# Patient Record
Sex: FEMALE | Race: WHITE | NOT HISPANIC OR LATINO | Employment: OTHER | URBAN - METROPOLITAN AREA
[De-identification: names, ages, dates, MRNs, and addresses within clinical notes are randomized per-mention and may not be internally consistent; named-entity substitution may affect disease eponyms.]

---

## 2018-05-22 ENCOUNTER — OFFICE VISIT (OUTPATIENT)
Dept: FAMILY MEDICINE | Facility: CLINIC | Age: 68
End: 2018-05-22
Payer: MEDICARE

## 2018-05-22 VITALS
HEIGHT: 60 IN | RESPIRATION RATE: 18 BRPM | BODY MASS INDEX: 28.86 KG/M2 | SYSTOLIC BLOOD PRESSURE: 118 MMHG | DIASTOLIC BLOOD PRESSURE: 68 MMHG | TEMPERATURE: 98 F | HEART RATE: 73 BPM | WEIGHT: 147 LBS | OXYGEN SATURATION: 98 %

## 2018-05-22 DIAGNOSIS — L73.2 HYDRADENITIS: ICD-10-CM

## 2018-05-22 DIAGNOSIS — M53.3 COCCYDYNIA: Primary | ICD-10-CM

## 2018-05-22 PROCEDURE — 99204 OFFICE O/P NEW MOD 45 MIN: CPT | Mod: S$GLB,,, | Performed by: FAMILY MEDICINE

## 2018-05-22 PROCEDURE — 3008F BODY MASS INDEX DOCD: CPT | Mod: CPTII,S$GLB,, | Performed by: FAMILY MEDICINE

## 2018-05-22 PROCEDURE — 99999 PR PBB SHADOW E&M-NEW PATIENT-LVL IV: CPT | Mod: PBBFAC,,, | Performed by: FAMILY MEDICINE

## 2018-05-22 RX ORDER — ALPRAZOLAM 0.5 MG/1
0.5 TABLET ORAL 2 TIMES DAILY PRN
Refills: 0 | COMMUNITY
Start: 2018-03-20 | End: 2018-10-31 | Stop reason: SDUPTHER

## 2018-05-22 RX ORDER — SULFAMETHOXAZOLE AND TRIMETHOPRIM 800; 160 MG/1; MG/1
1 TABLET ORAL 2 TIMES DAILY
Qty: 20 TABLET | Refills: 0 | Status: SHIPPED | OUTPATIENT
Start: 2018-05-22 | End: 2018-06-01

## 2018-05-22 RX ORDER — AMITRIPTYLINE HYDROCHLORIDE 25 MG/1
25 TABLET, FILM COATED ORAL NIGHTLY PRN
Refills: 2 | COMMUNITY
Start: 2018-02-23 | End: 2019-05-13

## 2018-05-22 RX ORDER — ROSUVASTATIN CALCIUM 40 MG/1
40 TABLET, COATED ORAL DAILY
COMMUNITY
End: 2021-05-31 | Stop reason: SDUPTHER

## 2018-05-22 RX ORDER — LOSARTAN POTASSIUM 25 MG/1
25 TABLET ORAL DAILY
Refills: 3 | COMMUNITY
Start: 2018-03-01 | End: 2018-12-05

## 2018-05-22 RX ORDER — BUTALBITAL, ACETAMINOPHEN AND CAFFEINE 50; 325; 40 MG/1; MG/1; MG/1
TABLET ORAL
Refills: 4 | COMMUNITY
Start: 2018-05-04 | End: 2018-10-31 | Stop reason: SDUPTHER

## 2018-05-22 RX ORDER — FLUTICASONE PROPIONATE 50 MCG
1 SPRAY, SUSPENSION (ML) NASAL DAILY
COMMUNITY
End: 2022-05-25

## 2018-05-23 ENCOUNTER — TELEPHONE (OUTPATIENT)
Dept: FAMILY MEDICINE | Facility: CLINIC | Age: 68
End: 2018-05-23

## 2018-05-23 NOTE — TELEPHONE ENCOUNTER
----- Message from Damaris Adrian sent at 5/23/2018 10:09 AM CDT -----  Contact: [phone]/ self   Patient would like to know if you've gotten her xray results. Please advise.

## 2018-05-25 ENCOUNTER — OFFICE VISIT (OUTPATIENT)
Dept: FAMILY MEDICINE | Facility: CLINIC | Age: 68
End: 2018-05-25
Payer: MEDICARE

## 2018-05-25 VITALS
TEMPERATURE: 98 F | SYSTOLIC BLOOD PRESSURE: 122 MMHG | DIASTOLIC BLOOD PRESSURE: 78 MMHG | WEIGHT: 147 LBS | RESPIRATION RATE: 18 BRPM | BODY MASS INDEX: 28.86 KG/M2 | OXYGEN SATURATION: 97 % | HEIGHT: 60 IN | HEART RATE: 78 BPM

## 2018-05-25 DIAGNOSIS — Z12.39 BREAST CANCER SCREENING: ICD-10-CM

## 2018-05-25 DIAGNOSIS — Z12.11 COLON CANCER SCREENING: ICD-10-CM

## 2018-05-25 DIAGNOSIS — R80.9 MICROALBUMINURIA: Primary | ICD-10-CM

## 2018-05-25 DIAGNOSIS — E11.29 TYPE 2 DIABETES MELLITUS WITH OTHER DIABETIC KIDNEY COMPLICATION, WITHOUT LONG-TERM CURRENT USE OF INSULIN: ICD-10-CM

## 2018-05-25 PROCEDURE — 99214 OFFICE O/P EST MOD 30 MIN: CPT | Mod: S$GLB,,, | Performed by: FAMILY MEDICINE

## 2018-05-25 PROCEDURE — 99999 PR PBB SHADOW E&M-EST. PATIENT-LVL IV: CPT | Mod: PBBFAC,,, | Performed by: FAMILY MEDICINE

## 2018-05-25 PROCEDURE — 3008F BODY MASS INDEX DOCD: CPT | Mod: CPTII,S$GLB,, | Performed by: FAMILY MEDICINE

## 2018-05-28 NOTE — PROGRESS NOTES
HPI:  Jayla Loyd is a 67 y.o. year old female that  Presents fro test results  Chief Complaint   Patient presents with    Follow-up     xray/lab results   .     HPI      Past Medical History:   Diagnosis Date    Diabetes mellitus type I     Migraines     Proteinuria      Social History     Social History    Marital status:      Spouse name: N/A    Number of children: N/A    Years of education: N/A     Occupational History    Not on file.     Social History Main Topics    Smoking status: Never Smoker    Smokeless tobacco: Never Used    Alcohol use Yes      Comment: rarely    Drug use: No    Sexual activity: Not on file     Other Topics Concern    Not on file     Social History Narrative    No narrative on file     History reviewed. No pertinent surgical history.  Family History   Problem Relation Age of Onset    Bladder Cancer Mother     Hypertension Mother         CABG    Heart failure Father         CABG    Hypertension Father     No Known Problems Son            Review of Systems  General ROS: negative for chills, fever or weight loss  ENT ROS: negative for epistaxis, sore throat or rhinorrhea  Respiratory ROS: no cough, shortness of breath, or wheezing  Cardiovascular ROS: no chest pain or dyspnea on exertion  Gastrointestinal ROS: no abdominal pain, change in bowel habits, or black/ bloody stools    Physical Exam:  /78 (BP Location: Right arm, Patient Position: Sitting, BP Method: Medium (Manual))   Pulse 78   Temp 97.6 °F (36.4 °C) (Oral)   Resp 18   Ht 5' (1.524 m)   Wt 66.7 kg (147 lb)   LMP 05/25/1998 (Approximate)   SpO2 97%   BMI 28.71 kg/m²   General appearance: alert, cooperative, no distress  Constitutional:Oriented to person, place, and time.appears well-developed and well-nourished.  HEENT: Normocephalic, atraumatic, neck symmetrical, no nasal discharge, TM- clear bilaterally  Lungs: clear to auscultation bilaterally, no dullness to percussion  bilaterally  Heart: regular rate and rhythm without rub; no displacement of the PMI , S1&S2 present  Abdomen: soft, non-tender; bowel sounds normoactive; no organomegaly  Physical Exam    LABS:    Complete Blood Count  No results found for: RBC, HGB, HCT, MCV, MCH, MCHC, RDW, PLT, MPV, GRAN, LYMPH, MONO, EOS, BASO, GRAN, LYMPH, MONO, EOSINOPHIL, BASOPHIL, DIFFMETHOD    Comprehensive Metabolic Panel  No results found for: GLU, BUN, CREATININE, NA, K, CL, PROT, ALBUMIN, BILITOT, AST, ALKPHOS, CO2, ALT, ANIONGAP, EGFRNONAA, ESTGFRAFRICA    LIPID  No results found for: CHOL, HDL      TSH  No results found for: TSH    Current Outpatient Prescriptions   Medication Sig Dispense Refill    ALPRAZolam (XANAX) 0.5 MG tablet Take 0.5 mg by mouth 2 (two) times daily.  0    amitriptyline (ELAVIL) 25 MG tablet Take 25 mg by mouth every evening.  2    butalbital-acetaminophen-caffeine -40 mg (FIORICET, ESGIC) -40 mg per tablet TAKE 1-2 TABLETS BY MOUTH EVERY 4 HOURS AS NEEDED. NOT EXCEED 6 TABLETS PER 24 HOURS.  4    citalopram (CELEXA) 10 MG tablet Take 40 mg by mouth once daily.       fluticasone (FLONASE) 50 mcg/actuation nasal spray 1 spray by Each Nare route once daily.      losartan (COZAAR) 25 MG tablet Take 25 mg by mouth once daily.  3    metformin (GLUCOPHAGE) 500 MG tablet Take 500 mg by mouth 2 (two) times daily with meals.      rosuvastatin (CRESTOR) 40 MG Tab Take 10 mg by mouth every evening.      sulfamethoxazole-trimethoprim 800-160mg (BACTRIM DS) 800-160 mg Tab Take 1 tablet by mouth 2 (two) times daily. 20 tablet 0     No current facility-administered medications for this visit.        Assessment:    ICD-10-CM ICD-9-CM    1. Microalbuminuria R80.9 791.0 Ambulatory Referral to Nephrology   2. Type 2 diabetes mellitus with other diabetic kidney complication, without long-term current use of insulin E11.29 250.40 Ambulatory Referral to Nephrology         Plan:    Follow-up in about 7 weeks  (around 7/11/2018).          Janie Do MD

## 2018-05-28 NOTE — PROGRESS NOTES
HPI:  Jayla Loyd is a 67 y.o. year old female that  presents for evaluation f a swelling between her buttok cheeks that feels like an uncomfortable mass when she is sitting. She fell on her bottom over a week ago. She also has a small swelling under her right arm pit.  Chief Complaint   Patient presents with    Mass     pt states its under her tailbone, also a cyst under her right arm pit that she would like to have evaluated    .     HPI      Past Medical History:   Diagnosis Date    Diabetes mellitus type I     Migraines     Proteinuria      Social History     Social History    Marital status:      Spouse name: N/A    Number of children: N/A    Years of education: N/A     Occupational History    Not on file.     Social History Main Topics    Smoking status: Never Smoker    Smokeless tobacco: Never Used    Alcohol use Yes      Comment: rarely    Drug use: No    Sexual activity: Not on file     Other Topics Concern    Not on file     Social History Narrative    No narrative on file     History reviewed. No pertinent surgical history.  Family History   Problem Relation Age of Onset    Bladder Cancer Mother     Hypertension Mother         CABG    Heart failure Father         CABG    Hypertension Father     No Known Problems Son            Review of Systems  General ROS: negative for chills, fever or weight loss  ENT ROS: negative for epistaxis, sore throat or rhinorrhea  Respiratory ROS: no cough, shortness of breath, or wheezing  Cardiovascular ROS: no chest pain or dyspnea on exertion  Gastrointestinal ROS: no abdominal pain, change in bowel habits, or black/ bloody stools  Skin: right axilla 2 swellings without drainage    Physical Exam:  /68 (BP Location: Right arm, Patient Position: Sitting, BP Method: Medium (Manual))   Pulse 73   Temp 98.2 °F (36.8 °C) (Oral)   Resp 18   Ht 5' (1.524 m)   Wt 66.7 kg (147 lb)   SpO2 98%   BMI 28.71 kg/m²   General appearance: alert,  cooperative, no distress  Constitutional:Oriented to person, place, and time.appears well-developed and well-nourished.  HEENT: Normocephalic, atraumatic, neck symmetrical, no nasal discharge, TM- clear bilaterally  Lungs: clear to auscultation bilaterally, no dullness to percussion bilaterally  Heart: regular rate and rhythm without rub; no displacement of the PMI , S1&S2 present  Abdomen: soft, non-tender; bowel sounds normoactive; no organomegaly  Axilla: single pustule and small single swelling fluctuant mass with enduration  Physical Exam    LABS:    Complete Blood Count  No results found for: RBC, HGB, HCT, MCV, MCH, MCHC, RDW, PLT, MPV, GRAN, LYMPH, MONO, EOS, BASO, GRAN, LYMPH, MONO, EOSINOPHIL, BASOPHIL, DIFFMETHOD    Comprehensive Metabolic Panel  No results found for: GLU, BUN, CREATININE, NA, K, CL, PROT, ALBUMIN, BILITOT, AST, ALKPHOS, CO2, ALT, ANIONGAP, EGFRNONAA, ESTGFRAFRICA    LIPID  No results found for: CHOL, HDL      TSH  No results found for: TSH    Current Outpatient Prescriptions   Medication Sig Dispense Refill    ALPRAZolam (XANAX) 0.5 MG tablet Take 0.5 mg by mouth 2 (two) times daily.  0    amitriptyline (ELAVIL) 25 MG tablet Take 25 mg by mouth every evening.  2    butalbital-acetaminophen-caffeine -40 mg (FIORICET, ESGIC) -40 mg per tablet TAKE 1-2 TABLETS BY MOUTH EVERY 4 HOURS AS NEEDED. NOT EXCEED 6 TABLETS PER 24 HOURS.  4    citalopram (CELEXA) 10 MG tablet Take 40 mg by mouth once daily.       fluticasone (FLONASE) 50 mcg/actuation nasal spray 1 spray by Each Nare route once daily.      losartan (COZAAR) 25 MG tablet Take 25 mg by mouth once daily.  3    metformin (GLUCOPHAGE) 500 MG tablet Take 500 mg by mouth 2 (two) times daily with meals.      rosuvastatin (CRESTOR) 40 MG Tab Take 10 mg by mouth every evening.      sulfamethoxazole-trimethoprim 800-160mg (BACTRIM DS) 800-160 mg Tab Take 1 tablet by mouth 2 (two) times daily. 20 tablet 0     No current  facility-administered medications for this visit.        Assessment:    ICD-10-CM ICD-9-CM    1. Coccydynia M53.3 724.79 X-Ray Sacrum And Coccyx   2. Hydradenitis L73.2 705.83 sulfamethoxazole-trimethoprim 800-160mg (BACTRIM DS) 800-160 mg Tab         Plan:    Follow-up in 3 days (on 5/25/2018).          Janie Do MD

## 2018-07-11 ENCOUNTER — TELEPHONE (OUTPATIENT)
Dept: FAMILY MEDICINE | Facility: CLINIC | Age: 68
End: 2018-07-11

## 2018-07-11 ENCOUNTER — OFFICE VISIT (OUTPATIENT)
Dept: FAMILY MEDICINE | Facility: CLINIC | Age: 68
End: 2018-07-11
Payer: MEDICARE

## 2018-07-11 VITALS
TEMPERATURE: 98 F | RESPIRATION RATE: 18 BRPM | WEIGHT: 146 LBS | DIASTOLIC BLOOD PRESSURE: 66 MMHG | OXYGEN SATURATION: 98 % | HEIGHT: 60 IN | BODY MASS INDEX: 28.66 KG/M2 | SYSTOLIC BLOOD PRESSURE: 122 MMHG | HEART RATE: 63 BPM

## 2018-07-11 DIAGNOSIS — G43.909 MIGRAINE WITHOUT STATUS MIGRAINOSUS, NOT INTRACTABLE, UNSPECIFIED MIGRAINE TYPE: Primary | ICD-10-CM

## 2018-07-11 DIAGNOSIS — Z11.59 NEED FOR HEPATITIS C SCREENING TEST: ICD-10-CM

## 2018-07-11 PROCEDURE — 99214 OFFICE O/P EST MOD 30 MIN: CPT | Mod: S$GLB,,, | Performed by: FAMILY MEDICINE

## 2018-07-11 PROCEDURE — 99999 PR PBB SHADOW E&M-EST. PATIENT-LVL III: CPT | Mod: PBBFAC,,, | Performed by: FAMILY MEDICINE

## 2018-07-11 PROCEDURE — 3008F BODY MASS INDEX DOCD: CPT | Mod: CPTII,S$GLB,, | Performed by: FAMILY MEDICINE

## 2018-07-11 PROCEDURE — 3078F DIAST BP <80 MM HG: CPT | Mod: CPTII,S$GLB,, | Performed by: FAMILY MEDICINE

## 2018-07-11 PROCEDURE — 3074F SYST BP LT 130 MM HG: CPT | Mod: CPTII,S$GLB,, | Performed by: FAMILY MEDICINE

## 2018-07-11 RX ORDER — BLOOD SUGAR DIAGNOSTIC
STRIP MISCELLANEOUS
Refills: 3 | COMMUNITY
Start: 2018-06-02 | End: 2022-12-30 | Stop reason: SDUPTHER

## 2018-07-11 RX ORDER — LANCETS
EACH MISCELLANEOUS
Refills: 3 | COMMUNITY
Start: 2018-06-02 | End: 2022-12-30 | Stop reason: SDUPTHER

## 2018-07-11 RX ORDER — KETOROLAC TROMETHAMINE 10 MG/1
10 TABLET, FILM COATED ORAL EVERY 6 HOURS PRN
Qty: 90 TABLET | Refills: 2 | Status: SHIPPED | OUTPATIENT
Start: 2018-07-11 | End: 2018-07-24

## 2018-07-11 NOTE — PROGRESS NOTES
HPI:  Jayla Loyd is a 67 y.o. year old female that  presents fro lab f/u. She would like to go over her labs from the nephrologist. She states that they were worried about her Phosphorous level. She was put on a very strict phosphorus restricted diet which she is finding very difficult to stick to.  She would like a medication to replace the Tramadol that she has been taking for her migraine headaches.  Chief Complaint   Patient presents with    Follow-up     lab results from another physican    Medication Problem     Tramadol   .     HPI      Past Medical History:   Diagnosis Date    Diabetes mellitus type I     GERD (gastroesophageal reflux disease)     Migraines     Proteinuria      Social History     Social History    Marital status:      Spouse name: N/A    Number of children: N/A    Years of education: N/A     Occupational History    Not on file.     Social History Main Topics    Smoking status: Never Smoker    Smokeless tobacco: Never Used    Alcohol use Yes      Comment: rarely    Drug use: Yes     Types: Amphetamines      Comment: per script from Dr. Vance    Sexual activity: Not on file     Other Topics Concern    Not on file     Social History Narrative    No narrative on file     Past Surgical History:   Procedure Laterality Date    CATARACT EXTRACTION       Family History   Problem Relation Age of Onset    Bladder Cancer Mother     Hypertension Mother         CABG    Cancer Mother     Heart disease Mother     Heart failure Father         CABG    Hypertension Father     Heart disease Father     No Known Problems Son     Cancer Maternal Aunt     Cancer Paternal Aunt     Diabetes Paternal Aunt     Cancer Maternal Grandmother     Heart disease Maternal Grandfather     Cancer Paternal Grandmother     Dementia Paternal Grandmother     Heart disease Paternal Grandfather            Review of Systems  General ROS: negative for chills, fever or weight loss  ENT ROS:  negative for epistaxis, sore throat or rhinorrhea  Respiratory ROS: no cough, shortness of breath, or wheezing  Cardiovascular ROS: no chest pain or dyspnea on exertion  Gastrointestinal ROS: no abdominal pain, change in bowel habits, or black/ bloody stools    Physical Exam:  /66   Pulse 63   Temp 98.3 °F (36.8 °C) (Oral)   Resp 18   Ht 5' (1.524 m)   Wt 66.2 kg (146 lb)   LMP 05/25/1998 (Approximate)   SpO2 98%   BMI 28.51 kg/m²   General appearance: alert, cooperative, no distress  Constitutional:Oriented to person, place, and time.appears well-developed and well-nourished.  Lungs: clear to auscultation bilaterally, no dullness to percussion bilaterally  Heart: regular rate and rhythm without rub; no displacement of the PMI , S1&S2 present    Physical Exam    LABS:    Complete Blood Count  Lab Results   Component Value Date    RBC 4.21 06/21/2018    HGB 12.0 06/21/2018    HCT 36 06/21/2018     06/21/2018    MPV 9.3 06/21/2018    EOSINOPHIL 1.9 06/21/2018    BASOPHIL 1 06/21/2018       Comprehensive Metabolic Panel  Lab Results   Component Value Date     (A) 06/21/2018    BUN 13 05/14/2018    CREATININE 0.65 06/21/2018     06/21/2018    K 3.9 06/21/2018     06/21/2018    PROT 6.8 06/21/2018    ALBUMIN 4.1 06/21/2018    AST 14 06/21/2018    CO2 30 06/21/2018    ALT 15 06/21/2018    ANIONGAP 17 05/14/2018       LIPID  No results found for: CHOL, HDL      TSH  No results found for: TSH    Current Outpatient Prescriptions   Medication Sig Dispense Refill    ACCU-CHEK SAPPHIRE PLUS TEST STRP Strp Use as directed every day  3    ACCU-CHEK SOFTCLIX LANCETS Misc USE AS DIRECTED EVERY DAY  3    ALPRAZolam (XANAX) 0.5 MG tablet Take 0.5 mg by mouth 2 (two) times daily as needed.   0    amitriptyline (ELAVIL) 25 MG tablet Take 25 mg by mouth nightly as needed.   2    butalbital-acetaminophen-caffeine -40 mg (FIORICET, ESGIC) -40 mg per tablet TAKE 1-2 TABLETS BY MOUTH  EVERY 4 HOURS AS NEEDED. NOT EXCEED 6 TABLETS PER 24 HOURS.  4    citalopram (CELEXA) 10 MG tablet Take 40 mg by mouth once daily.       coenzyme Q10 (CO Q-10) 100 mg capsule Take 100 mg by mouth once daily.      dicyclomine (BENTYL) 20 mg tablet Take 20 mg by mouth as needed.      esomeprazole (NEXIUM) 40 MG capsule Take 40 mg by mouth once daily.      ferrous sulfate (IRON) 325 mg (65 mg iron) CpSR Take 65 mg by mouth once daily.      fluticasone (FLONASE) 50 mcg/actuation nasal spray 1 spray by Each Nare route once daily.      folic acid/multivit-min/lutein (CENTRUM SILVER ORAL) Take by mouth once daily.      Lactobac no.41/Bifidobact no.7 (PROBIOTIC-10 ORAL) Take by mouth once daily.      losartan (COZAAR) 25 MG tablet Take 25 mg by mouth once daily. 1/2 daily  3    metformin (GLUCOPHAGE) 500 MG tablet Take 500 mg by mouth daily with breakfast.       pseudoephedrine (SUDAFED) 30 MG tablet Take 30 mg by mouth every 4 (four) hours as needed for Congestion.      ranitidine (ZANTAC) 150 MG capsule Take 150 mg by mouth 2 (two) times daily.      rosuvastatin (CRESTOR) 40 MG Tab Take 40 mg by mouth once daily.       traMADol (ULTRAM) 50 mg tablet Take 50 mg by mouth every 6 (six) hours as needed for Pain.      TURMERIC, BULK, MISC 1,500 mg by Misc.(Non-Drug; Combo Route) route once daily.      ketorolac (TORADOL) 10 mg tablet Take 1 tablet (10 mg total) by mouth every 6 (six) hours as needed for Pain. 90 tablet 2    magnesium oxide (MAG-OX) 400 mg tablet Take 400 mg by mouth once daily.       No current facility-administered medications for this visit.        Assessment:    ICD-10-CM ICD-9-CM    1. Migraine without status migrainosus, not intractable, unspecified migraine type G43.909 346.90 ketorolac (TORADOL) 10 mg tablet   2. Need for hepatitis C screening test Z11.59 V73.89 Hepatitis C antibody      Hepatitis C antibody         Plan:    Follow-up in 4 weeks (on 8/7/2018).          Janie STOREY  MD Hi

## 2018-07-11 NOTE — TELEPHONE ENCOUNTER
----- Message from Jailene Marshall sent at 7/11/2018 10:03 AM CDT -----  Contact: salo with [facility] [phone]  Pharmacist requested to speak with the nurse to verify if the doctor received the prior auth request for the ketorolac (TORADOL) 10 mg tablet. Please call and advise.

## 2018-07-12 ENCOUNTER — TELEPHONE (OUTPATIENT)
Dept: FAMILY MEDICINE | Facility: CLINIC | Age: 68
End: 2018-07-12

## 2018-07-12 ENCOUNTER — TELEPHONE (OUTPATIENT)
Dept: ADMINISTRATIVE | Facility: HOSPITAL | Age: 68
End: 2018-07-12

## 2018-07-12 NOTE — TELEPHONE ENCOUNTER
----- Message from Precious Flores sent at 7/12/2018 11:08 AM CDT -----  Contact: Self. [phone]  Patient would like to speak with you about [facility] not having the paperwork for her medication. Please advise

## 2018-07-12 NOTE — TELEPHONE ENCOUNTER
----- Message from Jennifer Hurley sent at 7/12/2018  9:12 AM CDT -----  Contact: Myron from [facility]/ [phone]  Pharmacy called in to ask if they can change ketorolac (TORADOL) 10 mg tablet to another medication.    Please call and advise.

## 2018-07-18 ENCOUNTER — TELEPHONE (OUTPATIENT)
Dept: FAMILY MEDICINE | Facility: CLINIC | Age: 68
End: 2018-07-18

## 2018-07-18 NOTE — TELEPHONE ENCOUNTER
----- Message from Deja Do sent at 7/18/2018  3:13 PM CDT -----  Contact: [phone]/ self  Patient requesting to speak with you regarding issues she's experiencing with her coccyx. Please advise. Pt prefers to speak with Nancy.

## 2018-07-23 NOTE — PROGRESS NOTES
CRS Office Visit History and Physical    Referring Md:   Aaareferral Self  No address on file    SUBJECTIVE:     Chief Complaint:  Tailbone pain    History of Present Illness:  Patient is a 67 y.o. female presents with tail bone pain. The patient is a new patient to this practice.   Course is as follows:  In May 2018, she fell directly on her tailbone.  She was seen by her primary care provider.  X-ray demonstrated no fracture.  She has continued to have intermittent pain with sitting.  This is not every time.  She has no pain with ambulation.  She has no pain during defecation.  No similar episodes in the past.  She has not taken any medication for this.  She does not feel the pain is improved or worsened over the past 2 months.  She presents for evaluation given the ongoing nature of the pain. No family history of colon or rectal cancer.  In terms of her functional status, she is constipated at baseline.  She takes fiber daily as well as Dulcolax.  She has 2-3 bowel movements per week.  This has been her baseline for multiple years.    Last Colonoscopy: 2/2/17  One diminutive polyp in the transverse colon.   Resected and retrieved.  Non bleeding internal hemorrhoids.   The examination was otherwise normal.   Repeat colonoscopy in 5 years for surveilance.       Review of patient's allergies indicates:   Allergen Reactions    Codeine Nausea Only       Past Medical History:   Diagnosis Date    Diabetes mellitus type I     GERD (gastroesophageal reflux disease)     Migraines     Proteinuria      Past Surgical History:   Procedure Laterality Date    CATARACT EXTRACTION       Family History   Problem Relation Age of Onset    Bladder Cancer Mother     Hypertension Mother         CABG    Cancer Mother     Heart disease Mother     Heart failure Father         CABG    Hypertension Father     Heart disease Father     No Known Problems Son     Cancer Maternal Aunt     Cancer Paternal Aunt     Diabetes  Paternal Aunt     Cancer Maternal Grandmother     Heart disease Maternal Grandfather     Cancer Paternal Grandmother     Dementia Paternal Grandmother     Heart disease Paternal Grandfather      Social History   Substance Use Topics    Smoking status: Never Smoker    Smokeless tobacco: Never Used    Alcohol use Yes      Comment: rarely        Review of Systems:  Review of Systems   Constitutional: Negative for chills, diaphoresis, fever, malaise/fatigue and weight loss.   HENT: Negative for congestion.    Respiratory: Negative for shortness of breath.    Cardiovascular: Negative for chest pain and leg swelling.   Gastrointestinal: Positive for constipation. Negative for abdominal pain, blood in stool, nausea and vomiting.   Genitourinary: Negative for dysuria.   Musculoskeletal: Positive for back pain and joint pain. Negative for myalgias.   Skin: Negative for rash.   Neurological: Negative for dizziness and weakness.   Endo/Heme/Allergies: Does not bruise/bleed easily.   Psychiatric/Behavioral: Negative for depression.       OBJECTIVE:     Vital Signs (Most Recent)  BP (!) 170/82 (BP Location: Left arm, Patient Position: Sitting, BP Method: Medium (Automatic))   Wt 67.2 kg (148 lb 2.4 oz)   LMP 05/25/1998 (Approximate)   BMI 28.93 kg/m²     Physical Exam:  General: White female in no distress   Neuro: alert and oriented x 4.  Moves all extremities.     HEENT: no icterus.  Trachea midline  Respiratory: respirations are even and unlabored  Cardiac: regular rate  Abdomen:  Soft, nontender, no masses  Extremities: Warm dry and intact  Skin: no rashes  Anorectal:  External exam is normal. No tenderness to direct palpation of the sacrum or coccyx.  No tenderness in the perianal area. No external hemorrhoids.  No fissure.  Digital exam was performed. Normal anal tone. No tenderness on palpation of bilateral levator muscles.  Moderate tenderness to direct palpation of the coccyx posteriorly.  No mass palpated.  No presacral skin dimple    Labs: CBC and electrolytes and renal function normal from 2018    Imagin18:  Pelvic XR : There is no fracture, dislocation, or bony erosion      ASSESSMENT/PLAN:     Jayla was seen today for tailbone pain.    Diagnoses and all orders for this visit:    Coccydynia    Constipation, unspecified constipation type        67-year-old woman with coccydynia following a fall 2 months ago.  We discussed the treatment of this with inset AIDS over the following week.  If she continues to have pain in 2 weeks, we will proceed with an MRI for further evaluation.  I have provided her with reassurance today.  In regards to her constipation, I have asked her to start taking MiraLax once at night as well as fiber daily.    ARCADIO Duenas MD  Staff Surgeon  Colon & Rectal Surgery

## 2018-07-24 ENCOUNTER — INITIAL CONSULT (OUTPATIENT)
Dept: SURGERY | Facility: CLINIC | Age: 68
End: 2018-07-24
Payer: MEDICARE

## 2018-07-24 VITALS
DIASTOLIC BLOOD PRESSURE: 82 MMHG | SYSTOLIC BLOOD PRESSURE: 170 MMHG | BODY MASS INDEX: 28.93 KG/M2 | WEIGHT: 148.13 LBS

## 2018-07-24 DIAGNOSIS — K59.00 CONSTIPATION, UNSPECIFIED CONSTIPATION TYPE: ICD-10-CM

## 2018-07-24 DIAGNOSIS — M53.3 COCCYDYNIA: Primary | ICD-10-CM

## 2018-07-24 PROCEDURE — 99203 OFFICE O/P NEW LOW 30 MIN: CPT | Mod: S$GLB,,, | Performed by: COLON & RECTAL SURGERY

## 2018-07-24 PROCEDURE — 99999 PR PBB SHADOW E&M-EST. PATIENT-LVL II: CPT | Mod: PBBFAC,,, | Performed by: COLON & RECTAL SURGERY

## 2018-07-24 PROCEDURE — 3079F DIAST BP 80-89 MM HG: CPT | Mod: CPTII,S$GLB,, | Performed by: COLON & RECTAL SURGERY

## 2018-07-24 PROCEDURE — 3077F SYST BP >= 140 MM HG: CPT | Mod: CPTII,S$GLB,, | Performed by: COLON & RECTAL SURGERY

## 2018-07-24 PROCEDURE — 3008F BODY MASS INDEX DOCD: CPT | Mod: CPTII,S$GLB,, | Performed by: COLON & RECTAL SURGERY

## 2018-07-27 LAB
HCV AB S/CO SERPL IA: 0.01
HCV AB SERPL QL IA: NORMAL

## 2018-08-07 ENCOUNTER — OFFICE VISIT (OUTPATIENT)
Dept: SURGERY | Facility: CLINIC | Age: 68
End: 2018-08-07
Payer: MEDICARE

## 2018-08-07 ENCOUNTER — OFFICE VISIT (OUTPATIENT)
Dept: FAMILY MEDICINE | Facility: CLINIC | Age: 68
End: 2018-08-07
Payer: MEDICARE

## 2018-08-07 VITALS
TEMPERATURE: 98 F | WEIGHT: 151.81 LBS | BODY MASS INDEX: 29.8 KG/M2 | HEIGHT: 60 IN | HEART RATE: 70 BPM | OXYGEN SATURATION: 97 % | SYSTOLIC BLOOD PRESSURE: 122 MMHG | DIASTOLIC BLOOD PRESSURE: 70 MMHG

## 2018-08-07 VITALS
OXYGEN SATURATION: 98 % | BODY MASS INDEX: 29.82 KG/M2 | TEMPERATURE: 98 F | RESPIRATION RATE: 18 BRPM | DIASTOLIC BLOOD PRESSURE: 70 MMHG | SYSTOLIC BLOOD PRESSURE: 122 MMHG | HEART RATE: 76 BPM | WEIGHT: 151.88 LBS | HEIGHT: 60 IN

## 2018-08-07 DIAGNOSIS — E11.29 TYPE 2 DIABETES MELLITUS WITH OTHER DIABETIC KIDNEY COMPLICATION, WITHOUT LONG-TERM CURRENT USE OF INSULIN: Primary | ICD-10-CM

## 2018-08-07 DIAGNOSIS — Z12.31 ENCOUNTER FOR SCREENING MAMMOGRAM FOR BREAST CANCER: ICD-10-CM

## 2018-08-07 DIAGNOSIS — Z78.0 MENOPAUSE: ICD-10-CM

## 2018-08-07 DIAGNOSIS — K59.00 CONSTIPATION, UNSPECIFIED CONSTIPATION TYPE: ICD-10-CM

## 2018-08-07 DIAGNOSIS — R80.9 MICROALBUMINURIA: ICD-10-CM

## 2018-08-07 DIAGNOSIS — M53.3 COCCYDYNIA: Primary | ICD-10-CM

## 2018-08-07 DIAGNOSIS — K21.9 GASTROESOPHAGEAL REFLUX DISEASE, ESOPHAGITIS PRESENCE NOT SPECIFIED: ICD-10-CM

## 2018-08-07 PROCEDURE — 3078F DIAST BP <80 MM HG: CPT | Mod: CPTII,S$GLB,, | Performed by: FAMILY MEDICINE

## 2018-08-07 PROCEDURE — 3074F SYST BP LT 130 MM HG: CPT | Mod: CPTII,S$GLB,, | Performed by: COLON & RECTAL SURGERY

## 2018-08-07 PROCEDURE — 99999 PR PBB SHADOW E&M-EST. PATIENT-LVL III: CPT | Mod: PBBFAC,,, | Performed by: COLON & RECTAL SURGERY

## 2018-08-07 PROCEDURE — 3078F DIAST BP <80 MM HG: CPT | Mod: CPTII,S$GLB,, | Performed by: COLON & RECTAL SURGERY

## 2018-08-07 PROCEDURE — 3074F SYST BP LT 130 MM HG: CPT | Mod: CPTII,S$GLB,, | Performed by: FAMILY MEDICINE

## 2018-08-07 PROCEDURE — 3008F BODY MASS INDEX DOCD: CPT | Mod: CPTII,S$GLB,, | Performed by: COLON & RECTAL SURGERY

## 2018-08-07 PROCEDURE — 99999 PR PBB SHADOW E&M-EST. PATIENT-LVL III: CPT | Mod: PBBFAC,,, | Performed by: FAMILY MEDICINE

## 2018-08-07 PROCEDURE — 99213 OFFICE O/P EST LOW 20 MIN: CPT | Mod: S$GLB,,, | Performed by: COLON & RECTAL SURGERY

## 2018-08-07 PROCEDURE — 3008F BODY MASS INDEX DOCD: CPT | Mod: CPTII,S$GLB,, | Performed by: FAMILY MEDICINE

## 2018-08-07 PROCEDURE — 99214 OFFICE O/P EST MOD 30 MIN: CPT | Mod: S$GLB,,, | Performed by: FAMILY MEDICINE

## 2018-08-07 RX ORDER — ESOMEPRAZOLE MAGNESIUM 40 MG/1
40 CAPSULE, DELAYED RELEASE ORAL DAILY
Qty: 90 CAPSULE | Refills: 0 | Status: SHIPPED | OUTPATIENT
Start: 2018-08-07 | End: 2019-09-24 | Stop reason: SDUPTHER

## 2018-08-07 NOTE — PROGRESS NOTES
HPI:  Jayla Loyd is a 67 y.o. year old female that presents for f/u of her mammogram and dexa scan. She has an [id number] with Dr. Alvarez.She states that she is actually feeling well at this time.  Chief Complaint   Patient presents with    Follow-up     pt would like to go over Dr. Alvarez labs--mammo/dexa scan    .     HPI      Past Medical History:   Diagnosis Date    Diabetes mellitus type I     GERD (gastroesophageal reflux disease)     Migraines     Proteinuria      Social History     Socioeconomic History    Marital status:      Spouse name: Not on file    Number of children: Not on file    Years of education: Not on file    Highest education level: Not on file   Social Needs    Financial resource strain: Not on file    Food insecurity - worry: Not on file    Food insecurity - inability: Not on file    Transportation needs - medical: Not on file    Transportation needs - non-medical: Not on file   Occupational History    Not on file   Tobacco Use    Smoking status: Never Smoker    Smokeless tobacco: Never Used   Substance and Sexual Activity    Alcohol use: Yes     Comment: rarely    Drug use: Yes     Types: Amphetamines     Comment: per script from Dr. Vance    Sexual activity: Not on file   Other Topics Concern    Not on file   Social History Narrative    Not on file     Past Surgical History:   Procedure Laterality Date    CATARACT EXTRACTION       Family History   Problem Relation Age of Onset    Bladder Cancer Mother     Hypertension Mother         CABG    Cancer Mother     Heart disease Mother     Heart failure Father         CABG    Hypertension Father     Heart disease Father     No Known Problems Son     Cancer Maternal Aunt     Cancer Paternal Aunt     Diabetes Paternal Aunt     Cancer Maternal Grandmother     Heart disease Maternal Grandfather     Cancer Paternal Grandmother     Dementia Paternal Grandmother     Heart disease Paternal Grandfather             Review of Systems  General ROS: negative for chills, fever or weight loss  ENT ROS: negative for epistaxis, sore throat or rhinorrhea  Respiratory ROS: no cough, shortness of breath, or wheezing  Cardiovascular ROS: no chest pain or dyspnea on exertion  Gastrointestinal ROS: no abdominal pain, change in bowel habits, or black/ bloody stools    Physical Exam:  /70 (BP Location: Right arm)   Pulse 76   Temp 98.4 °F (36.9 °C) (Oral)   Resp 18   Ht 5' (1.524 m)   Wt 68.9 kg (151 lb 14.4 oz)   LMP 05/25/1998 (Approximate)   SpO2 98%   BMI 29.67 kg/m²   General appearance: alert, cooperative, no distress  Constitutional:Oriented to person, place, and time.appears well-developed and well-nourished.  Lungs: clear to auscultation bilaterally, no dullness to percussion bilaterally  Heart: regular rate and rhythm without rub; no displacement of the PMI , S1&S2 present  Abdomen: soft, non-tender; bowel sounds normoactive; no organomegaly  Physical Exam    LABS:    Complete Blood Count  Lab Results   Component Value Date    RBC 4.11 07/26/2018    HGB 12.2 07/26/2018    HCT 35.7 07/26/2018     07/26/2018    MPV 9.4 07/26/2018    EOSINOPHIL 2.5 07/26/2018    BASOPHIL 1 07/26/2018       Comprehensive Metabolic Panel  Lab Results   Component Value Date     (A) 06/21/2018    BUN 18 07/26/2018    CREATININE 0.65 06/21/2018     07/26/2018    K 4.2 07/26/2018     07/26/2018    PROT 6.8 06/21/2018    ALBUMIN 4.0 07/26/2018    ALBUMIN 53 07/26/2018    AST 16 07/26/2018    CO2 27 07/26/2018    ALT 27 07/26/2018    ANIONGAP 17 05/14/2018       LIPID  No results found for: CHOL, HDL      TSH  No results found for: TSH    Current Outpatient Medications   Medication Sig Dispense Refill    ACCU-CHEK SAPPHIRE PLUS TEST STRP Strp Use as directed every day  3    ACCU-CHEK SOFTCLIX LANCETS Misc USE AS DIRECTED EVERY DAY  3    ALPRAZolam (XANAX) 0.5 MG tablet Take 0.5 mg by mouth 2 (two) times daily  as needed.   0    amitriptyline (ELAVIL) 25 MG tablet Take 25 mg by mouth nightly as needed.   2    butalbital-acetaminophen-caffeine -40 mg (FIORICET, ESGIC) -40 mg per tablet TAKE 1-2 TABLETS BY MOUTH EVERY 4 HOURS AS NEEDED. NOT EXCEED 6 TABLETS PER 24 HOURS.  4    citalopram (CELEXA) 10 MG tablet Take 40 mg by mouth once daily.       coenzyme Q10 (CO Q-10) 100 mg capsule Take 100 mg by mouth once daily.      dicyclomine (BENTYL) 20 mg tablet Take 20 mg by mouth as needed.      esomeprazole (NEXIUM) 40 MG capsule Take 1 capsule (40 mg total) by mouth once daily. 90 capsule 0    fluticasone (FLONASE) 50 mcg/actuation nasal spray 1 spray by Each Nare route once daily.      folic acid/multivit-min/lutein (CENTRUM SILVER ORAL) Take by mouth once daily.      Lactobac no.41/Bifidobact no.7 (PROBIOTIC-10 ORAL) Take by mouth once daily.      losartan (COZAAR) 25 MG tablet Take 25 mg by mouth once daily. 1/2 daily  3    metformin (GLUCOPHAGE) 500 MG tablet Take 500 mg by mouth daily with breakfast.       pseudoephedrine (SUDAFED) 30 MG tablet Take 30 mg by mouth every 4 (four) hours as needed for Congestion.      ranitidine (ZANTAC) 150 MG capsule Take 150 mg by mouth 2 (two) times daily.      rosuvastatin (CRESTOR) 40 MG Tab Take 40 mg by mouth once daily.        No current facility-administered medications for this visit.        Assessment:    ICD-10-CM ICD-9-CM    1. Type 2 diabetes mellitus with other diabetic kidney complication, without long-term current use of insulin E11.29 250.40    2. Microalbuminuria R80.9 791.0    3. Menopause Z78.0 627.2 DXA Bone Density Spine And Hip   4. Encounter for screening mammogram for breast cancer Z12.31 V76.12 Mammo Digital Screening Bilateral With CAD   5. Gastroesophageal reflux disease, esophagitis presence not specified K21.9 530.81 esomeprazole (NEXIUM) 40 MG capsule         Plan:    Follow-up in 3 months (on 11/7/2018).          Janie Do,  MD

## 2018-08-07 NOTE — PROGRESS NOTES
CRS Office Visit Followup      SUBJECTIVE:     Chief Complaint:  Tailbone pain    History of Present Illness:  Patient is a 67 y.o. female presents with tail bone pain. The patient is an established patient to this practice.   Course is as follows:  In May 2018, she fell directly on her tailbone.  She was seen by her primary care provider.  X-ray demonstrated no fracture.  She has continued to have intermittent pain with sitting.  This is not every time.  She has no pain with ambulation.  She has no pain during defecation.  No similar episodes in the past.  She has not taken any medication for this.  She does not feel the pain is improved or worsened over the past 2 months.  She presents for evaluation given the ongoing nature of the pain. No family history of colon or rectal cancer.  In terms of her functional status, she is constipated at baseline.  She takes fiber daily as well as Dulcolax.  She has 2-3 bowel movements per week.  This has been her baseline for multiple years.  She was seen in clinic 2 weeks ago and found to have tenderness on direct palpation of her coccyx consistent with coccydynia.  She was given NSAIDs.   Presents today for followup.  She has been doing well.  Pain is improved.  No pain today.  Bowel movements have improved with MiraLax and fiber.  Now having almost daily bowel movements.    Last Colonoscopy: 2/2/17  One diminutive polyp in the transverse colon.   Resected and retrieved.  Non bleeding internal hemorrhoids.   The examination was otherwise normal.   Repeat colonoscopy in 5 years for surveilance.           Review of Systems:  Review of Systems   Constitutional: Negative for chills, diaphoresis, fever, malaise/fatigue and weight loss.   HENT: Negative for congestion.    Respiratory: Negative for shortness of breath.    Cardiovascular: Negative for chest pain and leg swelling.   Gastrointestinal: Positive for constipation. Negative for abdominal pain, blood in stool, nausea and  vomiting.   Genitourinary: Negative for dysuria.   Musculoskeletal: Positive for back pain and joint pain. Negative for myalgias.   Skin: Negative for rash.   Neurological: Negative for dizziness and weakness.   Endo/Heme/Allergies: Does not bruise/bleed easily.   Psychiatric/Behavioral: Negative for depression.       OBJECTIVE:     Vital Signs (Most Recent)  /70 (BP Location: Right arm, Patient Position: Sitting, BP Method: Medium (Manual))   Pulse 70   Temp 98.4 °F (36.9 °C) (Oral)   Ht 5' (1.524 m)   Wt 68.9 kg (151 lb 12.6 oz)   LMP 1998 (Approximate)   SpO2 97%   BMI 29.64 kg/m²     Physical Exam:  General: White female in no distress   Neuro: alert and oriented x 4.  Moves all extremities.     HEENT: no icterus.  Trachea midline  Respiratory: respirations are even and unlabored  Cardiac: regular rate  Abdomen:  Soft, nontender, no masses  Extremities: Warm dry and intact  Skin: no rashes  Anorectal:  Deferred    Labs: none    Imagin18:  Pelvic XR : There is no fracture, dislocation, or bony erosion      ASSESSMENT/PLAN:     Jayla was seen today for mg coccyx pain.    Diagnoses and all orders for this visit:    Coccydynia    Constipation, unspecified constipation type        67-year-old woman with coccydynia following a fall 2 months ago.  Overall she is improving.  Constipation is improving as well.  She can follow up with me as needed.        ARCADIO Duenas MD  Staff Surgeon  Colon & Rectal Surgery

## 2018-08-10 DIAGNOSIS — E11.9 TYPE 2 DIABETES MELLITUS WITHOUT COMPLICATION: ICD-10-CM

## 2018-08-19 PROBLEM — I10 HYPERTENSION, ESSENTIAL: Status: ACTIVE | Noted: 2018-08-19

## 2018-08-19 PROBLEM — R80.9 MICROALBUMINURIA: Status: ACTIVE | Noted: 2018-08-19

## 2018-08-19 PROBLEM — E83.39 HYPERPHOSPHATEMIA: Status: ACTIVE | Noted: 2018-08-19

## 2018-09-10 DIAGNOSIS — L73.2 HYDRADENITIS: ICD-10-CM

## 2018-09-10 RX ORDER — SULFAMETHOXAZOLE AND TRIMETHOPRIM 800; 160 MG/1; MG/1
1 TABLET ORAL 2 TIMES DAILY
Qty: 20 TABLET | Refills: 0 | OUTPATIENT
Start: 2018-09-10 | End: 2018-09-20

## 2018-09-25 ENCOUNTER — OFFICE VISIT (OUTPATIENT)
Dept: SURGERY | Facility: CLINIC | Age: 68
End: 2018-09-25
Payer: MEDICARE

## 2018-09-25 DIAGNOSIS — M53.3 COCCYDYNIA: Primary | ICD-10-CM

## 2018-09-25 PROCEDURE — 99999 PR PBB SHADOW E&M-EST. PATIENT-LVL II: CPT | Mod: PBBFAC,,, | Performed by: COLON & RECTAL SURGERY

## 2018-09-25 PROCEDURE — 99213 OFFICE O/P EST LOW 20 MIN: CPT | Mod: S$PBB,,, | Performed by: COLON & RECTAL SURGERY

## 2018-09-25 PROCEDURE — 1101F PT FALLS ASSESS-DOCD LE1/YR: CPT | Mod: CPTII,,, | Performed by: COLON & RECTAL SURGERY

## 2018-09-25 PROCEDURE — 99212 OFFICE O/P EST SF 10 MIN: CPT | Mod: PBBFAC,PN | Performed by: COLON & RECTAL SURGERY

## 2018-09-25 RX ORDER — GABAPENTIN 100 MG/1
100 CAPSULE ORAL 3 TIMES DAILY
Qty: 90 CAPSULE | Refills: 11 | Status: SHIPPED | OUTPATIENT
Start: 2018-09-25 | End: 2019-05-13

## 2018-09-25 NOTE — PROGRESS NOTES
CRS Office Visit Followup      SUBJECTIVE:     Chief Complaint:  Tailbone pain    History of Present Illness:  Patient is a 67 y.o. female presents with tail bone pain. The patient is an established patient to this practice.   Course is as follows:  In May 2018, she fell directly on her tailbone.  She was seen by her primary care provider.  X-ray demonstrated no fracture.  She has continued to have intermittent pain with sitting.  This is not every time.  She has no pain with ambulation.  She has no pain during defecation.  No similar episodes in the past.  She has not taken any medication for this.  She does not feel the pain is improved or worsened over the past 2 months.  She presents for evaluation given the ongoing nature of the pain. No family history of colon or rectal cancer.  In terms of her functional status, she is constipated at baseline.  She takes fiber daily as well as Dulcolax.  She has 2-3 bowel movements per week.  This has been her baseline for multiple years.  She was previously seen in August have prescribed NSAIDs.  She had improvement in her pain.  However, over the past few weeks, she has had worsening of her tailbone pain.  This is intermittent but frequently impacts her ability to sit.  She is concern for an underlying mass and therefore presents for follow-up.    Last Colonoscopy: 7/2018  One diminutive polyp in the transverse colon.   Resected and retrieved.  Non bleeding internal hemorrhoids.   The examination was otherwise normal.   Repeat colonoscopy in 5 years for surveilance.           Review of Systems:  Review of Systems   Constitutional: Negative for chills, diaphoresis, fever, malaise/fatigue and weight loss.   HENT: Negative for congestion.    Respiratory: Negative for shortness of breath.    Cardiovascular: Negative for chest pain and leg swelling.   Gastrointestinal: Positive for constipation. Negative for abdominal pain, blood in stool, nausea and vomiting.   Genitourinary:  Negative for dysuria.   Musculoskeletal: Positive for back pain and joint pain. Negative for myalgias.   Skin: Negative for rash.   Neurological: Negative for dizziness and weakness.   Endo/Heme/Allergies: Does not bruise/bleed easily.   Psychiatric/Behavioral: Negative for depression.       OBJECTIVE:     Vital Signs (Most Recent)  LMP 1998 (Approximate)     Physical Exam:  General: White female in no distress   Neuro: alert and oriented x 4.  Moves all extremities.     HEENT: no icterus.  Trachea midline  Respiratory: respirations are even and unlabored  Cardiac: regular rate  Abdomen:  Soft, nontender, no masses  Extremities: Warm dry and intact  Skin: no rashes  Anorectal:  Deferred    Labs: none    Imagin18:  Pelvic XR : There is no fracture, dislocation, or bony erosion      ASSESSMENT/PLAN:     Diagnoses and all orders for this visit:    Coccydynia  -     MRI Pelvis W WO Contrast; Future  -     Creatinine, serum; Future    Other orders  -     gabapentin (NEURONTIN) 100 MG capsule; Take 1 capsule (100 mg total) by mouth 3 (three) times daily.        67-year-old woman with coccydynia following a fall 2 months ago.  Since the pain is persistent and worsening, we will proceed with cross-sectional imaging.  An MRI has been requested.  Additionally, we will attempt pain management with gabapentin.  Given her underlying renal dysfunction, high dose long-term NSAIDs cannot be given.  I will follow up with her following her MRI      W. Abdi Duenas MD  Staff Surgeon  Colon [facility] Rectal Surgery

## 2018-09-26 ENCOUNTER — TELEPHONE (OUTPATIENT)
Dept: ADMINISTRATIVE | Facility: HOSPITAL | Age: 68
End: 2018-09-26

## 2018-09-26 NOTE — TELEPHONE ENCOUNTER
Contacted pt to schedule labs and follow up appointment, no answer on home phone.   Mobile number, mailbox is full.

## 2018-09-27 ENCOUNTER — HOSPITAL ENCOUNTER (OUTPATIENT)
Dept: RADIOLOGY | Facility: HOSPITAL | Age: 68
Discharge: HOME | End: 2018-09-27
Attending: COLON & RECTAL SURGERY
Payer: MEDICARE

## 2018-09-27 DIAGNOSIS — M53.3 COCCYDYNIA: ICD-10-CM

## 2018-09-27 PROCEDURE — 72195 MRI PELVIS W/O DYE: CPT | Mod: 26,,, | Performed by: RADIOLOGY

## 2018-09-27 PROCEDURE — 72195 MRI PELVIS W/O DYE: CPT | Mod: TC

## 2018-10-02 ENCOUNTER — TELEPHONE (OUTPATIENT)
Dept: SURGERY | Facility: HOSPITAL | Age: 68
End: 2018-10-02

## 2018-10-02 NOTE — TELEPHONE ENCOUNTER
Called patient to discuss her MRI.  MRI was normal and did not show any retrorectal mass or spinal abnormality.  She still having pain despite Neurontin 100 mg 3 times a day.  I have asked for her to increase her dose to 300 mg 3 times a day.  Additionally, she will get back on her Elavil at a lower dose.  She was previously taking Elavil 25 mg which caused her excessive drowsiness during the day after she took the pill.  We will therefore try 12.5mg.  She will follow up with me in a week and let me know how goes.    ARCADIO Duenas MD  Staff Surgeon  Colon & Rectal Surgery

## 2018-10-31 ENCOUNTER — OFFICE VISIT (OUTPATIENT)
Dept: FAMILY MEDICINE | Facility: CLINIC | Age: 68
End: 2018-10-31
Payer: MEDICARE

## 2018-10-31 VITALS
DIASTOLIC BLOOD PRESSURE: 76 MMHG | OXYGEN SATURATION: 97 % | BODY MASS INDEX: 30.23 KG/M2 | WEIGHT: 154 LBS | HEIGHT: 60 IN | HEART RATE: 79 BPM | SYSTOLIC BLOOD PRESSURE: 110 MMHG | RESPIRATION RATE: 18 BRPM

## 2018-10-31 DIAGNOSIS — E11.29 TYPE 2 DIABETES MELLITUS WITH OTHER DIABETIC KIDNEY COMPLICATION, WITHOUT LONG-TERM CURRENT USE OF INSULIN: Primary | ICD-10-CM

## 2018-10-31 DIAGNOSIS — G43.809 OTHER MIGRAINE WITHOUT STATUS MIGRAINOSUS, NOT INTRACTABLE: ICD-10-CM

## 2018-10-31 DIAGNOSIS — F41.9 ANXIETY: ICD-10-CM

## 2018-10-31 PROCEDURE — 99999 PR PBB SHADOW E&M-EST. PATIENT-LVL III: CPT | Mod: PBBFAC,,, | Performed by: FAMILY MEDICINE

## 2018-10-31 PROCEDURE — 99214 OFFICE O/P EST MOD 30 MIN: CPT | Mod: S$PBB,,, | Performed by: FAMILY MEDICINE

## 2018-10-31 PROCEDURE — 99213 OFFICE O/P EST LOW 20 MIN: CPT | Mod: PBBFAC,PN | Performed by: FAMILY MEDICINE

## 2018-10-31 PROCEDURE — 3078F DIAST BP <80 MM HG: CPT | Mod: CPTII,,, | Performed by: FAMILY MEDICINE

## 2018-10-31 PROCEDURE — 3074F SYST BP LT 130 MM HG: CPT | Mod: CPTII,,, | Performed by: FAMILY MEDICINE

## 2018-10-31 PROCEDURE — 1101F PT FALLS ASSESS-DOCD LE1/YR: CPT | Mod: CPTII,,, | Performed by: FAMILY MEDICINE

## 2018-10-31 RX ORDER — BUTALBITAL, ACETAMINOPHEN AND CAFFEINE 50; 325; 40 MG/1; MG/1; MG/1
TABLET ORAL
Qty: 90 TABLET | Refills: 4 | Status: SHIPPED | OUTPATIENT
Start: 2018-10-31 | End: 2018-12-05

## 2018-10-31 RX ORDER — ALPRAZOLAM 0.5 MG/1
0.5 TABLET ORAL 2 TIMES DAILY PRN
Qty: 30 TABLET | Refills: 0 | Status: SHIPPED | OUTPATIENT
Start: 2018-10-31 | End: 2018-12-05

## 2018-10-31 RX ORDER — CITALOPRAM 40 MG/1
40 TABLET, FILM COATED ORAL DAILY
Qty: 90 TABLET | Refills: 0 | Status: SHIPPED | OUTPATIENT
Start: 2018-10-31 | End: 2021-02-05

## 2018-10-31 RX ORDER — TRAMADOL HYDROCHLORIDE 50 MG/1
50 TABLET ORAL EVERY 6 HOURS PRN
Qty: 30 TABLET | Refills: 0 | Status: SHIPPED | OUTPATIENT
Start: 2018-10-31 | End: 2019-06-12

## 2018-10-31 NOTE — PROGRESS NOTES
HPI:  Jayla Loyd is a 68 y.o. year old female that  presents for f/u. She was given Neurontin from Dr. Duenas for her lower back pain. She states that it helped more with her hand tremor than anything . She is taking the Neurontin 100 mg  1 tab in the am , 2 in the afternoon , and 2 in th evening.  She states that she has been eating more than she should and therefore her diabetes is probably not as controlled and she has gained weight.  Chief Complaint   Patient presents with    Medication Refill     Fiorcet    Follow-up     pt states she thinks her diabetes is off because she gets sleepy after eating--foot exam   .     HPI      Past Medical History:   Diagnosis Date    Diabetes mellitus type I     GERD (gastroesophageal reflux disease)     Migraines     Proteinuria      Social History     Socioeconomic History    Marital status:      Spouse name: Not on file    Number of children: Not on file    Years of education: Not on file    Highest education level: Not on file   Social Needs    Financial resource strain: Not on file    Food insecurity - worry: Not on file    Food insecurity - inability: Not on file    Transportation needs - medical: Not on file    Transportation needs - non-medical: Not on file   Occupational History    Not on file   Tobacco Use    Smoking status: Never Smoker    Smokeless tobacco: Never Used   Substance and Sexual Activity    Alcohol use: Yes     Comment: rarely    Drug use: Yes     Types: Amphetamines     Comment: per script from Dr. Vance    Sexual activity: Not on file   Other Topics Concern    Not on file   Social History Narrative    Not on file     Past Surgical History:   Procedure Laterality Date    CATARACT EXTRACTION       Family History   Problem Relation Age of Onset    Bladder Cancer Mother     Hypertension Mother         CABG    Cancer Mother     Heart disease Mother     Heart failure Father         CABG    Hypertension Father      Heart disease Father     No Known Problems Son     Cancer Maternal Aunt     Cancer Paternal Aunt     Diabetes Paternal Aunt     Cancer Maternal Grandmother     Heart disease Maternal Grandfather     Cancer Paternal Grandmother     Dementia Paternal Grandmother     Heart disease Paternal Grandfather            Review of Systems  General ROS: negative for chills, fever or weight loss  ENT ROS: negative for epistaxis, sore throat or rhinorrhea  Respiratory ROS: no cough, shortness of breath, or wheezing  Cardiovascular ROS: no chest pain or dyspnea on exertion  Gastrointestinal ROS: no abdominal pain, change in bowel habits, or black/ bloody stools    Physical Exam:  /76   Pulse 79   Resp 18   Ht 5' (1.524 m)   Wt 69.9 kg (154 lb)   LMP 05/25/1998 (Approximate)   SpO2 97%   BMI 30.08 kg/m²   General appearance: alert, cooperative, no distress  Constitutional:Oriented to person, place, and time.appears well-developed and well-nourished.  HEENT: Normocephalic, atraumatic, neck symmetrical, no nasal discharge, TM- clear bilaterally  Lungs: clear to auscultation bilaterally, no dullness to percussion bilaterally  Heart: regular rate and rhythm without rub; no displacement of the PMI , S1&S2 present  Abdomen: soft, non-tender; bowel sounds normoactive; no organomegaly  Physical Exam   Cardiovascular:   Pulses:       Dorsalis pedis pulses are 1+ on the right side, and 1+ on the left side.        Posterior tibial pulses are 1+ on the right side, and 1+ on the left side.   Musculoskeletal:        Right foot: There is normal range of motion and no deformity.        Left foot: There is normal range of motion and no deformity.   Feet:   Right Foot:   Protective Sensation: 6 sites tested. 6 sites sensed.   Skin Integrity: Positive for dry skin. Negative for ulcer, blister, skin breakdown, erythema, warmth or callus.   Left Foot:   Protective Sensation: 6 sites tested. 6 sites sensed.   Skin Integrity:  Positive for dry skin. Negative for ulcer, blister, skin breakdown, erythema, warmth or callus.       LABS:    Complete Blood Count  Lab Results   Component Value Date    RBC 4.11 07/26/2018    HGB 12.2 07/26/2018    HCT 35.7 07/26/2018     07/26/2018    MPV 9.4 07/26/2018    EOSINOPHIL 2.5 07/26/2018    BASOPHIL 1 07/26/2018       Comprehensive Metabolic Panel  Lab Results   Component Value Date     (A) 06/21/2018    BUN 18 07/26/2018    CREATININE 0.65 06/21/2018     07/26/2018    K 4.2 07/26/2018     07/26/2018    PROT 6.8 06/21/2018    ALBUMIN 4.0 07/26/2018    ALBUMIN 53 07/26/2018    AST 16 07/26/2018    CO2 27 07/26/2018    ALT 27 07/26/2018    ANIONGAP 17 05/14/2018       LIPID  No results found for: CHOL, HDL      TSH  No results found for: TSH    Current Outpatient Medications   Medication Sig Dispense Refill    ACCU-CHEK SAPPHIRE PLUS TEST STRP Strp Use as directed every day  3    ACCU-CHEK SOFTCLIX LANCETS Misc USE AS DIRECTED EVERY DAY  3    ALPRAZolam (XANAX) 0.5 MG tablet Take 1 tablet (0.5 mg total) by mouth 2 (two) times daily as needed. 30 tablet 0    amitriptyline (ELAVIL) 25 MG tablet Take 25 mg by mouth nightly as needed.   2    butalbital-acetaminophen-caffeine -40 mg (FIORICET, ESGIC) -40 mg per tablet TAKE 1-2 TABLETS BY MOUTH EVERY 4 HOURS AS NEEDED. NOT EXCEED 6 TABLETS PER 24 HOURS. 90 tablet 4    citalopram (CELEXA) 40 MG tablet Take 1 tablet (40 mg total) by mouth once daily. 90 tablet 0    coenzyme Q10 (CO Q-10) 100 mg capsule Take 100 mg by mouth once daily.      dicyclomine (BENTYL) 20 mg tablet Take 20 mg by mouth as needed.      esomeprazole (NEXIUM) 40 MG capsule Take 1 capsule (40 mg total) by mouth once daily. 90 capsule 0    fluticasone (FLONASE) 50 mcg/actuation nasal spray 1 spray by Each Nare route once daily.      folic acid/multivit-min/lutein (CENTRUM SILVER ORAL) Take by mouth once daily.      gabapentin (NEURONTIN) 100 MG  capsule Take 1 capsule (100 mg total) by mouth 3 (three) times daily. 90 capsule 11    Lactobac no.41/Bifidobact no.7 (PROBIOTIC-10 ORAL) Take by mouth once daily.      losartan (COZAAR) 25 MG tablet Take 25 mg by mouth once daily. 1/2 daily  3    metformin (GLUCOPHAGE) 500 MG tablet Take 500 mg by mouth daily with breakfast.       ranitidine (ZANTAC) 150 MG capsule Take 150 mg by mouth 2 (two) times daily.      rosuvastatin (CRESTOR) 40 MG Tab Take 40 mg by mouth once daily.       pseudoephedrine (SUDAFED) 30 MG tablet Take 30 mg by mouth every 4 (four) hours as needed for Congestion.      traMADol (ULTRAM) 50 mg tablet Take 1 tablet (50 mg total) by mouth every 6 (six) hours as needed for Pain. 30 tablet 0     No current facility-administered medications for this visit.        Assessment:    ICD-10-CM ICD-9-CM    1. Type 2 diabetes mellitus with other diabetic kidney complication, without long-term current use of insulin E11.29 250.40 Hemoglobin A1c      Lipid panel      Comprehensive metabolic panel      CBC auto differential   2. Anxiety F41.9 300.00 citalopram (CELEXA) 40 MG tablet      ALPRAZolam (XANAX) 0.5 MG tablet   3. Other migraine without status migrainosus, not intractable G43.809 346.80 butalbital-acetaminophen-caffeine -40 mg (FIORICET, ESGIC) -40 mg per tablet      traMADol (ULTRAM) 50 mg tablet         Plan:  Will decrease the dispensing of pts xanax and will not give refills. I will re-asses her need for this medication on an as need basis instead of the scheduled basis that she is currently using it.  Pt educated that she has to take her Glucophage bid as well as stop eating the candy.  Follow-up in 3 months (on 1/31/2019).          Janie Do MD

## 2018-11-02 ENCOUNTER — TELEPHONE (OUTPATIENT)
Dept: FAMILY MEDICINE | Facility: CLINIC | Age: 68
End: 2018-11-02

## 2018-11-02 NOTE — TELEPHONE ENCOUNTER
Called patient to inform that the labs were being put in now. She stated that she is disillusioned and is not going back just forget it and hung up.

## 2018-11-02 NOTE — TELEPHONE ENCOUNTER
----- Message from Grace Do sent at 11/2/2018 10:13 AM CDT -----  Contact: [phone]  Patient called crying to request a call back from your office immediately.    Please call [phone] and advise.

## 2018-11-02 NOTE — TELEPHONE ENCOUNTER
----- Message from Deja Do sent at 11/2/2018  9:37 AM CDT -----  Contact: [phone]/ self   Patient requesting to speak with you regarding [facility] not receiving lab orders. Pt is requesting orders be faxed to [facility] as she's currently there.

## 2018-12-04 DIAGNOSIS — E11.29 TYPE 2 DIABETES MELLITUS WITH OTHER DIABETIC KIDNEY COMPLICATION, WITHOUT LONG-TERM CURRENT USE OF INSULIN: Primary | ICD-10-CM

## 2018-12-05 ENCOUNTER — OFFICE VISIT (OUTPATIENT)
Dept: UROLOGY | Facility: CLINIC | Age: 68
End: 2018-12-05
Attending: UROLOGY
Payer: MEDICARE

## 2018-12-05 VITALS
HEIGHT: 60 IN | HEART RATE: 80 BPM | BODY MASS INDEX: 30.26 KG/M2 | DIASTOLIC BLOOD PRESSURE: 79 MMHG | WEIGHT: 154.13 LBS | SYSTOLIC BLOOD PRESSURE: 123 MMHG

## 2018-12-05 DIAGNOSIS — R31.29 MICROHEMATURIA: Primary | ICD-10-CM

## 2018-12-05 DIAGNOSIS — N39.8 VAGINAL VOIDING: ICD-10-CM

## 2018-12-05 LAB
BILIRUB SERPL-MCNC: ABNORMAL MG/DL
BLOOD URINE, POC: 250
COLOR, POC UA: ABNORMAL
GLUCOSE UR QL STRIP: ABNORMAL
KETONES UR QL STRIP: ABNORMAL
LEUKOCYTE ESTERASE URINE, POC: ABNORMAL
NITRITE, POC UA: ABNORMAL
PH, POC UA: 5
PROTEIN, POC: 30
SPECIFIC GRAVITY, POC UA: 1.01
UROBILINOGEN, POC UA: ABNORMAL

## 2018-12-05 PROCEDURE — 99204 OFFICE O/P NEW MOD 45 MIN: CPT | Mod: 25,S$GLB,, | Performed by: UROLOGY

## 2018-12-05 PROCEDURE — 1100F PTFALLS ASSESS-DOCD GE2>/YR: CPT | Mod: CPTII,S$GLB,, | Performed by: UROLOGY

## 2018-12-05 PROCEDURE — 3078F DIAST BP <80 MM HG: CPT | Mod: CPTII,S$GLB,, | Performed by: UROLOGY

## 2018-12-05 PROCEDURE — 3288F FALL RISK ASSESSMENT DOCD: CPT | Mod: CPTII,S$GLB,, | Performed by: UROLOGY

## 2018-12-05 PROCEDURE — 3074F SYST BP LT 130 MM HG: CPT | Mod: CPTII,S$GLB,, | Performed by: UROLOGY

## 2018-12-05 PROCEDURE — 87086 URINE CULTURE/COLONY COUNT: CPT

## 2018-12-05 PROCEDURE — 81002 URINALYSIS NONAUTO W/O SCOPE: CPT | Mod: S$GLB,,, | Performed by: UROLOGY

## 2018-12-05 RX ORDER — ALPRAZOLAM 0.5 MG
1 TABLET ORAL
COMMUNITY
Start: 2018-11-05 | End: 2020-11-12

## 2018-12-05 RX ORDER — BUTALBITAL, ACETAMINOPHEN AND CAFFEINE 50; 325; 40 MG/1; MG/1; MG/1
1-2 TABLET ORAL
COMMUNITY
Start: 2018-11-05 | End: 2019-02-02

## 2018-12-05 RX ORDER — TIZANIDINE 4 MG/1
4 TABLET ORAL 3 TIMES DAILY
Refills: 0 | COMMUNITY
Start: 2018-11-19 | End: 2019-06-12

## 2018-12-05 RX ORDER — LOSARTAN POTASSIUM 25 MG/1
1 TABLET ORAL
COMMUNITY
Start: 2018-11-05 | End: 2019-02-20

## 2018-12-05 NOTE — PROGRESS NOTES
"  Subjective:       Jayla Loyd is a 68 y.o. female who is a new patient who was referred by Dr Blanchard for evaluation of hematuria.      Hematuria  Patient complains of microscopic hematuria. Onset of hematuria was many years ago (30+) and was unknown in onset. There is not a history of nephrolithiasis. There is not a history of urologic trauma. Other urologic symptoms include incontinence. Patient admits to history of no risk factors for cancer. Patient denies history of chronic Alonzo catheter,  surgeries, occupational exposure, sexually transmitted diseases, tobacco use, trauma and urolithiasis. Prior workup has been prior cystoscopy (years ago). Mother with bladder cancer - mother was smoker.     She reports "incontinence" that happens only after voiding - when going to sitting to standing after void.     She saw nephrology in 8/18 with proteinuria and microhematuria.      UA micro 7/18 - 20-40 RBCc  UA micro 6/18 - 0-2 RBCs      The following portions of the patient's history were reviewed and updated as appropriate: allergies, current medications, past family history, past medical history, past social history, past surgical history and problem list.    Review of Systems  Constitutional: no fever or chills  ENT: no nasal congestion or sore throat  Respiratory: no cough or shortness of breath  Cardiovascular: no chest pain or palpitations  Gastrointestinal: no nausea or vomiting, tolerating diet  Genitourinary: as per HPI  Hematologic/Lymphatic: no easy bruising or lymphadenopathy  Musculoskeletal: no arthralgias or myalgias  Skin: no rashes or lesions  Neurological: no seizures or tremors  Behavioral/Psych: no auditory or visual hallucinations        Objective:    Vitals: /79 (BP Location: Left arm, Patient Position: Sitting, BP Method: Large (Automatic))   Pulse 80   Ht 5' (1.524 m)   Wt 69.9 kg (154 lb 1.6 oz)   LMP 05/25/1998 (Approximate)   BMI 30.10 kg/m²     Physical Exam   General: well " developed, well nourished in no acute distress  Head: normocephalic, atraumatic  Neck: supple, trachea midline, no obvious enlargement of thyroid  HEENT: EOMI, mucus membranes moist, sclera anicteric, no hearing impairment  Lungs: symmetric expansion, non-labored breathing  Cardiovascular: regular rate and rhythm, normal pulses  Abdomen: soft, non tender, non distended, no palpable masses, no hepatosplenomegaly, no hernias, no CVA tenderness  Musculoskeletal: no peripheral edema, normal ROM in bilateral upper and lower extremities  Lymphatics: no cervical or inguinal lymphadenopathy  Skin: no rashes or lesions  Neuro: alert and oriented x 3, no gross deficits  Psych: normal judgment and insight, normal mood/affect and non-anxious  Genitourinary:   patient declined exam      Lab Review   Urine analysis today in clinic shows positive for nitrites, leukocytes, red blood cells     Lab Results   Component Value Date    WBC 7.1 07/26/2018    HGB 12.2 07/26/2018    HCT 35.7 07/26/2018    HCT 36 06/21/2018     07/26/2018     Lab Results   Component Value Date    CREATININE 0.65 06/21/2018    BUN 18 07/26/2018       Imaging  NA       Assessment/Plan:      1. Microhematuria    - Discussed etiology and workup of hematuria   - UCx today   - Cytology - not indicated   - CT urogram   - Office cystoscopy   - Long history - 30+ years   - Mother with bladder cancer (smoker)   - Will repeat workup since last w/u 30 years ago     2. Vaginal voiding    - Instructed on toileting posture         Follow up in 3-4 weeks for CT, cysto

## 2018-12-06 ENCOUNTER — LAB VISIT (OUTPATIENT)
Dept: LAB | Facility: HOSPITAL | Age: 68
End: 2018-12-06
Attending: UROLOGY
Payer: MEDICARE

## 2018-12-06 DIAGNOSIS — R31.29 MICROHEMATURIA: ICD-10-CM

## 2018-12-06 LAB
CREAT SERPL-MCNC: 0.8 MG/DL
EST. GFR  (AFRICAN AMERICAN): >60 ML/MIN/1.73 M^2
EST. GFR  (NON AFRICAN AMERICAN): >60 ML/MIN/1.73 M^2

## 2018-12-06 PROCEDURE — 36415 COLL VENOUS BLD VENIPUNCTURE: CPT

## 2018-12-06 PROCEDURE — 82565 ASSAY OF CREATININE: CPT

## 2018-12-07 ENCOUNTER — TELEPHONE (OUTPATIENT)
Dept: UROLOGY | Facility: CLINIC | Age: 68
End: 2018-12-07

## 2018-12-07 LAB
BACTERIA UR CULT: NORMAL
BACTERIA UR CULT: NORMAL

## 2018-12-13 ENCOUNTER — HOSPITAL ENCOUNTER (OUTPATIENT)
Dept: RADIOLOGY | Facility: HOSPITAL | Age: 68
Discharge: HOME | End: 2018-12-13
Attending: UROLOGY
Payer: MEDICARE

## 2018-12-13 DIAGNOSIS — R31.29 MICROHEMATURIA: ICD-10-CM

## 2018-12-13 PROCEDURE — 74178 CT ABD&PLV WO CNTR FLWD CNTR: CPT | Mod: TC

## 2018-12-13 PROCEDURE — 25500020 PHARM REV CODE 255: Performed by: UROLOGY

## 2018-12-13 PROCEDURE — 74178 CT ABD&PLV WO CNTR FLWD CNTR: CPT | Mod: 26,,, | Performed by: RADIOLOGY

## 2018-12-13 RX ADMIN — IOHEXOL 125 ML: 350 INJECTION, SOLUTION INTRAVENOUS at 04:12

## 2018-12-19 ENCOUNTER — TELEPHONE (OUTPATIENT)
Dept: UROLOGY | Facility: CLINIC | Age: 68
End: 2018-12-19

## 2018-12-19 NOTE — TELEPHONE ENCOUNTER
Please inform pt:    CT scan did not show any abnormalities that would cause blood in urine. Will plan for cystoscopy as scheduled.

## 2019-01-02 ENCOUNTER — PROCEDURE VISIT (OUTPATIENT)
Dept: UROLOGY | Facility: CLINIC | Age: 69
End: 2019-01-02
Attending: UROLOGY
Payer: MEDICARE

## 2019-01-02 VITALS
SYSTOLIC BLOOD PRESSURE: 120 MMHG | HEIGHT: 60 IN | WEIGHT: 158.94 LBS | HEART RATE: 90 BPM | DIASTOLIC BLOOD PRESSURE: 80 MMHG | BODY MASS INDEX: 31.2 KG/M2

## 2019-01-02 DIAGNOSIS — R31.29 MICROHEMATURIA: ICD-10-CM

## 2019-01-02 LAB
BILIRUB SERPL-MCNC: ABNORMAL MG/DL
BLOOD URINE, POC: 50
COLOR, POC UA: ABNORMAL
GLUCOSE UR QL STRIP: NORMAL
KETONES UR QL STRIP: ABNORMAL
LEUKOCYTE ESTERASE URINE, POC: ABNORMAL
NITRITE, POC UA: ABNORMAL
PH, POC UA: 5
PROTEIN, POC: ABNORMAL
SPECIFIC GRAVITY, POC UA: 1.01
UROBILINOGEN, POC UA: NORMAL

## 2019-01-02 PROCEDURE — 52000 CYSTOSCOPY: ICD-10-PCS | Mod: S$GLB,,, | Performed by: UROLOGY

## 2019-01-02 PROCEDURE — 52000 CYSTOURETHROSCOPY: CPT | Mod: S$GLB,,, | Performed by: UROLOGY

## 2019-01-02 PROCEDURE — 81002 URINALYSIS NONAUTO W/O SCOPE: CPT | Mod: S$GLB,,, | Performed by: UROLOGY

## 2019-01-02 PROCEDURE — 81002 POCT URINE DIPSTICK WITHOUT MICROSCOPE: ICD-10-PCS | Mod: S$GLB,,, | Performed by: UROLOGY

## 2019-01-02 RX ORDER — LIDOCAINE HYDROCHLORIDE 20 MG/ML
JELLY TOPICAL
Status: COMPLETED | OUTPATIENT
Start: 2019-01-02 | End: 2019-01-02

## 2019-01-02 RX ORDER — CIPROFLOXACIN 500 MG/1
500 TABLET ORAL
Status: COMPLETED | OUTPATIENT
Start: 2019-01-02 | End: 2019-01-02

## 2019-01-02 RX ADMIN — CIPROFLOXACIN 500 MG: 500 TABLET ORAL at 11:01

## 2019-01-02 RX ADMIN — LIDOCAINE HYDROCHLORIDE 5 ML: 20 JELLY TOPICAL at 11:01

## 2019-01-02 NOTE — PROCEDURES
"Cystoscopy  Date/Time: 1/2/2019 11:44 AM  Performed by: Ama Britt MD  Authorized by: Ama Britt MD     Consent Done?:  Yes (Written)  Time out: Immediately prior to procedure a "time out" was called to verify the correct patient, procedure, equipment, support staff and site/side marked as required.    Indications: hematuria    Position:  Dorsal lithotomy  Anesthesia:  Lidocaine jelly  Patient sedated?: No    Preparation: Patient was prepped and draped in usual sterile fashion      Scope type:  Flexible cystoscope  Stent inserted: No    Stent removed: No    External exam normal: No (Retracted urethral meatus)    Digital exam performed: No    Urethra normal: Yes  Bladder neck normal: Bladder neck normal   Bladder normal: Yes (Erythema near bladder neck - normal variant)      Patient tolerance:  Patient tolerated the procedure well with no immediate complications     Essentially normal cysto.  Retracted urethra - likely vaginal voiding causing post-void UI.        "

## 2019-02-01 ENCOUNTER — PATIENT OUTREACH (OUTPATIENT)
Dept: ADMINISTRATIVE | Facility: HOSPITAL | Age: 69
End: 2019-02-01

## 2019-04-23 ENCOUNTER — TELEPHONE (OUTPATIENT)
Dept: OTOLARYNGOLOGY | Facility: CLINIC | Age: 69
End: 2019-04-23

## 2019-04-23 NOTE — TELEPHONE ENCOUNTER
----- Message from Britt Dee sent at 4/23/2019 10:58 AM CDT -----  Contact: Self [phone]  Patient is a new patient and is requesting to be seen today due to allergies. Please advice

## 2019-04-23 NOTE — TELEPHONE ENCOUNTER
Returned patients call, notified that we did not have an appointment available for today but that we could try main campus, patient stated that she did not want to go all the way down there and then disconnected.

## 2019-05-06 ENCOUNTER — HOSPITAL ENCOUNTER (OUTPATIENT)
Dept: RADIOLOGY | Facility: HOSPITAL | Age: 69
Discharge: HOME | End: 2019-05-06
Attending: INTERNAL MEDICINE
Payer: MEDICARE

## 2019-05-06 DIAGNOSIS — R80.1 PERSISTENT PROTEINURIA: ICD-10-CM

## 2019-05-06 DIAGNOSIS — I10 HYPERTENSION, ESSENTIAL: ICD-10-CM

## 2019-05-06 PROCEDURE — 76770 US EXAM ABDO BACK WALL COMP: CPT | Mod: TC

## 2019-05-06 PROCEDURE — 76770 US EXAM ABDO BACK WALL COMP: CPT | Mod: 26,,, | Performed by: RADIOLOGY

## 2019-05-06 PROCEDURE — 76770 US KIDNEY: ICD-10-PCS | Mod: 26,,, | Performed by: RADIOLOGY

## 2019-05-13 ENCOUNTER — TELEPHONE (OUTPATIENT)
Dept: GASTROENTEROLOGY | Facility: CLINIC | Age: 69
End: 2019-05-13

## 2019-05-13 PROBLEM — R80.1 PERSISTENT PROTEINURIA: Status: ACTIVE | Noted: 2018-08-19

## 2019-05-13 NOTE — TELEPHONE ENCOUNTER
----- Message from Jaimie Perez sent at 5/13/2019  3:20 PM CDT -----  Contact: [phone]/self  Patient would like to be seen sooner than the next available appointment. Please advise.

## 2019-06-12 ENCOUNTER — OFFICE VISIT (OUTPATIENT)
Dept: UROLOGY | Facility: CLINIC | Age: 69
End: 2019-06-12
Attending: UROLOGY
Payer: MEDICARE

## 2019-06-12 VITALS
DIASTOLIC BLOOD PRESSURE: 74 MMHG | HEART RATE: 71 BPM | HEIGHT: 60 IN | SYSTOLIC BLOOD PRESSURE: 116 MMHG | WEIGHT: 160.94 LBS | BODY MASS INDEX: 31.6 KG/M2

## 2019-06-12 DIAGNOSIS — R31.29 MICROHEMATURIA: Primary | ICD-10-CM

## 2019-06-12 DIAGNOSIS — N39.8 VAGINAL VOIDING: ICD-10-CM

## 2019-06-12 LAB
BILIRUB SERPL-MCNC: ABNORMAL MG/DL
BLOOD URINE, POC: ABNORMAL
COLOR, POC UA: ABNORMAL
GLUCOSE UR QL STRIP: NORMAL
KETONES UR QL STRIP: ABNORMAL
LEUKOCYTE ESTERASE URINE, POC: ABNORMAL
NITRITE, POC UA: ABNORMAL
PH, POC UA: 5
POC RESIDUAL URINE VOLUME: 33 ML (ref 0–100)
PROTEIN, POC: + 30
SPECIFIC GRAVITY, POC UA: 1.02
UROBILINOGEN, POC UA: NORMAL

## 2019-06-12 PROCEDURE — 81002 POCT URINE DIPSTICK WITHOUT MICROSCOPE: ICD-10-PCS | Mod: S$GLB,,, | Performed by: UROLOGY

## 2019-06-12 PROCEDURE — 3078F PR MOST RECENT DIASTOLIC BLOOD PRESSURE < 80 MM HG: ICD-10-PCS | Mod: CPTII,S$GLB,, | Performed by: UROLOGY

## 2019-06-12 PROCEDURE — 1101F PT FALLS ASSESS-DOCD LE1/YR: CPT | Mod: CPTII,S$GLB,, | Performed by: UROLOGY

## 2019-06-12 PROCEDURE — 51798 US URINE CAPACITY MEASURE: CPT | Mod: S$GLB,,, | Performed by: UROLOGY

## 2019-06-12 PROCEDURE — 81002 URINALYSIS NONAUTO W/O SCOPE: CPT | Mod: S$GLB,,, | Performed by: UROLOGY

## 2019-06-12 PROCEDURE — 99213 OFFICE O/P EST LOW 20 MIN: CPT | Mod: 25,S$GLB,, | Performed by: UROLOGY

## 2019-06-12 PROCEDURE — 3074F PR MOST RECENT SYSTOLIC BLOOD PRESSURE < 130 MM HG: ICD-10-PCS | Mod: CPTII,S$GLB,, | Performed by: UROLOGY

## 2019-06-12 PROCEDURE — 1101F PR PT FALLS ASSESS DOC 0-1 FALLS W/OUT INJ PAST YR: ICD-10-PCS | Mod: CPTII,S$GLB,, | Performed by: UROLOGY

## 2019-06-12 PROCEDURE — 99213 PR OFFICE/OUTPT VISIT, EST, LEVL III, 20-29 MIN: ICD-10-PCS | Mod: 25,S$GLB,, | Performed by: UROLOGY

## 2019-06-12 PROCEDURE — 51798 POCT BLADDER SCAN: ICD-10-PCS | Mod: S$GLB,,, | Performed by: UROLOGY

## 2019-06-12 PROCEDURE — 3078F DIAST BP <80 MM HG: CPT | Mod: CPTII,S$GLB,, | Performed by: UROLOGY

## 2019-06-12 PROCEDURE — 3074F SYST BP LT 130 MM HG: CPT | Mod: CPTII,S$GLB,, | Performed by: UROLOGY

## 2019-06-12 NOTE — PROGRESS NOTES
"  Subjective:       Jayla Loyd is a 68 y.o. female who is an established patient who was referred by Dr Blanchard for evaluation of hematuria.      Hematuria  Patient complains of microscopic hematuria. Onset of hematuria was many years ago (30+) and was unknown in onset. There is not a history of nephrolithiasis. There is not a history of urologic trauma. Other urologic symptoms include incontinence. Patient admits to history of no risk factors for cancer. Patient denies history of chronic Alonzo catheter,  surgeries, occupational exposure, sexually transmitted diseases, tobacco use, trauma and urolithiasis. Prior workup has been prior cystoscopy (years ago). Mother with bladder cancer - mother was smoker.     She reports "incontinence" that happens only after voiding - when going to sitting to standing after void.     She saw nephrology in 8/18 with proteinuria and microhematuria.      UA micro 7/18 - 20-40 RBCc  UA micro 6/18 - 0-2 RBCs    She is s/p hematuria workup - CT uro 12/18 negative, cysto 1/19 negative. Retracted urethra with likely vaginal voiding causing post-void UI.     6/12/2019  She has been doing well, no issues. No gross hematuria.         The following portions of the patient's history were reviewed and updated as appropriate: allergies, current medications, past family history, past medical history, past social history, past surgical history and problem list.    Review of Systems  Constitutional: no fever or chills  ENT: no nasal congestion or sore throat  Respiratory: no cough or shortness of breath  Cardiovascular: no chest pain or palpitations  Gastrointestinal: no nausea or vomiting, tolerating diet  Genitourinary: as per HPI  Hematologic/Lymphatic: no easy bruising or lymphadenopathy  Musculoskeletal: no arthralgias or myalgias  Skin: no rashes or lesions  Neurological: no seizures or tremors  Behavioral/Psych: no auditory or visual hallucinations        Objective:    Vitals: /74 " (BP Location: Left arm, Patient Position: Sitting, BP Method: Large (Automatic))   Pulse 71   Ht 5' (1.524 m)   Wt 73 kg (160 lb 15 oz)   LMP 05/25/1998 (Approximate)   BMI 31.43 kg/m²     Physical Exam   General: well developed, well nourished in no acute distress  Head: normocephalic, atraumatic  Neck: supple, trachea midline, no obvious enlargement of thyroid  HEENT: EOMI, mucus membranes moist, sclera anicteric, no hearing impairment  Lungs: symmetric expansion, non-labored breathing  Cardiovascular: regular rate and rhythm, normal pulses  Abdomen: soft, non tender, non distended, no palpable masses, no hepatosplenomegaly, no hernias, no CVA tenderness  Musculoskeletal: no peripheral edema, normal ROM in bilateral upper and lower extremities  Lymphatics: no cervical or inguinal lymphadenopathy  Skin: no rashes or lesions  Neuro: alert and oriented x 3, no gross deficits  Psych: normal judgment and insight, normal mood/affect and non-anxious  Genitourinary:   patient declined exam      Lab Review   Urine analysis today in clinic shows positive for protein, 5-10 red blood cells     Lab Results   Component Value Date    WBC 6.82 05/07/2019    HGB 11.9 (L) 05/07/2019    HCT 35.6 (L) 05/07/2019    MCV 89 05/07/2019     05/07/2019     Lab Results   Component Value Date    CREATININE 0.69 05/07/2019    CREATININE 0.65 06/21/2018    BUN 15 05/07/2019    BUN 18 07/26/2018       Imaging  Images and reports were personally reviewed by me and discussed with patient   CT reviewed       Assessment/Plan:      1. Microhematuria    - Discussed etiology and workup of hematuria   - UCx today   - Cytology - not indicated   - CT urogram   - Office cystoscopy   - Long history - 30+ years   - Mother with bladder cancer (smoker)   - Will repeat workup since last w/u 30 years ago    - Consider cytology next year     2. Vaginal voiding    - Instructed on toileting posture   - Retracted urethra confirmed on cystoscopy        Follow up in 12 month (urine recheck), then likely d/c

## 2019-06-18 ENCOUNTER — OFFICE VISIT (OUTPATIENT)
Dept: GASTROENTEROLOGY | Facility: CLINIC | Age: 69
End: 2019-06-18
Payer: MEDICARE

## 2019-06-18 VITALS
BODY MASS INDEX: 31.61 KG/M2 | WEIGHT: 161 LBS | HEIGHT: 60 IN | HEART RATE: 74 BPM | DIASTOLIC BLOOD PRESSURE: 72 MMHG | SYSTOLIC BLOOD PRESSURE: 110 MMHG

## 2019-06-18 DIAGNOSIS — K21.9 GASTROESOPHAGEAL REFLUX DISEASE, ESOPHAGITIS PRESENCE NOT SPECIFIED: Primary | ICD-10-CM

## 2019-06-18 PROCEDURE — 99203 PR OFFICE/OUTPT VISIT, NEW, LEVL III, 30-44 MIN: ICD-10-PCS | Mod: S$GLB,,, | Performed by: INTERNAL MEDICINE

## 2019-06-18 PROCEDURE — 99999 PR PBB SHADOW E&M-EST. PATIENT-LVL III: ICD-10-PCS | Mod: PBBFAC,,, | Performed by: INTERNAL MEDICINE

## 2019-06-18 PROCEDURE — 3074F SYST BP LT 130 MM HG: CPT | Mod: CPTII,S$GLB,, | Performed by: INTERNAL MEDICINE

## 2019-06-18 PROCEDURE — 99203 OFFICE O/P NEW LOW 30 MIN: CPT | Mod: S$GLB,,, | Performed by: INTERNAL MEDICINE

## 2019-06-18 PROCEDURE — 1101F PR PT FALLS ASSESS DOC 0-1 FALLS W/OUT INJ PAST YR: ICD-10-PCS | Mod: CPTII,S$GLB,, | Performed by: INTERNAL MEDICINE

## 2019-06-18 PROCEDURE — 3074F PR MOST RECENT SYSTOLIC BLOOD PRESSURE < 130 MM HG: ICD-10-PCS | Mod: CPTII,S$GLB,, | Performed by: INTERNAL MEDICINE

## 2019-06-18 PROCEDURE — 3078F DIAST BP <80 MM HG: CPT | Mod: CPTII,S$GLB,, | Performed by: INTERNAL MEDICINE

## 2019-06-18 PROCEDURE — 99999 PR PBB SHADOW E&M-EST. PATIENT-LVL III: CPT | Mod: PBBFAC,,, | Performed by: INTERNAL MEDICINE

## 2019-06-18 PROCEDURE — 1101F PT FALLS ASSESS-DOCD LE1/YR: CPT | Mod: CPTII,S$GLB,, | Performed by: INTERNAL MEDICINE

## 2019-06-18 PROCEDURE — 3078F PR MOST RECENT DIASTOLIC BLOOD PRESSURE < 80 MM HG: ICD-10-PCS | Mod: CPTII,S$GLB,, | Performed by: INTERNAL MEDICINE

## 2019-06-18 NOTE — PROGRESS NOTES
Subjective:       Patient ID: Jayla Loyd is a 68 y.o. female.    Chief Complaint: Gastroesophageal Reflux    67 yo F here to establish care for GERD.  She has longstanding symptoms and has been treated by Dr. Schaeffer for many years.  She has had several EGDs and colonoscopies in the past, with the last EGD being approximately 1 year ago.  She has been on Nexium for many years.  She feels that her reflux has gotten much worse over the past 1-2 years despite staying on PPI.  She is now taking Nexium bid and Zantac bid.  She describes burning up into her chest with regurgitation of food products.  She has rare vomiting.  She wakes in the middle of the night with severe regurgitation and reflux and this will take hours to go away.  She states that previous EGD have showed non-bleeding ulcers, no mention of esophageal findings by the pt.    Review of Systems   Constitutional: Negative for appetite change and unexpected weight change.   Eyes: Negative for photophobia and visual disturbance.   Respiratory: Negative for chest tightness, shortness of breath and wheezing.    Cardiovascular: Negative for chest pain, palpitations and leg swelling.   Genitourinary: Negative for dysuria, flank pain and hematuria.   Musculoskeletal: Negative for joint swelling and myalgias.   Skin: Negative for color change and rash.   Neurological: Negative for dizziness and speech difficulty.   Psychiatric/Behavioral: Negative for confusion and hallucinations.       Objective:       /72 (BP Location: Right arm, Patient Position: Sitting)   Pulse 74   Ht 5' (1.524 m)   Wt 73 kg (161 lb)   LMP 05/25/1998 (Approximate)   BMI 31.44 kg/m²     Physical Exam   Constitutional: She is oriented to person, place, and time. She appears well-developed and well-nourished.   Eyes: Pupils are equal, round, and reactive to light. EOM are normal.   Neck: Normal range of motion. Neck supple.   Cardiovascular: Normal rate and regular rhythm.    Pulmonary/Chest: Effort normal and breath sounds normal.   Abdominal: Soft. Bowel sounds are normal. She exhibits no distension and no mass. There is no tenderness. There is no rebound and no guarding.   Musculoskeletal: Normal range of motion. She exhibits no edema.   Neurological: She is alert and oriented to person, place, and time.   Psychiatric: She has a normal mood and affect. Her behavior is normal. Judgment and thought content normal.       Lab Results   Component Value Date    WBC 6.82 05/07/2019    HGB 11.9 (L) 05/07/2019    HCT 35.6 (L) 05/07/2019    MCV 89 05/07/2019     05/07/2019     CMP  Sodium   Date Value Ref Range Status   05/07/2019 142 136 - 145 mmol/L Final   07/26/2018 140 135 - 146 Final     Potassium   Date Value Ref Range Status   05/07/2019 4.1 3.5 - 5.1 mmol/L Final   07/26/2018 4.2 3.5 - 5.3 Final     Chloride   Date Value Ref Range Status   05/07/2019 108 95 - 110 mmol/L Final   07/26/2018 106 98 - 110 Final     CO2   Date Value Ref Range Status   05/07/2019 26 23 - 29 mmol/L Final   07/26/2018 27 20 - 31 Final     Glucose   Date Value Ref Range Status   05/07/2019 141 (H) 70 - 110 mg/dL Final   06/21/2018 114 (A) 65 - 99 Final     BUN, Bld   Date Value Ref Range Status   05/07/2019 15 7 - 17 mg/dL Final     BUN   Date Value Ref Range Status   07/26/2018 18 7 - 25 mg/dL Final     Creatinine   Date Value Ref Range Status   05/07/2019 0.69 0.50 - 1.40 mg/dL Final   06/21/2018 0.65 0.50 - 0.99 Final     Calcium   Date Value Ref Range Status   05/07/2019 9.2 8.7 - 10.5 mg/dL Final   07/26/2018 9.1 8.6 - 10.4 mg/dL Final   07/26/2018 9.1 8.6 - 10.4 mg/dL Final     Total Protein   Date Value Ref Range Status   05/07/2019 6.9 6.0 - 8.4 g/dL Final     Albumin   Date Value Ref Range Status   05/07/2019 4.0 3.5 - 5.2 g/dL Final   07/26/2018 4.0 3.6 - 5.1 Final   07/26/2018 53  Final     Total Bilirubin   Date Value Ref Range Status   05/07/2019 0.2 0.1 - 1.0 mg/dL Final     Comment:      For infants and newborns, interpretation of results should be based  on gestational age, weight and in agreement with clinical  observations.  Premature Infant recommended reference ranges:  Up to 24 hours.............<8.0 mg/dL  Up to 48 hours............<12.0 mg/dL  3-5 days..................<15.0 mg/dL  6-29 days.................<15.0 mg/dL       Alkaline Phosphatase   Date Value Ref Range Status   05/07/2019 106 38 - 126 U/L Final     AST   Date Value Ref Range Status   05/07/2019 16 15 - 46 U/L Final   07/26/2018 16 10 - 35 U/L Final     ALT   Date Value Ref Range Status   05/07/2019 16 10 - 44 U/L Final   07/26/2018 27 6 - 29 U/L Final     Anion Gap   Date Value Ref Range Status   05/07/2019 8 8 - 16 mmol/L Final   05/14/2018 17 9 - 18 mmol/L Final     eGFR if    Date Value Ref Range Status   05/07/2019 >60.0 >60 mL/min/1.73 m^2 Final     eGFR if non    Date Value Ref Range Status   05/07/2019 >60.0 >60 mL/min/1.73 m^2 Final     Comment:     Calculation used to obtain the estimated glomerular filtration  rate (eGFR) is the CKD-EPI equation.          Assessment:       1. Gastroesophageal reflux disease, esophagitis presence not specified        Plan:       Gastroesophageal reflux disease, esophagitis presence not specified  -     FL Upper GI With KUB; Future; Expected date: 06/18/2019  -     I want to see the dynamics of her esophagus and reflux

## 2019-06-18 NOTE — LETTER
June 18, 2019      Vicenta Alvarez MD  [address]  [address]  [facility]  [facility] [id number]           [facility]  [address], [address]   [facility] [address]  Phone: [phone]  Fax: [phone]          Patient: Jayla Loyd   MR Number: [id number]   YOB: 1950   Date of Visit: 6/18/2019       Dear Dr. Vicenta Alvarez:    Thank you for referring Jayla Loyd to me for evaluation. Attached you will find relevant portions of my assessment and plan of care.    If you have questions, please do not hesitate to call me. I look forward to following Jayla Loyd along with you.    Sincerely,    Vicenta Alvarez MD    Enclosure  CC:  No Recipients    If you would like to receive this communication electronically, please contact [email or web address] or [phone] to request more information on [employer] Link access.    For providers and/or their staff who would like to refer a patient to [facility], please contact us through our one-stop-shop provider referral line, [facility], at [phone].    If you feel you have received this communication in error or would no longer like to receive these types of communications, please e-mail [email or web address]

## 2019-06-21 ENCOUNTER — TELEPHONE (OUTPATIENT)
Dept: GASTROENTEROLOGY | Facility: CLINIC | Age: 69
End: 2019-06-21

## 2019-06-21 NOTE — TELEPHONE ENCOUNTER
Spoke with patient in regards to test results. Patient is aware of care plan regarding mag citrate clean out and miralax use. Patient verbalize understanding. Follow-up appt was mad and reminder mailed out, pt is aware to continue PPI.

## 2019-06-21 NOTE — TELEPHONE ENCOUNTER
"----- Message from Vicenta Alvarez MD sent at 6/21/2019 10:47 AM CDT -----  UGI shows a small hiatal hernia which does give reflux, and she does have a small amount of reflux noted.  The " film" which is the xray at the beginning shows an enormous amount of stool in her colon, which could be accounting for her worsening reflux.  I would like her to do a mag citrate clean out IF SHE WANTS to get rid of this stool quickly (3-4 bottles total with 1 bottle every 2 hours), then start daily Miralax.  If she doesn't want to do the clean out, take Miralax twice a day for 2 weeks, then decrease to once a day.  This is more gentle, but it will take a while to get all of that stool out.  Please give n/a appt with me.  Cont PPI.  "

## 2019-06-30 PROBLEM — D50.9 IRON DEFICIENCY ANEMIA: Status: ACTIVE | Noted: 2019-06-30

## 2019-09-24 ENCOUNTER — OFFICE VISIT (OUTPATIENT)
Dept: GASTROENTEROLOGY | Facility: CLINIC | Age: 69
End: 2019-09-24
Payer: MEDICARE

## 2019-09-24 VITALS
BODY MASS INDEX: 32.67 KG/M2 | SYSTOLIC BLOOD PRESSURE: 124 MMHG | WEIGHT: 167.31 LBS | HEART RATE: 69 BPM | DIASTOLIC BLOOD PRESSURE: 76 MMHG

## 2019-09-24 DIAGNOSIS — Z12.31 SCREENING MAMMOGRAM, ENCOUNTER FOR: Primary | ICD-10-CM

## 2019-09-24 DIAGNOSIS — K21.9 GASTROESOPHAGEAL REFLUX DISEASE, ESOPHAGITIS PRESENCE NOT SPECIFIED: Primary | ICD-10-CM

## 2019-09-24 DIAGNOSIS — K59.04 CHRONIC IDIOPATHIC CONSTIPATION: ICD-10-CM

## 2019-09-24 PROCEDURE — 1101F PR PT FALLS ASSESS DOC 0-1 FALLS W/OUT INJ PAST YR: ICD-10-PCS | Mod: CPTII,S$GLB,, | Performed by: INTERNAL MEDICINE

## 2019-09-24 PROCEDURE — 3078F DIAST BP <80 MM HG: CPT | Mod: CPTII,S$GLB,, | Performed by: INTERNAL MEDICINE

## 2019-09-24 PROCEDURE — 99999 PR PBB SHADOW E&M-EST. PATIENT-LVL III: ICD-10-PCS | Mod: PBBFAC,,, | Performed by: INTERNAL MEDICINE

## 2019-09-24 PROCEDURE — 3074F SYST BP LT 130 MM HG: CPT | Mod: CPTII,S$GLB,, | Performed by: INTERNAL MEDICINE

## 2019-09-24 PROCEDURE — 99213 PR OFFICE/OUTPT VISIT, EST, LEVL III, 20-29 MIN: ICD-10-PCS | Mod: S$GLB,,, | Performed by: INTERNAL MEDICINE

## 2019-09-24 PROCEDURE — 99213 OFFICE O/P EST LOW 20 MIN: CPT | Mod: S$GLB,,, | Performed by: INTERNAL MEDICINE

## 2019-09-24 PROCEDURE — 3078F PR MOST RECENT DIASTOLIC BLOOD PRESSURE < 80 MM HG: ICD-10-PCS | Mod: CPTII,S$GLB,, | Performed by: INTERNAL MEDICINE

## 2019-09-24 PROCEDURE — 1101F PT FALLS ASSESS-DOCD LE1/YR: CPT | Mod: CPTII,S$GLB,, | Performed by: INTERNAL MEDICINE

## 2019-09-24 PROCEDURE — 99999 PR PBB SHADOW E&M-EST. PATIENT-LVL III: CPT | Mod: PBBFAC,,, | Performed by: INTERNAL MEDICINE

## 2019-09-24 PROCEDURE — 3074F PR MOST RECENT SYSTOLIC BLOOD PRESSURE < 130 MM HG: ICD-10-PCS | Mod: CPTII,S$GLB,, | Performed by: INTERNAL MEDICINE

## 2019-09-24 RX ORDER — ESOMEPRAZOLE MAGNESIUM 40 MG/1
40 CAPSULE, DELAYED RELEASE ORAL DAILY
Qty: 90 CAPSULE | Refills: 3 | Status: SHIPPED | OUTPATIENT
Start: 2019-09-24 | End: 2020-07-30

## 2019-09-24 RX ORDER — ONDANSETRON 4 MG/1
TABLET, ORALLY DISINTEGRATING ORAL
COMMUNITY
Start: 2019-08-02 | End: 2020-07-16 | Stop reason: SDUPTHER

## 2019-09-24 RX ORDER — POLYETHYLENE GLYCOL 3350 17 G/17G
17 POWDER, FOR SOLUTION ORAL DAILY
Qty: 1 BOTTLE | Refills: 11 | Status: SHIPPED | OUTPATIENT
Start: 2019-09-24 | End: 2020-07-08

## 2019-09-24 RX ORDER — MECLIZINE HYDROCHLORIDE 25 MG/1
1 TABLET ORAL
COMMUNITY
Start: 2019-04-19 | End: 2020-07-22 | Stop reason: SDUPTHER

## 2019-09-24 NOTE — PROGRESS NOTES
"Subjective:       Patient ID: Jayla Loyd is a 68 y.o. female.    Chief Complaint: Heartburn and Gas    69 yo F here for f/u GERD.  UGI series was done (she has already had numerous endoscopies by Dr. Schaeffer) to quantify amount of reflux.  A small HH was noted with small amount of reflux, but the  film showed a very large amount of stool therefore the pt did a magnesium citrate cleanout followed by daily Miralax.  She states she did very well but she just got back from vacation.  She did not take her Miralax while on vacation and now she is having "gas pains" under her breasts and bloating.  With the Miralax daily and coconut oil pill daily she has 1 BM qod, which she states satisfies her.  When her bowels were moving better, the reflux is notably better.    She is complaining of left ankle swelling and would like to see somebody.  She is also requesting appt with GYN for pap smear, as well as scheduling of mammogram.    Review of Systems   Constitutional: Negative for appetite change.   Respiratory: Negative for chest tightness, shortness of breath and wheezing.    Cardiovascular: Negative for chest pain, palpitations and leg swelling.       Objective:       /76 (BP Location: Left arm, Patient Position: Sitting, BP Method: Large (Manual))   Pulse 69   Wt 75.9 kg (167 lb 4.8 oz)   LMP 05/25/1998 (Approximate)   BMI 32.67 kg/m²     Physical Exam   Constitutional: She is oriented to person, place, and time. She appears well-developed and well-nourished.   Abdominal: Soft. Bowel sounds are normal. She exhibits distension. There is no tenderness.   Neurological: She is alert and oriented to person, place, and time.   Psychiatric: She has a normal mood and affect. Her behavior is normal. Judgment and thought content normal.       Assessment:       1. Gastroesophageal reflux disease, esophagitis presence not specified    2. Chronic idiopathic constipation        UGI was independently visualized and " reviewed by me and showed large amount of stool in the colon, small HH with small amount of reflux.    Plan:       Gastroesophageal reflux disease, esophagitis presence not specified  -     esomeprazole (NEXIUM) 40 MG capsule; Take 1 capsule (40 mg total) by mouth once daily.  Dispense: 90 capsule; Refill: 3  -     Proven risks of long term PPI use, including pneumonia, c.diff infection, and bone loss, as well as theoretical risks including dementia and CKD, were discussed with the patient at length and the patient understands risks and benefits of therapy.  Option of tapering/weaning PPI away was also discussed, including the need for possible long term therapy to treat symptoms if they recur after cessation of medication, as well as to mitigate the risk of developing complications of reflux such as Cox's esophagus and/or esophageal cancer.  Patient understands the risks and benefits of treatment with drug versus cessation.  Daily supplementation with MVI with calcium and vitamin D were recommended as was follow up with PCP for bone scan when appropriate.  Labs including B12, folate, CMP, CBC, calcium, and magnesium should be checked at least annually.  -     Her GERD had worsened due to constipation.  No further workup at this time.    Chronic idiopathic constipation  -     polyethylene glycol (GLYCOLAX) 17 gram/dose powder; Take 17 g by mouth once daily.  Dispense: 1 Bottle; Refill: 11  -     I believe the gas and bloating she is having currently is b/c she has not taken her Miralax.  I recommend that she restart it and stay on it daily.    My assistant will assist her in getting appts with Dr. Wilson (podiatry) and Dr. Hollingsworth (GYN), as well as scheduling the mammogram that was already ordered by Dr. Do.

## 2019-09-24 NOTE — PATIENT INSTRUCTIONS

## 2019-10-22 ENCOUNTER — TELEPHONE (OUTPATIENT)
Dept: GASTROENTEROLOGY | Facility: CLINIC | Age: 69
End: 2019-10-22

## 2019-10-22 NOTE — TELEPHONE ENCOUNTER
Spoke to patient about her mammogram result being incomplete and that she needs to get the film from [facility]. Patient states she is not going to get the film from [facility], so they can release the report as is. Patient states she should not have had her mammogram done here.

## 2019-10-31 ENCOUNTER — TELEPHONE (OUTPATIENT)
Dept: OBSTETRICS AND GYNECOLOGY | Facility: CLINIC | Age: 69
End: 2019-10-31

## 2019-10-31 NOTE — TELEPHONE ENCOUNTER
----- Message from Brianna Martinez MA sent at 10/31/2019 11:59 AM CDT -----      ----- Message -----  From: Isis Huerta MA  Sent: 10/31/2019  11:27 AM CDT  To: [facility] Staff    This patient has an appointment on 11/5/19 but needs a different day with the latest possible time available. Thanks

## 2019-10-31 NOTE — TELEPHONE ENCOUNTER
Attempted to contact patient, no answer. Unable to leave voicemail. Mailbox full and can not accept any calls.

## 2019-11-04 NOTE — TELEPHONE ENCOUNTER
Attempted to contact patient, no answer. Unable to leave voicemail, mailbox full and unable to accept calls.

## 2020-02-13 ENCOUNTER — OFFICE VISIT (OUTPATIENT)
Dept: GASTROENTEROLOGY | Facility: CLINIC | Age: 70
End: 2020-02-13
Payer: MEDICARE

## 2020-02-13 VITALS
SYSTOLIC BLOOD PRESSURE: 122 MMHG | DIASTOLIC BLOOD PRESSURE: 80 MMHG | BODY MASS INDEX: 32.75 KG/M2 | WEIGHT: 167.69 LBS | HEART RATE: 60 BPM

## 2020-02-13 DIAGNOSIS — K21.9 GASTROESOPHAGEAL REFLUX DISEASE, ESOPHAGITIS PRESENCE NOT SPECIFIED: Primary | ICD-10-CM

## 2020-02-13 DIAGNOSIS — K59.04 CHRONIC IDIOPATHIC CONSTIPATION: ICD-10-CM

## 2020-02-13 PROCEDURE — 1159F PR MEDICATION LIST DOCUMENTED IN MEDICAL RECORD: ICD-10-PCS | Mod: S$GLB,,, | Performed by: INTERNAL MEDICINE

## 2020-02-13 PROCEDURE — 1126F PR PAIN SEVERITY QUANTIFIED, NO PAIN PRESENT: ICD-10-PCS | Mod: S$GLB,,, | Performed by: INTERNAL MEDICINE

## 2020-02-13 PROCEDURE — 1101F PT FALLS ASSESS-DOCD LE1/YR: CPT | Mod: CPTII,S$GLB,, | Performed by: INTERNAL MEDICINE

## 2020-02-13 PROCEDURE — 1101F PR PT FALLS ASSESS DOC 0-1 FALLS W/OUT INJ PAST YR: ICD-10-PCS | Mod: CPTII,S$GLB,, | Performed by: INTERNAL MEDICINE

## 2020-02-13 PROCEDURE — 99999 PR PBB SHADOW E&M-EST. PATIENT-LVL III: CPT | Mod: PBBFAC,,, | Performed by: INTERNAL MEDICINE

## 2020-02-13 PROCEDURE — 99213 OFFICE O/P EST LOW 20 MIN: CPT | Mod: S$GLB,,, | Performed by: INTERNAL MEDICINE

## 2020-02-13 PROCEDURE — 99999 PR PBB SHADOW E&M-EST. PATIENT-LVL III: ICD-10-PCS | Mod: PBBFAC,,, | Performed by: INTERNAL MEDICINE

## 2020-02-13 PROCEDURE — 3079F PR MOST RECENT DIASTOLIC BLOOD PRESSURE 80-89 MM HG: ICD-10-PCS | Mod: CPTII,S$GLB,, | Performed by: INTERNAL MEDICINE

## 2020-02-13 PROCEDURE — 1159F MED LIST DOCD IN RCRD: CPT | Mod: S$GLB,,, | Performed by: INTERNAL MEDICINE

## 2020-02-13 PROCEDURE — 3079F DIAST BP 80-89 MM HG: CPT | Mod: CPTII,S$GLB,, | Performed by: INTERNAL MEDICINE

## 2020-02-13 PROCEDURE — 3074F PR MOST RECENT SYSTOLIC BLOOD PRESSURE < 130 MM HG: ICD-10-PCS | Mod: CPTII,S$GLB,, | Performed by: INTERNAL MEDICINE

## 2020-02-13 PROCEDURE — 99213 PR OFFICE/OUTPT VISIT, EST, LEVL III, 20-29 MIN: ICD-10-PCS | Mod: S$GLB,,, | Performed by: INTERNAL MEDICINE

## 2020-02-13 PROCEDURE — 1126F AMNT PAIN NOTED NONE PRSNT: CPT | Mod: S$GLB,,, | Performed by: INTERNAL MEDICINE

## 2020-02-13 PROCEDURE — 3074F SYST BP LT 130 MM HG: CPT | Mod: CPTII,S$GLB,, | Performed by: INTERNAL MEDICINE

## 2020-02-13 RX ORDER — TIZANIDINE 4 MG/1
TABLET ORAL
COMMUNITY
Start: 2019-11-20 | End: 2020-11-13

## 2020-02-13 RX ORDER — FUROSEMIDE 20 MG/1
TABLET ORAL
COMMUNITY
Start: 2019-11-20 | End: 2020-11-13

## 2020-02-13 RX ORDER — LOSARTAN POTASSIUM 25 MG/1
TABLET ORAL
COMMUNITY
Start: 2019-05-28 | End: 2020-05-18

## 2020-02-13 NOTE — PROGRESS NOTES
Subjective:       Patient ID: Jayla Loyd is a 69 y.o. female.    Chief Complaint: No chief complaint on file.    68 yo F here for f/u GERD and CIC.  She has decreased the Miralax to qod due to leakage with flatus.  She is doing well on this dose.  She finds that the OTC Nexium works better for her rather than the Rx which is generic.  She was not taking it in the morning and was doing poorly, but since switching to every morning on an empty stomach, she has been doing much better.  Her biggest issue is GERD at night more than 6 hours after eating.  This has also improved with taking the PPI correctly.    Review of Systems   Constitutional: Negative for appetite change.   Respiratory: Negative for chest tightness, shortness of breath and wheezing.    Cardiovascular: Negative for chest pain, palpitations and leg swelling.       Objective:       /80 (BP Location: Left arm, Patient Position: Sitting, BP Method: Medium (Manual))   Pulse 60   Wt 76.1 kg (167 lb 11.2 oz)   LMP 05/25/1998 (Approximate)   BMI 32.75 kg/m²     Physical Exam   Constitutional: She is oriented to person, place, and time. She appears well-developed and well-nourished.   Neurological: She is alert and oriented to person, place, and time.   Psychiatric: She has a normal mood and affect. Her behavior is normal.       Assessment:       1. Gastroesophageal reflux disease, esophagitis presence not specified    2. Chronic idiopathic constipation        Plan:       Gastroesophageal reflux disease, esophagitis presence not specified        -     She will take one OTC pill and one Rx pill together first thing in the AM on empty stomach, with water.  If one of the pills needs to be moved, it will be to an hour before supper.        -     If we can not adequately control symptoms with this regimen, will plan for GES    Chronic idiopathic constipation        -     Cont qod Miralax.  It does NOT have to be every day, but does need to be on some  sort of schedule.

## 2020-02-13 NOTE — PATIENT INSTRUCTIONS

## 2020-05-19 LAB
CHOL/HDLC RATIO: 2
CHOLEST SERPL-MSCNC: 171 MG/DL (ref 0–200)
HDLC SERPL-MCNC: 84 MG/DL
LDLC SERPL CALC-MCNC: 60 MG/DL
NON-HDL CHOLESTEROL: 87
TRIGLYCERIDE (LIPID PAN): 73
VLDL CHOLESTEROL: 15 MG/DL

## 2020-07-08 ENCOUNTER — OFFICE VISIT (OUTPATIENT)
Dept: OBSTETRICS AND GYNECOLOGY | Facility: CLINIC | Age: 70
End: 2020-07-08
Payer: MEDICARE

## 2020-07-08 VITALS
HEIGHT: 60 IN | BODY MASS INDEX: 32.75 KG/M2 | SYSTOLIC BLOOD PRESSURE: 120 MMHG | DIASTOLIC BLOOD PRESSURE: 85 MMHG | WEIGHT: 166.81 LBS

## 2020-07-08 DIAGNOSIS — N64.4 BREAST PAIN IN FEMALE: ICD-10-CM

## 2020-07-08 DIAGNOSIS — R61 SWEATING PROFUSELY: ICD-10-CM

## 2020-07-08 DIAGNOSIS — Z12.4 PAP SMEAR FOR CERVICAL CANCER SCREENING: Primary | ICD-10-CM

## 2020-07-08 DIAGNOSIS — Z00.00 HEALTHCARE MAINTENANCE: ICD-10-CM

## 2020-07-08 PROBLEM — Z12.31 ENCOUNTER FOR SCREENING MAMMOGRAM FOR MALIGNANT NEOPLASM OF BREAST: Status: ACTIVE | Noted: 2020-07-08

## 2020-07-08 PROBLEM — J30.1 ALLERGIC RHINITIS DUE TO POLLEN: Status: ACTIVE | Noted: 2020-07-08

## 2020-07-08 PROBLEM — I25.84 CORONARY ATHEROSCLEROSIS DUE TO CALCIFIED CORONARY LESION (CODE): Status: ACTIVE | Noted: 2020-07-08

## 2020-07-08 PROBLEM — R31.9 HEMATURIA, UNSPECIFIED: Status: ACTIVE | Noted: 2020-07-08

## 2020-07-08 PROBLEM — K80.20 CALCULUS OF GALLBLADDER: Status: ACTIVE | Noted: 2020-07-08

## 2020-07-08 PROCEDURE — 88175 CYTOPATH C/V AUTO FLUID REDO: CPT

## 2020-07-08 PROCEDURE — 99999 PR PBB SHADOW E&M-EST. PATIENT-LVL V: ICD-10-PCS | Mod: PBBFAC,,, | Performed by: OBSTETRICS & GYNECOLOGY

## 2020-07-08 PROCEDURE — 99999 PR PBB SHADOW E&M-EST. PATIENT-LVL V: CPT | Mod: PBBFAC,,, | Performed by: OBSTETRICS & GYNECOLOGY

## 2020-07-08 PROCEDURE — G0101 PR CA SCREEN;PELVIC/BREAST EXAM: ICD-10-PCS | Mod: S$GLB,,, | Performed by: OBSTETRICS & GYNECOLOGY

## 2020-07-08 PROCEDURE — G0101 CA SCREEN;PELVIC/BREAST EXAM: HCPCS | Mod: S$GLB,,, | Performed by: OBSTETRICS & GYNECOLOGY

## 2020-07-08 RX ORDER — PROPRANOLOL HYDROCHLORIDE 80 MG/1
CAPSULE, EXTENDED RELEASE ORAL
COMMUNITY
Start: 2020-06-22 | End: 2020-11-16 | Stop reason: SDUPTHER

## 2020-07-08 RX ORDER — FUROSEMIDE 40 MG/1
20 TABLET ORAL DAILY PRN
COMMUNITY
Start: 2020-07-07 | End: 2021-05-17

## 2020-07-08 NOTE — PROGRESS NOTES
"  Chief Complaint: Well Woman Exam   Patient known to me.  HPI:      Jayla Loyd is a 69 y.o.  who presents for annual exam. She is currently complaining of pain under right breast and sweating of lower body.    These body sweats just started in past few months. No new medications used and never above the waist as experienced when had menopausal hot flashes. Patient reports pain under the right breast is "like a cramp" and only happens occasionally in past month. Normal mammogram at the end of 2019.    Ms. Loyd is currently sexually active with a single male partner. She declines STD screening today. Patient does not have regular monthly menses. Patient's last menstrual period was 1998 (approximate). She is currently using no method for contraception.    Previous Pap:  no abnormalities (No result found) Last done at . No results available for review.  Previous Mammogram: Birads 0 and followup done and normal per patient (do not have that result today to review).  Results for orders placed during the hospital encounter of 10/17/19   Mammo Digital Screening Bilat w/ Raul    Addendum Result:  Mammo Digital Screening Bilat w/ Raul   History: Patient is 69 y.o. and is seen for a screening mammogram.  Films Compared: 2016   Findings: This procedure was performed using tomosynthesis.  Computer-aided  detection was utilized in the interpretation of this examination. The breasts have scattered areas of fibroglandular density.   Right There is a focal asymmetry seen in the upper outer quadrant of the right  breast. Compared to the previous study, there are no significant changes.   Left There is no evidence of suspicious masses, calcifications, or other  abnormal findings in the left breast.  Impression: Bilateral There is no mammographic evidence of malignancy.  BI-RADS Category:  Overall: 2 - Benign   Recommendation: Routine screening mammogram in 1 year is recommended.  The patient's estimated " lifetime risk of breast cancer (to age 85) based  on Tyrer-Cuzick - 7 risk assessment model is: Tyrer-Cuzick: 7.09 %.  According to the American Cancer Society,  patients with a lifetime breast  cancer risk of 20% or higher might benefit from supplemental screening  tests.     Roman Garcia MD 10/30/2019  3:09 PM          Narrative Result:   Mammo Digital Screening Bilat w/ Raul     History:  Patient is 68 y.o. and is seen for a screening mammogram.    Films Compared:  Prior images (if available) were compared.     Findings:  This procedure was performed using tomosynthesis.  Computer-aided   detection was utilized in the interpretation of this examination.  The breasts have scattered areas of fibroglandular density.     Right  There is a focal asymmetry seen in the upper outer quadrant of the right   breast.     Left  There is no evidence of suspicious masses, calcifications, or other   abnormal findings in the left breast.      Impression Right  Focal Asymmetry: Right breast focal asymmetry at the upper outer position.   Assessment: 0 - Incomplete.     Left  There is no mammographic evidence of malignancy.    BI-RADS Category:   Overall: 0 - Incomplete: Need Prior Mammograms for Comparison     Recommendation:  Obtain prior study for comparison is recommended.     The patient's estimated lifetime risk of breast cancer (to age 85) based   on Tyrer-Cuzick - 7 risk assessment model is: Tyrer-Cuzick: 7.09 %.   According to the American Cancer Society,  patients with a lifetime breast   cancer risk of 20% or higher might benefit from supplemental screening   tests.      Most Recent Dexa: 2018 normal.  Colonoscopy: Up to date per patient    OB History        1    Para   1    Term   1            AB        Living   1       SAB        TAB        Ectopic        Multiple        Live Births   1           Obstetric Comments   Menarche at 15         No abnormal pap or STDs    ROS:     GENERAL: Denies unintentional  weight gain or weight loss. Feeling well overall. +sweating in lower body intermittently  SKIN: Denies rash or lesions.   HEENT: Denies headaches, or vision changes.   CARDIOVASCULAR: Denies palpitations or chest pain.   RESPIRATORY: Denies shortness of breath or dyspnea on exertion.  BREASTS: Denies lumps, or nipple discharge. +pain under right breast  ABDOMEN: Denies abdominal pain, constipation, diarrhea, nausea, vomiting, or change in appetite.   URINARY: Denies frequency, dysuria, hematuria.  NEUROLOGIC: Denies syncope or weakness.   PSYCHIATRIC: Denies depression, anxiety or mood swings.    Physical Exam:      PHYSICAL EXAM:  /85   Ht 5' (1.524 m)   Wt 75.7 kg (166 lb 12.8 oz)   LMP 05/25/1998 (Approximate)   BMI 32.58 kg/m²   Body mass index is 32.58 kg/m².     APPEARANCE: Well nourished, well developed, in no acute distress.  PSYCH: Appropriate mood and affect.  SKIN: No acne or hirsutism  NECK: Neck symmetric without masses or thyromegaly  NODES: No inguinal, axillary, or supraclavicular lymph node enlargement  CHEST: Normal respiratory effort.  ABDOMEN: Soft.  No tenderness or masses.   BREASTS: Symmetrical, no skin changes or visible lesions.  No palpable masses or nipple discharge bilaterally. Mild tenderness along rib right below the right breast  PELVIC: Normal external genitalia without lesions.  Normal hair distribution.  Adequate perineal body, normal urethral meatus.  Vagina atrophic without lesions or discharge.  Cervix pink, without lesions, discharge or tenderness.  No significant cystocele or rectocele.  Bimanual exam shows uterus to be normal size, regular, mobile and nontender.  Adnexa without masses or tenderness.    EXTREMITIES: No edema.    Assessment/Plan:     Pap smear for cervical cancer screening  Annual exam done today. Pap smear done. Will check right breast ultrasound secondary to pain along right lower breast/rib. Mammogram due in October. DEXA will be due in next year.  Patient requests PCP referral and will send for evaluation of lower body sweating as do not feel hormonal as should not be happening now after over 10+ years of menopuase.  -     Liquid-Based Pap Smear, Screening    Breast pain in female  -     US Breast Right Complete; Future; Expected date: 07/08/2020    Sweating profusely  Refer to PCP for further evaluation.    Healthcare maintenance  -     Ambulatory referral/consult to Internal Medicine; Future; Expected date: 07/15/2020    RTC 1-2 years per Medicare coverage.      Counseling:     Patient was counseled today on current ASCCP pap guidelines, the recommendation for yearly pelvic exams, healthy diet and exercise routines, breast self awareness and annual mammograms. She is to see her PCP for other health maintenance.       Use of the CURRENT Patient Portal discussed and encouraged during today's visit.       Mana Pulido MD

## 2020-07-09 ENCOUNTER — TELEPHONE (OUTPATIENT)
Dept: FAMILY MEDICINE | Facility: CLINIC | Age: 70
End: 2020-07-09

## 2020-07-15 LAB
FINAL PATHOLOGIC DIAGNOSIS: NORMAL
Lab: NORMAL

## 2020-07-16 ENCOUNTER — OFFICE VISIT (OUTPATIENT)
Dept: FAMILY MEDICINE | Facility: CLINIC | Age: 70
End: 2020-07-16
Payer: MEDICARE

## 2020-07-16 ENCOUNTER — CLINICAL SUPPORT (OUTPATIENT)
Dept: FAMILY MEDICINE | Facility: CLINIC | Age: 70
End: 2020-07-16
Attending: INTERNAL MEDICINE
Payer: MEDICARE

## 2020-07-16 VITALS
TEMPERATURE: 97 F | SYSTOLIC BLOOD PRESSURE: 116 MMHG | DIASTOLIC BLOOD PRESSURE: 86 MMHG | OXYGEN SATURATION: 98 % | HEART RATE: 67 BPM | WEIGHT: 167.56 LBS | BODY MASS INDEX: 32.89 KG/M2 | HEIGHT: 60 IN

## 2020-07-16 DIAGNOSIS — R80.9 PROTEINURIA, UNSPECIFIED TYPE: ICD-10-CM

## 2020-07-16 DIAGNOSIS — G44.89 CHRONIC MIXED HEADACHE SYNDROME: Primary | ICD-10-CM

## 2020-07-16 DIAGNOSIS — F41.9 ANXIETY: ICD-10-CM

## 2020-07-16 DIAGNOSIS — E11.69 TYPE 2 DIABETES MELLITUS WITH OTHER SPECIFIED COMPLICATION, WITHOUT LONG-TERM CURRENT USE OF INSULIN: ICD-10-CM

## 2020-07-16 DIAGNOSIS — E78.5 HYPERLIPIDEMIA, UNSPECIFIED HYPERLIPIDEMIA TYPE: ICD-10-CM

## 2020-07-16 DIAGNOSIS — Z00.00 HEALTHCARE MAINTENANCE: ICD-10-CM

## 2020-07-16 DIAGNOSIS — I10 HYPERTENSION, ESSENTIAL: ICD-10-CM

## 2020-07-16 DIAGNOSIS — M19.012 ARTHRITIS OF LEFT SHOULDER REGION: ICD-10-CM

## 2020-07-16 PROBLEM — E11.9 TYPE 2 DIABETES MELLITUS, WITHOUT LONG-TERM CURRENT USE OF INSULIN: Status: ACTIVE | Noted: 2020-07-16

## 2020-07-16 PROCEDURE — 92250 DIABETIC EYE SCREENING PHOTO: ICD-10-PCS | Mod: 26,S$GLB,, | Performed by: OPTOMETRIST

## 2020-07-16 PROCEDURE — 1101F PT FALLS ASSESS-DOCD LE1/YR: CPT | Mod: CPTII,S$GLB,, | Performed by: INTERNAL MEDICINE

## 2020-07-16 PROCEDURE — 3074F PR MOST RECENT SYSTOLIC BLOOD PRESSURE < 130 MM HG: ICD-10-PCS | Mod: CPTII,S$GLB,, | Performed by: INTERNAL MEDICINE

## 2020-07-16 PROCEDURE — 1125F PR PAIN SEVERITY QUANTIFIED, PAIN PRESENT: ICD-10-PCS | Mod: S$GLB,,, | Performed by: INTERNAL MEDICINE

## 2020-07-16 PROCEDURE — 1125F AMNT PAIN NOTED PAIN PRSNT: CPT | Mod: S$GLB,,, | Performed by: INTERNAL MEDICINE

## 2020-07-16 PROCEDURE — 3008F PR BODY MASS INDEX (BMI) DOCUMENTED: ICD-10-PCS | Mod: CPTII,S$GLB,, | Performed by: INTERNAL MEDICINE

## 2020-07-16 PROCEDURE — 1159F PR MEDICATION LIST DOCUMENTED IN MEDICAL RECORD: ICD-10-PCS | Mod: S$GLB,,, | Performed by: INTERNAL MEDICINE

## 2020-07-16 PROCEDURE — 2022F DILAT RTA XM EVC RTNOPTHY: CPT | Mod: S$GLB,,, | Performed by: OPTOMETRIST

## 2020-07-16 PROCEDURE — 2024F PR 7 FIELD PHOTOS WITH INTERP/ REVIEW: ICD-10-PCS | Mod: S$GLB,,, | Performed by: INTERNAL MEDICINE

## 2020-07-16 PROCEDURE — 99214 PR OFFICE/OUTPT VISIT, EST, LEVL IV, 30-39 MIN: ICD-10-PCS | Mod: S$GLB,,, | Performed by: INTERNAL MEDICINE

## 2020-07-16 PROCEDURE — 2024F 7 FLD RTA PHOTO EVC RTNOPTHY: CPT | Mod: S$GLB,,, | Performed by: INTERNAL MEDICINE

## 2020-07-16 PROCEDURE — 1159F MED LIST DOCD IN RCRD: CPT | Mod: S$GLB,,, | Performed by: INTERNAL MEDICINE

## 2020-07-16 PROCEDURE — 3074F SYST BP LT 130 MM HG: CPT | Mod: CPTII,S$GLB,, | Performed by: INTERNAL MEDICINE

## 2020-07-16 PROCEDURE — 3079F PR MOST RECENT DIASTOLIC BLOOD PRESSURE 80-89 MM HG: ICD-10-PCS | Mod: CPTII,S$GLB,, | Performed by: INTERNAL MEDICINE

## 2020-07-16 PROCEDURE — 99214 OFFICE O/P EST MOD 30 MIN: CPT | Mod: S$GLB,,, | Performed by: INTERNAL MEDICINE

## 2020-07-16 PROCEDURE — 2022F DIABETIC EYE SCREENING PHOTO: ICD-10-PCS | Mod: S$GLB,,, | Performed by: OPTOMETRIST

## 2020-07-16 PROCEDURE — 99999 PR PBB SHADOW E&M-EST. PATIENT-LVL V: CPT | Mod: PBBFAC,,, | Performed by: INTERNAL MEDICINE

## 2020-07-16 PROCEDURE — 99999 PR PBB SHADOW E&M-EST. PATIENT-LVL V: ICD-10-PCS | Mod: PBBFAC,,, | Performed by: INTERNAL MEDICINE

## 2020-07-16 PROCEDURE — 3008F BODY MASS INDEX DOCD: CPT | Mod: CPTII,S$GLB,, | Performed by: INTERNAL MEDICINE

## 2020-07-16 PROCEDURE — 92250 FUNDUS PHOTOGRAPHY W/I&R: CPT | Mod: 26,S$GLB,, | Performed by: OPTOMETRIST

## 2020-07-16 PROCEDURE — 1101F PR PT FALLS ASSESS DOC 0-1 FALLS W/OUT INJ PAST YR: ICD-10-PCS | Mod: CPTII,S$GLB,, | Performed by: INTERNAL MEDICINE

## 2020-07-16 PROCEDURE — 3079F DIAST BP 80-89 MM HG: CPT | Mod: CPTII,S$GLB,, | Performed by: INTERNAL MEDICINE

## 2020-07-16 RX ORDER — ASPIRIN 81 MG/1
81 TABLET ORAL DAILY
Refills: 0
Start: 2020-07-16 | End: 2021-01-19

## 2020-07-16 RX ORDER — ONDANSETRON 4 MG/1
4 TABLET, ORALLY DISINTEGRATING ORAL EVERY 8 HOURS PRN
Qty: 20 TABLET | Refills: 3 | Status: SHIPPED | OUTPATIENT
Start: 2020-07-16 | End: 2021-05-17

## 2020-07-16 NOTE — PROGRESS NOTES
[facility] Note    Chief Complaint      Chief Complaint   Patient presents with    Establish Care     pt here to est care / pt has a headache      History of Present Illness      Jayla Loyd is a 69 y.o. female who presents today for chief complaint follow up chronic issues and establish care . Patient is new to me.    PCP: Primary Doctor No  Patient comes to appointment alone.     Sees Dr Jung in [place] [facility]  Ama Britt, Charly Do, Dr Jung, Dr Butt, Tracy Hanson, Maan Pulido, Laury    Refuses vaccines   Dr escobar does foot exam   Tremor on propranolol     Active Problem List with Overview Notes    Diagnosis Date Noted    Hyperlipidemia 07/22/2020     On crestor       Arthritis of left shoulder region 07/16/2020     seeing [place] [facility]       Anxiety 07/16/2020     Using qhs xanax       Type 2 diabetes mellitus, without long-term current use of insulin 07/16/2020     Sees dr butt had recent a1c, he also does foot exam      Allergic rhinitis due to pollen 07/08/2020    Calculus of gallbladder 07/08/2020    Coronary atherosclerosis due to calcified coronary lesion (CODE) 07/08/2020    Encounter for screening mammogram for malignant neoplasm of breast 07/08/2020    Hematuria, unspecified 07/08/2020    Chronic idiopathic constipation 09/24/2019    Iron deficiency anemia, unspecified 06/30/2019    Microhematuria 12/05/2018    Hyperphosphatemia 08/19/2018    Hypertension, essential 08/19/2018     At goal on lasix and micardis, no chest pain or shortness of breath       Proteinuria, unspecified 08/19/2018     Seeing dr hanson       Body mass index (bmi) 26.0-26.9, adult 11/03/2017    Gastroesophageal reflux disease 07/16/2014    Adenomatous polyp of colon 07/16/2014    Chronic mixed headache syndrome 07/16/2014     Has chronic migraines, uses fioricet   Tried amovig, has not tried triptan - not interested  Sees Charly  [facility]      Pure hypercholesterolemia 08/01/2012    Gastro-esophageal reflux disease with esophagitis 08/01/2012    Kidney stone 04/14/2011       [facility]     Health Maintenance   Topic Date Due    Hemoglobin A1c  1950    TETANUS VACCINE  02/01/2008    Pneumococcal Vaccine (65+ Low/Medium Risk) (1 of 2 - PCV13) 10/27/2017    Foot Exam  10/31/2019    Lipid Panel  05/19/2021    Aspirin/Antiplatelet Therapy  07/16/2021    Eye Exam  07/17/2021    Mammogram  07/10/2022    DEXA SCAN  07/21/2023    Hepatitis C Screening  Completed       Past Medical History:   Diagnosis Date    Diabetes mellitus type I     GERD (gastroesophageal reflux disease)     Migraines     Proteinuria        Past Surgical History:   Procedure Laterality Date    CATARACT EXTRACTION         family history includes Bladder Cancer in her mother; Breast cancer in her maternal aunt and maternal grandmother; Cancer in her maternal aunt, maternal grandmother, mother, paternal aunt, and paternal grandmother; Dementia in her paternal grandmother; Diabetes in her paternal aunt; Heart disease in her father, maternal grandfather, mother, and paternal grandfather; Heart failure in her father; Hypertension in her father and mother; No Known Problems in her son.    Social History     Tobacco Use    Smoking status: Never Smoker    Smokeless tobacco: Never Used   Substance Use Topics    Alcohol use: Yes     Comment: rarely    Drug use: Yes     Types: Amphetamines     Comment: per script from Dr. Rajiv KOHLI     Outpatient Encounter Medications as of 7/16/2020   Medication Sig Note Dispense Refill    ACCU-CHEK SAPPHIRE PLUS TEST STRP Strp Use as directed every day   3    ACCU-CHEK SOFTCLIX LANCETS Misc USE AS DIRECTED EVERY DAY   3    ALPRAZolam (XANAX) 0.5 MG tablet Take 1 tablet by mouth.  7/16/2020: As needed      butalbital-acetaminophen-caffeine -40 mg (FIORICET, ESGIC) -40 mg per tablet TAKE 1-2 TABLETS BY MOUTH  EVERY 4 HOURS AS NEEDED NTE 6/DAY   1    citalopram (CELEXA) 40 MG tablet Take 1 tablet (40 mg total) by mouth once daily.  90 tablet 0    dicyclomine (BENTYL) 20 mg tablet Take 20 mg by mouth as needed. 7/16/2020: As needed      esomeprazole (NEXIUM) 40 MG capsule Take 1 capsule (40 mg total) by mouth once daily.  90 capsule 3    fluticasone (FLONASE) 50 mcg/actuation nasal spray 1 spray by Each Nare route once daily.       furosemide (LASIX) 20 MG tablet take 1 tablet by oral route  every day 7/16/2020: As needed      furosemide (LASIX) 40 MG tablet  7/16/2020: As needed      Lactobac no.41/Bifidobact no.7 (PROBIOTIC-10 ORAL) Take by mouth once daily.       metformin (GLUCOPHAGE) 500 MG tablet Take 500 mg by mouth daily with breakfast.        ondansetron (ZOFRAN-ODT) 4 MG TbDL Take 1 tablet (4 mg total) by mouth every 8 (eight) hours as needed.  20 tablet 3    polyethylene glycol (MIRALAX) 17 gram PwPk Take by mouth once daily. 7/16/2020: As needed      propranoloL (INDERAL LA) 80 MG 24 hr capsule        pseudoephedrine (SUDAFED) 30 MG tablet Take 30 mg by mouth every 4 (four) hours as needed for Congestion.       rosuvastatin (CRESTOR) 40 MG Tab Take 40 mg by mouth once daily.        telmisartan (MICARDIS) 20 MG Tab Take 1 tablet (20 mg total) by mouth once daily. 1/2 tablet of 40mg daily  90 tablet 3    tiZANidine (ZANAFLEX) 4 MG tablet Pt takes 1/2 of medication 7/16/2020: As needed      [DISCONTINUED] meclizine (ANTIVERT) 25 mg tablet Take 1 tablet by mouth. 7/16/2020: As needed      [DISCONTINUED] ondansetron (ZOFRAN-ODT) 4 MG TbDL DISSOLVE ONE TABLET BY MOUTH EVERY 8 HOURS 7/16/2020: As needed      aspirin (ECOTRIN) 81 MG EC tablet Take 1 tablet (81 mg total) by mouth once daily.   0    coenzyme Q10 (CO Q-10) 100 mg capsule Take 100 mg by mouth once daily.       folic acid/multivit-min/lutein (CENTRUM SILVER ORAL) Take by mouth once daily.       traMADol (ULTRAM) 50 mg tablet TAKE 1  TABLET BY MOUTH EVERY 12-24 HOURS AS NEEDED FOR PAIN   1     No facility-administered encounter medications on file as of 7/16/2020.         Review of patient's allergies indicates:   Allergen Reactions    Codeine Nausea Only           Physical Exam      Vital Signs  Temp: 97.3 °F (36.3 °C)  Temp src: Oral  Pulse: 67  SpO2: 98 %  BP: 116/86  Pain Score:   8  Pain Loc: Head(pt states headache and teeth hurt)  Height and Weight  Height: 5' (152.4 cm)  Weight: 76 kg (167 lb 8.8 oz)  BSA (Calculated - sq m): 1.79 sq meters  BMI (Calculated): 32.7  Weight in (lb) to have BMI = 25: 127.7]    Physical Exam     Laboratory:  CBC:  Recent Labs   Lab Result Units 05/01/20  0937   WBC K/uL 7.74   RBC M/uL 4.06   Hemoglobin g/dL 11.3*   Hematocrit % 36.2*   Platelets K/uL 408*   Mean Corpuscular Volume fL 89   Mean Corpuscular Hemoglobin pg 27.8   Mean Corpuscular Hemoglobin Conc g/dL 31.2*     CMP:  Recent Labs   Lab Result Units 05/01/20  0937   Glucose mg/dL 156*   Calcium mg/dL 9.4   Albumin g/dL 3.8   Total Protein g/dL 6.9   Sodium mmol/L 140   Potassium mmol/L 4.4   CO2 mmol/L 31*   Chloride mmol/L 104   BUN, Bld mg/dL 12   Alkaline Phosphatase U/L 141*   ALT U/L 12   AST U/L 19   Total Bilirubin mg/dL 0.3     URINALYSIS:  Recent Labs   Lab Result Units 05/01/20  0937   Color, UA  Yellow   Specific Gravity, UA  1.020   pH, UA  7.0   Protein, UA  Negative   Nitrite, UA  Negative   Leukocytes, UA  Negative   Urobilinogen, UA EU/dL Negative      LIPIDS:  Recent Labs   Lab Result Units 05/19/20   HDL mg/dL 84   Cholesterol MG/   LDL Cholesterol MG/DL 60     TSH:  No results for input(s): TSH in the last 2160 hours.  A1C:  No results for input(s): HGBA1C in the last 2160 hours.    Radiology:      Assessment/Plan     Jayla Loyd is a 69 y.o.female with:    Chronic mixed headache syndrome  Continue current medications without change       Anxiety  Ok to continue     Hypertension, essential  Continue current medications  without change       Proteinuria, unspecified  2/2 DM? Continue follow up with nephro     Type 2 diabetes mellitus, without long-term current use of insulin  Get records     Hyperlipidemia  utd labs, continue statin, start asa with comorbid DM      Orders Placed This Encounter   Procedures    CBC auto differential     Standing Status:   Future     Standing Expiration Date:   9/14/2021    Comprehensive metabolic panel     Standing Status:   Future     Standing Expiration Date:   9/14/2021    Lipid Panel     Standing Status:   Future     Standing Expiration Date:   9/14/2021    Microalbumin/creatinine urine ratio     Standing Status:   Future     Standing Expiration Date:   7/16/2021     Order Specific Question:   Specimen Source     Answer:   Urine    Hemoglobin A1C     Standing Status:   Future     Standing Expiration Date:   9/14/2021    Diabetic Eye Screening Photo     Standing Status:   Future     Number of Occurrences:   1     Standing Expiration Date:   7/16/2021       -Continue current medications and maintain follow up with specialists.  Return to clinic in 6 months.  Future Appointments   Date Time Provider Department Center   8/18/2020  9:15 AM Stacia Whittaker MD [facility]   10/16/2020  8:30 AM LAB, [facility]   10/16/2020  8:40 AM LAB, [facility]   1/14/2021 11:00 AM Geena Barnard MD [facility] [place]   5/31/2021  9:00 AM Vicenta Alvarez MD [facility]       Geena Barnard MD  7/22/2020 10:59 AM    Primary Care Internal Medicine - [facility]

## 2020-07-16 NOTE — LETTER
July 16, 2020      Mana Pulido MD  [address]  [facility] [address]           [address], [facility] [address]  [facility] [address]  Phone: [phone]  Fax: [phone]          Patient: Jayla Loyd   MR Number: [id number]   YOB: 1950   Date of Visit: 7/16/2020       Dear Dr. Mana Pulido:    Thank you for referring Jayla Loyd to me for evaluation. Attached you will find relevant portions of my assessment and plan of care.    If you have questions, please do not hesitate to call me. I look forward to following Jayla Loyd along with you.    Sincerely,    Geena Barnard MD    Enclosure  CC:  No Recipients    If you would like to receive this communication electronically, please contact [email or web address] or [phone] to request more information on [employer] Link access.    For providers and/or their staff who would like to refer a patient to [facility], please contact us through our one-stop-shop provider referral line, [facility], at [phone].    If you feel you have received this communication in error or would no longer like to receive these types of communications, please e-mail [email or web address]

## 2020-07-16 NOTE — LETTER
AUTHORIZATION FOR RELEASE OF   CONFIDENTIAL INFORMATION    Dear Dr. Jung,    We are seeing Jayla Loyd, date of birth 1950, in the clinic at No Department Specified. Primary Doctor Trang is the patient's PCP. Jayla Loyd has an outstanding lab/procedure at the time we reviewed her chart. In order to help keep her health information updated, she has authorized us to request the following medical record(s):        (  )  MAMMOGRAM                                      (  )  COLONOSCOPY      (  )  PAP SMEAR                                          (x  )  OUTSIDE LAB RESULTS     (  )  DEXA SCAN                                          (  )  EYE EXAM            (  )  FOOT EXAM                                          (  )  ENTIRE RECORD     (  )  OUTSIDE IMMUNIZATIONS                 (  )  _______________         Please fax records to [facility], Abby Gandolfi, [phone]     If you have any questions, please contact Natali at [phone]           Patient Name: Jayla Loyd  : 1950  Patient Phone #: [phone]

## 2020-07-16 NOTE — PROGRESS NOTES
Jayla Loyd is a 69 y.o. female here for a diabetic eye screening with non-dilated fundus photos per Dr Barnard.    Patient cooperative?: Yes  Small pupils?: Yes  Last eye exam: unknown    For exam results, see Encounter Report.

## 2020-07-21 ENCOUNTER — TELEPHONE (OUTPATIENT)
Dept: ADMINISTRATIVE | Facility: HOSPITAL | Age: 70
End: 2020-07-21

## 2020-07-22 ENCOUNTER — TELEPHONE (OUTPATIENT)
Dept: ADMINISTRATIVE | Facility: HOSPITAL | Age: 70
End: 2020-07-22

## 2020-07-22 ENCOUNTER — PATIENT MESSAGE (OUTPATIENT)
Dept: FAMILY MEDICINE | Facility: CLINIC | Age: 70
End: 2020-07-22

## 2020-07-22 PROBLEM — E78.5 HYPERLIPIDEMIA: Status: ACTIVE | Noted: 2020-07-22

## 2020-07-22 RX ORDER — MECLIZINE HYDROCHLORIDE 25 MG/1
25 TABLET ORAL 2 TIMES DAILY PRN
Qty: 60 TABLET | Refills: 0 | Status: SHIPPED | OUTPATIENT
Start: 2020-07-22 | End: 2021-01-11 | Stop reason: SDUPTHER

## 2020-07-24 ENCOUNTER — TELEPHONE (OUTPATIENT)
Dept: OPHTHALMOLOGY | Facility: CLINIC | Age: 70
End: 2020-07-24

## 2020-07-24 NOTE — TELEPHONE ENCOUNTER
Called patient regarding diabetic eye screening results , gave patient her results     ----- Message from NEETU Eagle, OD sent at 7/17/2020 12:35 PM CDT -----  No gross diabetic retinopathy is noted in either eye. Advise dilated fundus exam in 12 months.

## 2020-07-29 ENCOUNTER — TELEPHONE (OUTPATIENT)
Dept: FAMILY MEDICINE | Facility: CLINIC | Age: 70
End: 2020-07-29

## 2020-07-29 NOTE — TELEPHONE ENCOUNTER
----- Message from Livia Anderson sent at 7/29/2020  5:28 PM CDT -----  Regarding: Appt question  Pt calling to speak with the Stacia concerning an appt.    Pt can be  reached at [phone]      Thank you!

## 2020-09-02 ENCOUNTER — TELEPHONE (OUTPATIENT)
Dept: FAMILY MEDICINE | Facility: CLINIC | Age: 70
End: 2020-09-02

## 2020-09-02 NOTE — TELEPHONE ENCOUNTER
Patient is schedule for virtual tomorrow . Is going to download the Zapper pipo and call office back if she has any problems

## 2020-09-02 NOTE — TELEPHONE ENCOUNTER
----- Message from David Ch sent at 9/2/2020  2:35 PM CDT -----  Regarding: Advice  Contact: Patient [phone]  Patient would like to get medical advice.     Comments: surgery for Friday for shoulder replacement on Friday, stating when sleep at nights wakes up by head spinning feeling, wants to get advice from PCP prior to the surgery.    Please call an advise  Thank you

## 2020-09-03 ENCOUNTER — OFFICE VISIT (OUTPATIENT)
Dept: FAMILY MEDICINE | Facility: CLINIC | Age: 70
End: 2020-09-03
Payer: MEDICARE

## 2020-09-03 VITALS
SYSTOLIC BLOOD PRESSURE: 124 MMHG | BODY MASS INDEX: 32.27 KG/M2 | OXYGEN SATURATION: 96 % | HEART RATE: 81 BPM | DIASTOLIC BLOOD PRESSURE: 86 MMHG | TEMPERATURE: 99 F | WEIGHT: 165.25 LBS

## 2020-09-03 DIAGNOSIS — R42 VERTIGO: Primary | ICD-10-CM

## 2020-09-03 PROCEDURE — 1126F AMNT PAIN NOTED NONE PRSNT: CPT | Mod: S$GLB,,, | Performed by: INTERNAL MEDICINE

## 2020-09-03 PROCEDURE — 3008F BODY MASS INDEX DOCD: CPT | Mod: CPTII,S$GLB,, | Performed by: INTERNAL MEDICINE

## 2020-09-03 PROCEDURE — 99999 PR PBB SHADOW E&M-EST. PATIENT-LVL V: ICD-10-PCS | Mod: PBBFAC,,, | Performed by: INTERNAL MEDICINE

## 2020-09-03 PROCEDURE — 99999 PR PBB SHADOW E&M-EST. PATIENT-LVL V: CPT | Mod: PBBFAC,,, | Performed by: INTERNAL MEDICINE

## 2020-09-03 PROCEDURE — 1159F MED LIST DOCD IN RCRD: CPT | Mod: S$GLB,,, | Performed by: INTERNAL MEDICINE

## 2020-09-03 PROCEDURE — 1159F PR MEDICATION LIST DOCUMENTED IN MEDICAL RECORD: ICD-10-PCS | Mod: S$GLB,,, | Performed by: INTERNAL MEDICINE

## 2020-09-03 PROCEDURE — 3079F DIAST BP 80-89 MM HG: CPT | Mod: CPTII,S$GLB,, | Performed by: INTERNAL MEDICINE

## 2020-09-03 PROCEDURE — 1101F PT FALLS ASSESS-DOCD LE1/YR: CPT | Mod: CPTII,S$GLB,, | Performed by: INTERNAL MEDICINE

## 2020-09-03 PROCEDURE — 3079F PR MOST RECENT DIASTOLIC BLOOD PRESSURE 80-89 MM HG: ICD-10-PCS | Mod: CPTII,S$GLB,, | Performed by: INTERNAL MEDICINE

## 2020-09-03 PROCEDURE — 3008F PR BODY MASS INDEX (BMI) DOCUMENTED: ICD-10-PCS | Mod: CPTII,S$GLB,, | Performed by: INTERNAL MEDICINE

## 2020-09-03 PROCEDURE — 1126F PR PAIN SEVERITY QUANTIFIED, NO PAIN PRESENT: ICD-10-PCS | Mod: S$GLB,,, | Performed by: INTERNAL MEDICINE

## 2020-09-03 PROCEDURE — 3074F SYST BP LT 130 MM HG: CPT | Mod: CPTII,S$GLB,, | Performed by: INTERNAL MEDICINE

## 2020-09-03 PROCEDURE — 1101F PR PT FALLS ASSESS DOC 0-1 FALLS W/OUT INJ PAST YR: ICD-10-PCS | Mod: CPTII,S$GLB,, | Performed by: INTERNAL MEDICINE

## 2020-09-03 PROCEDURE — 99214 OFFICE O/P EST MOD 30 MIN: CPT | Mod: S$GLB,,, | Performed by: INTERNAL MEDICINE

## 2020-09-03 PROCEDURE — 99214 PR OFFICE/OUTPT VISIT, EST, LEVL IV, 30-39 MIN: ICD-10-PCS | Mod: S$GLB,,, | Performed by: INTERNAL MEDICINE

## 2020-09-03 PROCEDURE — 3074F PR MOST RECENT SYSTOLIC BLOOD PRESSURE < 130 MM HG: ICD-10-PCS | Mod: CPTII,S$GLB,, | Performed by: INTERNAL MEDICINE
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
## 2020-10-05 ENCOUNTER — PATIENT MESSAGE (OUTPATIENT)
Dept: INTERNAL MEDICINE | Facility: CLINIC | Age: 70
End: 2020-10-05

## 2020-10-08 ENCOUNTER — TELEPHONE (OUTPATIENT)
Dept: FAMILY MEDICINE | Facility: CLINIC | Age: 70
End: 2020-10-08

## 2020-10-08 DIAGNOSIS — G47.00 INSOMNIA, UNSPECIFIED TYPE: Primary | ICD-10-CM

## 2020-10-08 RX ORDER — AMITRIPTYLINE HYDROCHLORIDE 10 MG/1
10 TABLET, FILM COATED ORAL NIGHTLY PRN
Qty: 90 TABLET | Refills: 1 | Status: SHIPPED | OUTPATIENT
Start: 2020-10-08 | End: 2021-01-19

## 2020-10-08 NOTE — TELEPHONE ENCOUNTER
Spoke with patient. Pt states she would like Dr Barnard to prescribe her a low dose of Elavil to help her to sleep. Pt states that she was on this medication previously and feels she would benefit from this medication now. Pt states that she recently underwent shoulder surgery and has trouble sleeping with the pain.   Informed patient that I would forward this request to Dr Barnard and once she responds someone would give her a call back. Pt verbalizes understanding.

## 2020-10-08 NOTE — TELEPHONE ENCOUNTER
Called and informed pt that her medication was refilled and is ready for . Pt verbalized understanding of message.

## 2020-10-08 NOTE — TELEPHONE ENCOUNTER
----- Message from Mihcael Lozano sent at 10/8/2020  3:14 PM CDT -----  Type:  Needs Medical Advice    Who Called: Jayla   Symptoms (please be specific): pt wants to know if Dr. Barnard would prescribe her a medication amitripilane (elavil) to help her sleep at night   How long has patient had these symptoms:  unknown  Pharmacy name and phone #:   [id number] - [place], [place] - [address] [facility] [phone] (Phone)  [phone] (Fax)  Would the patient rather a call back or a response via [facility]? Call back  Best Call Back Number:  [phone]  Additional Information: none

## 2020-11-16 ENCOUNTER — OFFICE VISIT (OUTPATIENT)
Dept: FAMILY MEDICINE | Facility: CLINIC | Age: 70
End: 2020-11-16
Payer: MEDICARE

## 2020-11-16 VITALS
SYSTOLIC BLOOD PRESSURE: 106 MMHG | TEMPERATURE: 98 F | HEART RATE: 86 BPM | WEIGHT: 165.25 LBS | DIASTOLIC BLOOD PRESSURE: 74 MMHG | BODY MASS INDEX: 32.44 KG/M2 | OXYGEN SATURATION: 95 % | HEIGHT: 60 IN

## 2020-11-16 DIAGNOSIS — E11.69 TYPE 2 DIABETES MELLITUS WITH OTHER SPECIFIED COMPLICATION, WITHOUT LONG-TERM CURRENT USE OF INSULIN: ICD-10-CM

## 2020-11-16 DIAGNOSIS — L73.9 FOLLICULITIS: Primary | ICD-10-CM

## 2020-11-16 DIAGNOSIS — R68.89 HEAT INTOLERANCE: ICD-10-CM

## 2020-11-16 DIAGNOSIS — D50.9 IRON DEFICIENCY ANEMIA, UNSPECIFIED IRON DEFICIENCY ANEMIA TYPE: ICD-10-CM

## 2020-11-16 PROBLEM — K80.20 CALCULUS OF GALLBLADDER: Status: RESOLVED | Noted: 2020-07-08 | Resolved: 2020-11-16

## 2020-11-16 PROBLEM — R31.29 MICROHEMATURIA: Status: RESOLVED | Noted: 2018-12-05 | Resolved: 2020-11-16

## 2020-11-16 PROBLEM — I25.84 CORONARY ATHEROSCLEROSIS DUE TO CALCIFIED CORONARY LESION (CODE): Status: RESOLVED | Noted: 2020-07-08 | Resolved: 2020-11-16

## 2020-11-16 PROCEDURE — 3288F PR FALLS RISK ASSESSMENT DOCUMENTED: ICD-10-PCS | Mod: CPTII,S$GLB,, | Performed by: INTERNAL MEDICINE

## 2020-11-16 PROCEDURE — 1126F AMNT PAIN NOTED NONE PRSNT: CPT | Mod: S$GLB,,, | Performed by: INTERNAL MEDICINE

## 2020-11-16 PROCEDURE — 1101F PT FALLS ASSESS-DOCD LE1/YR: CPT | Mod: CPTII,S$GLB,, | Performed by: INTERNAL MEDICINE

## 2020-11-16 PROCEDURE — 99999 PR PBB SHADOW E&M-EST. PATIENT-LVL IV: CPT | Mod: PBBFAC,,, | Performed by: INTERNAL MEDICINE

## 2020-11-16 PROCEDURE — 2024F PR 7 FIELD PHOTOS WITH INTERP/ REVIEW: ICD-10-PCS | Mod: S$GLB,,, | Performed by: INTERNAL MEDICINE

## 2020-11-16 PROCEDURE — 1126F PR PAIN SEVERITY QUANTIFIED, NO PAIN PRESENT: ICD-10-PCS | Mod: S$GLB,,, | Performed by: INTERNAL MEDICINE

## 2020-11-16 PROCEDURE — 3078F PR MOST RECENT DIASTOLIC BLOOD PRESSURE < 80 MM HG: ICD-10-PCS | Mod: CPTII,S$GLB,, | Performed by: INTERNAL MEDICINE

## 2020-11-16 PROCEDURE — 3044F PR MOST RECENT HEMOGLOBIN A1C LEVEL <7.0%: ICD-10-PCS | Mod: CPTII,S$GLB,, | Performed by: INTERNAL MEDICINE

## 2020-11-16 PROCEDURE — 3074F SYST BP LT 130 MM HG: CPT | Mod: CPTII,S$GLB,, | Performed by: INTERNAL MEDICINE

## 2020-11-16 PROCEDURE — 99214 PR OFFICE/OUTPT VISIT, EST, LEVL IV, 30-39 MIN: ICD-10-PCS | Mod: S$GLB,,, | Performed by: INTERNAL MEDICINE

## 2020-11-16 PROCEDURE — 1159F PR MEDICATION LIST DOCUMENTED IN MEDICAL RECORD: ICD-10-PCS | Mod: S$GLB,,, | Performed by: INTERNAL MEDICINE

## 2020-11-16 PROCEDURE — 3074F PR MOST RECENT SYSTOLIC BLOOD PRESSURE < 130 MM HG: ICD-10-PCS | Mod: CPTII,S$GLB,, | Performed by: INTERNAL MEDICINE

## 2020-11-16 PROCEDURE — 99214 OFFICE O/P EST MOD 30 MIN: CPT | Mod: S$GLB,,, | Performed by: INTERNAL MEDICINE

## 2020-11-16 PROCEDURE — 3008F PR BODY MASS INDEX (BMI) DOCUMENTED: ICD-10-PCS | Mod: CPTII,S$GLB,, | Performed by: INTERNAL MEDICINE

## 2020-11-16 PROCEDURE — 3044F HG A1C LEVEL LT 7.0%: CPT | Mod: CPTII,S$GLB,, | Performed by: INTERNAL MEDICINE

## 2020-11-16 PROCEDURE — 3288F FALL RISK ASSESSMENT DOCD: CPT | Mod: CPTII,S$GLB,, | Performed by: INTERNAL MEDICINE

## 2020-11-16 PROCEDURE — 3008F BODY MASS INDEX DOCD: CPT | Mod: CPTII,S$GLB,, | Performed by: INTERNAL MEDICINE

## 2020-11-16 PROCEDURE — 2024F 7 FLD RTA PHOTO EVC RTNOPTHY: CPT | Mod: S$GLB,,, | Performed by: INTERNAL MEDICINE

## 2020-11-16 PROCEDURE — 3078F DIAST BP <80 MM HG: CPT | Mod: CPTII,S$GLB,, | Performed by: INTERNAL MEDICINE

## 2020-11-16 PROCEDURE — 1101F PR PT FALLS ASSESS DOC 0-1 FALLS W/OUT INJ PAST YR: ICD-10-PCS | Mod: CPTII,S$GLB,, | Performed by: INTERNAL MEDICINE

## 2020-11-16 PROCEDURE — 1159F MED LIST DOCD IN RCRD: CPT | Mod: S$GLB,,, | Performed by: INTERNAL MEDICINE

## 2020-11-16 PROCEDURE — 99999 PR PBB SHADOW E&M-EST. PATIENT-LVL IV: ICD-10-PCS | Mod: PBBFAC,,, | Performed by: INTERNAL MEDICINE

## 2020-11-16 RX ORDER — PROPRANOLOL HYDROCHLORIDE 80 MG/1
80 CAPSULE, EXTENDED RELEASE ORAL DAILY
Qty: 90 CAPSULE | Refills: 1 | Status: SHIPPED | OUTPATIENT
Start: 2020-11-16 | End: 2021-02-05

## 2020-11-16 RX ORDER — MUPIROCIN 20 MG/G
OINTMENT TOPICAL 3 TIMES DAILY
Qty: 22 G | Refills: 0 | Status: SHIPPED | OUTPATIENT
Start: 2020-11-16 | End: 2021-03-11

## 2020-11-16 NOTE — PROGRESS NOTES
[facility] Note    Chief Complaint      Chief Complaint   Patient presents with    Mass     pt stated that she found a cyst under her left arm/ no pain/ pt stated that the cyst has always been there     Fever     pt states that she has noticed jonatan under her boobs and on both legs/ it feels like she has fever/ has been dealing with this for 6 months/ no fever when temp taken     History of Present Illness      Jayla Loyd is a 70 y.o. female who presents today for follow up chronic medical issues.     HPI   DM2 at goal as well as LDL   Says she doesn't have htn, cad, kidney or gall stones   Last cscope with Laury  Has long standing anemia dx as sathya and put on fe supp via [facility]   Torso, thighs, and neck: feels hot to her no redness, no swelling, not sweating, not hot flashes, persistent not waxing and waning, for the last 6 months   Has a cyst in axilla shed like me to look at, had nge mmg 7.2020      Active Problem List with Overview Notes    Diagnosis Date Noted    Heat intolerance 11/16/2020    Folliculitis 11/16/2020    Hyperlipidemia 07/22/2020     On crestor       Arthritis of left shoulder region 07/16/2020     seeing [facility] pending shoulder MRI       Anxiety 07/16/2020     Using qhs xanax       Type 2 diabetes mellitus, without long-term current use of insulin 07/16/2020     Sees dr butt had recent a1c, he also does foot exam      Allergic rhinitis due to pollen 07/08/2020    Encounter for screening mammogram for malignant neoplasm of breast 07/08/2020    Hematuria, unspecified 07/08/2020    Chronic idiopathic constipation 09/24/2019    Iron deficiency anemia, unspecified 06/30/2019    Hyperphosphatemia 08/19/2018    Hypertension, essential 08/19/2018     At goal on lasix and micardis, no chest pain or shortness of breath       Proteinuria, unspecified 08/19/2018     Seeing dr hanson       Body mass index (bmi) 26.0-26.9, adult 11/03/2017      [employer] Regulatory Update 10/1/2020      Gastroesophageal reflux disease 07/16/2014    Adenomatous polyp of colon 07/16/2014    Chronic mixed headache syndrome 07/16/2014     Has chronic migraines, uses fioricet   Tried amovig, has not tried triptan - not interested  Sees [facility]      Pure hypercholesterolemia 08/01/2012    Kidney stone 04/14/2011       Health Maintenance   Topic Date Due    TETANUS VACCINE  02/01/2008    Pneumococcal Vaccine (65+ Low/Medium Risk) (1 of 2 - PCV13) 11/16/2021 (Originally 10/27/2017)    Hemoglobin A1c  02/12/2021    Mammogram  07/10/2022    DEXA SCAN  08/11/2023    Lipid Panel  11/12/2025    Hepatitis C Screening  Completed       Past Medical History:   Diagnosis Date    Diabetes mellitus type I     GERD (gastroesophageal reflux disease)     Migraines     Proteinuria        Past Surgical History:   Procedure Laterality Date    CATARACT EXTRACTION      COSMETIC SURGERY  1971    rhinoplasty    EYE SURGERY  3 years ago    cataracts    TUBAL LIGATION  1992       family history includes Bladder Cancer in her mother; Breast cancer in her maternal aunt and maternal grandmother; Cancer in her maternal aunt, maternal grandmother, mother, paternal aunt, and paternal grandmother; Dementia in her paternal grandmother; Diabetes in her paternal aunt; Heart disease in her father, maternal grandfather, mother, and paternal grandfather; Heart failure in her father; Hypertension in her father and mother; No Known Problems in her son.    Social History     Tobacco Use    Smoking status: Never Smoker    Smokeless tobacco: Never Used   Substance Use Topics    Alcohol use: Not Currently     Alcohol/week: 0.0 standard drinks     Comment: rarely    Drug use: Not Currently     Types: Amphetamines     Comment: per script from Dr. Vance       Review of Systems   Constitutional: Negative for chills, fever, malaise/fatigue and weight loss.   Respiratory: Negative for cough,  sputum production, shortness of breath and wheezing.    Cardiovascular: Negative for chest pain, palpitations, orthopnea and leg swelling.   Gastrointestinal: Negative for constipation, diarrhea, nausea and vomiting.   Genitourinary: Negative for dysuria, frequency, hematuria and urgency.        Outpatient Encounter Medications as of 11/16/2020   Medication Sig Note Dispense Refill    ACCU-CHEK SAPPHIRE PLUS TEST STRP Strp Use as directed every day   3    ACCU-CHEK SOFTCLIX LANCETS Misc USE AS DIRECTED EVERY DAY   3    ALPRAZolam (XANAX) 0.5 MG tablet TAKE 1 TABLET BY MOUTH TWICE A DAY  60 tablet 0    amitriptyline (ELAVIL) 10 MG tablet Take 1 tablet (10 mg total) by mouth nightly as needed for Insomnia.  90 tablet 1    aspirin (ECOTRIN) 81 MG EC tablet Take 1 tablet (81 mg total) by mouth once daily.   0    butalbital-acetaminophen-caffeine -40 mg (FIORICET, ESGIC) -40 mg per tablet TAKE 1 TABLET 3 TIMES A DAY AS NEEDED FOR MIGRAINE  90 tablet 2    citalopram (CELEXA) 40 MG tablet Take 1 tablet (40 mg total) by mouth once daily.  90 tablet 0    coenzyme Q10 (CO Q-10) 100 mg capsule Take 100 mg by mouth once daily.       dicyclomine (BENTYL) 20 mg tablet Take 20 mg by mouth as needed. 7/16/2020: As needed      esomeprazole (NEXIUM) 40 MG capsule TAKE 1 CAPSULE BY MOUTH EVERY DAY  90 capsule 3    fluticasone (FLONASE) 50 mcg/actuation nasal spray 1 spray by Each Nare route once daily.       folic acid/multivit-min/lutein (CENTRUM SILVER ORAL) Take by mouth once daily.       furosemide (LASIX) 40 MG tablet  7/16/2020: As needed      Lactobac no.41/Bifidobact no.7 (PROBIOTIC-10 ORAL) Take by mouth once daily.       meclizine (ANTIVERT) 25 mg tablet Take 1 tablet (25 mg total) by mouth 2 (two) times daily as needed.  60 tablet 0    metformin (GLUCOPHAGE) 500 MG tablet Take 500 mg by mouth daily with breakfast.        mupirocin (BACTROBAN) 2 % ointment Apply topically 3 (three) times daily.  22  g 0    ondansetron (ZOFRAN-ODT) 4 MG TbDL Take 1 tablet (4 mg total) by mouth every 8 (eight) hours as needed.  20 tablet 3    polyethylene glycol (MIRALAX) 17 gram PwPk Take by mouth once daily. 7/16/2020: As needed      propranoloL (INDERAL LA) 80 MG 24 hr capsule        pseudoephedrine (SUDAFED) 30 MG tablet Take 30 mg by mouth every 4 (four) hours as needed for Congestion.       rosuvastatin (CRESTOR) 40 MG Tab Take 40 mg by mouth once daily.        telmisartan (MICARDIS) 20 MG Tab Take 1 tablet (20 mg total) by mouth once daily. 1/2 tablet of 40mg daily  90 tablet 3    traMADol (ULTRAM) 50 mg tablet TAKE 1 TABLET BY MOUTH EVERY 12-24 HOURS AS NEEDED FOR PAIN   1     No facility-administered encounter medications on file as of 11/16/2020.         Review of patient's allergies indicates:   Allergen Reactions    Codeine Nausea Only           Physical Exam      Vital Signs  Temp: 97.8 °F (36.6 °C)  Temp src: Oral  Pulse: 86  SpO2: 95 %  BP: 106/74  BP Location: Left arm  Patient Position: Sitting  Pain Score: 0-No pain  Height and Weight  Height: 5' (152.4 cm)  Weight: 75 kg (165 lb 3.8 oz)  BSA (Calculated - sq m): 1.78 sq meters  BMI (Calculated): 32.3  Weight in (lb) to have BMI = 25: 127.7]    Physical Exam  Vitals signs reviewed.   Constitutional:       General: She is not in acute distress.     Appearance: She is well-developed. She is not diaphoretic.   HENT:      Head: Normocephalic and atraumatic.      Right Ear: External ear normal.      Left Ear: External ear normal.      Nose: Nose normal.   Eyes:      General:         Right eye: No discharge.         Left eye: No discharge.      Conjunctiva/sclera: Conjunctivae normal.   Neck:      Musculoskeletal: Normal range of motion.   Pulmonary:      Effort: Pulmonary effort is normal. No respiratory distress.   Musculoskeletal: Normal range of motion.   Skin:     Coloration: Skin is not pale.      Findings: Lesion present. No rash.   Neurological:       Mental Status: She is alert and oriented to person, place, and time.   Psychiatric:         Behavior: Behavior normal.         Thought Content: Thought content normal.         Judgment: Judgment normal.          Laboratory:  CBC:  Recent Labs   Lab Result Units 11/12/20  0814   WBC K/uL 7.29   RBC M/uL 3.74*   Hemoglobin g/dL 10.6*   Hematocrit % 34.2*   Platelets K/uL 374*   MCV fL 91   MCH pg 28.3   MCHC g/dL 31.0*     CMP:  Recent Labs   Lab Result Units 11/12/20  0814   Glucose mg/dL 126*   Calcium mg/dL 9.7   Albumin g/dL 3.9   Total Protein g/dL 6.6   Sodium mmol/L 143   Potassium mmol/L 4.3   CO2 mmol/L 30*   Chloride mmol/L 107   BUN mg/dL 15   Alkaline Phosphatase U/L 101   ALT U/L 17   AST U/L 27   Total Bilirubin mg/dL 0.3     URINALYSIS:  Recent Labs   Lab Result Units 11/12/20  0813   Color, UA  Yellow   Specific Gravity, UA  1.020   pH, UA  6.0   Protein, UA  2+*   Bacteria /hpf Occasional   Nitrite, UA  Negative   Leukocytes, UA  Trace*   Urobilinogen, UA EU/dL Negative   Hyaline Casts, UA /lpf 0      LIPIDS:  Recent Labs   Lab Result Units 11/12/20  0814   HDL mg/dL 72   Cholesterol mg/dL 157   Triglycerides mg/dL 87   LDL Cholesterol mg/dL 67.6   HDL/Cholesterol Ratio % 45.9   Non-HDL Cholesterol mg/dL 85   Total Cholesterol/HDL Ratio  2.2     TSH:  No results for input(s): TSH in the last 2160 hours.  A1C:  Recent Labs   Lab Result Units 11/12/20  0814   Hemoglobin A1C % 6.4*         Assessment/Plan     Jayla Loyd is a 70 y.o.female with:    Heat intolerance  tfts    Type 2 diabetes mellitus, without long-term current use of insulin  At goal for a1c, bp hld    Iron deficiency anemia, unspecified  cscope utd and on fe supp     Folliculitis  Bactroban, exfoliation, astringent      Orders Placed This Encounter   Procedures    T4, Free     Standing Status:   Future     Standing Expiration Date:   1/16/2022    TSH     Standing Status:   Future     Standing Expiration Date:   1/16/2022       Use of  the [facility] Patient Portal discussed and encouraged during today's visit  -Continue current medications and maintain follow up with specialists.  Return to clinic as scheduled   Future Appointments   Date Time Provider Department Center   1/14/2021 11:00 AM Geena Barnard MD [facility]   5/17/2021 11:45 AM Vicenta Alvarez MD [facility]       Geena Barnard MD  11/16/2020 1:27 PM    Primary Care Internal Medicine - [facility]

## 2020-11-16 NOTE — TELEPHONE ENCOUNTER
Patient states that she needs a medication refill, she forgot to ask during her office visit. Please advise.

## 2020-11-17 DIAGNOSIS — E05.90 SUBCLINICAL HYPERTHYROIDISM: Primary | ICD-10-CM

## 2020-11-18 ENCOUNTER — TELEPHONE (OUTPATIENT)
Dept: FAMILY MEDICINE | Facility: CLINIC | Age: 70
End: 2020-11-18

## 2020-11-18 NOTE — TELEPHONE ENCOUNTER
Called and spoke with pt in regards of her message. Pt states that she is calling in regards of her TSH results. Pt states that she saw her results on her [facility] Portal  And wanted to know what do you recommenced that she does for her TSH. Please advise.

## 2020-11-19 ENCOUNTER — PATIENT MESSAGE (OUTPATIENT)
Dept: FAMILY MEDICINE | Facility: CLINIC | Age: 70
End: 2020-11-19

## 2020-11-19 DIAGNOSIS — E05.90 SUBCLINICAL HYPERTHYROIDISM: Primary | ICD-10-CM

## 2020-11-20 ENCOUNTER — PATIENT MESSAGE (OUTPATIENT)
Dept: FAMILY MEDICINE | Facility: CLINIC | Age: 70
End: 2020-11-20

## 2020-11-23 ENCOUNTER — PATIENT MESSAGE (OUTPATIENT)
Dept: FAMILY MEDICINE | Facility: CLINIC | Age: 70
End: 2020-11-23

## 2020-11-30 ENCOUNTER — PATIENT MESSAGE (OUTPATIENT)
Dept: FAMILY MEDICINE | Facility: CLINIC | Age: 70
End: 2020-11-30

## 2020-12-01 ENCOUNTER — PATIENT MESSAGE (OUTPATIENT)
Dept: FAMILY MEDICINE | Facility: CLINIC | Age: 70
End: 2020-12-01

## 2020-12-11 ENCOUNTER — TELEPHONE (OUTPATIENT)
Dept: FAMILY MEDICINE | Facility: CLINIC | Age: 70
End: 2020-12-11

## 2020-12-11 NOTE — TELEPHONE ENCOUNTER
Patient was next to patient for less than 3 minutes, had her mask on. Explained that it is close contact with someone for 15 minutes.

## 2020-12-11 NOTE — TELEPHONE ENCOUNTER
----- Message from Yolanda Lee sent at 12/11/2020  2:40 PM CST -----  Contact: [phone]  Pt was in clinic to drop off specimen and was next to someone recovering from covid-19 shed like to know if she needs to be tested

## 2020-12-15 ENCOUNTER — OFFICE VISIT (OUTPATIENT)
Dept: FAMILY MEDICINE | Facility: CLINIC | Age: 70
End: 2020-12-15
Payer: MEDICARE

## 2020-12-15 VITALS
HEIGHT: 60 IN | DIASTOLIC BLOOD PRESSURE: 60 MMHG | TEMPERATURE: 97 F | HEART RATE: 66 BPM | SYSTOLIC BLOOD PRESSURE: 110 MMHG | BODY MASS INDEX: 32.65 KG/M2 | WEIGHT: 166.31 LBS | OXYGEN SATURATION: 94 %

## 2020-12-15 DIAGNOSIS — R44.3 HALLUCINATIONS: Primary | ICD-10-CM

## 2020-12-15 PROCEDURE — 99213 OFFICE O/P EST LOW 20 MIN: CPT | Mod: HCNC,S$GLB,, | Performed by: INTERNAL MEDICINE

## 2020-12-15 PROCEDURE — 3074F PR MOST RECENT SYSTOLIC BLOOD PRESSURE < 130 MM HG: ICD-10-PCS | Mod: HCNC,CPTII,S$GLB, | Performed by: INTERNAL MEDICINE

## 2020-12-15 PROCEDURE — 99213 PR OFFICE/OUTPT VISIT, EST, LEVL III, 20-29 MIN: ICD-10-PCS | Mod: HCNC,S$GLB,, | Performed by: INTERNAL MEDICINE

## 2020-12-15 PROCEDURE — 3008F BODY MASS INDEX DOCD: CPT | Mod: HCNC,CPTII,S$GLB, | Performed by: INTERNAL MEDICINE

## 2020-12-15 PROCEDURE — 3078F DIAST BP <80 MM HG: CPT | Mod: HCNC,CPTII,S$GLB, | Performed by: INTERNAL MEDICINE

## 2020-12-15 PROCEDURE — 99999 PR PBB SHADOW E&M-EST. PATIENT-LVL V: ICD-10-PCS | Mod: PBBFAC,HCNC,, | Performed by: INTERNAL MEDICINE

## 2020-12-15 PROCEDURE — 3008F PR BODY MASS INDEX (BMI) DOCUMENTED: ICD-10-PCS | Mod: HCNC,CPTII,S$GLB, | Performed by: INTERNAL MEDICINE

## 2020-12-15 PROCEDURE — 1159F MED LIST DOCD IN RCRD: CPT | Mod: HCNC,S$GLB,, | Performed by: INTERNAL MEDICINE

## 2020-12-15 PROCEDURE — 3074F SYST BP LT 130 MM HG: CPT | Mod: HCNC,CPTII,S$GLB, | Performed by: INTERNAL MEDICINE

## 2020-12-15 PROCEDURE — 1159F PR MEDICATION LIST DOCUMENTED IN MEDICAL RECORD: ICD-10-PCS | Mod: HCNC,S$GLB,, | Performed by: INTERNAL MEDICINE

## 2020-12-15 PROCEDURE — 99999 PR PBB SHADOW E&M-EST. PATIENT-LVL V: CPT | Mod: PBBFAC,HCNC,, | Performed by: INTERNAL MEDICINE

## 2020-12-15 PROCEDURE — 3078F PR MOST RECENT DIASTOLIC BLOOD PRESSURE < 80 MM HG: ICD-10-PCS | Mod: HCNC,CPTII,S$GLB, | Performed by: INTERNAL MEDICINE

## 2020-12-15 RX ORDER — MELOXICAM 15 MG/1
TABLET ORAL
COMMUNITY
Start: 2020-12-15 | End: 2021-05-17 | Stop reason: SINTOL

## 2020-12-15 RX ORDER — PREGABALIN 50 MG/1
CAPSULE ORAL
COMMUNITY
Start: 2020-12-02 | End: 2021-01-19

## 2020-12-15 NOTE — PROGRESS NOTES
[facility] Note    Chief Complaint      Chief Complaint   Patient presents with    Hallucination     going on 2 week       History of Present Illness      Jayla Loyd is a 70 y.o. female pt of Dr. Cummings with chronic conditions of DM2, CAD, HTN, HLD, anxiety, chronic mixed headaches who presents today for: complaints of hallucinations for the past 2 weeks.  Occurs upon waking.  Lasts a few seconds then disappears.  Only visual, never auditory.  Takes lyrica in the evening (for the past 2-3 months) and xanax before bed (taken for years).      Past Medical History:  Past Medical History:   Diagnosis Date    Diabetes mellitus type I     GERD (gastroesophageal reflux disease)     Migraines     Proteinuria        Past Surgical History:   has a past surgical history that includes Cataract extraction; Eye surgery (3 years ago); Tubal ligation (1992); Cosmetic surgery (1971); and Shoulder surgery (Left, 09/2020).    Family History:  family history includes Bladder Cancer in her mother; Breast cancer in her maternal aunt and maternal grandmother; Cancer in her maternal aunt, maternal grandmother, mother, paternal aunt, and paternal grandmother; Dementia in her paternal grandmother; Diabetes in her paternal aunt; Heart disease in her father, maternal grandfather, mother, and paternal grandfather; Heart failure in her father; Hypertension in her father and mother; No Known Problems in her son.     Social History:  Social History     Tobacco Use    Smoking status: Never Smoker    Smokeless tobacco: Never Used   Substance Use Topics    Alcohol use: Not Currently     Alcohol/week: 0.0 standard drinks     Comment: rarely    Drug use: Not Currently     Types: Amphetamines     Comment: per script from Dr. Vance       I personally reviewed all past medical, surgical, social and family history.    Review of Systems   Constitutional: Negative for chills, fever and malaise/fatigue.   Respiratory: Negative  for shortness of breath.    Cardiovascular: Negative for chest pain.   Gastrointestinal: Negative for constipation, diarrhea, nausea and vomiting.   Skin: Negative for rash.   Neurological: Negative for weakness.   Psychiatric/Behavioral: Positive for hallucinations.   All other systems reviewed and are negative.       Medications:  Outpatient Encounter Medications as of 12/15/2020   Medication Sig Note Dispense Refill    ACCU-CHEK SAPPHIRE PLUS TEST STRP Strp Use as directed every day   3    ACCU-CHEK SOFTCLIX LANCETS Misc USE AS DIRECTED EVERY DAY   3    ALPRAZolam (XANAX) 0.5 MG tablet TAKE 1 TABLET BY MOUTH TWICE A DAY  60 tablet 0    amitriptyline (ELAVIL) 10 MG tablet Take 1 tablet (10 mg total) by mouth nightly as needed for Insomnia.  90 tablet 1    aspirin (ECOTRIN) 81 MG EC tablet Take 1 tablet (81 mg total) by mouth once daily.   0    butalbital-acetaminophen-caffeine -40 mg (FIORICET, ESGIC) -40 mg per tablet TAKE 1 TABLET 3 TIMES A DAY AS NEEDED FOR MIGRAINE  90 tablet 2    citalopram (CELEXA) 40 MG tablet Take 1 tablet (40 mg total) by mouth once daily.  90 tablet 0    coenzyme Q10 (CO Q-10) 100 mg capsule Take 100 mg by mouth once daily.       dicyclomine (BENTYL) 20 mg tablet Take 20 mg by mouth as needed. 7/16/2020: As needed      esomeprazole (NEXIUM) 40 MG capsule TAKE 1 CAPSULE BY MOUTH EVERY DAY  90 capsule 3    fluticasone (FLONASE) 50 mcg/actuation nasal spray 1 spray by Each Nare route once daily.       folic acid/multivit-min/lutein (CENTRUM SILVER ORAL) Take by mouth once daily.       furosemide (LASIX) 40 MG tablet  7/16/2020: As needed      Lactobac no.41/Bifidobact no.7 (PROBIOTIC-10 ORAL) Take by mouth once daily.       meclizine (ANTIVERT) 25 mg tablet Take 1 tablet (25 mg total) by mouth 2 (two) times daily as needed.  60 tablet 0    meloxicam (MOBIC) 15 MG tablet        metformin (GLUCOPHAGE) 500 MG tablet Take 500 mg by mouth daily with breakfast.         mupirocin (BACTROBAN) 2 % ointment Apply topically 3 (three) times daily.  22 g 0    ondansetron (ZOFRAN-ODT) 4 MG TbDL Take 1 tablet (4 mg total) by mouth every 8 (eight) hours as needed.  20 tablet 3    polyethylene glycol (MIRALAX) 17 gram PwPk Take by mouth once daily. 7/16/2020: As needed      pregabalin (LYRICA) 50 MG capsule TAKE 1 CAPSULE BY MOUTH EVERY DAY AT BEDTIME       propranoloL (INDERAL LA) 80 MG 24 hr capsule Take 1 capsule (80 mg total) by mouth once daily.  90 capsule 1    pseudoephedrine (SUDAFED) 30 MG tablet Take 30 mg by mouth every 4 (four) hours as needed for Congestion.       rosuvastatin (CRESTOR) 40 MG Tab Take 40 mg by mouth once daily.        telmisartan (MICARDIS) 20 MG Tab Take 1 tablet (20 mg total) by mouth once daily. 1/2 tablet of 40mg daily  90 tablet 3    traMADol (ULTRAM) 50 mg tablet TAKE 1 TABLET BY MOUTH EVERY 12-24 HOURS AS NEEDED FOR PAIN   1     No facility-administered encounter medications on file as of 12/15/2020.        Allergies:  Review of patient's allergies indicates:   Allergen Reactions    Codeine Nausea Only       Health Maintenance:  Immunization History   Administered Date(s) Administered    Influenza 12/02/2014    Influenza - Trivalent (ADULT) 10/25/2013    Influenza Split 12/02/2014    Td (ADULT) 02/01/1998    Td - PF (ADULT) 02/01/1998      Health Maintenance   Topic Date Due    TETANUS VACCINE  02/01/2008    Pneumococcal Vaccine (65+ Low/Medium Risk) (1 of 2 - PCV13) 11/16/2021 (Originally 10/27/2017)    Hemoglobin A1c  02/12/2021    Eye Exam  07/17/2021    Low Dose Statin  09/03/2021    Foot Exam  09/03/2021    Lipid Panel  11/12/2021    Mammogram  07/10/2022    DEXA SCAN  08/11/2023    Hepatitis C Screening  Completed        Physical Exam      Vital Signs  Temp: 96.9 °F (36.1 °C)  Temp src: Oral  Pulse: 66  SpO2: (!) 94 %  BP: 110/60  BP Location: Right arm  Patient Position: Sitting  Height and Weight  Height: 5' (152.4  cm)  Weight: 75.4 kg (166 lb 5.4 oz)  BSA (Calculated - sq m): 1.79 sq meters  BMI (Calculated): 32.5  Weight in (lb) to have BMI = 25: 127.7]    Physical Exam  Vitals signs reviewed.   Constitutional:       Appearance: She is well-developed.   HENT:      Head: Normocephalic and atraumatic.      Right Ear: External ear normal.      Left Ear: External ear normal.   Eyes:      Conjunctiva/sclera: Conjunctivae normal.      Pupils: Pupils are equal, round, and reactive to light.   Neck:      Vascular: No carotid bruit.   Cardiovascular:      Rate and Rhythm: Normal rate and regular rhythm.      Heart sounds: Normal heart sounds. No murmur.   Pulmonary:      Effort: Pulmonary effort is normal.      Breath sounds: Normal breath sounds. No wheezing or rales.   Abdominal:      General: Bowel sounds are normal. There is no distension.      Palpations: Abdomen is soft.      Tenderness: There is no abdominal tenderness.          Laboratory:  CBC:  Recent Labs   Lab 11/20/19  0942 05/01/20  0937 11/12/20  0814   WBC 7.76 7.74 7.29   RBC 4.45 4.06 3.74 L   Hemoglobin 12.8 11.3 L 10.6 L   Hematocrit 39.8 36.2 L 34.2 L   Platelets 421 H 408 H 374 H   MCV 89 89 91   MCH 28.8 27.8 28.3   MCHC 32.2 31.2 L 31.0 L     CMP:  Recent Labs   Lab 11/20/19  0942 05/01/20  0937 11/12/20  0814   Glucose 131 H 156 H 126 H   Calcium 9.9 9.4 9.7   Albumin 4.4 3.8 3.9   Total Protein 7.7 6.9 6.6   Sodium 143 140 143   Potassium 4.2 4.4 4.3   CO2 32 H 31 H 30 H   Chloride 104 104 107   BUN 14 12 15   Alkaline Phosphatase 141 H 141 H 101   ALT 18 12 17   AST 24 19 27   Total Bilirubin 0.2 0.3 0.3     URINALYSIS:  Recent Labs   Lab 02/15/19  1534 05/07/19  0749  11/12/20  0813   Color, UA Yellow Yellow   < > Yellow   Specific Gravity, UA 1.020 1.020   < > 1.020   Spec Grav UA  --   --    < >  --    pH, UA 7.0 7.0   < > 6.0   Protein, UA 2+ A 2+ A   < > 2+ A   Bacteria Rare Rare   < > Occasional   Nitrite, UA Negative Negative   < > Negative    Leukocytes, UA Negative Negative   < > Trace A   Urobilinogen, UA Negative Negative   < > Negative   Hyaline Casts, UA 0 0  --  0    < > = values in this interval not displayed.      LIPIDS:  Recent Labs   Lab 05/19/20 11/12/20  0814 11/16/20  1418   TSH  --   --  0.209 L   HDL 84 72  --    Cholesterol 171 157  --    Triglycerides  --  87  --    LDL Cholesterol 60 67.6  --    HDL/Cholesterol Ratio  --  45.9  --    Non-HDL Cholesterol  --  85  --    Total Cholesterol/HDL Ratio  --  2.2  --      TSH:  Recent Labs   Lab 11/16/20  1418   TSH 0.209 L     A1C:  Recent Labs   Lab 11/12/20 0814   Hemoglobin A1C 6.4 H       Assessment/Plan     Jayla Loyd is a 70 y.o.female with:    1. Hallucinations  Stop lyrica to see if symptoms improve.  If not improving, will see Dr. Do and psychiatry.    Chronic conditions status updated as per HPI.  Other than changes above, cont current medications and maintain follow up with specialists.  Return to clinic in 1 months.    Kendall Roy MD  [facility]

## 2020-12-17 PROBLEM — E05.90 SUBCLINICAL HYPERTHYROIDISM: Status: ACTIVE | Noted: 2020-12-17

## 2020-12-28 ENCOUNTER — TELEPHONE (OUTPATIENT)
Dept: FAMILY MEDICINE | Facility: CLINIC | Age: 70
End: 2020-12-28

## 2020-12-28 NOTE — TELEPHONE ENCOUNTER
----- Message from Era Valenzuela sent at 12/28/2020  9:23 AM CST -----  Contact: self [phone]  Pt would like orders placed for her to receive a rapid covid test. Pt state she has symptoms such as diarrhea, congestion, a little shortness of breath, and fatigue. Please call and advise. Thank you

## 2020-12-28 NOTE — TELEPHONE ENCOUNTER
Called and spoke with pt in regards of her message. Pt is seeking to have a rapid COIVD order placed in the system to be tested for COVID. It states that she is fatigue, congestion,and diarrhea as well and a little shortness of breath that comes and goes. Informed pt if shortness of breath get out of hand that she should go to ER to be treated. Advised pt that she can go to an [facility] to be test for COVID. Pt verbalized understanding of message, and is going to clinic to be tested.

## 2021-01-08 ENCOUNTER — PATIENT MESSAGE (OUTPATIENT)
Dept: FAMILY MEDICINE | Facility: CLINIC | Age: 71
End: 2021-01-08

## 2021-01-11 ENCOUNTER — PATIENT MESSAGE (OUTPATIENT)
Dept: FAMILY MEDICINE | Facility: CLINIC | Age: 71
End: 2021-01-11

## 2021-01-11 RX ORDER — MECLIZINE HYDROCHLORIDE 25 MG/1
25 TABLET ORAL 2 TIMES DAILY PRN
Qty: 60 TABLET | Refills: 0 | Status: SHIPPED | OUTPATIENT
Start: 2021-01-11 | End: 2021-12-13 | Stop reason: SDUPTHER

## 2021-01-13 ENCOUNTER — PATIENT MESSAGE (OUTPATIENT)
Dept: FAMILY MEDICINE | Facility: CLINIC | Age: 71
End: 2021-01-13

## 2021-01-13 DIAGNOSIS — E05.90 SUBCLINICAL HYPERTHYROIDISM: ICD-10-CM

## 2021-01-13 DIAGNOSIS — R41.82 ALTERED MENTAL STATUS, UNSPECIFIED ALTERED MENTAL STATUS TYPE: Primary | ICD-10-CM

## 2021-01-13 NOTE — TELEPHONE ENCOUNTER
----- Message from Milly Andujar MA sent at 12/30/2020  2:28 PM CST -----  Regarding: BP Check Due  Please call patient and have her schedule a nurse blood pressure check as soon as she is able.    Patient was due for a blood pressure check on 12/30/2020, but her  was COVID19 positive. Patient was unable to leave the house at that time.     Patient notified CT scan showed no abnormalities that would cause blood in urine. Will plan for cystoscopy as scheduled.

## 2021-01-16 ENCOUNTER — PATIENT MESSAGE (OUTPATIENT)
Dept: FAMILY MEDICINE | Facility: CLINIC | Age: 71
End: 2021-01-16

## 2021-01-18 ENCOUNTER — PATIENT MESSAGE (OUTPATIENT)
Dept: FAMILY MEDICINE | Facility: CLINIC | Age: 71
End: 2021-01-18

## 2021-01-19 ENCOUNTER — PATIENT MESSAGE (OUTPATIENT)
Dept: FAMILY MEDICINE | Facility: CLINIC | Age: 71
End: 2021-01-19

## 2021-01-19 ENCOUNTER — OFFICE VISIT (OUTPATIENT)
Dept: FAMILY MEDICINE | Facility: CLINIC | Age: 71
End: 2021-01-19
Payer: MEDICARE

## 2021-01-19 VITALS
HEIGHT: 60 IN | DIASTOLIC BLOOD PRESSURE: 78 MMHG | HEART RATE: 93 BPM | WEIGHT: 168.5 LBS | SYSTOLIC BLOOD PRESSURE: 116 MMHG | OXYGEN SATURATION: 97 % | BODY MASS INDEX: 33.08 KG/M2 | TEMPERATURE: 98 F

## 2021-01-19 DIAGNOSIS — R44.1 VISUAL HALLUCINATION: Primary | ICD-10-CM

## 2021-01-19 DIAGNOSIS — R42 DIZZINESS: ICD-10-CM

## 2021-01-19 DIAGNOSIS — R41.0 DELIRIUM: ICD-10-CM

## 2021-01-19 DIAGNOSIS — E05.90 SUBCLINICAL HYPERTHYROIDISM: ICD-10-CM

## 2021-01-19 PROCEDURE — 99999 PR PBB SHADOW E&M-EST. PATIENT-LVL V: ICD-10-PCS | Mod: PBBFAC,,, | Performed by: INTERNAL MEDICINE

## 2021-01-19 PROCEDURE — 3074F PR MOST RECENT SYSTOLIC BLOOD PRESSURE < 130 MM HG: ICD-10-PCS | Mod: CPTII,S$GLB,, | Performed by: INTERNAL MEDICINE

## 2021-01-19 PROCEDURE — 3008F PR BODY MASS INDEX (BMI) DOCUMENTED: ICD-10-PCS | Mod: CPTII,S$GLB,, | Performed by: INTERNAL MEDICINE

## 2021-01-19 PROCEDURE — 1126F AMNT PAIN NOTED NONE PRSNT: CPT | Mod: S$GLB,,, | Performed by: INTERNAL MEDICINE

## 2021-01-19 PROCEDURE — 99214 PR OFFICE/OUTPT VISIT, EST, LEVL IV, 30-39 MIN: ICD-10-PCS | Mod: S$GLB,,, | Performed by: INTERNAL MEDICINE

## 2021-01-19 PROCEDURE — 1159F MED LIST DOCD IN RCRD: CPT | Mod: S$GLB,,, | Performed by: INTERNAL MEDICINE

## 2021-01-19 PROCEDURE — 3078F DIAST BP <80 MM HG: CPT | Mod: CPTII,S$GLB,, | Performed by: INTERNAL MEDICINE

## 2021-01-19 PROCEDURE — 1159F PR MEDICATION LIST DOCUMENTED IN MEDICAL RECORD: ICD-10-PCS | Mod: S$GLB,,, | Performed by: INTERNAL MEDICINE

## 2021-01-19 PROCEDURE — 1126F PR PAIN SEVERITY QUANTIFIED, NO PAIN PRESENT: ICD-10-PCS | Mod: S$GLB,,, | Performed by: INTERNAL MEDICINE

## 2021-01-19 PROCEDURE — 3008F BODY MASS INDEX DOCD: CPT | Mod: CPTII,S$GLB,, | Performed by: INTERNAL MEDICINE

## 2021-01-19 PROCEDURE — 3078F PR MOST RECENT DIASTOLIC BLOOD PRESSURE < 80 MM HG: ICD-10-PCS | Mod: CPTII,S$GLB,, | Performed by: INTERNAL MEDICINE

## 2021-01-19 PROCEDURE — 99999 PR PBB SHADOW E&M-EST. PATIENT-LVL V: CPT | Mod: PBBFAC,,, | Performed by: INTERNAL MEDICINE

## 2021-01-19 PROCEDURE — 3288F FALL RISK ASSESSMENT DOCD: CPT | Mod: CPTII,S$GLB,, | Performed by: INTERNAL MEDICINE

## 2021-01-19 PROCEDURE — 3074F SYST BP LT 130 MM HG: CPT | Mod: CPTII,S$GLB,, | Performed by: INTERNAL MEDICINE

## 2021-01-19 PROCEDURE — 1101F PR PT FALLS ASSESS DOC 0-1 FALLS W/OUT INJ PAST YR: ICD-10-PCS | Mod: CPTII,S$GLB,, | Performed by: INTERNAL MEDICINE

## 2021-01-19 PROCEDURE — 3288F PR FALLS RISK ASSESSMENT DOCUMENTED: ICD-10-PCS | Mod: CPTII,S$GLB,, | Performed by: INTERNAL MEDICINE

## 2021-01-19 PROCEDURE — 1101F PT FALLS ASSESS-DOCD LE1/YR: CPT | Mod: CPTII,S$GLB,, | Performed by: INTERNAL MEDICINE

## 2021-01-19 PROCEDURE — 99214 OFFICE O/P EST MOD 30 MIN: CPT | Mod: S$GLB,,, | Performed by: INTERNAL MEDICINE

## 2021-01-19 RX ORDER — BUTALBITAL, ACETAMINOPHEN AND CAFFEINE 50; 325; 40 MG/1; MG/1; MG/1
TABLET ORAL
Qty: 90 TABLET | Refills: 2 | Status: SHIPPED | OUTPATIENT
Start: 2021-01-19 | End: 2021-08-11 | Stop reason: SDUPTHER

## 2021-01-21 ENCOUNTER — PATIENT MESSAGE (OUTPATIENT)
Dept: FAMILY MEDICINE | Facility: CLINIC | Age: 71
End: 2021-01-21

## 2021-01-21 DIAGNOSIS — E87.5 HYPERKALEMIA: Primary | ICD-10-CM

## 2021-01-27 ENCOUNTER — PATIENT MESSAGE (OUTPATIENT)
Dept: FAMILY MEDICINE | Facility: CLINIC | Age: 71
End: 2021-01-27

## 2021-02-05 ENCOUNTER — OFFICE VISIT (OUTPATIENT)
Dept: FAMILY MEDICINE | Facility: CLINIC | Age: 71
End: 2021-02-05
Payer: MEDICARE

## 2021-02-05 VITALS
WEIGHT: 169.63 LBS | HEIGHT: 60 IN | SYSTOLIC BLOOD PRESSURE: 116 MMHG | TEMPERATURE: 97 F | DIASTOLIC BLOOD PRESSURE: 74 MMHG | BODY MASS INDEX: 33.3 KG/M2 | HEART RATE: 95 BPM | OXYGEN SATURATION: 96 %

## 2021-02-05 DIAGNOSIS — F41.9 ANXIETY: ICD-10-CM

## 2021-02-05 DIAGNOSIS — R44.1 VISUAL HALLUCINATION: ICD-10-CM

## 2021-02-05 DIAGNOSIS — G47.9 SLEEP DISTURBANCE: ICD-10-CM

## 2021-02-05 DIAGNOSIS — I87.2 VENOUS INSUFFICIENCY: ICD-10-CM

## 2021-02-05 DIAGNOSIS — R42 DIZZINESS: ICD-10-CM

## 2021-02-05 DIAGNOSIS — R25.1 TREMOR: ICD-10-CM

## 2021-02-05 DIAGNOSIS — J01.31 ACUTE RECURRENT SPHENOIDAL SINUSITIS: Primary | ICD-10-CM

## 2021-02-05 PROCEDURE — 3078F PR MOST RECENT DIASTOLIC BLOOD PRESSURE < 80 MM HG: ICD-10-PCS | Mod: CPTII,S$GLB,, | Performed by: INTERNAL MEDICINE

## 2021-02-05 PROCEDURE — 3008F BODY MASS INDEX DOCD: CPT | Mod: CPTII,S$GLB,, | Performed by: INTERNAL MEDICINE

## 2021-02-05 PROCEDURE — 1159F PR MEDICATION LIST DOCUMENTED IN MEDICAL RECORD: ICD-10-PCS | Mod: S$GLB,,, | Performed by: INTERNAL MEDICINE

## 2021-02-05 PROCEDURE — 1101F PT FALLS ASSESS-DOCD LE1/YR: CPT | Mod: CPTII,S$GLB,, | Performed by: INTERNAL MEDICINE

## 2021-02-05 PROCEDURE — 3074F SYST BP LT 130 MM HG: CPT | Mod: CPTII,S$GLB,, | Performed by: INTERNAL MEDICINE

## 2021-02-05 PROCEDURE — 3288F FALL RISK ASSESSMENT DOCD: CPT | Mod: CPTII,S$GLB,, | Performed by: INTERNAL MEDICINE

## 2021-02-05 PROCEDURE — 99214 OFFICE O/P EST MOD 30 MIN: CPT | Mod: S$GLB,,, | Performed by: INTERNAL MEDICINE

## 2021-02-05 PROCEDURE — 3078F DIAST BP <80 MM HG: CPT | Mod: CPTII,S$GLB,, | Performed by: INTERNAL MEDICINE

## 2021-02-05 PROCEDURE — 3288F PR FALLS RISK ASSESSMENT DOCUMENTED: ICD-10-PCS | Mod: CPTII,S$GLB,, | Performed by: INTERNAL MEDICINE

## 2021-02-05 PROCEDURE — 99999 PR PBB SHADOW E&M-EST. PATIENT-LVL IV: ICD-10-PCS | Mod: PBBFAC,,, | Performed by: INTERNAL MEDICINE

## 2021-02-05 PROCEDURE — 1101F PR PT FALLS ASSESS DOC 0-1 FALLS W/OUT INJ PAST YR: ICD-10-PCS | Mod: CPTII,S$GLB,, | Performed by: INTERNAL MEDICINE

## 2021-02-05 PROCEDURE — 99214 PR OFFICE/OUTPT VISIT, EST, LEVL IV, 30-39 MIN: ICD-10-PCS | Mod: S$GLB,,, | Performed by: INTERNAL MEDICINE

## 2021-02-05 PROCEDURE — 1126F AMNT PAIN NOTED NONE PRSNT: CPT | Mod: S$GLB,,, | Performed by: INTERNAL MEDICINE

## 2021-02-05 PROCEDURE — 3074F PR MOST RECENT SYSTOLIC BLOOD PRESSURE < 130 MM HG: ICD-10-PCS | Mod: CPTII,S$GLB,, | Performed by: INTERNAL MEDICINE

## 2021-02-05 PROCEDURE — 1126F PR PAIN SEVERITY QUANTIFIED, NO PAIN PRESENT: ICD-10-PCS | Mod: S$GLB,,, | Performed by: INTERNAL MEDICINE

## 2021-02-05 PROCEDURE — 3008F PR BODY MASS INDEX (BMI) DOCUMENTED: ICD-10-PCS | Mod: CPTII,S$GLB,, | Performed by: INTERNAL MEDICINE

## 2021-02-05 PROCEDURE — 99999 PR PBB SHADOW E&M-EST. PATIENT-LVL IV: CPT | Mod: PBBFAC,,, | Performed by: INTERNAL MEDICINE

## 2021-02-05 PROCEDURE — 1159F MED LIST DOCD IN RCRD: CPT | Mod: S$GLB,,, | Performed by: INTERNAL MEDICINE

## 2021-02-05 RX ORDER — TELMISARTAN 20 MG/1
20 TABLET ORAL DAILY
Qty: 90 TABLET | Refills: 0 | Status: SHIPPED | OUTPATIENT
Start: 2021-02-05 | End: 2021-04-29

## 2021-02-05 RX ORDER — ALPRAZOLAM 0.5 MG/1
0.5 TABLET ORAL 2 TIMES DAILY
Qty: 60 TABLET | Refills: 0 | Status: SHIPPED | OUTPATIENT
Start: 2021-02-05 | End: 2021-04-12 | Stop reason: SDUPTHER

## 2021-02-05 RX ORDER — TRAZODONE HYDROCHLORIDE 50 MG/1
50 TABLET ORAL NIGHTLY
Qty: 90 TABLET | Refills: 1 | Status: SHIPPED | OUTPATIENT
Start: 2021-02-15 | End: 2021-03-11

## 2021-02-05 RX ORDER — CEFDINIR 300 MG/1
300 CAPSULE ORAL 2 TIMES DAILY
Qty: 20 CAPSULE | Refills: 0 | Status: SHIPPED | OUTPATIENT
Start: 2021-02-05 | End: 2021-02-15

## 2021-02-10 ENCOUNTER — PATIENT MESSAGE (OUTPATIENT)
Dept: FAMILY MEDICINE | Facility: CLINIC | Age: 71
End: 2021-02-10

## 2021-02-11 ENCOUNTER — PATIENT MESSAGE (OUTPATIENT)
Dept: FAMILY MEDICINE | Facility: CLINIC | Age: 71
End: 2021-02-11

## 2021-02-12 ENCOUNTER — PATIENT MESSAGE (OUTPATIENT)
Dept: FAMILY MEDICINE | Facility: CLINIC | Age: 71
End: 2021-02-12

## 2021-02-15 PROBLEM — I87.2 VENOUS INSUFFICIENCY: Status: ACTIVE | Noted: 2021-02-15

## 2021-02-15 PROBLEM — R25.1 TREMOR: Status: ACTIVE | Noted: 2021-02-15

## 2021-02-15 PROBLEM — J32.3 SPHENOID SINUSITIS: Status: ACTIVE | Noted: 2021-02-15

## 2021-02-15 PROBLEM — G47.9 SLEEP DISTURBANCE: Status: ACTIVE | Noted: 2021-02-15

## 2021-02-17 ENCOUNTER — PATIENT MESSAGE (OUTPATIENT)
Dept: FAMILY MEDICINE | Facility: CLINIC | Age: 71
End: 2021-02-17

## 2021-03-03 ENCOUNTER — PATIENT MESSAGE (OUTPATIENT)
Dept: FAMILY MEDICINE | Facility: CLINIC | Age: 71
End: 2021-03-03

## 2021-03-03 RX ORDER — CLOTRIMAZOLE 1 %
CREAM (GRAM) TOPICAL 2 TIMES DAILY
Qty: 45 G | Refills: 1 | Status: SHIPPED | OUTPATIENT
Start: 2021-03-03 | End: 2021-04-13

## 2021-03-05 ENCOUNTER — PATIENT MESSAGE (OUTPATIENT)
Dept: FAMILY MEDICINE | Facility: CLINIC | Age: 71
End: 2021-03-05

## 2021-03-11 ENCOUNTER — PATIENT MESSAGE (OUTPATIENT)
Dept: FAMILY MEDICINE | Facility: CLINIC | Age: 71
End: 2021-03-11

## 2021-03-11 ENCOUNTER — OFFICE VISIT (OUTPATIENT)
Dept: FAMILY MEDICINE | Facility: CLINIC | Age: 71
End: 2021-03-11
Payer: MEDICARE

## 2021-03-11 VITALS
WEIGHT: 168.44 LBS | HEIGHT: 60 IN | DIASTOLIC BLOOD PRESSURE: 80 MMHG | BODY MASS INDEX: 33.07 KG/M2 | SYSTOLIC BLOOD PRESSURE: 116 MMHG | RESPIRATION RATE: 16 BRPM | HEART RATE: 79 BPM | TEMPERATURE: 98 F | OXYGEN SATURATION: 98 %

## 2021-03-11 DIAGNOSIS — B37.2 CANDIDAL INTERTRIGO: ICD-10-CM

## 2021-03-11 DIAGNOSIS — F41.9 ANXIETY: Primary | ICD-10-CM

## 2021-03-11 PROCEDURE — 3008F BODY MASS INDEX DOCD: CPT | Mod: CPTII,S$GLB,, | Performed by: INTERNAL MEDICINE

## 2021-03-11 PROCEDURE — 1100F PR PT FALLS ASSESS DOC 2+ FALLS/FALL W/INJURY/YR: ICD-10-PCS | Mod: CPTII,S$GLB,, | Performed by: INTERNAL MEDICINE

## 2021-03-11 PROCEDURE — 99999 PR PBB SHADOW E&M-EST. PATIENT-LVL V: ICD-10-PCS | Mod: PBBFAC,,, | Performed by: INTERNAL MEDICINE

## 2021-03-11 PROCEDURE — 3288F FALL RISK ASSESSMENT DOCD: CPT | Mod: CPTII,S$GLB,, | Performed by: INTERNAL MEDICINE

## 2021-03-11 PROCEDURE — 99213 OFFICE O/P EST LOW 20 MIN: CPT | Mod: S$GLB,,, | Performed by: INTERNAL MEDICINE

## 2021-03-11 PROCEDURE — 1126F PR PAIN SEVERITY QUANTIFIED, NO PAIN PRESENT: ICD-10-PCS | Mod: S$GLB,,, | Performed by: INTERNAL MEDICINE

## 2021-03-11 PROCEDURE — 3288F PR FALLS RISK ASSESSMENT DOCUMENTED: ICD-10-PCS | Mod: CPTII,S$GLB,, | Performed by: INTERNAL MEDICINE

## 2021-03-11 PROCEDURE — 3079F DIAST BP 80-89 MM HG: CPT | Mod: CPTII,S$GLB,, | Performed by: INTERNAL MEDICINE

## 2021-03-11 PROCEDURE — 3074F PR MOST RECENT SYSTOLIC BLOOD PRESSURE < 130 MM HG: ICD-10-PCS | Mod: CPTII,S$GLB,, | Performed by: INTERNAL MEDICINE

## 2021-03-11 PROCEDURE — 1126F AMNT PAIN NOTED NONE PRSNT: CPT | Mod: S$GLB,,, | Performed by: INTERNAL MEDICINE

## 2021-03-11 PROCEDURE — 3074F SYST BP LT 130 MM HG: CPT | Mod: CPTII,S$GLB,, | Performed by: INTERNAL MEDICINE

## 2021-03-11 PROCEDURE — 1159F MED LIST DOCD IN RCRD: CPT | Mod: S$GLB,,, | Performed by: INTERNAL MEDICINE

## 2021-03-11 PROCEDURE — 99213 PR OFFICE/OUTPT VISIT, EST, LEVL III, 20-29 MIN: ICD-10-PCS | Mod: S$GLB,,, | Performed by: INTERNAL MEDICINE

## 2021-03-11 PROCEDURE — 1159F PR MEDICATION LIST DOCUMENTED IN MEDICAL RECORD: ICD-10-PCS | Mod: S$GLB,,, | Performed by: INTERNAL MEDICINE

## 2021-03-11 PROCEDURE — 99999 PR PBB SHADOW E&M-EST. PATIENT-LVL V: CPT | Mod: PBBFAC,,, | Performed by: INTERNAL MEDICINE

## 2021-03-11 PROCEDURE — 3008F PR BODY MASS INDEX (BMI) DOCUMENTED: ICD-10-PCS | Mod: CPTII,S$GLB,, | Performed by: INTERNAL MEDICINE

## 2021-03-11 PROCEDURE — 1100F PTFALLS ASSESS-DOCD GE2>/YR: CPT | Mod: CPTII,S$GLB,, | Performed by: INTERNAL MEDICINE

## 2021-03-11 PROCEDURE — 3079F PR MOST RECENT DIASTOLIC BLOOD PRESSURE 80-89 MM HG: ICD-10-PCS | Mod: CPTII,S$GLB,, | Performed by: INTERNAL MEDICINE

## 2021-03-11 RX ORDER — TRAZODONE HYDROCHLORIDE 100 MG/1
100 TABLET ORAL NIGHTLY
Qty: 90 TABLET | Refills: 1
Start: 2021-03-11 | End: 2021-07-28

## 2021-03-11 RX ORDER — BUSPIRONE HYDROCHLORIDE 5 MG/1
5 TABLET ORAL 2 TIMES DAILY
Qty: 60 TABLET | Refills: 11 | Status: SHIPPED | OUTPATIENT
Start: 2021-03-11 | End: 2021-04-12

## 2021-03-11 RX ORDER — PROMETHAZINE HYDROCHLORIDE 12.5 MG/1
12.5 TABLET ORAL EVERY 6 HOURS PRN
Qty: 30 TABLET | Refills: 1 | Status: SHIPPED | OUTPATIENT
Start: 2021-03-11 | End: 2021-04-12 | Stop reason: SDUPTHER

## 2021-03-11 RX ORDER — METFORMIN HYDROCHLORIDE 500 MG/1
1000 TABLET, EXTENDED RELEASE ORAL DAILY
COMMUNITY
Start: 2021-01-03 | End: 2021-05-17

## 2021-03-11 RX ORDER — MELOXICAM 15 MG/1
TABLET ORAL
COMMUNITY
Start: 2020-12-15 | End: 2021-03-11 | Stop reason: SDUPTHER

## 2021-03-11 RX ORDER — PREDNISONE 5 MG/1
5 TABLET ORAL DAILY
COMMUNITY
End: 2021-03-11

## 2021-03-11 RX ORDER — NYSTATIN 100000 [USP'U]/G
POWDER TOPICAL 4 TIMES DAILY
Qty: 60 G | Refills: 3 | Status: SHIPPED | OUTPATIENT
Start: 2021-03-11 | End: 2021-03-12 | Stop reason: SDUPTHER

## 2021-03-12 ENCOUNTER — PATIENT MESSAGE (OUTPATIENT)
Dept: FAMILY MEDICINE | Facility: CLINIC | Age: 71
End: 2021-03-12

## 2021-03-12 RX ORDER — NYSTATIN 100000 [USP'U]/G
POWDER TOPICAL 4 TIMES DAILY
Qty: 60 G | Refills: 3 | Status: SHIPPED | OUTPATIENT
Start: 2021-03-12 | End: 2021-05-17 | Stop reason: ALTCHOICE

## 2021-03-15 ENCOUNTER — PATIENT MESSAGE (OUTPATIENT)
Dept: FAMILY MEDICINE | Facility: CLINIC | Age: 71
End: 2021-03-15

## 2021-03-16 ENCOUNTER — PATIENT MESSAGE (OUTPATIENT)
Dept: FAMILY MEDICINE | Facility: CLINIC | Age: 71
End: 2021-03-16

## 2021-03-20 ENCOUNTER — PATIENT MESSAGE (OUTPATIENT)
Dept: FAMILY MEDICINE | Facility: CLINIC | Age: 71
End: 2021-03-20

## 2021-03-22 ENCOUNTER — TELEPHONE (OUTPATIENT)
Dept: FAMILY MEDICINE | Facility: CLINIC | Age: 71
End: 2021-03-22

## 2021-03-22 ENCOUNTER — PATIENT MESSAGE (OUTPATIENT)
Dept: FAMILY MEDICINE | Facility: CLINIC | Age: 71
End: 2021-03-22

## 2021-03-22 RX ORDER — CODEINE PHOSPHATE AND GUAIFENESIN 10; 100 MG/5ML; MG/5ML
5 SOLUTION ORAL EVERY 6 HOURS PRN
Qty: 236 ML | Refills: 0 | Status: SHIPPED | OUTPATIENT
Start: 2021-03-22 | End: 2021-04-01

## 2021-03-23 ENCOUNTER — PATIENT MESSAGE (OUTPATIENT)
Dept: FAMILY MEDICINE | Facility: CLINIC | Age: 71
End: 2021-03-23

## 2021-03-23 ENCOUNTER — TELEPHONE (OUTPATIENT)
Dept: FAMILY MEDICINE | Facility: CLINIC | Age: 71
End: 2021-03-23

## 2021-03-24 ENCOUNTER — PATIENT MESSAGE (OUTPATIENT)
Dept: FAMILY MEDICINE | Facility: CLINIC | Age: 71
End: 2021-03-24

## 2021-03-24 RX ORDER — LEVOFLOXACIN 750 MG/1
750 TABLET ORAL DAILY
Qty: 5 TABLET | Refills: 0 | Status: SHIPPED | OUTPATIENT
Start: 2021-03-24 | End: 2021-03-29

## 2021-03-28 ENCOUNTER — PATIENT MESSAGE (OUTPATIENT)
Dept: FAMILY MEDICINE | Facility: CLINIC | Age: 71
End: 2021-03-28

## 2021-03-28 DIAGNOSIS — J18.9 COMMUNITY ACQUIRED PNEUMONIA, UNSPECIFIED LATERALITY: Primary | ICD-10-CM

## 2021-03-29 ENCOUNTER — PATIENT OUTREACH (OUTPATIENT)
Dept: ADMINISTRATIVE | Facility: HOSPITAL | Age: 71
End: 2021-03-29

## 2021-03-29 ENCOUNTER — OFFICE VISIT (OUTPATIENT)
Dept: FAMILY MEDICINE | Facility: CLINIC | Age: 71
End: 2021-03-29
Payer: MEDICARE

## 2021-03-29 ENCOUNTER — PATIENT MESSAGE (OUTPATIENT)
Dept: FAMILY MEDICINE | Facility: CLINIC | Age: 71
End: 2021-03-29

## 2021-03-29 VITALS
SYSTOLIC BLOOD PRESSURE: 114 MMHG | TEMPERATURE: 98 F | HEIGHT: 60 IN | DIASTOLIC BLOOD PRESSURE: 64 MMHG | BODY MASS INDEX: 33.09 KG/M2 | HEART RATE: 97 BPM | OXYGEN SATURATION: 96 % | WEIGHT: 168.56 LBS

## 2021-03-29 DIAGNOSIS — J18.9 COMMUNITY ACQUIRED PNEUMONIA OF RIGHT LUNG, UNSPECIFIED PART OF LUNG: Primary | ICD-10-CM

## 2021-03-29 DIAGNOSIS — E11.69 TYPE 2 DIABETES MELLITUS WITH OTHER SPECIFIED COMPLICATION, WITHOUT LONG-TERM CURRENT USE OF INSULIN: Primary | ICD-10-CM

## 2021-03-29 PROCEDURE — 3288F PR FALLS RISK ASSESSMENT DOCUMENTED: ICD-10-PCS | Mod: CPTII,S$GLB,, | Performed by: INTERNAL MEDICINE

## 2021-03-29 PROCEDURE — 3078F PR MOST RECENT DIASTOLIC BLOOD PRESSURE < 80 MM HG: ICD-10-PCS | Mod: CPTII,S$GLB,, | Performed by: INTERNAL MEDICINE

## 2021-03-29 PROCEDURE — 3008F BODY MASS INDEX DOCD: CPT | Mod: CPTII,S$GLB,, | Performed by: INTERNAL MEDICINE

## 2021-03-29 PROCEDURE — 99214 PR OFFICE/OUTPT VISIT, EST, LEVL IV, 30-39 MIN: ICD-10-PCS | Mod: S$GLB,,, | Performed by: INTERNAL MEDICINE

## 2021-03-29 PROCEDURE — 99214 OFFICE O/P EST MOD 30 MIN: CPT | Mod: S$GLB,,, | Performed by: INTERNAL MEDICINE

## 2021-03-29 PROCEDURE — 1126F PR PAIN SEVERITY QUANTIFIED, NO PAIN PRESENT: ICD-10-PCS | Mod: S$GLB,,, | Performed by: INTERNAL MEDICINE

## 2021-03-29 PROCEDURE — 3074F PR MOST RECENT SYSTOLIC BLOOD PRESSURE < 130 MM HG: ICD-10-PCS | Mod: CPTII,S$GLB,, | Performed by: INTERNAL MEDICINE

## 2021-03-29 PROCEDURE — 1101F PR PT FALLS ASSESS DOC 0-1 FALLS W/OUT INJ PAST YR: ICD-10-PCS | Mod: CPTII,S$GLB,, | Performed by: INTERNAL MEDICINE

## 2021-03-29 PROCEDURE — 1159F PR MEDICATION LIST DOCUMENTED IN MEDICAL RECORD: ICD-10-PCS | Mod: S$GLB,,, | Performed by: INTERNAL MEDICINE

## 2021-03-29 PROCEDURE — 3288F FALL RISK ASSESSMENT DOCD: CPT | Mod: CPTII,S$GLB,, | Performed by: INTERNAL MEDICINE

## 2021-03-29 PROCEDURE — 1159F MED LIST DOCD IN RCRD: CPT | Mod: S$GLB,,, | Performed by: INTERNAL MEDICINE

## 2021-03-29 PROCEDURE — 3078F DIAST BP <80 MM HG: CPT | Mod: CPTII,S$GLB,, | Performed by: INTERNAL MEDICINE

## 2021-03-29 PROCEDURE — 3008F PR BODY MASS INDEX (BMI) DOCUMENTED: ICD-10-PCS | Mod: CPTII,S$GLB,, | Performed by: INTERNAL MEDICINE

## 2021-03-29 PROCEDURE — 1126F AMNT PAIN NOTED NONE PRSNT: CPT | Mod: S$GLB,,, | Performed by: INTERNAL MEDICINE

## 2021-03-29 PROCEDURE — 99999 PR PBB SHADOW E&M-EST. PATIENT-LVL V: CPT | Mod: PBBFAC,,, | Performed by: INTERNAL MEDICINE

## 2021-03-29 PROCEDURE — 3074F SYST BP LT 130 MM HG: CPT | Mod: CPTII,S$GLB,, | Performed by: INTERNAL MEDICINE

## 2021-03-29 PROCEDURE — 1101F PT FALLS ASSESS-DOCD LE1/YR: CPT | Mod: CPTII,S$GLB,, | Performed by: INTERNAL MEDICINE

## 2021-03-29 PROCEDURE — 99999 PR PBB SHADOW E&M-EST. PATIENT-LVL V: ICD-10-PCS | Mod: PBBFAC,,, | Performed by: INTERNAL MEDICINE

## 2021-03-29 RX ORDER — PREDNISONE 20 MG/1
20 TABLET ORAL DAILY
Qty: 5 TABLET | Refills: 0 | Status: SHIPPED | OUTPATIENT
Start: 2021-03-29 | End: 2021-05-17

## 2021-03-30 ENCOUNTER — PATIENT OUTREACH (OUTPATIENT)
Dept: ADMINISTRATIVE | Facility: HOSPITAL | Age: 71
End: 2021-03-30

## 2021-03-30 ENCOUNTER — PATIENT MESSAGE (OUTPATIENT)
Dept: FAMILY MEDICINE | Facility: CLINIC | Age: 71
End: 2021-03-30

## 2021-03-31 ENCOUNTER — PATIENT MESSAGE (OUTPATIENT)
Dept: FAMILY MEDICINE | Facility: CLINIC | Age: 71
End: 2021-03-31

## 2021-03-31 ENCOUNTER — TELEPHONE (OUTPATIENT)
Dept: GASTROENTEROLOGY | Facility: CLINIC | Age: 71
End: 2021-03-31

## 2021-03-31 DIAGNOSIS — M67.979 ACHILLES TENDON DISORDER, UNSPECIFIED LATERALITY: Primary | ICD-10-CM

## 2021-03-31 DIAGNOSIS — K21.9 GASTROESOPHAGEAL REFLUX DISEASE: ICD-10-CM

## 2021-03-31 DIAGNOSIS — M79.662 PAIN OF LEFT CALF: Primary | ICD-10-CM

## 2021-03-31 RX ORDER — ESOMEPRAZOLE MAGNESIUM 40 MG/1
CAPSULE, DELAYED RELEASE ORAL
Qty: 90 CAPSULE | Refills: 3 | Status: SHIPPED | OUTPATIENT
Start: 2021-03-31 | End: 2021-04-01

## 2021-04-01 ENCOUNTER — PATIENT MESSAGE (OUTPATIENT)
Dept: FAMILY MEDICINE | Facility: CLINIC | Age: 71
End: 2021-04-01

## 2021-04-01 DIAGNOSIS — S86.019A ACHILLES TENDON AVULSION: Primary | ICD-10-CM

## 2021-04-01 DIAGNOSIS — K21.9 GASTROESOPHAGEAL REFLUX DISEASE: ICD-10-CM

## 2021-04-01 DIAGNOSIS — M67.979 ACHILLES TENDON DISORDER, UNSPECIFIED LATERALITY: ICD-10-CM

## 2021-04-01 RX ORDER — ESOMEPRAZOLE MAGNESIUM 40 MG/1
40 CAPSULE, DELAYED RELEASE ORAL DAILY
Qty: 180 CAPSULE | Refills: 1 | Status: SHIPPED | OUTPATIENT
Start: 2021-04-01 | End: 2021-04-01

## 2021-04-01 RX ORDER — ESOMEPRAZOLE MAGNESIUM 40 MG/1
40 CAPSULE, DELAYED RELEASE ORAL
Qty: 180 CAPSULE | Refills: 1 | Status: SHIPPED | OUTPATIENT
Start: 2021-04-01 | End: 2021-04-12 | Stop reason: SDUPTHER

## 2021-04-05 ENCOUNTER — PATIENT MESSAGE (OUTPATIENT)
Dept: FAMILY MEDICINE | Facility: CLINIC | Age: 71
End: 2021-04-05

## 2021-04-05 ENCOUNTER — PATIENT MESSAGE (OUTPATIENT)
Dept: ADMINISTRATIVE | Facility: HOSPITAL | Age: 71
End: 2021-04-05

## 2021-04-06 ENCOUNTER — TELEPHONE (OUTPATIENT)
Dept: PODIATRY | Facility: CLINIC | Age: 71
End: 2021-04-06

## 2021-04-06 ENCOUNTER — PATIENT OUTREACH (OUTPATIENT)
Dept: ADMINISTRATIVE | Facility: OTHER | Age: 71
End: 2021-04-06

## 2021-04-06 ENCOUNTER — PATIENT MESSAGE (OUTPATIENT)
Dept: FAMILY MEDICINE | Facility: CLINIC | Age: 71
End: 2021-04-06

## 2021-04-07 ENCOUNTER — PATIENT MESSAGE (OUTPATIENT)
Dept: FAMILY MEDICINE | Facility: CLINIC | Age: 71
End: 2021-04-07

## 2021-04-08 ENCOUNTER — PATIENT MESSAGE (OUTPATIENT)
Dept: FAMILY MEDICINE | Facility: CLINIC | Age: 71
End: 2021-04-08

## 2021-04-12 ENCOUNTER — OFFICE VISIT (OUTPATIENT)
Dept: FAMILY MEDICINE | Facility: CLINIC | Age: 71
End: 2021-04-12
Payer: MEDICARE

## 2021-04-12 ENCOUNTER — PATIENT MESSAGE (OUTPATIENT)
Dept: FAMILY MEDICINE | Facility: CLINIC | Age: 71
End: 2021-04-12

## 2021-04-12 VITALS
WEIGHT: 168 LBS | HEART RATE: 95 BPM | HEIGHT: 62 IN | BODY MASS INDEX: 30.91 KG/M2 | SYSTOLIC BLOOD PRESSURE: 118 MMHG | TEMPERATURE: 98 F | OXYGEN SATURATION: 97 % | DIASTOLIC BLOOD PRESSURE: 78 MMHG

## 2021-04-12 DIAGNOSIS — F41.9 ANXIETY: Primary | ICD-10-CM

## 2021-04-12 DIAGNOSIS — J18.9 COMMUNITY ACQUIRED PNEUMONIA OF RIGHT LUNG, UNSPECIFIED PART OF LUNG: ICD-10-CM

## 2021-04-12 DIAGNOSIS — R10.9 ABDOMINAL PAIN, UNSPECIFIED ABDOMINAL LOCATION: ICD-10-CM

## 2021-04-12 DIAGNOSIS — K21.9 GASTROESOPHAGEAL REFLUX DISEASE: ICD-10-CM

## 2021-04-12 DIAGNOSIS — E11.69 TYPE 2 DIABETES MELLITUS WITH OTHER SPECIFIED COMPLICATION, WITHOUT LONG-TERM CURRENT USE OF INSULIN: ICD-10-CM

## 2021-04-12 DIAGNOSIS — K21.9 GASTROESOPHAGEAL REFLUX DISEASE, UNSPECIFIED WHETHER ESOPHAGITIS PRESENT: ICD-10-CM

## 2021-04-12 DIAGNOSIS — R05.3 CHRONIC COUGH: ICD-10-CM

## 2021-04-12 PROCEDURE — 1126F PR PAIN SEVERITY QUANTIFIED, NO PAIN PRESENT: ICD-10-PCS | Mod: S$GLB,,, | Performed by: INTERNAL MEDICINE

## 2021-04-12 PROCEDURE — 3288F FALL RISK ASSESSMENT DOCD: CPT | Mod: CPTII,S$GLB,, | Performed by: INTERNAL MEDICINE

## 2021-04-12 PROCEDURE — 3288F PR FALLS RISK ASSESSMENT DOCUMENTED: ICD-10-PCS | Mod: CPTII,S$GLB,, | Performed by: INTERNAL MEDICINE

## 2021-04-12 PROCEDURE — 3008F BODY MASS INDEX DOCD: CPT | Mod: CPTII,S$GLB,, | Performed by: INTERNAL MEDICINE

## 2021-04-12 PROCEDURE — 99499 UNLISTED E&M SERVICE: CPT | Mod: HCNC,S$GLB,, | Performed by: INTERNAL MEDICINE

## 2021-04-12 PROCEDURE — 99214 OFFICE O/P EST MOD 30 MIN: CPT | Mod: S$GLB,,, | Performed by: INTERNAL MEDICINE

## 2021-04-12 PROCEDURE — 99214 PR OFFICE/OUTPT VISIT, EST, LEVL IV, 30-39 MIN: ICD-10-PCS | Mod: S$GLB,,, | Performed by: INTERNAL MEDICINE

## 2021-04-12 PROCEDURE — 99499 RISK ADDL DX/OHS AUDIT: ICD-10-PCS | Mod: HCNC,S$GLB,, | Performed by: INTERNAL MEDICINE

## 2021-04-12 PROCEDURE — 1159F PR MEDICATION LIST DOCUMENTED IN MEDICAL RECORD: ICD-10-PCS | Mod: S$GLB,,, | Performed by: INTERNAL MEDICINE

## 2021-04-12 PROCEDURE — 3008F PR BODY MASS INDEX (BMI) DOCUMENTED: ICD-10-PCS | Mod: CPTII,S$GLB,, | Performed by: INTERNAL MEDICINE

## 2021-04-12 PROCEDURE — 1159F MED LIST DOCD IN RCRD: CPT | Mod: S$GLB,,, | Performed by: INTERNAL MEDICINE

## 2021-04-12 PROCEDURE — 99999 PR PBB SHADOW E&M-EST. PATIENT-LVL V: CPT | Mod: PBBFAC,,, | Performed by: INTERNAL MEDICINE

## 2021-04-12 PROCEDURE — 1101F PT FALLS ASSESS-DOCD LE1/YR: CPT | Mod: CPTII,S$GLB,, | Performed by: INTERNAL MEDICINE

## 2021-04-12 PROCEDURE — 1126F AMNT PAIN NOTED NONE PRSNT: CPT | Mod: S$GLB,,, | Performed by: INTERNAL MEDICINE

## 2021-04-12 PROCEDURE — 1101F PR PT FALLS ASSESS DOC 0-1 FALLS W/OUT INJ PAST YR: ICD-10-PCS | Mod: CPTII,S$GLB,, | Performed by: INTERNAL MEDICINE

## 2021-04-12 PROCEDURE — 99999 PR PBB SHADOW E&M-EST. PATIENT-LVL V: ICD-10-PCS | Mod: PBBFAC,,, | Performed by: INTERNAL MEDICINE

## 2021-04-12 RX ORDER — DICYCLOMINE HYDROCHLORIDE 20 MG/1
20 TABLET ORAL 3 TIMES DAILY PRN
Qty: 90 TABLET | Refills: 1 | Status: SHIPPED | OUTPATIENT
Start: 2021-04-12 | End: 2021-05-04

## 2021-04-12 RX ORDER — FLUOXETINE 10 MG/1
10 CAPSULE ORAL DAILY
Qty: 90 CAPSULE | Refills: 3 | Status: SHIPPED | OUTPATIENT
Start: 2021-04-12 | End: 2021-12-15 | Stop reason: SDUPTHER

## 2021-04-12 RX ORDER — ALPRAZOLAM 0.5 MG/1
0.5 TABLET ORAL 2 TIMES DAILY
Qty: 60 TABLET | Refills: 0 | Status: SHIPPED | OUTPATIENT
Start: 2021-04-12 | End: 2021-05-24 | Stop reason: SDUPTHER

## 2021-04-12 RX ORDER — PROMETHAZINE HYDROCHLORIDE 12.5 MG/1
12.5 TABLET ORAL EVERY 6 HOURS PRN
Qty: 30 TABLET | Refills: 1 | Status: SHIPPED | OUTPATIENT
Start: 2021-04-12 | End: 2021-07-19 | Stop reason: SDUPTHER

## 2021-04-12 RX ORDER — ESOMEPRAZOLE MAGNESIUM 40 MG/1
40 CAPSULE, DELAYED RELEASE ORAL
Qty: 180 CAPSULE | Refills: 1 | Status: SHIPPED | OUTPATIENT
Start: 2021-04-12 | End: 2022-03-17 | Stop reason: SDUPTHER

## 2021-04-13 ENCOUNTER — PATIENT MESSAGE (OUTPATIENT)
Dept: FAMILY MEDICINE | Facility: CLINIC | Age: 71
End: 2021-04-13

## 2021-04-13 PROBLEM — R05.3 CHRONIC COUGH: Status: ACTIVE | Noted: 2021-04-13

## 2021-04-21 ENCOUNTER — PATIENT MESSAGE (OUTPATIENT)
Dept: FAMILY MEDICINE | Facility: CLINIC | Age: 71
End: 2021-04-21

## 2021-04-22 ENCOUNTER — PATIENT MESSAGE (OUTPATIENT)
Dept: FAMILY MEDICINE | Facility: CLINIC | Age: 71
End: 2021-04-22

## 2021-04-26 ENCOUNTER — OFFICE VISIT (OUTPATIENT)
Dept: FAMILY MEDICINE | Facility: CLINIC | Age: 71
End: 2021-04-26
Payer: MEDICARE

## 2021-04-26 VITALS
SYSTOLIC BLOOD PRESSURE: 118 MMHG | DIASTOLIC BLOOD PRESSURE: 78 MMHG | TEMPERATURE: 98 F | HEART RATE: 90 BPM | OXYGEN SATURATION: 97 % | WEIGHT: 167.56 LBS | BODY MASS INDEX: 30.83 KG/M2 | HEIGHT: 62 IN

## 2021-04-26 DIAGNOSIS — F41.9 ANXIETY: ICD-10-CM

## 2021-04-26 DIAGNOSIS — R05.9 COUGH: Primary | ICD-10-CM

## 2021-04-26 DIAGNOSIS — J18.9 COMMUNITY ACQUIRED PNEUMONIA OF RIGHT LUNG, UNSPECIFIED PART OF LUNG: ICD-10-CM

## 2021-04-26 PROBLEM — J32.3 SPHENOID SINUSITIS: Status: RESOLVED | Noted: 2021-02-15 | Resolved: 2021-04-26

## 2021-04-26 PROCEDURE — 1101F PR PT FALLS ASSESS DOC 0-1 FALLS W/OUT INJ PAST YR: ICD-10-PCS | Mod: CPTII,S$GLB,, | Performed by: INTERNAL MEDICINE

## 2021-04-26 PROCEDURE — 3288F PR FALLS RISK ASSESSMENT DOCUMENTED: ICD-10-PCS | Mod: CPTII,S$GLB,, | Performed by: INTERNAL MEDICINE

## 2021-04-26 PROCEDURE — 99999 PR PBB SHADOW E&M-EST. PATIENT-LVL III: CPT | Mod: PBBFAC,,, | Performed by: INTERNAL MEDICINE

## 2021-04-26 PROCEDURE — 3008F BODY MASS INDEX DOCD: CPT | Mod: CPTII,S$GLB,, | Performed by: INTERNAL MEDICINE

## 2021-04-26 PROCEDURE — 99999 PR PBB SHADOW E&M-EST. PATIENT-LVL III: ICD-10-PCS | Mod: PBBFAC,,, | Performed by: INTERNAL MEDICINE

## 2021-04-26 PROCEDURE — 3288F FALL RISK ASSESSMENT DOCD: CPT | Mod: CPTII,S$GLB,, | Performed by: INTERNAL MEDICINE

## 2021-04-26 PROCEDURE — 3008F PR BODY MASS INDEX (BMI) DOCUMENTED: ICD-10-PCS | Mod: CPTII,S$GLB,, | Performed by: INTERNAL MEDICINE

## 2021-04-26 PROCEDURE — 1159F MED LIST DOCD IN RCRD: CPT | Mod: S$GLB,,, | Performed by: INTERNAL MEDICINE

## 2021-04-26 PROCEDURE — 1126F AMNT PAIN NOTED NONE PRSNT: CPT | Mod: S$GLB,,, | Performed by: INTERNAL MEDICINE

## 2021-04-26 PROCEDURE — 99213 OFFICE O/P EST LOW 20 MIN: CPT | Mod: S$GLB,,, | Performed by: INTERNAL MEDICINE

## 2021-04-26 PROCEDURE — 1126F PR PAIN SEVERITY QUANTIFIED, NO PAIN PRESENT: ICD-10-PCS | Mod: S$GLB,,, | Performed by: INTERNAL MEDICINE

## 2021-04-26 PROCEDURE — 99213 PR OFFICE/OUTPT VISIT, EST, LEVL III, 20-29 MIN: ICD-10-PCS | Mod: S$GLB,,, | Performed by: INTERNAL MEDICINE

## 2021-04-26 PROCEDURE — 1101F PT FALLS ASSESS-DOCD LE1/YR: CPT | Mod: CPTII,S$GLB,, | Performed by: INTERNAL MEDICINE

## 2021-04-26 PROCEDURE — 1159F PR MEDICATION LIST DOCUMENTED IN MEDICAL RECORD: ICD-10-PCS | Mod: S$GLB,,, | Performed by: INTERNAL MEDICINE

## 2021-05-26 ENCOUNTER — PATIENT MESSAGE (OUTPATIENT)
Dept: FAMILY MEDICINE | Facility: CLINIC | Age: 71
End: 2021-05-26

## 2021-05-30 ENCOUNTER — PATIENT MESSAGE (OUTPATIENT)
Dept: FAMILY MEDICINE | Facility: CLINIC | Age: 71
End: 2021-05-30

## 2021-05-31 RX ORDER — ROSUVASTATIN CALCIUM 40 MG/1
40 TABLET, COATED ORAL DAILY
Qty: 90 TABLET | Refills: 1 | Status: SHIPPED | OUTPATIENT
Start: 2021-05-31 | End: 2022-05-25 | Stop reason: SDUPTHER

## 2021-06-17 RX ORDER — TELMISARTAN 20 MG/1
20 TABLET ORAL DAILY
Qty: 90 TABLET | Refills: 0 | Status: SHIPPED | OUTPATIENT
Start: 2021-06-17 | End: 2021-09-13

## 2021-07-07 ENCOUNTER — OFFICE VISIT (OUTPATIENT)
Dept: UROLOGY | Facility: CLINIC | Age: 71
End: 2021-07-07
Attending: UROLOGY
Payer: MEDICARE

## 2021-07-07 ENCOUNTER — PATIENT MESSAGE (OUTPATIENT)
Dept: FAMILY MEDICINE | Facility: CLINIC | Age: 71
End: 2021-07-07

## 2021-07-07 VITALS
WEIGHT: 166 LBS | BODY MASS INDEX: 32.59 KG/M2 | HEART RATE: 58 BPM | SYSTOLIC BLOOD PRESSURE: 133 MMHG | HEIGHT: 60 IN | DIASTOLIC BLOOD PRESSURE: 66 MMHG

## 2021-07-07 DIAGNOSIS — R31.29 MICROHEMATURIA: Primary | ICD-10-CM

## 2021-07-07 PROCEDURE — 1101F PR PT FALLS ASSESS DOC 0-1 FALLS W/OUT INJ PAST YR: ICD-10-PCS | Mod: CPTII,S$GLB,, | Performed by: UROLOGY

## 2021-07-07 PROCEDURE — 3288F FALL RISK ASSESSMENT DOCD: CPT | Mod: CPTII,S$GLB,, | Performed by: UROLOGY

## 2021-07-07 PROCEDURE — 88112 CYTOPATH CELL ENHANCE TECH: CPT | Performed by: PATHOLOGY

## 2021-07-07 PROCEDURE — 99214 OFFICE O/P EST MOD 30 MIN: CPT | Mod: S$GLB,,, | Performed by: UROLOGY

## 2021-07-07 PROCEDURE — 1159F PR MEDICATION LIST DOCUMENTED IN MEDICAL RECORD: ICD-10-PCS | Mod: S$GLB,,, | Performed by: UROLOGY

## 2021-07-07 PROCEDURE — 3288F PR FALLS RISK ASSESSMENT DOCUMENTED: ICD-10-PCS | Mod: CPTII,S$GLB,, | Performed by: UROLOGY

## 2021-07-07 PROCEDURE — 1159F MED LIST DOCD IN RCRD: CPT | Mod: S$GLB,,, | Performed by: UROLOGY

## 2021-07-07 PROCEDURE — 1125F AMNT PAIN NOTED PAIN PRSNT: CPT | Mod: S$GLB,,, | Performed by: UROLOGY

## 2021-07-07 PROCEDURE — 3008F BODY MASS INDEX DOCD: CPT | Mod: CPTII,S$GLB,, | Performed by: UROLOGY

## 2021-07-07 PROCEDURE — 1101F PT FALLS ASSESS-DOCD LE1/YR: CPT | Mod: CPTII,S$GLB,, | Performed by: UROLOGY

## 2021-07-07 PROCEDURE — 88112 CYTOPATH CELL ENHANCE TECH: CPT | Mod: 26,,, | Performed by: PATHOLOGY

## 2021-07-07 PROCEDURE — 3008F PR BODY MASS INDEX (BMI) DOCUMENTED: ICD-10-PCS | Mod: CPTII,S$GLB,, | Performed by: UROLOGY

## 2021-07-07 PROCEDURE — 88112 PR  CYTOPATH, CELL ENHANCE TECH: ICD-10-PCS | Mod: 26,,, | Performed by: PATHOLOGY

## 2021-07-07 PROCEDURE — 99214 PR OFFICE/OUTPT VISIT, EST, LEVL IV, 30-39 MIN: ICD-10-PCS | Mod: S$GLB,,, | Performed by: UROLOGY

## 2021-07-07 PROCEDURE — 1125F PR PAIN SEVERITY QUANTIFIED, PAIN PRESENT: ICD-10-PCS | Mod: S$GLB,,, | Performed by: UROLOGY

## 2021-07-08 ENCOUNTER — PATIENT MESSAGE (OUTPATIENT)
Dept: FAMILY MEDICINE | Facility: CLINIC | Age: 71
End: 2021-07-08

## 2021-07-08 DIAGNOSIS — M19.012 ARTHRITIS OF LEFT SHOULDER REGION: Primary | ICD-10-CM

## 2021-07-09 LAB
FINAL PATHOLOGIC DIAGNOSIS: NORMAL
Lab: NORMAL

## 2021-07-10 ENCOUNTER — PATIENT MESSAGE (OUTPATIENT)
Dept: UROLOGY | Facility: CLINIC | Age: 71
End: 2021-07-10

## 2021-07-12 ENCOUNTER — PATIENT MESSAGE (OUTPATIENT)
Dept: FAMILY MEDICINE | Facility: CLINIC | Age: 71
End: 2021-07-12

## 2021-07-12 ENCOUNTER — PATIENT MESSAGE (OUTPATIENT)
Dept: UROLOGY | Facility: CLINIC | Age: 71
End: 2021-07-12

## 2021-07-12 RX ORDER — FLUCONAZOLE 150 MG/1
150 TABLET ORAL ONCE
Qty: 1 TABLET | Refills: 0 | Status: SHIPPED | OUTPATIENT
Start: 2021-07-12 | End: 2021-07-12

## 2021-07-19 ENCOUNTER — PATIENT MESSAGE (OUTPATIENT)
Dept: FAMILY MEDICINE | Facility: CLINIC | Age: 71
End: 2021-07-19

## 2021-07-19 DIAGNOSIS — Z12.31 ENCOUNTER FOR SCREENING MAMMOGRAM FOR MALIGNANT NEOPLASM OF BREAST: Primary | ICD-10-CM

## 2021-07-19 DIAGNOSIS — F41.9 ANXIETY: ICD-10-CM

## 2021-07-20 ENCOUNTER — PATIENT MESSAGE (OUTPATIENT)
Dept: FAMILY MEDICINE | Facility: CLINIC | Age: 71
End: 2021-07-20

## 2021-07-21 ENCOUNTER — PATIENT MESSAGE (OUTPATIENT)
Dept: FAMILY MEDICINE | Facility: CLINIC | Age: 71
End: 2021-07-21

## 2021-07-21 RX ORDER — PROMETHAZINE HYDROCHLORIDE 12.5 MG/1
12.5 TABLET ORAL EVERY 6 HOURS PRN
Qty: 30 TABLET | Refills: 1 | Status: SHIPPED | OUTPATIENT
Start: 2021-07-21 | End: 2021-12-09

## 2021-07-21 RX ORDER — ALPRAZOLAM 0.5 MG/1
0.5 TABLET ORAL 2 TIMES DAILY
Qty: 60 TABLET | Refills: 0 | Status: SHIPPED | OUTPATIENT
Start: 2021-07-21 | End: 2021-09-30 | Stop reason: SDUPTHER

## 2021-07-22 ENCOUNTER — PATIENT MESSAGE (OUTPATIENT)
Dept: FAMILY MEDICINE | Facility: CLINIC | Age: 71
End: 2021-07-22

## 2021-08-04 ENCOUNTER — PATIENT MESSAGE (OUTPATIENT)
Dept: ADMINISTRATIVE | Facility: HOSPITAL | Age: 71
End: 2021-08-04

## 2021-08-04 ENCOUNTER — PATIENT MESSAGE (OUTPATIENT)
Dept: FAMILY MEDICINE | Facility: CLINIC | Age: 71
End: 2021-08-04

## 2021-08-12 ENCOUNTER — PATIENT MESSAGE (OUTPATIENT)
Dept: FAMILY MEDICINE | Facility: CLINIC | Age: 71
End: 2021-08-12

## 2021-08-12 RX ORDER — BUTALBITAL, ACETAMINOPHEN AND CAFFEINE 50; 325; 40 MG/1; MG/1; MG/1
TABLET ORAL
Qty: 90 TABLET | Refills: 2 | OUTPATIENT
Start: 2021-08-12 | End: 2021-08-12 | Stop reason: SDUPTHER

## 2021-08-12 RX ORDER — BUTALBITAL, ACETAMINOPHEN AND CAFFEINE 50; 325; 40 MG/1; MG/1; MG/1
TABLET ORAL
Qty: 60 TABLET | Refills: 0 | Status: SHIPPED | OUTPATIENT
Start: 2021-08-12 | End: 2021-10-18 | Stop reason: SDUPTHER

## 2021-08-17 ENCOUNTER — PATIENT OUTREACH (OUTPATIENT)
Dept: ADMINISTRATIVE | Facility: HOSPITAL | Age: 71
End: 2021-08-17

## 2021-08-17 ENCOUNTER — TELEPHONE (OUTPATIENT)
Dept: ADMINISTRATIVE | Facility: HOSPITAL | Age: 71
End: 2021-08-17

## 2021-09-30 DIAGNOSIS — F41.9 ANXIETY: ICD-10-CM

## 2021-09-30 RX ORDER — ALPRAZOLAM 0.5 MG/1
0.5 TABLET ORAL 2 TIMES DAILY
Qty: 60 TABLET | Refills: 0 | Status: SHIPPED | OUTPATIENT
Start: 2021-09-30 | End: 2021-11-13 | Stop reason: SDUPTHER

## 2021-10-05 ENCOUNTER — PATIENT MESSAGE (OUTPATIENT)
Dept: ADMINISTRATIVE | Facility: HOSPITAL | Age: 71
End: 2021-10-05

## 2021-10-18 ENCOUNTER — PATIENT MESSAGE (OUTPATIENT)
Dept: ADMINISTRATIVE | Facility: HOSPITAL | Age: 71
End: 2021-10-18
Payer: MEDICARE

## 2021-11-13 DIAGNOSIS — F41.9 ANXIETY: ICD-10-CM

## 2021-11-15 ENCOUNTER — PATIENT MESSAGE (OUTPATIENT)
Dept: FAMILY MEDICINE | Facility: CLINIC | Age: 71
End: 2021-11-15
Payer: MEDICARE

## 2021-11-15 DIAGNOSIS — F41.9 ANXIETY: ICD-10-CM

## 2021-11-16 RX ORDER — ALPRAZOLAM 0.5 MG/1
0.5 TABLET ORAL DAILY PRN
Qty: 30 TABLET | Refills: 0 | Status: SHIPPED | OUTPATIENT
Start: 2021-11-16 | End: 2021-12-13 | Stop reason: SDUPTHER

## 2021-11-16 RX ORDER — ALPRAZOLAM 0.5 MG/1
0.5 TABLET ORAL 2 TIMES DAILY
Qty: 60 TABLET | Refills: 0 | OUTPATIENT
Start: 2021-11-16

## 2021-12-09 RX ORDER — PROMETHAZINE HYDROCHLORIDE 12.5 MG/1
12.5 TABLET ORAL EVERY 6 HOURS PRN
Qty: 30 TABLET | Refills: 1 | OUTPATIENT
Start: 2021-12-09

## 2021-12-09 RX ORDER — PROMETHAZINE HYDROCHLORIDE 12.5 MG/1
TABLET ORAL
Qty: 30 TABLET | Refills: 1 | Status: SHIPPED | OUTPATIENT
Start: 2021-12-09 | End: 2022-03-09 | Stop reason: SDUPTHER

## 2021-12-15 ENCOUNTER — PATIENT MESSAGE (OUTPATIENT)
Dept: INTERNAL MEDICINE | Facility: CLINIC | Age: 71
End: 2021-12-15
Payer: MEDICARE

## 2021-12-15 ENCOUNTER — OFFICE VISIT (OUTPATIENT)
Dept: INTERNAL MEDICINE | Facility: CLINIC | Age: 71
End: 2021-12-15
Payer: MEDICARE

## 2021-12-15 VITALS
OXYGEN SATURATION: 97 % | BODY MASS INDEX: 31.31 KG/M2 | WEIGHT: 159.5 LBS | SYSTOLIC BLOOD PRESSURE: 102 MMHG | HEART RATE: 63 BPM | RESPIRATION RATE: 16 BRPM | DIASTOLIC BLOOD PRESSURE: 68 MMHG | HEIGHT: 60 IN

## 2021-12-15 DIAGNOSIS — D12.6 ADENOMATOUS POLYP OF COLON, UNSPECIFIED PART OF COLON: ICD-10-CM

## 2021-12-15 DIAGNOSIS — F41.9 ANXIETY: ICD-10-CM

## 2021-12-15 DIAGNOSIS — M25.551 RIGHT HIP PAIN: Primary | ICD-10-CM

## 2021-12-15 DIAGNOSIS — E11.69 TYPE 2 DIABETES MELLITUS WITH OTHER SPECIFIED COMPLICATION, WITHOUT LONG-TERM CURRENT USE OF INSULIN: ICD-10-CM

## 2021-12-15 DIAGNOSIS — I10 HYPERTENSION, ESSENTIAL: ICD-10-CM

## 2021-12-15 DIAGNOSIS — M19.012 ARTHRITIS OF LEFT SHOULDER REGION: ICD-10-CM

## 2021-12-15 DIAGNOSIS — E05.90 SUBCLINICAL HYPERTHYROIDISM: ICD-10-CM

## 2021-12-15 DIAGNOSIS — G47.9 SLEEP DISTURBANCE: ICD-10-CM

## 2021-12-15 PROCEDURE — 99499 UNLISTED E&M SERVICE: CPT | Mod: S$GLB,,, | Performed by: INTERNAL MEDICINE

## 2021-12-15 PROCEDURE — 4010F PR ACE/ARB THEARPY RXD/TAKEN: ICD-10-PCS | Mod: HCNC,CPTII,S$GLB, | Performed by: INTERNAL MEDICINE

## 2021-12-15 PROCEDURE — 4010F ACE/ARB THERAPY RXD/TAKEN: CPT | Mod: HCNC,CPTII,S$GLB, | Performed by: INTERNAL MEDICINE

## 2021-12-15 PROCEDURE — 99499 RISK ADDL DX/OHS AUDIT: ICD-10-PCS | Mod: S$GLB,,, | Performed by: INTERNAL MEDICINE

## 2021-12-15 PROCEDURE — 99214 PR OFFICE/OUTPT VISIT, EST, LEVL IV, 30-39 MIN: ICD-10-PCS | Mod: HCNC,S$GLB,, | Performed by: INTERNAL MEDICINE

## 2021-12-15 PROCEDURE — 3066F NEPHROPATHY DOC TX: CPT | Mod: HCNC,CPTII,S$GLB, | Performed by: INTERNAL MEDICINE

## 2021-12-15 PROCEDURE — 99999 PR PBB SHADOW E&M-EST. PATIENT-LVL IV: CPT | Mod: PBBFAC,HCNC,, | Performed by: INTERNAL MEDICINE

## 2021-12-15 PROCEDURE — 99999 PR PBB SHADOW E&M-EST. PATIENT-LVL IV: ICD-10-PCS | Mod: PBBFAC,HCNC,, | Performed by: INTERNAL MEDICINE

## 2021-12-15 PROCEDURE — 99214 OFFICE O/P EST MOD 30 MIN: CPT | Mod: HCNC,S$GLB,, | Performed by: INTERNAL MEDICINE

## 2021-12-15 PROCEDURE — 3066F PR DOCUMENTATION OF TREATMENT FOR NEPHROPATHY: ICD-10-PCS | Mod: HCNC,CPTII,S$GLB, | Performed by: INTERNAL MEDICINE

## 2021-12-15 RX ORDER — TRAMADOL HYDROCHLORIDE 50 MG/1
TABLET ORAL
COMMUNITY
Start: 2021-10-28 | End: 2022-05-25

## 2021-12-15 RX ORDER — OXYCODONE HYDROCHLORIDE 5 MG/1
TABLET ORAL
COMMUNITY
Start: 2021-11-09 | End: 2022-02-16

## 2021-12-15 RX ORDER — DICYCLOMINE HYDROCHLORIDE 10 MG/1
10 CAPSULE ORAL 4 TIMES DAILY PRN
Qty: 120 CAPSULE | Refills: 0 | Status: SHIPPED | OUTPATIENT
Start: 2021-12-15 | End: 2022-01-06

## 2021-12-15 RX ORDER — FLUOXETINE HYDROCHLORIDE 20 MG/1
20 CAPSULE ORAL DAILY
Qty: 90 CAPSULE | Refills: 3
Start: 2021-12-15 | End: 2022-02-17 | Stop reason: SDUPTHER

## 2021-12-16 ENCOUNTER — TELEPHONE (OUTPATIENT)
Dept: ADMINISTRATIVE | Facility: HOSPITAL | Age: 71
End: 2021-12-16
Payer: MEDICARE

## 2021-12-20 PROBLEM — J18.9 COMMUNITY ACQUIRED PNEUMONIA OF RIGHT LUNG: Status: RESOLVED | Noted: 2021-03-29 | Resolved: 2021-12-20

## 2021-12-20 PROBLEM — L73.9 FOLLICULITIS: Status: RESOLVED | Noted: 2020-11-16 | Resolved: 2021-12-20

## 2021-12-20 PROBLEM — R05.9 COUGH: Status: RESOLVED | Noted: 2021-04-26 | Resolved: 2021-12-20

## 2021-12-20 PROBLEM — M25.551 RIGHT HIP PAIN: Status: ACTIVE | Noted: 2021-12-20

## 2021-12-29 ENCOUNTER — HOSPITAL ENCOUNTER (OUTPATIENT)
Dept: RADIOLOGY | Facility: HOSPITAL | Age: 71
Discharge: HOME | End: 2021-12-29
Attending: INTERNAL MEDICINE
Payer: MEDICARE

## 2021-12-29 ENCOUNTER — PATIENT MESSAGE (OUTPATIENT)
Dept: INTERNAL MEDICINE | Facility: CLINIC | Age: 71
End: 2021-12-29
Payer: MEDICARE

## 2021-12-29 DIAGNOSIS — M25.551 RIGHT HIP PAIN: ICD-10-CM

## 2021-12-29 PROCEDURE — 73502 X-RAY EXAM HIP UNI 2-3 VIEWS: CPT | Mod: 26,HCNC,RT, | Performed by: RADIOLOGY

## 2021-12-29 PROCEDURE — 73502 X-RAY EXAM HIP UNI 2-3 VIEWS: CPT | Mod: TC,HCNC,RT

## 2021-12-29 PROCEDURE — 73502 XR HIP WITH PELVIS WHEN PERFORMED, 2 OR 3  VIEWS RIGHT: ICD-10-PCS | Mod: 26,HCNC,RT, | Performed by: RADIOLOGY

## 2021-12-30 ENCOUNTER — PATIENT MESSAGE (OUTPATIENT)
Dept: INTERNAL MEDICINE | Facility: CLINIC | Age: 71
End: 2021-12-30
Payer: MEDICARE

## 2022-01-05 ENCOUNTER — PATIENT OUTREACH (OUTPATIENT)
Dept: ADMINISTRATIVE | Facility: OTHER | Age: 72
End: 2022-01-05
Payer: MEDICARE

## 2022-01-05 DIAGNOSIS — E11.69 TYPE 2 DIABETES MELLITUS WITH OTHER SPECIFIED COMPLICATION, WITHOUT LONG-TERM CURRENT USE OF INSULIN: Primary | ICD-10-CM

## 2022-01-05 NOTE — PROGRESS NOTES
Care Everywhere: updated  Immunization: updated  Health Maintenance: updated  Media Review:   Legacy Review:   DIS:  Order placed: hemoglobin   Upcoming appts:  EFAX:  Task Tickets:  Referrals:

## 2022-01-06 ENCOUNTER — OFFICE VISIT (OUTPATIENT)
Dept: SPORTS MEDICINE | Facility: CLINIC | Age: 72
End: 2022-01-06
Payer: MEDICARE

## 2022-01-06 VITALS
DIASTOLIC BLOOD PRESSURE: 78 MMHG | SYSTOLIC BLOOD PRESSURE: 123 MMHG | HEART RATE: 74 BPM | WEIGHT: 159 LBS | HEIGHT: 60 IN | BODY MASS INDEX: 31.22 KG/M2

## 2022-01-06 DIAGNOSIS — M76.31 IT BAND SYNDROME, RIGHT: ICD-10-CM

## 2022-01-06 DIAGNOSIS — M70.61 GREATER TROCHANTERIC BURSITIS OF RIGHT HIP: Primary | ICD-10-CM

## 2022-01-06 PROCEDURE — 1159F PR MEDICATION LIST DOCUMENTED IN MEDICAL RECORD: ICD-10-PCS | Mod: HCNC,CPTII,S$GLB, | Performed by: STUDENT IN AN ORGANIZED HEALTH CARE EDUCATION/TRAINING PROGRAM

## 2022-01-06 PROCEDURE — 1101F PR PT FALLS ASSESS DOC 0-1 FALLS W/OUT INJ PAST YR: ICD-10-PCS | Mod: HCNC,CPTII,S$GLB, | Performed by: STUDENT IN AN ORGANIZED HEALTH CARE EDUCATION/TRAINING PROGRAM

## 2022-01-06 PROCEDURE — 1126F AMNT PAIN NOTED NONE PRSNT: CPT | Mod: HCNC,CPTII,S$GLB, | Performed by: STUDENT IN AN ORGANIZED HEALTH CARE EDUCATION/TRAINING PROGRAM

## 2022-01-06 PROCEDURE — 20610 LARGE JOINT ASPIRATION/INJECTION: R GREATER TROCHANTERIC BURSA: ICD-10-PCS | Mod: HCNC,RT,S$GLB, | Performed by: STUDENT IN AN ORGANIZED HEALTH CARE EDUCATION/TRAINING PROGRAM

## 2022-01-06 PROCEDURE — 3008F BODY MASS INDEX DOCD: CPT | Mod: HCNC,CPTII,S$GLB, | Performed by: STUDENT IN AN ORGANIZED HEALTH CARE EDUCATION/TRAINING PROGRAM

## 2022-01-06 PROCEDURE — 3008F PR BODY MASS INDEX (BMI) DOCUMENTED: ICD-10-PCS | Mod: HCNC,CPTII,S$GLB, | Performed by: STUDENT IN AN ORGANIZED HEALTH CARE EDUCATION/TRAINING PROGRAM

## 2022-01-06 PROCEDURE — 1160F RVW MEDS BY RX/DR IN RCRD: CPT | Mod: HCNC,CPTII,S$GLB, | Performed by: STUDENT IN AN ORGANIZED HEALTH CARE EDUCATION/TRAINING PROGRAM

## 2022-01-06 PROCEDURE — 3078F DIAST BP <80 MM HG: CPT | Mod: HCNC,CPTII,S$GLB, | Performed by: STUDENT IN AN ORGANIZED HEALTH CARE EDUCATION/TRAINING PROGRAM

## 2022-01-06 PROCEDURE — 1126F PR PAIN SEVERITY QUANTIFIED, NO PAIN PRESENT: ICD-10-PCS | Mod: HCNC,CPTII,S$GLB, | Performed by: STUDENT IN AN ORGANIZED HEALTH CARE EDUCATION/TRAINING PROGRAM

## 2022-01-06 PROCEDURE — 1101F PT FALLS ASSESS-DOCD LE1/YR: CPT | Mod: HCNC,CPTII,S$GLB, | Performed by: STUDENT IN AN ORGANIZED HEALTH CARE EDUCATION/TRAINING PROGRAM

## 2022-01-06 PROCEDURE — 20610 DRAIN/INJ JOINT/BURSA W/O US: CPT | Mod: HCNC,RT,S$GLB, | Performed by: STUDENT IN AN ORGANIZED HEALTH CARE EDUCATION/TRAINING PROGRAM

## 2022-01-06 PROCEDURE — 1160F PR REVIEW ALL MEDS BY PRESCRIBER/CLIN PHARMACIST DOCUMENTED: ICD-10-PCS | Mod: HCNC,CPTII,S$GLB, | Performed by: STUDENT IN AN ORGANIZED HEALTH CARE EDUCATION/TRAINING PROGRAM

## 2022-01-06 PROCEDURE — 3288F PR FALLS RISK ASSESSMENT DOCUMENTED: ICD-10-PCS | Mod: HCNC,CPTII,S$GLB, | Performed by: STUDENT IN AN ORGANIZED HEALTH CARE EDUCATION/TRAINING PROGRAM

## 2022-01-06 PROCEDURE — 3074F PR MOST RECENT SYSTOLIC BLOOD PRESSURE < 130 MM HG: ICD-10-PCS | Mod: HCNC,CPTII,S$GLB, | Performed by: STUDENT IN AN ORGANIZED HEALTH CARE EDUCATION/TRAINING PROGRAM

## 2022-01-06 PROCEDURE — 99999 PR PBB SHADOW E&M-EST. PATIENT-LVL III: CPT | Mod: PBBFAC,HCNC,, | Performed by: STUDENT IN AN ORGANIZED HEALTH CARE EDUCATION/TRAINING PROGRAM

## 2022-01-06 PROCEDURE — 3074F SYST BP LT 130 MM HG: CPT | Mod: HCNC,CPTII,S$GLB, | Performed by: STUDENT IN AN ORGANIZED HEALTH CARE EDUCATION/TRAINING PROGRAM

## 2022-01-06 PROCEDURE — 99999 PR PBB SHADOW E&M-EST. PATIENT-LVL III: ICD-10-PCS | Mod: PBBFAC,HCNC,, | Performed by: STUDENT IN AN ORGANIZED HEALTH CARE EDUCATION/TRAINING PROGRAM

## 2022-01-06 PROCEDURE — 3288F FALL RISK ASSESSMENT DOCD: CPT | Mod: HCNC,CPTII,S$GLB, | Performed by: STUDENT IN AN ORGANIZED HEALTH CARE EDUCATION/TRAINING PROGRAM

## 2022-01-06 PROCEDURE — 1159F MED LIST DOCD IN RCRD: CPT | Mod: HCNC,CPTII,S$GLB, | Performed by: STUDENT IN AN ORGANIZED HEALTH CARE EDUCATION/TRAINING PROGRAM

## 2022-01-06 PROCEDURE — 99204 PR OFFICE/OUTPT VISIT, NEW, LEVL IV, 45-59 MIN: ICD-10-PCS | Mod: 25,HCNC,S$GLB, | Performed by: STUDENT IN AN ORGANIZED HEALTH CARE EDUCATION/TRAINING PROGRAM

## 2022-01-06 PROCEDURE — 3078F PR MOST RECENT DIASTOLIC BLOOD PRESSURE < 80 MM HG: ICD-10-PCS | Mod: HCNC,CPTII,S$GLB, | Performed by: STUDENT IN AN ORGANIZED HEALTH CARE EDUCATION/TRAINING PROGRAM

## 2022-01-06 PROCEDURE — 99204 OFFICE O/P NEW MOD 45 MIN: CPT | Mod: 25,HCNC,S$GLB, | Performed by: STUDENT IN AN ORGANIZED HEALTH CARE EDUCATION/TRAINING PROGRAM

## 2022-01-06 RX ORDER — TRIAMCINOLONE ACETONIDE 40 MG/ML
80 INJECTION, SUSPENSION INTRA-ARTICULAR; INTRAMUSCULAR
Status: DISCONTINUED | OUTPATIENT
Start: 2022-01-06 | End: 2022-01-06 | Stop reason: HOSPADM

## 2022-01-06 RX ORDER — DICYCLOMINE HYDROCHLORIDE 10 MG/1
10 CAPSULE ORAL 4 TIMES DAILY PRN
Qty: 120 CAPSULE | Refills: 0 | Status: SHIPPED | OUTPATIENT
Start: 2022-01-06 | End: 2022-02-02

## 2022-01-06 RX ADMIN — TRIAMCINOLONE ACETONIDE 80 MG: 40 INJECTION, SUSPENSION INTRA-ARTICULAR; INTRAMUSCULAR at 09:01

## 2022-01-06 NOTE — PROCEDURES
Large Joint Aspiration/Injection: R greater trochanteric bursa    Date/Time: 1/6/2022 9:45 AM  Performed by: Nic Euceda MD  Authorized by: Nic Euceda MD     Consent Done?:  Yes (Verbal)  Indications:  Pain  Site marked: the procedure site was marked    Timeout: prior to procedure the correct patient, procedure, and site was verified    Prep: patient was prepped and draped in usual sterile fashion      Local anesthesia used?: Yes    Local anesthetic:  Lidocaine spray, bupivacaine 0.25% with epinephrine and lidocaine 1% without epinephrine  Anesthetic total (ml):  8      Details:  Needle Size:  21 G  Approach:  Lateral  Location:  Hip  Site:  R greater trochanteric bursa  Medications:  80 mg triamcinolone acetonide 40 mg/mL  Patient tolerance:  Patient tolerated the procedure well with no immediate complications

## 2022-01-06 NOTE — PROGRESS NOTES
Subjective:          Chief Complaint: Jayla Loyd is a 71 y.o. female who had concerns including Pain of the Right Hip.    Jayla Loyd is a 71 y.o. female   Presents for evaluation for right hip.  This started about 2 months ago.  One month prior she had right shoulder surgery by Dr. Hannah. She began to experience sharp pain on the lateral aspect of her hip that radiates down the side of her right leg.  Denies any numbness or paresthesias to the right foot.  Her pain really is only at night.  She does not know of any  Specific motions are instances that increase the pain, other than at night.  Does not recall any specific trauma or injury to the hip or lateral leg.  She states this is not present on the contralateral left.  She is unsure about increase in pain levels if she puts direct pressure on this as she is not able to lay on her right side due to her recent right shoulder surgery.  She is not able take anti-inflammatories from her recent surgery, she has been taking Tylenol with moderate relief.      Denies any back pain.    Past Medical History:   Diagnosis Date    Diabetes mellitus type I     GERD (gastroesophageal reflux disease)     Migraines     Proteinuria        Current Outpatient Medications on File Prior to Visit   Medication Sig Dispense Refill    ACCU-CHEK SAPPHIRE PLUS TEST STRP Strp Use as directed every day  3    ACCU-CHEK SOFTCLIX LANCETS Misc USE AS DIRECTED EVERY DAY  3    albuterol (PROVENTIL/VENTOLIN HFA) 90 mcg/actuation inhaler Inhale 1-2 puffs into the lungs every 6 (six) hours as needed for Wheezing. (Patient not taking: No sig reported) 18 g 0    ALBUTEROL INHL Inhale into the lungs.      ALPRAZolam (XANAX) 0.5 MG tablet Take 1 tablet (0.5 mg total) by mouth daily as needed for Anxiety. 30 tablet 0    butalbital-acetaminophen-caffeine -40 mg (FIORICET, ESGIC) -40 mg per tablet TAKE 1 TABLET 3 TIMES A DAY AS NEEDED FOR MIGRAINE 60 tablet 0    coenzyme  Q10 100 mg capsule Take 100 mg by mouth once daily.      dicyclomine (BENTYL) 10 MG capsule Take 1 capsule (10 mg total) by mouth 4 (four) times daily as needed (abdominal discomfort). 120 capsule 0    esomeprazole (NEXIUM) 40 MG capsule Take 1 capsule (40 mg total) by mouth 2 (two) times daily before meals. 180 capsule 1    FLUoxetine 20 MG capsule Take 1 capsule (20 mg total) by mouth once daily. 90 capsule 3    fluticasone (FLONASE) 50 mcg/actuation nasal spray 1 spray by Each Nare route once daily.      folic acid/multivit-min/lutein (CENTRUM SILVER ORAL) Take by mouth once daily.      Lactobac no.41/Bifidobact no.7 (PROBIOTIC-10 ORAL) Take by mouth once daily.      meclizine (ANTIVERT) 25 mg tablet Take 1 tablet (25 mg total) by mouth 2 (two) times daily as needed. 60 tablet 0    metformin (GLUCOPHAGE) 500 MG tablet Take 500 mg by mouth daily with breakfast.       oxyCODONE (ROXICODONE) 5 MG immediate release tablet       promethazine (PHENERGAN) 12.5 MG Tab TAKE 1 TABLET BY MOUTH EVERY 6 HOURS AS NEEDED. 30 tablet 1    propranoloL (INDERAL) 40 MG tablet Take 40 mg by mouth 2 (two) times daily.      pseudoephedrine (SUDAFED) 30 MG tablet Take 30 mg by mouth every 4 (four) hours as needed for Congestion.      rosuvastatin (CRESTOR) 40 MG Tab Take 1 tablet (40 mg total) by mouth once daily. 90 tablet 1    telmisartan (MICARDIS) 20 MG Tab TAKE 1 TABLET BY MOUTH EVERY DAY 90 tablet 0    traMADoL (ULTRAM) 50 mg tablet        No current facility-administered medications on file prior to visit.       Past Surgical History:   Procedure Laterality Date    CATARACT EXTRACTION      COSMETIC SURGERY  1971    rhinoplasty    EYE SURGERY  3 years ago    cataracts    SHOULDER SURGERY Left 09/2020    TUBAL LIGATION  1992       Family History   Problem Relation Age of Onset    Bladder Cancer Mother     Hypertension Mother         CABG    Cancer Mother     Heart disease Mother     Heart failure Father          CABG    Hypertension Father     Heart disease Father     No Known Problems Son     Cancer Maternal Aunt     Breast cancer Maternal Aunt     Cancer Paternal Aunt     Diabetes Paternal Aunt     Cancer Maternal Grandmother     Breast cancer Maternal Grandmother     Heart disease Maternal Grandfather     Cancer Paternal Grandmother     Dementia Paternal Grandmother     Heart disease Paternal Grandfather        Social History     Socioeconomic History    Marital status:    Tobacco Use    Smoking status: Never Smoker    Smokeless tobacco: Never Used   Substance and Sexual Activity    Alcohol use: Not Currently     Alcohol/week: 0.0 standard drinks     Comment: rarely    Drug use: Not Currently     Types: Amphetamines     Comment: per script from Dr. Vance    Sexual activity: Yes     Partners: Male     Birth control/protection: Post-menopausal     Social Determinants of Health     Financial Resource Strain: Low Risk     Difficulty of Paying Living Expenses: Not hard at all   Food Insecurity: No Food Insecurity    Worried About Running Out of Food in the Last Year: Never true    Ran Out of Food in the Last Year: Never true   Transportation Needs: No Transportation Needs    Lack of Transportation (Medical): No    Lack of Transportation (Non-Medical): No   Physical Activity: Inactive    Days of Exercise per Week: 0 days    Minutes of Exercise per Session: 0 min   Stress: Stress Concern Present    Feeling of Stress : Rather much   Social Connections: Unknown    Frequency of Communication with Friends and Family: More than three times a week    Frequency of Social Gatherings with Friends and Family: Never    Active Member of Clubs or Organizations: No    Attends Club or Organization Meetings: Never    Marital Status:        Review of Systems   Constitutional: Negative.   HENT: Negative.    Eyes: Negative.    Cardiovascular: Negative.    Respiratory: Negative.    Endocrine:  Negative.    Hematologic/Lymphatic: Negative.    Skin: Negative.    Musculoskeletal: Positive for joint pain (right hip). Negative for arthritis, falls, joint swelling, muscle weakness, myalgias and stiffness.   Neurological: Negative.    Psychiatric/Behavioral: Negative.    Allergic/Immunologic: Negative.                    Objective:        General: Jayla is well-developed, well-nourished, appears stated age, in no acute distress, alert and oriented to time, place and person.     General    Nursing note and vitals reviewed.  Constitutional: She is oriented to person, place, and time. She appears well-developed and well-nourished. No distress.   HENT:   Head: Normocephalic and atraumatic.   Nose: Nose normal.   Eyes: EOM are normal.   Cardiovascular: Regular rhythm and intact distal pulses.    Pulmonary/Chest: Effort normal. No respiratory distress.   Neurological: She is alert and oriented to person, place, and time.   Psychiatric: She has a normal mood and affect. Her behavior is normal. Judgment and thought content normal.     General Musculoskeletal Exam   Gait: normal       Right Knee Exam     Inspection   Alignment:  normal  Effusion: absent    Left Knee Exam     Inspection   Alignment:  normal  Effusion: absent    Right Hip Exam     Inspection   Scars: absent  Swelling: absent  Bruising: absent  No deformity of hip.  Quadriceps Atrophy:  Negative  Erythema: absent    Tenderness   The patient tender to palpation of the trochanteric bursa.    Range of Motion   Abduction: 45   Adduction: 30   Extension: 10   Flexion: 110   External rotation: 60   Internal rotation: 30     Tests   Pain w/ forced internal rotation (COURTNEY): absent  Pain w/ forced external rotation (FADIR): absent  Mai: positive  Trendelenburg Test: negative  Circumduction test: negative  Stinchfield test: negative  Log Roll: negative    Other   Sensation: normal  Left Hip Exam     Inspection   Scars: absent  Swelling: absent  No deformity of  hip.  Quadriceps Atrophy:  negative  Erythema: absent  Bruising: absent    Range of Motion   Abduction: 45   Adduction: 30   Extension: 10   Flexion: 110   External rotation: 60   Internal rotation: 30     Tests   Pain w/ forced internal rotation (COURTNEY): absent  Pain w/ forced external rotation (FADIR): absent  Mai: negative  Trendelenburg Test: negative  Circumduction test: negative  Stinchfield test: negative  Log Roll: negative    Other   Sensation: normal          Muscle Strength   Right Lower Extremity   Hip Abduction: 4/5   Hip Adduction: 5/5   Hip Flexion: 5/5   Ankle Dorsiflexion:  5/5   Left Lower Extremity   Hip Abduction: 5/5   Hip Adduction: 5/5   Hip Flexion: 5/5   Ankle Dorsiflexion:  5/5     Vascular Exam     Right Pulses  Dorsalis Pedis:      2+  Posterior Tibial:      2+        Left Pulses  Dorsalis Pedis:      2+  Posterior Tibial:      2+        Capillary Refill  Left Hand: normal capillary refill    Edema  Right Upper Leg: absent  Left Upper Leg: absent      Imaging:  X-rays of the right hip including AP pelvis and bilateral lateral  Obtained 12/29/2021 personally reviewed by me 01/06/2022.  There is mild to moderate arthritic changes of the bilateral hips with preserved joint spaces.  There is mild arthritic changes in the lumbar spine that is visualized.  No other bony abnormality.            Assessment:     Jayla Loyd is a 71 y.o. female   With right greater trochanteric bursitis and IT band syndrome  Encounter Diagnoses   Name Primary?    It band syndrome, right     Greater trochanteric bursitis of right hip Yes          Plan:        the diagnosis and treatment options were explained at length with the patient all her questions were answered.  I showed her x-rays and explained the findings.  We discussed beginning with non operative treatment.  This would entail a course of physical therapy to work on IT band stretching with soft tissue modalities.  She is agreeable to this.  We  also discussed corticosteroid injection into the trochanteric bursa.  The risks and benefits were discussed, after this discussion she elected to proceed.  She will return to clinic in 6-8 weeks for re-evaluation.    All of their questions were answered.  They will call the clinic with any questions or concerns in the interim.    Should the patient's symptoms worsen, persist, or fail to improve they should return for reevaluation and I would be happy to see them back anytime.        Nic Euceda M.D.     Please be aware that this note has been generated with the assistance of [facility] voice-to-text.  Please excuse any spelling or grammatical errors.    Thank you for choosing Dr. Nic Euceda for your sports medicine care. It is our goal to provide you with exceptional care that will help keep you healthy, active, and get you back in the game.     If you felt that you received exemplary care today, please consider leaving feedback for Dr. Euceda on [email or web address] at [email or web address].    Please do not hesitate to reach out to us via email, phone, or MyChart with any questions, concerns, or feedback.

## 2022-01-10 ENCOUNTER — PATIENT MESSAGE (OUTPATIENT)
Dept: INTERNAL MEDICINE | Facility: CLINIC | Age: 72
End: 2022-01-10
Payer: MEDICARE

## 2022-01-10 ENCOUNTER — PATIENT MESSAGE (OUTPATIENT)
Dept: ADMINISTRATIVE | Facility: HOSPITAL | Age: 72
End: 2022-01-10
Payer: MEDICARE

## 2022-01-11 DIAGNOSIS — F41.9 ANXIETY: ICD-10-CM

## 2022-01-11 NOTE — TELEPHONE ENCOUNTER
Care Due:                  Date            Visit Type   Department     Provider  --------------------------------------------------------------------------------                                MYCHART                              FOLLOWUP/OF  [facility] PRIMARY  Last Visit: 12-      FICE VISIT   CARE           Geena Barnard  Next Visit: None Scheduled  None         None Found                                                            Last  Test          Frequency    Reason                     Performed    Due Date  --------------------------------------------------------------------------------    Lipid Panel.  12 months..  rosuvastatin.............  Not Found    Overdue    Powered by [employer]. Reference number: [id number].   1/11/2022 5:27:21 PM CST

## 2022-01-14 RX ORDER — BUTALBITAL, ACETAMINOPHEN AND CAFFEINE 50; 325; 40 MG/1; MG/1; MG/1
TABLET ORAL
Qty: 60 TABLET | Refills: 0 | Status: SHIPPED | OUTPATIENT
Start: 2022-01-14 | End: 2022-02-14 | Stop reason: SDUPTHER

## 2022-01-14 RX ORDER — ALPRAZOLAM 0.5 MG/1
0.5 TABLET ORAL DAILY PRN
Qty: 30 TABLET | Refills: 0 | Status: SHIPPED | OUTPATIENT
Start: 2022-01-14 | End: 2022-02-16 | Stop reason: SDUPTHER

## 2022-01-20 DIAGNOSIS — E11.9 TYPE 2 DIABETES MELLITUS WITHOUT COMPLICATION: ICD-10-CM

## 2022-02-02 RX ORDER — DICYCLOMINE HYDROCHLORIDE 10 MG/1
10 CAPSULE ORAL 4 TIMES DAILY PRN
Qty: 120 CAPSULE | Refills: 0 | Status: SHIPPED | OUTPATIENT
Start: 2022-02-02 | End: 2022-03-02

## 2022-02-06 ENCOUNTER — PATIENT MESSAGE (OUTPATIENT)
Dept: INTERNAL MEDICINE | Facility: CLINIC | Age: 72
End: 2022-02-06
Payer: MEDICARE

## 2022-02-09 RX ORDER — TELMISARTAN 20 MG/1
20 TABLET ORAL DAILY
Qty: 90 TABLET | Refills: 0 | Status: SHIPPED | OUTPATIENT
Start: 2022-02-09 | End: 2022-05-25 | Stop reason: SDUPTHER

## 2022-02-14 ENCOUNTER — PATIENT MESSAGE (OUTPATIENT)
Dept: INTERNAL MEDICINE | Facility: CLINIC | Age: 72
End: 2022-02-14
Payer: MEDICARE

## 2022-02-14 RX ORDER — BUTALBITAL, ACETAMINOPHEN AND CAFFEINE 50; 325; 40 MG/1; MG/1; MG/1
TABLET ORAL
Qty: 60 TABLET | Refills: 0 | Status: SHIPPED | OUTPATIENT
Start: 2022-02-14 | End: 2022-03-07 | Stop reason: SDUPTHER

## 2022-02-14 NOTE — TELEPHONE ENCOUNTER
Care Due:                  Date            Visit Type   Department     Provider  --------------------------------------------------------------------------------                                MYCHART                              FOLLOWUP/OF  [facility] PRIMARY  Last Visit: 12-      FICE VISIT   CARE           Geena Barnard  Next Visit: None Scheduled  None         None Found                                                            Last  Test          Frequency    Reason                     Performed    Due Date  --------------------------------------------------------------------------------    CMP.........  12 months..  rosuvastatin, telmisartan  05- 05-    Lipid Panel.  12 months..  rosuvastatin.............  11- 11-    Powered by [employer]. Reference number: [id number].   2/14/2022 10:33:32 AM CST

## 2022-02-15 ENCOUNTER — PATIENT OUTREACH (OUTPATIENT)
Dept: ADMINISTRATIVE | Facility: OTHER | Age: 72
End: 2022-02-15
Payer: MEDICARE

## 2022-02-15 NOTE — LETTER
AUTHORIZATION FOR RELEASE OF   CONFIDENTIAL INFORMATION    Dear Lorenzo Schaeffer MD,    We are seeing Jayla Loyd, date of birth 1950, in the clinic at [facility]. Geena Barnard MD is the patient's PCP. Jayla Loyd has an outstanding lab/procedure at the time we reviewed her chart. In order to help keep her health information updated, she has authorized us to request the following medical record(s):        (  )  MAMMOGRAM                                      ( x )  COLONOSCOPY after 2017      (  )  PAP SMEAR                                          (  )  OUTSIDE LAB RESULTS     (  )  DEXA SCAN                                          (  )  EYE EXAM            (  )  FOOT EXAM                                          (  )  ENTIRE RECORD     (  )  OUTSIDE IMMUNIZATIONS                 (  )  _______________         Please fax records to [facility], Abby E. Gandolfi, MD, [phone]     If you have any questions, please contact Asia Jones at [phone].           Patient Name: Jayla Loyd  : 1950  Patient Phone #: [phone]

## 2022-02-15 NOTE — PROGRESS NOTES
Care Everywhere: updated  Immunization: updated  Health Maintenance: updated  Media Review: review for outside colon cancer report   Legacy Review:   DIS:  Order placed:   Upcoming appts:  EFAX:  Task Tickets:sent to Dr. Schaeffer office to possibly obtain updated colon cancer report   Referrals:

## 2022-02-16 ENCOUNTER — OFFICE VISIT (OUTPATIENT)
Dept: INTERNAL MEDICINE | Facility: CLINIC | Age: 72
End: 2022-02-16
Payer: MEDICARE

## 2022-02-16 VITALS
DIASTOLIC BLOOD PRESSURE: 62 MMHG | HEART RATE: 74 BPM | BODY MASS INDEX: 30.86 KG/M2 | OXYGEN SATURATION: 97 % | WEIGHT: 157.19 LBS | HEIGHT: 60 IN | TEMPERATURE: 99 F | SYSTOLIC BLOOD PRESSURE: 116 MMHG

## 2022-02-16 DIAGNOSIS — M25.551 RIGHT HIP PAIN: ICD-10-CM

## 2022-02-16 DIAGNOSIS — K22.719 BARRETT'S ESOPHAGUS WITH DYSPLASIA: ICD-10-CM

## 2022-02-16 DIAGNOSIS — R25.1 TREMOR: ICD-10-CM

## 2022-02-16 DIAGNOSIS — G44.89 CHRONIC MIXED HEADACHE SYNDROME: ICD-10-CM

## 2022-02-16 DIAGNOSIS — F41.9 ANXIETY: ICD-10-CM

## 2022-02-16 PROBLEM — K22.70 BARRETT'S ESOPHAGUS: Status: ACTIVE | Noted: 2022-02-16

## 2022-02-16 PROCEDURE — 1101F PT FALLS ASSESS-DOCD LE1/YR: CPT | Mod: HCNC,CPTII,S$GLB, | Performed by: INTERNAL MEDICINE

## 2022-02-16 PROCEDURE — 3008F BODY MASS INDEX DOCD: CPT | Mod: HCNC,CPTII,S$GLB, | Performed by: INTERNAL MEDICINE

## 2022-02-16 PROCEDURE — 1159F MED LIST DOCD IN RCRD: CPT | Mod: HCNC,CPTII,S$GLB, | Performed by: INTERNAL MEDICINE

## 2022-02-16 PROCEDURE — 3288F PR FALLS RISK ASSESSMENT DOCUMENTED: ICD-10-PCS | Mod: HCNC,CPTII,S$GLB, | Performed by: INTERNAL MEDICINE

## 2022-02-16 PROCEDURE — 1126F PR PAIN SEVERITY QUANTIFIED, NO PAIN PRESENT: ICD-10-PCS | Mod: HCNC,CPTII,S$GLB, | Performed by: INTERNAL MEDICINE

## 2022-02-16 PROCEDURE — 1159F PR MEDICATION LIST DOCUMENTED IN MEDICAL RECORD: ICD-10-PCS | Mod: HCNC,CPTII,S$GLB, | Performed by: INTERNAL MEDICINE

## 2022-02-16 PROCEDURE — 3078F DIAST BP <80 MM HG: CPT | Mod: HCNC,CPTII,S$GLB, | Performed by: INTERNAL MEDICINE

## 2022-02-16 PROCEDURE — 1126F AMNT PAIN NOTED NONE PRSNT: CPT | Mod: HCNC,CPTII,S$GLB, | Performed by: INTERNAL MEDICINE

## 2022-02-16 PROCEDURE — 1101F PR PT FALLS ASSESS DOC 0-1 FALLS W/OUT INJ PAST YR: ICD-10-PCS | Mod: HCNC,CPTII,S$GLB, | Performed by: INTERNAL MEDICINE

## 2022-02-16 PROCEDURE — 4010F ACE/ARB THERAPY RXD/TAKEN: CPT | Mod: HCNC,CPTII,S$GLB, | Performed by: INTERNAL MEDICINE

## 2022-02-16 PROCEDURE — 3074F SYST BP LT 130 MM HG: CPT | Mod: HCNC,CPTII,S$GLB, | Performed by: INTERNAL MEDICINE

## 2022-02-16 PROCEDURE — 3078F PR MOST RECENT DIASTOLIC BLOOD PRESSURE < 80 MM HG: ICD-10-PCS | Mod: HCNC,CPTII,S$GLB, | Performed by: INTERNAL MEDICINE

## 2022-02-16 PROCEDURE — 4010F PR ACE/ARB THEARPY RXD/TAKEN: ICD-10-PCS | Mod: HCNC,CPTII,S$GLB, | Performed by: INTERNAL MEDICINE

## 2022-02-16 PROCEDURE — 3008F PR BODY MASS INDEX (BMI) DOCUMENTED: ICD-10-PCS | Mod: HCNC,CPTII,S$GLB, | Performed by: INTERNAL MEDICINE

## 2022-02-16 PROCEDURE — 1160F RVW MEDS BY RX/DR IN RCRD: CPT | Mod: HCNC,CPTII,S$GLB, | Performed by: INTERNAL MEDICINE

## 2022-02-16 PROCEDURE — 99214 OFFICE O/P EST MOD 30 MIN: CPT | Mod: HCNC,S$GLB,, | Performed by: INTERNAL MEDICINE

## 2022-02-16 PROCEDURE — 99999 PR PBB SHADOW E&M-EST. PATIENT-LVL IV: ICD-10-PCS | Mod: PBBFAC,HCNC,, | Performed by: INTERNAL MEDICINE

## 2022-02-16 PROCEDURE — 99999 PR PBB SHADOW E&M-EST. PATIENT-LVL IV: CPT | Mod: PBBFAC,HCNC,, | Performed by: INTERNAL MEDICINE

## 2022-02-16 PROCEDURE — 99214 PR OFFICE/OUTPT VISIT, EST, LEVL IV, 30-39 MIN: ICD-10-PCS | Mod: HCNC,S$GLB,, | Performed by: INTERNAL MEDICINE

## 2022-02-16 PROCEDURE — 3074F PR MOST RECENT SYSTOLIC BLOOD PRESSURE < 130 MM HG: ICD-10-PCS | Mod: HCNC,CPTII,S$GLB, | Performed by: INTERNAL MEDICINE

## 2022-02-16 PROCEDURE — 3288F FALL RISK ASSESSMENT DOCD: CPT | Mod: HCNC,CPTII,S$GLB, | Performed by: INTERNAL MEDICINE

## 2022-02-16 PROCEDURE — 1160F PR REVIEW ALL MEDS BY PRESCRIBER/CLIN PHARMACIST DOCUMENTED: ICD-10-PCS | Mod: HCNC,CPTII,S$GLB, | Performed by: INTERNAL MEDICINE

## 2022-02-16 RX ORDER — ALPRAZOLAM 0.5 MG/1
0.5 TABLET ORAL 2 TIMES DAILY PRN
Qty: 60 TABLET | Refills: 0 | Status: SHIPPED | OUTPATIENT
Start: 2022-02-16 | End: 2022-04-28 | Stop reason: SDUPTHER

## 2022-02-16 NOTE — LETTER
AUTHORIZATION FOR RELEASE OF   CONFIDENTIAL INFORMATION    Dear Dr. Escobar,    We are seeing Jayla Loyd, date of birth 1950, in the clinic at [facility]. Geena Barnard MD is the patient's PCP. Jayla Loyd has an outstanding lab/procedure at the time we reviewed her chart. In order to help keep her health information updated, she has authorized us to request the following medical record(s):        (  )  MAMMOGRAM                                      (X  )  COLONOSCOPY      (  )  PAP SMEAR                                          (  )  OUTSIDE LAB RESULTS     (  )  DEXA SCAN                                          (  )  EYE EXAM            (  )  FOOT EXAM                                          (  )  ENTIRE RECORD     (  )  OUTSIDE IMMUNIZATIONS                 ( X ) EGD       Please fax records to [facility], Abby E. Gandolfi, MD, [phone]     If you have any questions, please contact Gin at [phone].           Patient Name: Jayla Loyd  : 1950  Patient Phone #: [phone]

## 2022-02-16 NOTE — PROGRESS NOTES
[facility] Note    Chief Complaint      Chief Complaint   Patient presents with    Anxiety    Migraine     History of Present Illness      Jayla Loyd is a 71 y.o. female who presents today for chief complaint migraine and anxiety.     HPI   Roomed 1152  Is waiting on appt to see Dr Charly Do neuro regarding leg spasms/tremors, is not RLS per pt, he calls it involuntary movements     Is out of xanax, does use 1 mg at night for sleep, is trying to transition to unisom which is helping but is very sedating redc she reduce to half dose,   Has been helping with busy moms  on [place]  Stress over family situation between son and his ex wife, child support, issues with their shared children, marital stress   Her mother in law passed away in Nov   discused possibly increasing prozac \    Did see ortho [facility] and got hip injxn which helped, has follow up    Seeing Nikita mcgrath    Had cscope and egd with Luz Quinones  Need her a1c from [place]   Active Problem List with Overview Notes    Diagnosis Date Noted    Cox's esophagus 02/16/2022    Right hip pain 12/20/2021    Chronic cough 04/13/2021    Tremor 02/15/2021    Sleep disturbance 02/15/2021    Venous insufficiency 02/15/2021    Visual hallucination 01/19/2021    Dizziness 01/19/2021    Subclinical hyperthyroidism 12/17/2020    Heat intolerance 11/16/2020    Hyperlipidemia 07/22/2020     On crestor       Arthritis of left shoulder region 07/16/2020     seeing [place] [facility] pending shoulder MRI       Anxiety 07/16/2020    Type 2 diabetes mellitus, without long-term current use of insulin 07/16/2020     Sees dr butt had recent a1c, he also does foot exam      Allergic rhinitis due to pollen 07/08/2020    Encounter for screening mammogram for malignant neoplasm of breast 07/08/2020    Hematuria, unspecified 07/08/2020    Chronic idiopathic constipation 09/24/2019    Iron deficiency anemia,  unspecified 06/30/2019    Hyperphosphatemia 08/19/2018    Hypertension, essential 08/19/2018     At goal on lasix and micardis, no chest pain or shortness of breath       Proteinuria, unspecified 08/19/2018     Seeing dr hanson       Body mass index (bmi) 26.0-26.9, adult 11/03/2017     IMO Regulatory Update 10/1/2020      Gastroesophageal reflux disease 07/16/2014    Adenomatous polyp of colon 07/16/2014    Chronic mixed headache syndrome 07/16/2014     Has chronic migraines, uses fioricet   Tried amovig, has not tried triptan - not interested  Sees Charly Do- Neuro      Pure hypercholesterolemia 08/01/2012    Kidney stone 04/14/2011       Health Maintenance   Topic Date Due    TETANUS VACCINE  02/01/2008    Foot Exam  09/03/2021    Lipid Panel  11/12/2021    Hemoglobin A1c  03/07/2022    Eye Exam  07/22/2022    Mammogram  08/10/2022    Low Dose Statin  02/16/2023    DEXA SCAN  08/11/2023    Hepatitis C Screening  Completed       Past Medical History:   Diagnosis Date    Diabetes mellitus type I     GERD (gastroesophageal reflux disease)     Migraines     Proteinuria        Past Surgical History:   Procedure Laterality Date    CATARACT EXTRACTION      COSMETIC SURGERY  1971    rhinoplasty    EYE SURGERY  3 years ago    cataracts    SHOULDER SURGERY Left 09/2020    TUBAL LIGATION  1992       family history includes Bladder Cancer in her mother; Breast cancer in her maternal aunt and maternal grandmother; Cancer in her maternal aunt, maternal grandmother, mother, paternal aunt, and paternal grandmother; Dementia in her paternal grandmother; Diabetes in her paternal aunt; Heart disease in her father, maternal grandfather, mother, and paternal grandfather; Heart failure in her father; Hypertension in her father and mother; No Known Problems in her son.    Social History     Tobacco Use    Smoking status: Never Smoker    Smokeless tobacco: Never Used   Substance Use Topics    Alcohol  use: Not Currently     Alcohol/week: 0.0 standard drinks     Comment: rarely    Drug use: Not Currently     Types: Amphetamines     Comment: per script from Dr. Vance       Review of Systems   Constitutional: Negative for chills, fever, malaise/fatigue and weight loss.   Respiratory: Negative for cough, sputum production, shortness of breath and wheezing.    Cardiovascular: Negative for chest pain, palpitations, orthopnea and leg swelling.   Gastrointestinal: Negative for constipation, diarrhea, nausea and vomiting.   Genitourinary: Negative for dysuria, frequency, hematuria and urgency.   Musculoskeletal: Positive for joint pain.   Psychiatric/Behavioral: The patient is nervous/anxious and has insomnia.         Outpatient Encounter Medications as of 2/16/2022   Medication Sig Note Dispense Refill    ACCU-CHEK SAPPHIRE PLUS TEST STRP Strp Use as directed every day   3    ACCU-CHEK SOFTCLIX LANCETS Misc USE AS DIRECTED EVERY DAY   3    butalbital-acetaminophen-caffeine -40 mg (FIORICET, ESGIC) -40 mg per tablet TAKE 1 TABLET 3 TIMES A DAY AS NEEDED FOR MIGRAINE  60 tablet 0    coenzyme Q10 100 mg capsule Take 100 mg by mouth once daily. 3/11/2021: As needed      dicyclomine (BENTYL) 10 MG capsule TAKE 1 CAPSULE (10 MG TOTAL) BY MOUTH 4 (FOUR) TIMES DAILY AS NEEDED (ABDOMINAL DISCOMFORT).  120 capsule 0    esomeprazole (NEXIUM) 40 MG capsule Take 1 capsule (40 mg total) by mouth 2 (two) times daily before meals.  180 capsule 1    FLUoxetine 20 MG capsule Take 1 capsule (20 mg total) by mouth once daily.  90 capsule 3    fluticasone (FLONASE) 50 mcg/actuation nasal spray 1 spray by Each Nare route once daily.       folic acid/multivit-min/lutein (CENTRUM SILVER ORAL) Take by mouth once daily.       Lactobac no.41/Bifidobact no.7 (PROBIOTIC-10 ORAL) Take by mouth once daily.       meclizine (ANTIVERT) 25 mg tablet Take 1 tablet (25 mg total) by mouth 2 (two) times daily as needed.  60 tablet 0     metformin (GLUCOPHAGE) 500 MG tablet Take 500 mg by mouth daily with breakfast.        promethazine (PHENERGAN) 12.5 MG Tab TAKE 1 TABLET BY MOUTH EVERY 6 HOURS AS NEEDED.  30 tablet 1    propranoloL (INDERAL) 40 MG tablet Take 40 mg by mouth 2 (two) times daily.       pseudoephedrine (SUDAFED) 30 MG tablet Take 30 mg by mouth every 4 (four) hours as needed for Congestion. 3/11/2021: As needed      rosuvastatin (CRESTOR) 40 MG Tab Take 1 tablet (40 mg total) by mouth once daily.  90 tablet 1    telmisartan (MICARDIS) 20 MG Tab Take 1 tablet (20 mg total) by mouth once daily.  90 tablet 0    [DISCONTINUED] ALPRAZolam (XANAX) 0.5 MG tablet Take 1 tablet (0.5 mg total) by mouth daily as needed for Anxiety.  30 tablet 0    albuterol (PROVENTIL/VENTOLIN HFA) 90 mcg/actuation inhaler Inhale 1-2 puffs into the lungs every 6 (six) hours as needed for Wheezing. (Patient not taking: No sig reported)  18 g 0    ALBUTEROL INHL Inhale into the lungs.       ALPRAZolam (XANAX) 0.5 MG tablet Take 1 tablet (0.5 mg total) by mouth 2 (two) times daily as needed for Anxiety.  60 tablet 0    traMADoL (ULTRAM) 50 mg tablet        [DISCONTINUED] oxyCODONE (ROXICODONE) 5 MG immediate release tablet         No facility-administered encounter medications on file as of 2/16/2022.        Review of patient's allergies indicates:   Allergen Reactions    Gabapentin Hallucinations    Lyrica [pregabalin] Hallucinations    Mobic [meloxicam] Itching    Codeine Nausea Only     Pt gets nausea and itching from it.           Physical Exam      Vital Signs  Temp: 98.5 °F (36.9 °C)  Temp src: Oral  Pulse: 74  SpO2: 97 %  BP: 116/62  BP Location: Left arm  Patient Position: Sitting  Pain Score: 0-No pain  Height and Weight  Height: 5' (152.4 cm)  Weight: 71.3 kg (157 lb 3 oz)  BSA (Calculated - sq m): 1.74 sq meters  BMI (Calculated): 30.7  Weight in (lb) to have BMI = 25: 127.7]    Physical Exam  Vitals reviewed.   Constitutional:        General: She is not in acute distress.     Appearance: She is well-developed and well-nourished. She is not diaphoretic.   HENT:      Head: Normocephalic and atraumatic.      Right Ear: External ear normal.      Left Ear: External ear normal.      Nose: Nose normal.   Eyes:      General:         Right eye: No discharge.         Left eye: No discharge.      Extraocular Movements: EOM normal.      Conjunctiva/sclera: Conjunctivae normal.   Pulmonary:      Effort: Pulmonary effort is normal. No respiratory distress.   Musculoskeletal:         General: Normal range of motion.      Cervical back: Normal range of motion.   Skin:     Coloration: Skin is not pale.      Findings: No rash.   Neurological:      Mental Status: She is alert and oriented to person, place, and time.   Psychiatric:         Mood and Affect: Mood and affect normal.         Behavior: Behavior normal.         Thought Content: Thought content normal.         Judgment: Judgment normal.          Laboratory:  CBC:  No results for input(s): WBC, RBC, HGB, HCT, PLT, MCV, MCH, MCHC in the last 2160 hours.  CMP:  No results for input(s): GLU, CALCIUM, ALBUMIN, PROT, NA, K, CO2, CL, BUN, ALKPHOS, ALT, AST, BILITOT in the last 2160 hours.    Invalid input(s): CREATININ  URINALYSIS:  No results for input(s): COLORU, CLARITYU, SPECGRAV, PHUR, PROTEINUA, GLUCOSEU, BILIRUBINCON, BLOODU, WBCU, RBCU, BACTERIA, MUCUS, NITRITE, LEUKOCYTESUR, UROBILINOGEN, HYALINECASTS in the last 2160 hours.   LIPIDS:  No results for input(s): TSH, HDL, CHOL, TRIG, LDLCALC, CHOLHDL, NONHDLCHOL, TOTALCHOLEST in the last 2160 hours.  TSH:  No results for input(s): TSH in the last 2160 hours.  A1C:      Assessment/Plan     Jayla Loyd is a 71 y.o.female with:    Tremor  Per neuro     Chronic mixed headache syndrome  Per neuro     Anxiety  Will look into counselor, otherwise consider increasing prozac    Right hip pain  Improved     Cox's esophagus  Ct ppi  Will get gi records        No orders of the defined types were placed in this encounter.      Use of the [employer] Patient Portal discussed and encouraged during today's visit  -Continue current medications and maintain follow up with specialists.  Return to clinic in prn months.  Future Appointments   Date Time Provider Department Center   2/17/2022  1:15 PM Nic Euceda MD [facility]       Geena Barnard MD  2/16/2022 11:51 AM    Primary Care Internal Medicine

## 2022-02-16 NOTE — LETTER
AUTHORIZATION FOR RELEASE OF   CONFIDENTIAL INFORMATION    Dear ,    We are seeing Jayla Loyd, date of birth 1950, in the clinic at [facility]. Geena Barnard MD is the patient's PCP. Jayla Loyd has an outstanding lab/procedure at the time we reviewed her chart. In order to help keep her health information updated, she has authorized us to request the following medical record(s):        (  )  MAMMOGRAM                                      (  )  COLONOSCOPY      (  )  PAP SMEAR                                          (X  )  A1C     (  )  DEXA SCAN                                          (  )  EYE EXAM            (  )  FOOT EXAM                                          (  )  ENTIRE RECORD     (  )  OUTSIDE IMMUNIZATIONS                 (  )  _______________         Please fax records to [facility], Abby E. Gandolfi, MD,[phone]     If you have any questions, please contact Gin at [phone].           Patient Name: Jayla Loyd  : 1950  Patient Phone #: [phone]

## 2022-02-17 ENCOUNTER — PATIENT MESSAGE (OUTPATIENT)
Dept: INTERNAL MEDICINE | Facility: CLINIC | Age: 72
End: 2022-02-17
Payer: MEDICARE

## 2022-02-17 DIAGNOSIS — F41.9 ANXIETY: ICD-10-CM

## 2022-02-17 NOTE — TELEPHONE ENCOUNTER
No new care gaps identified.  Powered by [employer]. Reference number: [id number].   2/17/2022 2:58:37 PM CST

## 2022-02-18 ENCOUNTER — PATIENT OUTREACH (OUTPATIENT)
Dept: ADMINISTRATIVE | Facility: OTHER | Age: 72
End: 2022-02-18
Payer: MEDICARE

## 2022-02-18 RX ORDER — FLUOXETINE 10 MG/1
CAPSULE ORAL
Qty: 90 CAPSULE | Refills: 2 | OUTPATIENT
Start: 2022-02-18

## 2022-02-18 RX ORDER — FLUOXETINE HYDROCHLORIDE 20 MG/1
20 CAPSULE ORAL DAILY
Qty: 90 CAPSULE | Refills: 3 | Status: SHIPPED | OUTPATIENT
Start: 2022-02-18 | End: 2022-12-30

## 2022-02-18 RX ORDER — FLUOXETINE HYDROCHLORIDE 20 MG/1
20 CAPSULE ORAL DAILY
Qty: 90 CAPSULE | Refills: 3 | Status: SHIPPED | OUTPATIENT
Start: 2022-02-18 | End: 2022-02-18 | Stop reason: SDUPTHER

## 2022-02-18 NOTE — TELEPHONE ENCOUNTER
No new care gaps identified.  Powered by [employer] by [employer]. Reference number: [id number].   2/18/2022 8:19:18 AM CST

## 2022-02-19 NOTE — TELEPHONE ENCOUNTER
Quick DC. Inappropriate Request    Refill Authorization Note   Jayla Loyd  is requesting a refill authorization.  Brief Assessment and Rationale for Refill:  Quick Discontinue  Medication Therapy Plan:       Medication Reconciliation Completed:  No      Comments:   Pended Medication(s)       Requested Prescriptions     Refused Prescriptions Disp Refills    FLUoxetine 10 MG capsule [Pharmacy Med Name: FLUOXETINE HCL 10 MG CAPSULE] 90 capsule 2     Sig: TAKE 1 CAPSULE BY MOUTH EVERY DAY     Refused By: RAI COBOS     Reason for Refusal: Refill not appropriate        Duplicate Pended Encounter(s)/ Last Prescribed Details: (includes pharmacy & prescriber details)   Ordering Encounter Report    Associated Reports   View Encounter          Note composed:8:05 PM 02/18/2022

## 2022-02-22 ENCOUNTER — TELEPHONE (OUTPATIENT)
Dept: INTERNAL MEDICINE | Facility: CLINIC | Age: 72
End: 2022-02-22
Payer: MEDICARE

## 2022-02-25 ENCOUNTER — PATIENT OUTREACH (OUTPATIENT)
Dept: ADMINISTRATIVE | Facility: HOSPITAL | Age: 72
End: 2022-02-25
Payer: MEDICARE

## 2022-02-25 NOTE — PROGRESS NOTES
Health Maintenance Due   Topic Date Due    Shingles Vaccine (1 of 2) Never done    TETANUS VACCINE  02/01/2008    Pneumococcal Vaccines (Age 65+) (1 of 1 - PPSV23) Never done    Foot Exam  09/03/2021    Diabetes Urine Screening  11/12/2021    Lipid Panel  11/12/2021     Triggered LINKS.  Updated Care Everywhere.  Checked for outside lab results in [employer].  Imported outside lab results into .  Chart review completed.

## 2022-03-02 DIAGNOSIS — E11.69 TYPE 2 DIABETES MELLITUS WITH OTHER SPECIFIED COMPLICATION, WITHOUT LONG-TERM CURRENT USE OF INSULIN: Primary | ICD-10-CM

## 2022-03-02 DIAGNOSIS — R53.83 FATIGUE, UNSPECIFIED TYPE: ICD-10-CM

## 2022-03-02 RX ORDER — DICYCLOMINE HYDROCHLORIDE 10 MG/1
10 CAPSULE ORAL 4 TIMES DAILY PRN
Qty: 120 CAPSULE | Refills: 0 | Status: SHIPPED | OUTPATIENT
Start: 2022-03-02 | End: 2022-03-29

## 2022-03-07 ENCOUNTER — LAB VISIT (OUTPATIENT)
Dept: LAB | Facility: HOSPITAL | Age: 72
End: 2022-03-07
Attending: INTERNAL MEDICINE
Payer: MEDICARE

## 2022-03-07 ENCOUNTER — TELEPHONE (OUTPATIENT)
Dept: FAMILY MEDICINE | Facility: CLINIC | Age: 72
End: 2022-03-07
Payer: MEDICARE

## 2022-03-07 ENCOUNTER — PATIENT MESSAGE (OUTPATIENT)
Dept: INTERNAL MEDICINE | Facility: CLINIC | Age: 72
End: 2022-03-07
Payer: MEDICARE

## 2022-03-07 DIAGNOSIS — R53.83 FATIGUE, UNSPECIFIED TYPE: ICD-10-CM

## 2022-03-07 DIAGNOSIS — E11.69 TYPE 2 DIABETES MELLITUS WITH OTHER SPECIFIED COMPLICATION, WITHOUT LONG-TERM CURRENT USE OF INSULIN: ICD-10-CM

## 2022-03-07 LAB
ALBUMIN SERPL BCP-MCNC: 3.2 G/DL (ref 3.5–5.2)
ALP SERPL-CCNC: 154 U/L (ref 55–135)
ALT SERPL W/O P-5'-P-CCNC: 15 U/L (ref 10–44)
ANION GAP SERPL CALC-SCNC: 12 MMOL/L (ref 8–16)
AST SERPL-CCNC: 16 U/L (ref 10–40)
BILIRUB SERPL-MCNC: 0.2 MG/DL (ref 0.1–1)
BUN SERPL-MCNC: 15 MG/DL (ref 8–23)
CALCIUM SERPL-MCNC: 10.3 MG/DL (ref 8.7–10.5)
CHLORIDE SERPL-SCNC: 103 MMOL/L (ref 95–110)
CHOLEST SERPL-MCNC: 175 MG/DL (ref 120–199)
CHOLEST/HDLC SERPL: 2.3 {RATIO} (ref 2–5)
CO2 SERPL-SCNC: 25 MMOL/L (ref 23–29)
CREAT SERPL-MCNC: 0.7 MG/DL (ref 0.5–1.4)
EST. GFR  (AFRICAN AMERICAN): >60 ML/MIN/1.73 M^2
EST. GFR  (NON AFRICAN AMERICAN): >60 ML/MIN/1.73 M^2
GLUCOSE SERPL-MCNC: 129 MG/DL (ref 70–110)
HDLC SERPL-MCNC: 77 MG/DL (ref 40–75)
HDLC SERPL: 44 % (ref 20–50)
LDLC SERPL CALC-MCNC: 79.2 MG/DL (ref 63–159)
NONHDLC SERPL-MCNC: 98 MG/DL
POTASSIUM SERPL-SCNC: 4.7 MMOL/L (ref 3.5–5.1)
PROT SERPL-MCNC: 7.3 G/DL (ref 6–8.4)
SODIUM SERPL-SCNC: 140 MMOL/L (ref 136–145)
T4 FREE SERPL-MCNC: 0.81 NG/DL (ref 0.71–1.51)
TRIGL SERPL-MCNC: 94 MG/DL (ref 30–150)
TSH SERPL DL<=0.005 MIU/L-ACNC: 1.87 UIU/ML (ref 0.4–4)

## 2022-03-07 PROCEDURE — 36415 COLL VENOUS BLD VENIPUNCTURE: CPT | Mod: HCNC | Performed by: INTERNAL MEDICINE

## 2022-03-07 PROCEDURE — 84443 ASSAY THYROID STIM HORMONE: CPT | Mod: HCNC | Performed by: INTERNAL MEDICINE

## 2022-03-07 PROCEDURE — 80053 COMPREHEN METABOLIC PANEL: CPT | Mod: HCNC | Performed by: INTERNAL MEDICINE

## 2022-03-07 PROCEDURE — 80061 LIPID PANEL: CPT | Mod: HCNC | Performed by: INTERNAL MEDICINE

## 2022-03-07 PROCEDURE — 84439 ASSAY OF FREE THYROXINE: CPT | Mod: HCNC | Performed by: INTERNAL MEDICINE

## 2022-03-07 RX ORDER — BUTALBITAL, ACETAMINOPHEN AND CAFFEINE 50; 325; 40 MG/1; MG/1; MG/1
TABLET ORAL
Qty: 60 TABLET | Refills: 0 | Status: SHIPPED | OUTPATIENT
Start: 2022-03-07 | End: 2022-03-31 | Stop reason: SDUPTHER

## 2022-03-07 RX ORDER — BUTALBITAL, ACETAMINOPHEN AND CAFFEINE 50; 325; 40 MG/1; MG/1; MG/1
TABLET ORAL
Qty: 60 TABLET | Refills: 0 | OUTPATIENT
Start: 2022-03-07

## 2022-03-07 NOTE — TELEPHONE ENCOUNTER
No new care gaps identified.  Powered by [employer] by [employer]. Reference number: [id number].   3/07/2022 4:23:37 AM CST

## 2022-03-09 ENCOUNTER — PATIENT MESSAGE (OUTPATIENT)
Dept: INTERNAL MEDICINE | Facility: CLINIC | Age: 72
End: 2022-03-09
Payer: MEDICARE

## 2022-03-09 RX ORDER — PROMETHAZINE HYDROCHLORIDE 12.5 MG/1
12.5 TABLET ORAL EVERY 6 HOURS PRN
Qty: 30 TABLET | Refills: 1 | Status: SHIPPED | OUTPATIENT
Start: 2022-03-09 | End: 2023-08-09

## 2022-03-10 ENCOUNTER — PATIENT MESSAGE (OUTPATIENT)
Dept: INTERNAL MEDICINE | Facility: CLINIC | Age: 72
End: 2022-03-10
Payer: MEDICARE

## 2022-03-10 DIAGNOSIS — R05.9 COUGH: Primary | ICD-10-CM

## 2022-03-10 RX ORDER — DEXTROMETHORPHAN HBR AND GUAIFENESIN 5; 100 MG/5ML; MG/5ML
10 LIQUID ORAL NIGHTLY
Qty: 118 ML | Refills: 0 | Status: SHIPPED | OUTPATIENT
Start: 2022-03-10 | End: 2022-03-20

## 2022-03-11 ENCOUNTER — PATIENT MESSAGE (OUTPATIENT)
Dept: INTERNAL MEDICINE | Facility: CLINIC | Age: 72
End: 2022-03-11
Payer: MEDICARE

## 2022-03-11 RX ORDER — BENZONATATE 100 MG/1
100 CAPSULE ORAL 3 TIMES DAILY PRN
Qty: 60 CAPSULE | Refills: 0 | Status: SHIPPED | OUTPATIENT
Start: 2022-03-11 | End: 2022-03-21

## 2022-03-17 DIAGNOSIS — K21.9 GASTROESOPHAGEAL REFLUX DISEASE: ICD-10-CM

## 2022-03-17 RX ORDER — ESOMEPRAZOLE MAGNESIUM 40 MG/1
40 CAPSULE, DELAYED RELEASE ORAL
Qty: 180 CAPSULE | Refills: 1 | Status: SHIPPED | OUTPATIENT
Start: 2022-03-17 | End: 2022-06-20

## 2022-03-17 NOTE — TELEPHONE ENCOUNTER
No new care gaps identified.  Powered by [employer] by [address]. Reference number: [id number].   3/17/2022 3:41:50 PM CDT

## 2022-03-29 RX ORDER — DICYCLOMINE HYDROCHLORIDE 10 MG/1
10 CAPSULE ORAL 4 TIMES DAILY PRN
Qty: 120 CAPSULE | Refills: 0 | Status: SHIPPED | OUTPATIENT
Start: 2022-03-29 | End: 2022-04-26 | Stop reason: SDUPTHER

## 2022-03-30 ENCOUNTER — PATIENT MESSAGE (OUTPATIENT)
Dept: INTERNAL MEDICINE | Facility: CLINIC | Age: 72
End: 2022-03-30
Payer: MEDICARE

## 2022-03-30 NOTE — TELEPHONE ENCOUNTER
No new care gaps identified.  Powered by [employer] by [employer] Reference number: [id number].   3/30/2022 5:41:32 PM CDT

## 2022-03-31 ENCOUNTER — PATIENT MESSAGE (OUTPATIENT)
Dept: INTERNAL MEDICINE | Facility: CLINIC | Age: 72
End: 2022-03-31
Payer: MEDICARE

## 2022-03-31 RX ORDER — BUTALBITAL, ACETAMINOPHEN AND CAFFEINE 50; 325; 40 MG/1; MG/1; MG/1
TABLET ORAL
Qty: 60 TABLET | Refills: 0 | OUTPATIENT
Start: 2022-03-31

## 2022-03-31 RX ORDER — BUTALBITAL, ACETAMINOPHEN AND CAFFEINE 50; 325; 40 MG/1; MG/1; MG/1
TABLET ORAL
Qty: 60 TABLET | Refills: 0 | Status: SHIPPED | OUTPATIENT
Start: 2022-03-31 | End: 2022-04-28 | Stop reason: SDUPTHER

## 2022-03-31 NOTE — TELEPHONE ENCOUNTER
No new care gaps identified.  Powered by [employer] by [employer]. Reference number: [id number].   3/31/2022 7:40:15 AM CDT

## 2022-04-05 ENCOUNTER — TELEPHONE (OUTPATIENT)
Dept: SPORTS MEDICINE | Facility: CLINIC | Age: 72
End: 2022-04-05
Payer: MEDICARE

## 2022-04-11 ENCOUNTER — TELEPHONE (OUTPATIENT)
Dept: SPORTS MEDICINE | Facility: CLINIC | Age: 72
End: 2022-04-11
Payer: MEDICARE

## 2022-04-14 NOTE — TELEPHONE ENCOUNTER
No new care gaps identified.  Powered by [employer] by [employer]. Reference number: [id number].   4/14/2022 10:52:43 AM CDT

## 2022-04-18 RX ORDER — MECLIZINE HYDROCHLORIDE 25 MG/1
TABLET ORAL
Qty: 60 TABLET | Refills: 0 | Status: SHIPPED | OUTPATIENT
Start: 2022-04-18 | End: 2022-08-26

## 2022-04-25 ENCOUNTER — PATIENT MESSAGE (OUTPATIENT)
Dept: INTERNAL MEDICINE | Facility: CLINIC | Age: 72
End: 2022-04-25
Payer: MEDICARE

## 2022-04-25 RX ORDER — PROMETHAZINE HYDROCHLORIDE AND DEXTROMETHORPHAN HYDROBROMIDE 6.25; 15 MG/5ML; MG/5ML
5 SYRUP ORAL EVERY 4 HOURS PRN
Qty: 118 ML | Refills: 0 | Status: SHIPPED | OUTPATIENT
Start: 2022-04-25 | End: 2022-05-05

## 2022-04-25 NOTE — TELEPHONE ENCOUNTER
Sending rx for phenergan cough syrup.  Needs appt, can she see Dr. TEJADA on Thursday at 1:30 if not improving

## 2022-04-26 RX ORDER — DICYCLOMINE HYDROCHLORIDE 10 MG/1
10 CAPSULE ORAL 4 TIMES DAILY PRN
Qty: 120 CAPSULE | Refills: 0 | Status: SHIPPED | OUTPATIENT
Start: 2022-04-26 | End: 2022-05-23

## 2022-04-28 ENCOUNTER — PATIENT MESSAGE (OUTPATIENT)
Dept: INTERNAL MEDICINE | Facility: CLINIC | Age: 72
End: 2022-04-28
Payer: MEDICARE

## 2022-04-28 DIAGNOSIS — F41.9 ANXIETY: ICD-10-CM

## 2022-04-28 NOTE — TELEPHONE ENCOUNTER
No new care gaps identified.  Powered by [employer] by [employer]. Reference number: [id number].   4/28/2022 9:52:43 AM CDT

## 2022-04-28 NOTE — TELEPHONE ENCOUNTER
No new care gaps identified.  Powered by [employer] by [employer]. Reference number: [id number].   4/28/2022 10:46:58 AM CDT

## 2022-04-29 ENCOUNTER — PATIENT MESSAGE (OUTPATIENT)
Dept: INTERNAL MEDICINE | Facility: CLINIC | Age: 72
End: 2022-04-29
Payer: MEDICARE

## 2022-04-29 RX ORDER — ALPRAZOLAM 0.5 MG/1
0.5 TABLET ORAL 2 TIMES DAILY PRN
Qty: 60 TABLET | Refills: 0 | OUTPATIENT
Start: 2022-04-29

## 2022-04-29 RX ORDER — BUTALBITAL, ACETAMINOPHEN AND CAFFEINE 50; 325; 40 MG/1; MG/1; MG/1
TABLET ORAL
Qty: 60 TABLET | Refills: 0 | OUTPATIENT
Start: 2022-04-29

## 2022-04-29 RX ORDER — BUTALBITAL, ACETAMINOPHEN AND CAFFEINE 50; 325; 40 MG/1; MG/1; MG/1
TABLET ORAL
Qty: 60 TABLET | Refills: 0 | Status: SHIPPED | OUTPATIENT
Start: 2022-04-29 | End: 2022-05-25 | Stop reason: SDUPTHER

## 2022-04-29 RX ORDER — ALPRAZOLAM 0.5 MG/1
0.5 TABLET ORAL 2 TIMES DAILY PRN
Qty: 60 TABLET | Refills: 0 | Status: SHIPPED | OUTPATIENT
Start: 2022-04-29 | End: 2022-06-15 | Stop reason: SDUPTHER

## 2022-05-03 ENCOUNTER — PATIENT MESSAGE (OUTPATIENT)
Dept: INTERNAL MEDICINE | Facility: CLINIC | Age: 72
End: 2022-05-03
Payer: MEDICARE

## 2022-05-09 ENCOUNTER — PATIENT MESSAGE (OUTPATIENT)
Dept: INTERNAL MEDICINE | Facility: CLINIC | Age: 72
End: 2022-05-09
Payer: MEDICARE

## 2022-05-09 ENCOUNTER — PATIENT MESSAGE (OUTPATIENT)
Dept: SPORTS MEDICINE | Facility: CLINIC | Age: 72
End: 2022-05-09
Payer: MEDICARE

## 2022-05-16 ENCOUNTER — PATIENT MESSAGE (OUTPATIENT)
Dept: INTERNAL MEDICINE | Facility: CLINIC | Age: 72
End: 2022-05-16
Payer: MEDICARE

## 2022-05-17 ENCOUNTER — PATIENT MESSAGE (OUTPATIENT)
Dept: INTERNAL MEDICINE | Facility: CLINIC | Age: 72
End: 2022-05-17
Payer: MEDICARE

## 2022-05-18 ENCOUNTER — PATIENT OUTREACH (OUTPATIENT)
Dept: ADMINISTRATIVE | Facility: OTHER | Age: 72
End: 2022-05-18
Payer: MEDICARE

## 2022-05-19 ENCOUNTER — OFFICE VISIT (OUTPATIENT)
Dept: SPORTS MEDICINE | Facility: CLINIC | Age: 72
End: 2022-05-19
Payer: MEDICARE

## 2022-05-19 ENCOUNTER — PATIENT MESSAGE (OUTPATIENT)
Dept: SPORTS MEDICINE | Facility: CLINIC | Age: 72
End: 2022-05-19

## 2022-05-19 ENCOUNTER — TELEPHONE (OUTPATIENT)
Dept: SPORTS MEDICINE | Facility: CLINIC | Age: 72
End: 2022-05-19
Payer: MEDICARE

## 2022-05-19 VITALS
HEART RATE: 60 BPM | WEIGHT: 155 LBS | SYSTOLIC BLOOD PRESSURE: 112 MMHG | DIASTOLIC BLOOD PRESSURE: 65 MMHG | BODY MASS INDEX: 30.43 KG/M2 | HEIGHT: 60 IN

## 2022-05-19 DIAGNOSIS — M76.31 IT BAND SYNDROME, RIGHT: Primary | ICD-10-CM

## 2022-05-19 DIAGNOSIS — M70.61 GREATER TROCHANTERIC BURSITIS OF RIGHT HIP: ICD-10-CM

## 2022-05-19 PROCEDURE — 1101F PT FALLS ASSESS-DOCD LE1/YR: CPT | Mod: CPTII,S$GLB,, | Performed by: STUDENT IN AN ORGANIZED HEALTH CARE EDUCATION/TRAINING PROGRAM

## 2022-05-19 PROCEDURE — 3008F PR BODY MASS INDEX (BMI) DOCUMENTED: ICD-10-PCS | Mod: CPTII,S$GLB,, | Performed by: STUDENT IN AN ORGANIZED HEALTH CARE EDUCATION/TRAINING PROGRAM

## 2022-05-19 PROCEDURE — 3074F PR MOST RECENT SYSTOLIC BLOOD PRESSURE < 130 MM HG: ICD-10-PCS | Mod: CPTII,S$GLB,, | Performed by: STUDENT IN AN ORGANIZED HEALTH CARE EDUCATION/TRAINING PROGRAM

## 2022-05-19 PROCEDURE — 3074F SYST BP LT 130 MM HG: CPT | Mod: CPTII,S$GLB,, | Performed by: STUDENT IN AN ORGANIZED HEALTH CARE EDUCATION/TRAINING PROGRAM

## 2022-05-19 PROCEDURE — 1126F PR PAIN SEVERITY QUANTIFIED, NO PAIN PRESENT: ICD-10-PCS | Mod: CPTII,S$GLB,, | Performed by: STUDENT IN AN ORGANIZED HEALTH CARE EDUCATION/TRAINING PROGRAM

## 2022-05-19 PROCEDURE — 3288F FALL RISK ASSESSMENT DOCD: CPT | Mod: CPTII,S$GLB,, | Performed by: STUDENT IN AN ORGANIZED HEALTH CARE EDUCATION/TRAINING PROGRAM

## 2022-05-19 PROCEDURE — 3288F PR FALLS RISK ASSESSMENT DOCUMENTED: ICD-10-PCS | Mod: CPTII,S$GLB,, | Performed by: STUDENT IN AN ORGANIZED HEALTH CARE EDUCATION/TRAINING PROGRAM

## 2022-05-19 PROCEDURE — 4010F ACE/ARB THERAPY RXD/TAKEN: CPT | Mod: CPTII,S$GLB,, | Performed by: STUDENT IN AN ORGANIZED HEALTH CARE EDUCATION/TRAINING PROGRAM

## 2022-05-19 PROCEDURE — 1159F PR MEDICATION LIST DOCUMENTED IN MEDICAL RECORD: ICD-10-PCS | Mod: CPTII,S$GLB,, | Performed by: STUDENT IN AN ORGANIZED HEALTH CARE EDUCATION/TRAINING PROGRAM

## 2022-05-19 PROCEDURE — 3078F DIAST BP <80 MM HG: CPT | Mod: CPTII,S$GLB,, | Performed by: STUDENT IN AN ORGANIZED HEALTH CARE EDUCATION/TRAINING PROGRAM

## 2022-05-19 PROCEDURE — 99999 PR PBB SHADOW E&M-EST. PATIENT-LVL IV: CPT | Mod: PBBFAC,,, | Performed by: STUDENT IN AN ORGANIZED HEALTH CARE EDUCATION/TRAINING PROGRAM

## 2022-05-19 PROCEDURE — 1160F RVW MEDS BY RX/DR IN RCRD: CPT | Mod: CPTII,S$GLB,, | Performed by: STUDENT IN AN ORGANIZED HEALTH CARE EDUCATION/TRAINING PROGRAM

## 2022-05-19 PROCEDURE — 4010F PR ACE/ARB THEARPY RXD/TAKEN: ICD-10-PCS | Mod: CPTII,S$GLB,, | Performed by: STUDENT IN AN ORGANIZED HEALTH CARE EDUCATION/TRAINING PROGRAM

## 2022-05-19 PROCEDURE — 1101F PR PT FALLS ASSESS DOC 0-1 FALLS W/OUT INJ PAST YR: ICD-10-PCS | Mod: CPTII,S$GLB,, | Performed by: STUDENT IN AN ORGANIZED HEALTH CARE EDUCATION/TRAINING PROGRAM

## 2022-05-19 PROCEDURE — 99213 OFFICE O/P EST LOW 20 MIN: CPT | Mod: S$GLB,,, | Performed by: STUDENT IN AN ORGANIZED HEALTH CARE EDUCATION/TRAINING PROGRAM

## 2022-05-19 PROCEDURE — 99213 PR OFFICE/OUTPT VISIT, EST, LEVL III, 20-29 MIN: ICD-10-PCS | Mod: S$GLB,,, | Performed by: STUDENT IN AN ORGANIZED HEALTH CARE EDUCATION/TRAINING PROGRAM

## 2022-05-19 PROCEDURE — 99999 PR PBB SHADOW E&M-EST. PATIENT-LVL IV: ICD-10-PCS | Mod: PBBFAC,,, | Performed by: STUDENT IN AN ORGANIZED HEALTH CARE EDUCATION/TRAINING PROGRAM

## 2022-05-19 PROCEDURE — 3078F PR MOST RECENT DIASTOLIC BLOOD PRESSURE < 80 MM HG: ICD-10-PCS | Mod: CPTII,S$GLB,, | Performed by: STUDENT IN AN ORGANIZED HEALTH CARE EDUCATION/TRAINING PROGRAM

## 2022-05-19 PROCEDURE — 1160F PR REVIEW ALL MEDS BY PRESCRIBER/CLIN PHARMACIST DOCUMENTED: ICD-10-PCS | Mod: CPTII,S$GLB,, | Performed by: STUDENT IN AN ORGANIZED HEALTH CARE EDUCATION/TRAINING PROGRAM

## 2022-05-19 PROCEDURE — 1126F AMNT PAIN NOTED NONE PRSNT: CPT | Mod: CPTII,S$GLB,, | Performed by: STUDENT IN AN ORGANIZED HEALTH CARE EDUCATION/TRAINING PROGRAM

## 2022-05-19 PROCEDURE — 1159F MED LIST DOCD IN RCRD: CPT | Mod: CPTII,S$GLB,, | Performed by: STUDENT IN AN ORGANIZED HEALTH CARE EDUCATION/TRAINING PROGRAM

## 2022-05-19 PROCEDURE — 3008F BODY MASS INDEX DOCD: CPT | Mod: CPTII,S$GLB,, | Performed by: STUDENT IN AN ORGANIZED HEALTH CARE EDUCATION/TRAINING PROGRAM

## 2022-05-19 NOTE — PROGRESS NOTES
Subjective:          Chief Complaint: Jayla Loyd is a 71 y.o. female who had concerns including Pain of the Right Hip and Pain of the Left Lower Leg.    Jayla Loyd is a 71 y.o. female   Presents for follow up evaluation for right hip. She received a corticosteroid injection at her last visit. She reports mild relief which lasted about 2 weeks.  She still has pain that she rates 8/10 at its worst.  This is located over the lateral aspect of the hip with no radiation.  She cannot lay on this side without pain.  The pain is mainly only at night.  She was also instructed to begin physical therapy for IT band stretching at her last appointment, however she is not as she states that she is currently in physical therapy for her shoulder and has not started physical therapy for her hip at this point as discussed at her last visit.     Denies any back pain.    Pain  Pertinent negatives include no joint swelling or myalgias.     Past Medical History:   Diagnosis Date    Diabetes mellitus type I     GERD (gastroesophageal reflux disease)     Migraines     Proteinuria        Current Outpatient Medications on File Prior to Visit   Medication Sig Dispense Refill    ACCU-CHEK SAPPHIRE PLUS TEST STRP Strp Use as directed every day  3    ACCU-CHEK SOFTCLIX LANCETS Misc USE AS DIRECTED EVERY DAY  3    ALBUTEROL INHL Inhale into the lungs.      ALPRAZolam (XANAX) 0.5 MG tablet Take 1 tablet (0.5 mg total) by mouth 2 (two) times daily as needed for Anxiety. 60 tablet 0    butalbital-acetaminophen-caffeine -40 mg (FIORICET, ESGIC) -40 mg per tablet TAKE 1 TABLET 3 TIMES A DAY AS NEEDED FOR MIGRAINE 60 tablet 0    coenzyme Q10 100 mg capsule Take 100 mg by mouth once daily.      dicyclomine (BENTYL) 10 MG capsule TAKE 1 CAPSULE (10 MG TOTAL) BY MOUTH 4 (FOUR) TIMES DAILY AS NEEDED (ABDOMINAL DISCOMFORT). 120 capsule 0    esomeprazole (NEXIUM) 40 MG capsule Take 1 capsule (40 mg total) by mouth 2 (two)  times daily before meals. 180 capsule 1    FLUoxetine 20 MG capsule Take 1 capsule (20 mg total) by mouth once daily. 90 capsule 3    fluticasone (FLONASE) 50 mcg/actuation nasal spray 1 spray by Each Nare route once daily.      folic acid/multivit-min/lutein (CENTRUM SILVER ORAL) Take by mouth once daily.      Lactobac no.41/Bifidobact no.7 (PROBIOTIC-10 ORAL) Take by mouth once daily.      meclizine (ANTIVERT) 25 mg tablet TAKE 1 TABLET BY MOUTH 2 TIMES DAILY AS NEEDED. 60 tablet 0    metformin (GLUCOPHAGE) 500 MG tablet Take 500 mg by mouth daily with breakfast.       promethazine (PHENERGAN) 12.5 MG Tab Take 1 tablet (12.5 mg total) by mouth every 6 (six) hours as needed. 30 tablet 1    propranoloL (INDERAL) 40 MG tablet Take 40 mg by mouth 2 (two) times daily.      pseudoephedrine (SUDAFED) 30 MG tablet Take 30 mg by mouth every 4 (four) hours as needed for Congestion.      rosuvastatin (CRESTOR) 40 MG Tab Take 1 tablet (40 mg total) by mouth once daily. 90 tablet 1    telmisartan (MICARDIS) 20 MG Tab Take 1 tablet (20 mg total) by mouth once daily. 90 tablet 0    traMADoL (ULTRAM) 50 mg tablet       albuterol (PROVENTIL/VENTOLIN HFA) 90 mcg/actuation inhaler Inhale 1-2 puffs into the lungs every 6 (six) hours as needed for Wheezing. (Patient not taking: No sig reported) 18 g 0     No current facility-administered medications on file prior to visit.       Past Surgical History:   Procedure Laterality Date    CATARACT EXTRACTION      COSMETIC SURGERY  1971    rhinoplasty    EYE SURGERY  3 years ago    cataracts    SHOULDER SURGERY Left 09/2020    TUBAL LIGATION  1992       Family History   Problem Relation Age of Onset    Bladder Cancer Mother     Hypertension Mother         CABG    Cancer Mother     Heart disease Mother     Heart failure Father         CABG    Hypertension Father     Heart disease Father     No Known Problems Son     Cancer Maternal Aunt     Breast cancer Maternal  Aunt     Cancer Paternal Aunt     Diabetes Paternal Aunt     Cancer Maternal Grandmother     Breast cancer Maternal Grandmother     Heart disease Maternal Grandfather     Cancer Paternal Grandmother     Dementia Paternal Grandmother     Heart disease Paternal Grandfather        Social History     Socioeconomic History    Marital status:    Tobacco Use    Smoking status: Never Smoker    Smokeless tobacco: Never Used   Substance and Sexual Activity    Alcohol use: Not Currently     Alcohol/week: 0.0 standard drinks     Comment: rarely    Drug use: Not Currently     Types: Amphetamines     Comment: per script from Dr. Vance    Sexual activity: Yes     Partners: Male     Birth control/protection: Post-menopausal       Review of Systems   Constitutional: Negative.   HENT: Negative.    Eyes: Negative.    Cardiovascular: Negative.    Respiratory: Negative.    Endocrine: Negative.    Hematologic/Lymphatic: Negative.    Skin: Negative.    Musculoskeletal: Positive for joint pain (right hip). Negative for arthritis, falls, joint swelling, muscle weakness, myalgias and stiffness.   Neurological: Negative.    Psychiatric/Behavioral: Negative.    Allergic/Immunologic: Negative.        Pain Related Questions  Over the past 3 days, what was your average pain during activity? (I.e. running, jogging, walking, climbing stairs, getting dressed, ect.): 8  Over the past 3 days, what was your highest pain level?: 9  Over the past 3 days, what was your lowest pain level? : 0    Other  How many nights a week are you awakened by your affected body part?: 7  Was the patient's HEIGHT measured or patient reported?: Patient Reported  Was the patient's WEIGHT measured or patient reported?: Measured      Objective:        General: Jayla is well-developed, well-nourished, appears stated age, in no acute distress, alert and oriented to time, place and person.     General    Nursing note and vitals reviewed.  Constitutional:  She is oriented to person, place, and time. She appears well-developed and well-nourished. No distress.   HENT:   Head: Normocephalic and atraumatic.   Nose: Nose normal.   Eyes: EOM are normal.   Cardiovascular: Normal rate and intact distal pulses.    Pulmonary/Chest: Effort normal. No respiratory distress.   Neurological: She is alert and oriented to person, place, and time.   Psychiatric: She has a normal mood and affect. Her behavior is normal. Judgment and thought content normal.     General Musculoskeletal Exam   Gait: normal       Right Knee Exam     Inspection   Alignment:  normal  Effusion: absent    Left Knee Exam     Inspection   Alignment:  normal  Effusion: absent    Right Hip Exam     Inspection   Scars: absent  Swelling: absent  Bruising: absent  No deformity of hip.  Quadriceps Atrophy:  Negative  Erythema: absent    Tenderness   The patient tender to palpation of the trochanteric bursa.    Range of Motion   Abduction: 45   Adduction: 30   Extension: 10   Flexion: 110   External rotation: 60   Internal rotation: 30     Tests   Pain w/ forced internal rotation (COURTNEY): absent  Pain w/ forced external rotation (FADIR): absent  Mai: positive  Trendelenburg Test: negative  Circumduction test: negative  Stinchfield test: negative  Log Roll: negative    Other   Sensation: normal  Left Hip Exam     Inspection   Scars: absent  Swelling: absent  No deformity of hip.  Quadriceps Atrophy:  negative  Erythema: absent  Bruising: absent    Range of Motion   Abduction: 45   Adduction: 30   Extension: 10   Flexion: 110   External rotation: 60   Internal rotation: 30     Tests   Pain w/ forced internal rotation (COURTNEY): absent  Pain w/ forced external rotation (FADIR): absent  Mai: negative  Trendelenburg Test: negative  Circumduction test: negative  Stinchfield test: negative  Log Roll: negative    Other   Sensation: normal          Muscle Strength   Right Lower Extremity   Hip Abduction: 5/5   Hip Adduction: 5/5    Hip Flexion: 5/5   Ankle Dorsiflexion:  5/5   Left Lower Extremity   Hip Abduction: 5/5   Hip Adduction: 5/5   Hip Flexion: 5/5   Ankle Dorsiflexion:  5/5     Vascular Exam     Right Pulses  Dorsalis Pedis:      2+  Posterior Tibial:      2+        Left Pulses  Dorsalis Pedis:      2+  Posterior Tibial:      2+        Capillary Refill  Left Hand: normal capillary refill        Edema  Right Upper Leg: absent  Left Upper Leg: absent      Imaging:  X-rays of the right hip including AP pelvis and bilateral lateral  Obtained 12/29/2021 personally reviewed by me 01/06/2022.  There is mild to moderate arthritic changes of the bilateral hips with preserved joint spaces.  There is mild arthritic changes in the lumbar spine that is visualized.  No other bony abnormality.            Assessment:     Jayla Loyd is a 71 y.o. female   With right greater trochanteric bursitis and IT band syndrome  Encounter Diagnoses   Name Primary?    It band syndrome, right Yes    Greater trochanteric bursitis of right hip           Plan:       She really has not had any improvement since her last visit.  She has yet to begin physical therapy.  I stated that stretching of the IT band will provide her with the most relief of this.  She has great strength to abduction and I do not think she has any gluteus muscle damage.  She will begin physical therapy and return to clinic in 2 months for repeat evaluation.    All of their questions were answered.  They will call the clinic with any questions or concerns in the interim.    Should the patient's symptoms worsen, persist, or fail to improve they should return for reevaluation and I would be happy to see them back anytime.        Nic Euceda M.D.     Please be aware that this note has been generated with the assistance of [facility] voice-to-text.  Please excuse any spelling or grammatical errors.    Thank you for choosing Dr. Nic Euceda for your sports medicine care. It is our goal to  provide you with exceptional care that will help keep you healthy, active, and get you back in the game.     If you felt that you received exemplary care today, please consider leaving feedback for Dr. Euceda on [employer] at [email or web address].    Please do not hesitate to reach out to us via email, phone, or MyChart with any questions, concerns, or feedback.

## 2022-05-19 NOTE — TELEPHONE ENCOUNTER
Spoke with marlin after receiving notification that she was running late. It was stated that she would not be long and that she is on her way. It was stated that this is fine.

## 2022-05-23 RX ORDER — DICYCLOMINE HYDROCHLORIDE 10 MG/1
10 CAPSULE ORAL 4 TIMES DAILY PRN
Qty: 120 CAPSULE | Refills: 0 | Status: SHIPPED | OUTPATIENT
Start: 2022-05-23 | End: 2022-06-22

## 2022-05-25 ENCOUNTER — PATIENT MESSAGE (OUTPATIENT)
Dept: INTERNAL MEDICINE | Facility: CLINIC | Age: 72
End: 2022-05-25
Payer: MEDICARE

## 2022-05-25 ENCOUNTER — OFFICE VISIT (OUTPATIENT)
Dept: INTERNAL MEDICINE | Facility: CLINIC | Age: 72
End: 2022-05-25
Payer: MEDICARE

## 2022-05-25 ENCOUNTER — PATIENT MESSAGE (OUTPATIENT)
Dept: INTERNAL MEDICINE | Facility: CLINIC | Age: 72
End: 2022-05-25

## 2022-05-25 VITALS
HEIGHT: 60 IN | BODY MASS INDEX: 30.69 KG/M2 | DIASTOLIC BLOOD PRESSURE: 62 MMHG | OXYGEN SATURATION: 97 % | HEART RATE: 83 BPM | SYSTOLIC BLOOD PRESSURE: 112 MMHG | WEIGHT: 156.31 LBS

## 2022-05-25 DIAGNOSIS — M25.579 ANKLE PAIN, UNSPECIFIED CHRONICITY, UNSPECIFIED LATERALITY: Primary | ICD-10-CM

## 2022-05-25 DIAGNOSIS — R61 NIGHT SWEATS: ICD-10-CM

## 2022-05-25 DIAGNOSIS — I10 HYPERTENSION, ESSENTIAL: ICD-10-CM

## 2022-05-25 DIAGNOSIS — G44.89 CHRONIC MIXED HEADACHE SYNDROME: ICD-10-CM

## 2022-05-25 DIAGNOSIS — R25.1 TREMOR: ICD-10-CM

## 2022-05-25 DIAGNOSIS — M25.579 ACUTE ANKLE PAIN, UNSPECIFIED LATERALITY: ICD-10-CM

## 2022-05-25 DIAGNOSIS — F41.9 ANXIETY: ICD-10-CM

## 2022-05-25 PROCEDURE — 99999 PR PBB SHADOW E&M-EST. PATIENT-LVL V: ICD-10-PCS | Mod: PBBFAC,,, | Performed by: INTERNAL MEDICINE

## 2022-05-25 PROCEDURE — 1159F PR MEDICATION LIST DOCUMENTED IN MEDICAL RECORD: ICD-10-PCS | Mod: CPTII,S$GLB,, | Performed by: INTERNAL MEDICINE

## 2022-05-25 PROCEDURE — 3288F PR FALLS RISK ASSESSMENT DOCUMENTED: ICD-10-PCS | Mod: CPTII,S$GLB,, | Performed by: INTERNAL MEDICINE

## 2022-05-25 PROCEDURE — 3008F BODY MASS INDEX DOCD: CPT | Mod: CPTII,S$GLB,, | Performed by: INTERNAL MEDICINE

## 2022-05-25 PROCEDURE — 99999 PR PBB SHADOW E&M-EST. PATIENT-LVL V: CPT | Mod: PBBFAC,,, | Performed by: INTERNAL MEDICINE

## 2022-05-25 PROCEDURE — 99213 PR OFFICE/OUTPT VISIT, EST, LEVL III, 20-29 MIN: ICD-10-PCS | Mod: S$GLB,,, | Performed by: INTERNAL MEDICINE

## 2022-05-25 PROCEDURE — 3078F DIAST BP <80 MM HG: CPT | Mod: CPTII,S$GLB,, | Performed by: INTERNAL MEDICINE

## 2022-05-25 PROCEDURE — 4010F PR ACE/ARB THEARPY RXD/TAKEN: ICD-10-PCS | Mod: CPTII,S$GLB,, | Performed by: INTERNAL MEDICINE

## 2022-05-25 PROCEDURE — 1126F AMNT PAIN NOTED NONE PRSNT: CPT | Mod: CPTII,S$GLB,, | Performed by: INTERNAL MEDICINE

## 2022-05-25 PROCEDURE — 3078F PR MOST RECENT DIASTOLIC BLOOD PRESSURE < 80 MM HG: ICD-10-PCS | Mod: CPTII,S$GLB,, | Performed by: INTERNAL MEDICINE

## 2022-05-25 PROCEDURE — 1159F MED LIST DOCD IN RCRD: CPT | Mod: CPTII,S$GLB,, | Performed by: INTERNAL MEDICINE

## 2022-05-25 PROCEDURE — 3288F FALL RISK ASSESSMENT DOCD: CPT | Mod: CPTII,S$GLB,, | Performed by: INTERNAL MEDICINE

## 2022-05-25 PROCEDURE — 1101F PT FALLS ASSESS-DOCD LE1/YR: CPT | Mod: CPTII,S$GLB,, | Performed by: INTERNAL MEDICINE

## 2022-05-25 PROCEDURE — 3074F PR MOST RECENT SYSTOLIC BLOOD PRESSURE < 130 MM HG: ICD-10-PCS | Mod: CPTII,S$GLB,, | Performed by: INTERNAL MEDICINE

## 2022-05-25 PROCEDURE — 1101F PR PT FALLS ASSESS DOC 0-1 FALLS W/OUT INJ PAST YR: ICD-10-PCS | Mod: CPTII,S$GLB,, | Performed by: INTERNAL MEDICINE

## 2022-05-25 PROCEDURE — 4010F ACE/ARB THERAPY RXD/TAKEN: CPT | Mod: CPTII,S$GLB,, | Performed by: INTERNAL MEDICINE

## 2022-05-25 PROCEDURE — 1126F PR PAIN SEVERITY QUANTIFIED, NO PAIN PRESENT: ICD-10-PCS | Mod: CPTII,S$GLB,, | Performed by: INTERNAL MEDICINE

## 2022-05-25 PROCEDURE — 3008F PR BODY MASS INDEX (BMI) DOCUMENTED: ICD-10-PCS | Mod: CPTII,S$GLB,, | Performed by: INTERNAL MEDICINE

## 2022-05-25 PROCEDURE — 99213 OFFICE O/P EST LOW 20 MIN: CPT | Mod: S$GLB,,, | Performed by: INTERNAL MEDICINE

## 2022-05-25 PROCEDURE — 3074F SYST BP LT 130 MM HG: CPT | Mod: CPTII,S$GLB,, | Performed by: INTERNAL MEDICINE

## 2022-05-25 RX ORDER — ROSUVASTATIN CALCIUM 40 MG/1
40 TABLET, COATED ORAL DAILY
Qty: 90 TABLET | Refills: 1 | Status: SHIPPED | OUTPATIENT
Start: 2022-05-25 | End: 2022-12-14

## 2022-05-25 RX ORDER — AZELASTINE HYDROCHLORIDE, FLUTICASONE PROPIONATE 137; 50 UG/1; UG/1
SPRAY, METERED NASAL
COMMUNITY
Start: 2022-04-30 | End: 2022-12-30

## 2022-05-25 RX ORDER — AMITRIPTYLINE HYDROCHLORIDE 10 MG/1
10 TABLET, FILM COATED ORAL NIGHTLY
COMMUNITY
Start: 2022-04-01 | End: 2024-02-28

## 2022-05-25 RX ORDER — TELMISARTAN 20 MG/1
20 TABLET ORAL DAILY
Qty: 90 TABLET | Refills: 1 | Status: SHIPPED | OUTPATIENT
Start: 2022-05-25 | End: 2022-12-30

## 2022-05-25 RX ORDER — BUTALBITAL, ACETAMINOPHEN AND CAFFEINE 50; 325; 40 MG/1; MG/1; MG/1
TABLET ORAL
Qty: 60 TABLET | Refills: 0 | Status: SHIPPED | OUTPATIENT
Start: 2022-05-25 | End: 2022-06-15 | Stop reason: SDUPTHER

## 2022-05-25 NOTE — PATIENT INSTRUCTIONS
Psychiatry referral placed, given phone number [phone] to call and schedule     [email or web address]

## 2022-05-25 NOTE — PROGRESS NOTES
[facility] Note    Chief Complaint      Chief Complaint   Patient presents with    Night Sweats    Follow-up    nerve damage     Right hand numbness and tingling on and off for the last few months    Foot Pain     Left foot pain would like Referral to Dr. Fong     History of Present Illness      Jayla Loyd is a 71 y.o. female who presents today for chief complaint ankle pain .     HPI   Working at [employer]  [place], feels some concern with going into [place] areas, feels sad over her age, tucker snot see counselor and does not want to, low libido  Propranolol improved tremors   Lower body sweating: having night sweats only in her lower bodym sheets will be damp   Ankle pain, does not bother he while shes working, does get some ankle edema, had dvt study with Charly Do, Dr Euceda who she sees for hip pain recommended she see Maritza Fong, referral placed    Active Problem List with Overview Notes    Diagnosis Date Noted    Acute ankle pain 05/31/2022    Night sweats 05/25/2022    Cox's esophagus 02/16/2022    Right hip pain 12/20/2021    Chronic cough 04/13/2021    Tremor 02/15/2021    Sleep disturbance 02/15/2021    Venous insufficiency 02/15/2021    Visual hallucination 01/19/2021    Dizziness 01/19/2021    Subclinical hyperthyroidism 12/17/2020    Heat intolerance 11/16/2020    Hyperlipidemia 07/22/2020     On crestor       Arthritis of left shoulder region 07/16/2020     seeing [place] [facility] pending shoulder MRI       Anxiety 07/16/2020    Type 2 diabetes mellitus, without long-term current use of insulin 07/16/2020     Sees dr butt had recent a1c, he also does foot exam      Allergic rhinitis due to pollen 07/08/2020    Encounter for screening mammogram for malignant neoplasm of breast 07/08/2020    Hematuria, unspecified 07/08/2020    Chronic idiopathic constipation 09/24/2019    Iron deficiency anemia, unspecified 06/30/2019     Hyperphosphatemia 08/19/2018    Hypertension, essential 08/19/2018     At goal on lasix and micardis, no chest pain or shortness of breath       Proteinuria, unspecified 08/19/2018     Seeing dr hanson       Body mass index (bmi) 26.0-26.9, adult 11/03/2017     IMO Regulatory Update 10/1/2020      Gastroesophageal reflux disease 07/16/2014    Adenomatous polyp of colon 07/16/2014    Chronic mixed headache syndrome 07/16/2014     Has chronic migraines, uses fioricet   Tried amovig, has not tried triptan - not interested  Sees Charly Do- Neuro      Pure hypercholesterolemia 08/01/2012    Kidney stone 04/14/2011       Health Maintenance   Topic Date Due    TETANUS VACCINE  02/01/2008    Foot Exam  09/03/2021    Hemoglobin A1c  06/07/2022    Eye Exam  07/22/2022    Mammogram  08/10/2022    Lipid Panel  03/07/2023    Low Dose Statin  05/25/2023    DEXA Scan  08/11/2023    Hepatitis C Screening  Completed       Past Medical History:   Diagnosis Date    Diabetes mellitus type I     GERD (gastroesophageal reflux disease)     Migraines     Proteinuria        Past Surgical History:   Procedure Laterality Date    CATARACT EXTRACTION      COSMETIC SURGERY  1971    rhinoplasty    EYE SURGERY  3 years ago    cataracts    SHOULDER SURGERY Left 09/2020    TUBAL LIGATION  1992       family history includes Bladder Cancer in her mother; Breast cancer in her maternal aunt and maternal grandmother; Cancer in her maternal aunt, maternal grandmother, mother, paternal aunt, and paternal grandmother; Dementia in her paternal grandmother; Diabetes in her paternal aunt; Heart disease in her father, maternal grandfather, mother, and paternal grandfather; Heart failure in her father; Hypertension in her father and mother; No Known Problems in her son.    Social History     Tobacco Use    Smoking status: Never Smoker    Smokeless tobacco: Never Used   Substance Use Topics    Alcohol use: Not Currently      Alcohol/week: 0.0 standard drinks     Comment: rarely    Drug use: Not Currently     Types: Amphetamines     Comment: per script from Dr. Vance       Review of Systems   Constitutional: Negative for chills, fever, malaise/fatigue and weight loss.   Respiratory: Negative for cough, sputum production, shortness of breath and wheezing.    Cardiovascular: Negative for chest pain, palpitations, orthopnea and leg swelling.   Gastrointestinal: Negative for constipation, diarrhea, nausea and vomiting.   Genitourinary: Negative for dysuria, frequency, hematuria and urgency.   Musculoskeletal: Positive for joint pain.   Psychiatric/Behavioral: The patient is nervous/anxious.         Outpatient Encounter Medications as of 5/25/2022   Medication Sig Note Dispense Refill    ACCU-CHEK SAPPHIRE PLUS TEST STRP Strp Use as directed every day   3    ACCU-CHEK SOFTCLIX LANCETS Misc USE AS DIRECTED EVERY DAY   3    ALPRAZolam (XANAX) 0.5 MG tablet Take 1 tablet (0.5 mg total) by mouth 2 (two) times daily as needed for Anxiety.  60 tablet 0    amitriptyline (ELAVIL) 10 MG tablet Take 10 mg by mouth every evening.       azelastine-fluticasone (DYMISTA) 137-50 mcg/spray Spry nassal spray USE 1 SPRAY INTO EACH NOSTRIL TWICE A DAY       coenzyme Q10 100 mg capsule Take 100 mg by mouth once daily. 3/11/2021: As needed      dicyclomine (BENTYL) 10 MG capsule TAKE 1 CAPSULE (10 MG TOTAL) BY MOUTH 4 (FOUR) TIMES DAILY AS NEEDED (ABDOMINAL DISCOMFORT).  120 capsule 0    esomeprazole (NEXIUM) 40 MG capsule Take 1 capsule (40 mg total) by mouth 2 (two) times daily before meals.  180 capsule 1    FLUoxetine 20 MG capsule Take 1 capsule (20 mg total) by mouth once daily.  90 capsule 3    Lactobac no.41/Bifidobact no.7 (PROBIOTIC-10 ORAL) Take by mouth once daily.       meclizine (ANTIVERT) 25 mg tablet TAKE 1 TABLET BY MOUTH 2 TIMES DAILY AS NEEDED.  60 tablet 0    metformin (GLUCOPHAGE) 500 MG tablet Take 500 mg by mouth daily with  breakfast.        promethazine (PHENERGAN) 12.5 MG Tab Take 1 tablet (12.5 mg total) by mouth every 6 (six) hours as needed.  30 tablet 1    propranoloL (INDERAL) 40 MG tablet Take 40 mg by mouth 2 (two) times daily.       pseudoephedrine (SUDAFED) 30 MG tablet Take 30 mg by mouth every 4 (four) hours as needed for Congestion. 3/11/2021: As needed      [DISCONTINUED] ALBUTEROL INHL Inhale into the lungs.       [DISCONTINUED] butalbital-acetaminophen-caffeine -40 mg (FIORICET, ESGIC) -40 mg per tablet TAKE 1 TABLET 3 TIMES A DAY AS NEEDED FOR MIGRAINE  60 tablet 0    [DISCONTINUED] rosuvastatin (CRESTOR) 40 MG Tab Take 1 tablet (40 mg total) by mouth once daily.  90 tablet 1    [DISCONTINUED] telmisartan (MICARDIS) 20 MG Tab Take 1 tablet (20 mg total) by mouth once daily.  90 tablet 0    [DISCONTINUED] traMADoL (ULTRAM) 50 mg tablet        butalbital-acetaminophen-caffeine -40 mg (FIORICET, ESGIC) -40 mg per tablet TAKE 1 TABLET 3 TIMES A DAY AS NEEDED FOR MIGRAINE  60 tablet 0    rosuvastatin (CRESTOR) 40 MG Tab Take 1 tablet (40 mg total) by mouth once daily.  90 tablet 1    telmisartan (MICARDIS) 20 MG Tab Take 1 tablet (20 mg total) by mouth once daily.  90 tablet 1    [DISCONTINUED] albuterol (PROVENTIL/VENTOLIN HFA) 90 mcg/actuation inhaler Inhale 1-2 puffs into the lungs every 6 (six) hours as needed for Wheezing. (Patient not taking: No sig reported)  18 g 0    [DISCONTINUED] fluticasone (FLONASE) 50 mcg/actuation nasal spray 1 spray by Each Nare route once daily.       [DISCONTINUED] folic acid/multivit-min/lutein (CENTRUM SILVER ORAL) Take by mouth once daily.        No facility-administered encounter medications on file as of 5/25/2022.        Review of patient's allergies indicates:   Allergen Reactions    Gabapentin Hallucinations    Lyrica [pregabalin] Hallucinations    Mobic [meloxicam] Itching    Codeine Nausea Only     Pt gets nausea and itching from it.            Physical Exam      Vital Signs  Pulse: 83  SpO2: 97 %  BP: 112/62  BP Location: Left arm  Patient Position: Sitting  Pain Score: 0-No pain  Height and Weight  Height: 5' (152.4 cm)  Weight: 70.9 kg (156 lb 4.9 oz)  BSA (Calculated - sq m): 1.73 sq meters  BMI (Calculated): 30.5  Weight in (lb) to have BMI = 25: 127.7]    Physical Exam  Vitals reviewed.   Constitutional:       General: She is not in acute distress.     Appearance: She is well-developed. She is not diaphoretic.   HENT:      Head: Normocephalic and atraumatic.      Right Ear: External ear normal.      Left Ear: External ear normal.      Nose: Nose normal.   Eyes:      General:         Right eye: No discharge.         Left eye: No discharge.      Conjunctiva/sclera: Conjunctivae normal.   Cardiovascular:      Rate and Rhythm: Normal rate and regular rhythm.   Pulmonary:      Effort: Pulmonary effort is normal. No respiratory distress.      Breath sounds: Normal breath sounds.   Musculoskeletal:         General: Normal range of motion.      Cervical back: Normal range of motion.   Skin:     Coloration: Skin is not pale.      Findings: No rash.   Neurological:      Mental Status: She is alert and oriented to person, place, and time.   Psychiatric:         Behavior: Behavior normal.         Thought Content: Thought content normal.         Judgment: Judgment normal.          Laboratory:  CBC:  No results for input(s): WBC, RBC, HGB, HCT, PLT, MCV, MCH, MCHC in the last 2160 hours.  CMP:  Recent Labs   Lab Result Units 03/07/22  0839   Glucose mg/dL 129*   Calcium mg/dL 10.3   Albumin g/dL 3.2*   Total Protein g/dL 7.3   Sodium mmol/L 140   Potassium mmol/L 4.7   CO2 mmol/L 25   Chloride mmol/L 103   BUN mg/dL 15   Alkaline Phosphatase U/L 154*   ALT U/L 15   AST U/L 16   Total Bilirubin mg/dL 0.2     URINALYSIS:  No results for input(s): COLORU, CLARITYU, SPECGRAV, PHUR, PROTEINUA, GLUCOSEU, BILIRUBINCON, BLOODU, WBCU, RBCU, BACTERIA, MUCUS,  NITRITE, LEUKOCYTESUR, UROBILINOGEN, HYALINECASTS in the last 2160 hours.   LIPIDS:  Recent Labs   Lab Result Units 03/07/22  0839   TSH uIU/mL 1.872   HDL mg/dL 77*   Cholesterol mg/dL 175   Triglycerides mg/dL 94   LDL Cholesterol mg/dL 79.2   HDL/Cholesterol Ratio % 44.0   Non-HDL Cholesterol mg/dL 98   Total Cholesterol/HDL Ratio  2.3     TSH:  Recent Labs   Lab Result Units 03/07/22  0839   TSH uIU/mL 1.872     A1C:  No results for input(s): HGBA1C in the last 2160 hours.    Radiology:      Assessment/Plan     Jayla Loyd is a 71 y.o.female with:    Night sweats  Not drenching, possibly attributable to SSRI, will monitor     Anxiety  Ref to psych for counseling      Chronic mixed headache syndrome  meds reflled    Tremor  Recently seen by neuro     Hypertension, essential  meds refilled     Acute ankle pain  Ortho referral placed      Orders Placed This Encounter   Procedures    Ambulatory referral/consult to Orthopedics     Standing Status:   Future     Standing Expiration Date:   6/25/2023     Referral Priority:   Routine     Referral Type:   Consultation     Referred to Provider:   Nellie Fong MD     Requested Specialty:   Orthopedic Surgery     Number of Visits Requested:   1    Ambulatory referral/consult to Psychology     Standing Status:   Future     Standing Expiration Date:   6/25/2023     Referral Priority:   Routine     Referral Type:   Psychiatric     Referral Reason:   Specialty Services Required     Requested Specialty:   Psychology     Number of Visits Requested:   1       Use of the [facility] Patient Portal discussed and encouraged during today's visit  -Continue current medications and maintain follow up with specialists.  Return to clinic in  months.  Future Appointments   Date Time Provider Department Center   7/14/2022  9:00 AM Nellie Fong MD [facility] ORTHO [facility]   7/21/2022 11:15 AM Nic Euceda MD [facility] SPORTS [place]   8/25/2022 10:00 AM Geena Barnard MD [facility] PRICARE  Mojgan Barnard MD  5/31/2022 1:45 PM    Primary Care Internal Medicine

## 2022-05-31 PROBLEM — M25.579 ACUTE ANKLE PAIN: Status: ACTIVE | Noted: 2022-05-31

## 2022-06-07 ENCOUNTER — PATIENT OUTREACH (OUTPATIENT)
Dept: ADMINISTRATIVE | Facility: HOSPITAL | Age: 72
End: 2022-06-07
Payer: MEDICARE

## 2022-06-07 NOTE — PROGRESS NOTES
Made pt Lab appt to collect A1C and Microalbumin.  Chart review done. HM updated. Immunizations reviewed & updated. Care Everywhere updated.

## 2022-06-07 NOTE — PROGRESS NOTES
Called pt. Made Lab appt to collect A1C and Microalbumin.  Chart review done. HM updated. Immunizations reviewed & updated. Care Everywhere updated.

## 2022-06-14 ENCOUNTER — PATIENT MESSAGE (OUTPATIENT)
Dept: INTERNAL MEDICINE | Facility: CLINIC | Age: 72
End: 2022-06-14
Payer: MEDICARE

## 2022-06-14 ENCOUNTER — TELEPHONE (OUTPATIENT)
Dept: INTERNAL MEDICINE | Facility: CLINIC | Age: 72
End: 2022-06-14
Payer: MEDICARE

## 2022-06-14 NOTE — LETTER
AUTHORIZATION FOR RELEASE OF   CONFIDENTIAL INFORMATION    Dear Dr Fer Soto,    We are seeing Jayla Loyd, date of birth 1950, in the clinic at [facility]. Geena Barnard MD is the patient's PCP. Jayla Loyd has an outstanding lab/procedure at the time we reviewed her chart. In order to help keep her health information updated, she has authorized us to request the following medical record(s):        (  )  MAMMOGRAM                                      (  )  COLONOSCOPY      (  )  PAP SMEAR                                          (  )  OUTSIDE LAB RESULTS     (  )  DEXA SCAN                                          (  )  EYE EXAM            (  )  FOOT EXAM                                          (  )  ENTIRE RECORD     (  )  OUTSIDE IMMUNIZATIONS                 ( x ) MOST RECENT A1C         Please fax records to [facility], Abby E. Gandolfi, MD, [phone]     If you have any questions, please contact [place] at [phone].           Patient Name: Jayla Loyd  : 1950  Patient Phone #: [phone]

## 2022-06-14 NOTE — TELEPHONE ENCOUNTER
Patient's A1c monitored by Dr Soto [facility], please contact his office/quest to obtain most recent A1c   Las A1c in media from Dec 2021  She also has A1c pending for me in [facility] system she can have drawn     Please contact her to elaine  Thanks

## 2022-06-15 ENCOUNTER — PATIENT MESSAGE (OUTPATIENT)
Dept: INTERNAL MEDICINE | Facility: CLINIC | Age: 72
End: 2022-06-15
Payer: MEDICARE

## 2022-06-15 DIAGNOSIS — F41.9 ANXIETY: ICD-10-CM

## 2022-06-15 RX ORDER — BUTALBITAL, ACETAMINOPHEN AND CAFFEINE 50; 325; 40 MG/1; MG/1; MG/1
TABLET ORAL
Qty: 60 TABLET | Refills: 0 | Status: SHIPPED | OUTPATIENT
Start: 2022-06-15 | End: 2022-07-11 | Stop reason: SDUPTHER

## 2022-06-15 RX ORDER — ALPRAZOLAM 0.5 MG/1
0.5 TABLET ORAL 2 TIMES DAILY PRN
Qty: 60 TABLET | Refills: 0 | Status: SHIPPED | OUTPATIENT
Start: 2022-06-15 | End: 2022-07-11 | Stop reason: SDUPTHER

## 2022-06-15 NOTE — TELEPHONE ENCOUNTER
No new care gaps identified.  [facility] Embedded Care Gaps. Reference number: [id number]. 6/15/2022   11:21:11 AM ROCHELLET

## 2022-06-29 ENCOUNTER — TELEPHONE (OUTPATIENT)
Dept: INTERNAL MEDICINE | Facility: CLINIC | Age: 72
End: 2022-06-29
Payer: MEDICARE

## 2022-06-29 DIAGNOSIS — R91.1 SOLITARY PULMONARY NODULE: ICD-10-CM

## 2022-06-29 NOTE — TELEPHONE ENCOUNTER
Office notes faxed from [facility]     CT chest on 6.15.22 revealed lung nodules, largest 6mm  Recommending a 3-6 month CT follow up    Records sent to be scanned

## 2022-07-01 DIAGNOSIS — M25.572 ACUTE BILATERAL ANKLE PAIN: Primary | ICD-10-CM

## 2022-07-01 DIAGNOSIS — M25.571 ACUTE BILATERAL ANKLE PAIN: Primary | ICD-10-CM

## 2022-07-11 ENCOUNTER — PATIENT OUTREACH (OUTPATIENT)
Dept: ADMINISTRATIVE | Facility: HOSPITAL | Age: 72
End: 2022-07-11
Payer: MEDICARE

## 2022-07-11 ENCOUNTER — PATIENT MESSAGE (OUTPATIENT)
Dept: ADMINISTRATIVE | Facility: HOSPITAL | Age: 72
End: 2022-07-11
Payer: MEDICARE

## 2022-07-11 DIAGNOSIS — F41.9 ANXIETY: ICD-10-CM

## 2022-07-11 RX ORDER — BUTALBITAL, ACETAMINOPHEN AND CAFFEINE 50; 325; 40 MG/1; MG/1; MG/1
TABLET ORAL
Qty: 60 TABLET | Refills: 0 | Status: SHIPPED | OUTPATIENT
Start: 2022-07-11 | End: 2022-08-03 | Stop reason: SDUPTHER

## 2022-07-11 RX ORDER — ALPRAZOLAM 0.5 MG/1
0.5 TABLET ORAL 2 TIMES DAILY PRN
Qty: 60 TABLET | Refills: 0 | Status: SHIPPED | OUTPATIENT
Start: 2022-07-11 | End: 2022-08-25 | Stop reason: SDUPTHER

## 2022-07-11 NOTE — PROGRESS NOTES
Health Maintenance Due   Topic Date Due    Pneumococcal Vaccines (Age 65+) (1 - PCV) Never done    Shingles Vaccine (1 of 2) Never done    TETANUS VACCINE  02/01/2008    Foot Exam  09/03/2021    Diabetes Urine Screening  11/12/2021    COVID-19 Vaccine (4 - Booster for Moderna series) 04/17/2022    Hemoglobin A1c  06/07/2022    Eye Exam  07/22/2022    Mammogram  08/10/2022     Chart review done. HM updated. Immunizations reviewed & updated. Care Everywhere updated.  Labs noted as scheduled 7/14/22 ahead of PCP f/u 8/25/22. Portal message sent to schedule diabetic eye exam and mammogram.

## 2022-07-11 NOTE — TELEPHONE ENCOUNTER
No new care gaps identified.  [facility] Embedded Care Gaps. Reference number: [id number]. 7/11/2022   9:31:31 AM ROCHELLET

## 2022-07-12 ENCOUNTER — PATIENT MESSAGE (OUTPATIENT)
Dept: INTERNAL MEDICINE | Facility: CLINIC | Age: 72
End: 2022-07-12
Payer: MEDICARE

## 2022-07-12 RX ORDER — BUTALBITAL, ACETAMINOPHEN AND CAFFEINE 50; 325; 40 MG/1; MG/1; MG/1
TABLET ORAL
Qty: 60 TABLET | Refills: 0 | OUTPATIENT
Start: 2022-07-12

## 2022-07-12 RX ORDER — MECLIZINE HYDROCHLORIDE 25 MG/1
TABLET ORAL
Qty: 60 TABLET | Refills: 0 | OUTPATIENT
Start: 2022-07-12

## 2022-07-12 NOTE — TELEPHONE ENCOUNTER
No new care gaps identified.  [facility] Embedded Care Gaps. Reference number: [id number]. 7/12/2022   4:30:30 PM CDT

## 2022-07-12 NOTE — TELEPHONE ENCOUNTER
No new care gaps identified.  [facility] Embedded Care Gaps. Reference number: [id number]. 7/12/2022   4:29:12 PM CDT

## 2022-07-12 NOTE — TELEPHONE ENCOUNTER
No new care gaps identified.  [facility] Embedded Care Gaps. Reference number: [id number]. 7/12/2022   4:31:02 PM CDT

## 2022-07-13 ENCOUNTER — PATIENT MESSAGE (OUTPATIENT)
Dept: INTERNAL MEDICINE | Facility: CLINIC | Age: 72
End: 2022-07-13
Payer: MEDICARE

## 2022-07-15 ENCOUNTER — PATIENT MESSAGE (OUTPATIENT)
Dept: ORTHOPEDICS | Facility: CLINIC | Age: 72
End: 2022-07-15

## 2022-07-15 ENCOUNTER — OFFICE VISIT (OUTPATIENT)
Dept: ORTHOPEDICS | Facility: CLINIC | Age: 72
End: 2022-07-15
Payer: MEDICARE

## 2022-07-15 ENCOUNTER — HOSPITAL ENCOUNTER (OUTPATIENT)
Dept: RADIOLOGY | Facility: HOSPITAL | Age: 72
Discharge: HOME | End: 2022-07-15
Attending: ORTHOPAEDIC SURGERY
Payer: MEDICARE

## 2022-07-15 DIAGNOSIS — M25.579 ANKLE PAIN, UNSPECIFIED CHRONICITY, UNSPECIFIED LATERALITY: Primary | ICD-10-CM

## 2022-07-15 DIAGNOSIS — M25.579 ANKLE PAIN, UNSPECIFIED CHRONICITY, UNSPECIFIED LATERALITY: ICD-10-CM

## 2022-07-15 DIAGNOSIS — M19.071 OSTEOARTHRITIS OF MIDFOOT, RIGHT: Primary | ICD-10-CM

## 2022-07-15 DIAGNOSIS — M19.072 OSTEOARTHRITIS OF MIDFOOT, LEFT: ICD-10-CM

## 2022-07-15 DIAGNOSIS — M25.572 ACUTE BILATERAL ANKLE PAIN: ICD-10-CM

## 2022-07-15 DIAGNOSIS — M25.571 ACUTE BILATERAL ANKLE PAIN: ICD-10-CM

## 2022-07-15 PROCEDURE — 1159F MED LIST DOCD IN RCRD: CPT | Mod: CPTII,S$GLB,, | Performed by: ORTHOPAEDIC SURGERY

## 2022-07-15 PROCEDURE — 73630 X-RAY EXAM OF FOOT: CPT | Mod: TC,50

## 2022-07-15 PROCEDURE — 73630 XR FOOT COMPLETE 3 VIEW BILATERAL: ICD-10-PCS | Mod: 26,50,, | Performed by: INTERNAL MEDICINE

## 2022-07-15 PROCEDURE — 99204 OFFICE O/P NEW MOD 45 MIN: CPT | Mod: S$GLB,,, | Performed by: ORTHOPAEDIC SURGERY

## 2022-07-15 PROCEDURE — 1125F PR PAIN SEVERITY QUANTIFIED, PAIN PRESENT: ICD-10-PCS | Mod: CPTII,S$GLB,, | Performed by: ORTHOPAEDIC SURGERY

## 2022-07-15 PROCEDURE — 73630 X-RAY EXAM OF FOOT: CPT | Mod: 26,50,, | Performed by: INTERNAL MEDICINE

## 2022-07-15 PROCEDURE — 99204 PR OFFICE/OUTPT VISIT, NEW, LEVL IV, 45-59 MIN: ICD-10-PCS | Mod: S$GLB,,, | Performed by: ORTHOPAEDIC SURGERY

## 2022-07-15 PROCEDURE — 4010F PR ACE/ARB THEARPY RXD/TAKEN: ICD-10-PCS | Mod: CPTII,S$GLB,, | Performed by: ORTHOPAEDIC SURGERY

## 2022-07-15 PROCEDURE — 99999 PR PBB SHADOW E&M-EST. PATIENT-LVL III: ICD-10-PCS | Mod: PBBFAC,,, | Performed by: ORTHOPAEDIC SURGERY

## 2022-07-15 PROCEDURE — 73610 X-RAY EXAM OF ANKLE: CPT | Mod: TC,50

## 2022-07-15 PROCEDURE — 1159F PR MEDICATION LIST DOCUMENTED IN MEDICAL RECORD: ICD-10-PCS | Mod: CPTII,S$GLB,, | Performed by: ORTHOPAEDIC SURGERY

## 2022-07-15 PROCEDURE — 73610 X-RAY EXAM OF ANKLE: CPT | Mod: 26,50,, | Performed by: INTERNAL MEDICINE

## 2022-07-15 PROCEDURE — 1125F AMNT PAIN NOTED PAIN PRSNT: CPT | Mod: CPTII,S$GLB,, | Performed by: ORTHOPAEDIC SURGERY

## 2022-07-15 PROCEDURE — 99999 PR PBB SHADOW E&M-EST. PATIENT-LVL III: CPT | Mod: PBBFAC,,, | Performed by: ORTHOPAEDIC SURGERY

## 2022-07-15 PROCEDURE — 4010F ACE/ARB THERAPY RXD/TAKEN: CPT | Mod: CPTII,S$GLB,, | Performed by: ORTHOPAEDIC SURGERY

## 2022-07-15 PROCEDURE — 73610 XR ANKLE COMPLETE 3 VIEW BILATERAL: ICD-10-PCS | Mod: 26,50,, | Performed by: INTERNAL MEDICINE

## 2022-07-15 NOTE — LETTER
July 15, 2022        Ruperto Montanez MD  [address]  [address]  [facility] [address]             [facility] - Orthopedics 5th Fl  [address] [facility] [facility]  [facility] [address]  Phone: [phone]   Patient: Jayla Loyd   MR Number: [id number]   YOB: 1950   Date of Visit: 7/15/2022     Dear Dr. Montanez,     See attached about out mutual patient.    Sincerely,      Nellie Fong MD            CC  No Recipients    Enclosure

## 2022-07-15 NOTE — PROGRESS NOTES
CHIEF COMPLAINT: Bilateral foot pain                                                 HISTORY OF PRESENT ILLNESS:  The patient is a 71 y.o. female with a history of plantar fasciitis and type II DM, who presents for evaluation of bilateral foot pain that has been ongoing for the past six months.  She has been experiencing pain along the medial aspect of both feet with associated swelling and cramping, which is worse in the left foot.  She has been able to ambulate well, typically without any significant pain until the end of the day when she is resting.  States that shoes with arch-support have been helping alleviate the pain.  Denies any numbness, tingling, or weakness.        PAST MEDICAL HISTORY:   Past Medical History:   Diagnosis Date    Carpal tunnel syndrome, right upper limb 06/23/2022    Diabetes mellitus type I     GERD (gastroesophageal reflux disease)     Hx of multiple pulmonary nodules 06/15/2022    Migraines     Proteinuria     Trigger finger, right ring finger 06/23/2022     PAST SURGICAL HISTORY:   Past Surgical History:   Procedure Laterality Date    CATARACT EXTRACTION      COSMETIC SURGERY  1971    rhinoplasty    EYE SURGERY  3 years ago    cataracts    SHOULDER SURGERY Left 09/2020    TUBAL LIGATION  1992     FAMILY HISTORY:   Family History   Problem Relation Age of Onset    Bladder Cancer Mother     Hypertension Mother         CABG    Cancer Mother     Heart disease Mother     Heart failure Father         CABG    Hypertension Father     Heart disease Father     No Known Problems Son     Cancer Maternal Aunt     Breast cancer Maternal Aunt     Cancer Paternal Aunt     Diabetes Paternal Aunt     Cancer Maternal Grandmother     Breast cancer Maternal Grandmother     Heart disease Maternal Grandfather     Cancer Paternal Grandmother     Dementia Paternal Grandmother     Heart disease Paternal Grandfather      SOCIAL HISTORY:   Social History     Socioeconomic History     Marital status:    Tobacco Use    Smoking status: Never Smoker    Smokeless tobacco: Never Used   Substance and Sexual Activity    Alcohol use: Not Currently     Alcohol/week: 0.0 standard drinks     Comment: rarely    Drug use: Not Currently     Types: Amphetamines     Comment: per script from Dr. Vance    Sexual activity: Yes     Partners: Male     Birth control/protection: Post-menopausal       MEDICATIONS:   Current Outpatient Medications:     ACCU-CHEK SAPPHIRE PLUS TEST STRP Strp, Use as directed every day, Disp: , Rfl: 3    ACCU-CHEK SOFTCLIX LANCETS Misc, USE AS DIRECTED EVERY DAY, Disp: , Rfl: 3    ALPRAZolam (XANAX) 0.5 MG tablet, Take 1 tablet (0.5 mg total) by mouth 2 (two) times daily as needed for Anxiety., Disp: 60 tablet, Rfl: 0    amitriptyline (ELAVIL) 10 MG tablet, Take 10 mg by mouth every evening., Disp: , Rfl:     azelastine-fluticasone (DYMISTA) 137-50 mcg/spray Spry nassal spray, USE 1 SPRAY INTO EACH NOSTRIL TWICE A DAY, Disp: , Rfl:     butalbital-acetaminophen-caffeine -40 mg (FIORICET, ESGIC) -40 mg per tablet, TAKE 1 TABLET 3 TIMES A DAY AS NEEDED FOR MIGRAINE, Disp: 60 tablet, Rfl: 0    coenzyme Q10 100 mg capsule, Take 100 mg by mouth once daily., Disp: , Rfl:     esomeprazole (NEXIUM) 40 MG capsule, TAKE 1 CAPSULE (40 MG TOTAL) BY MOUTH 2 (TWO) TIMES DAILY BEFORE MEALS., Disp: 180 capsule, Rfl: 0    FLUoxetine 20 MG capsule, Take 1 capsule (20 mg total) by mouth once daily., Disp: 90 capsule, Rfl: 3    Lactobac no.41/Bifidobact no.7 (PROBIOTIC-10 ORAL), Take by mouth once daily., Disp: , Rfl:     meclizine (ANTIVERT) 25 mg tablet, TAKE 1 TABLET BY MOUTH 2 TIMES DAILY AS NEEDED., Disp: 60 tablet, Rfl: 0    metformin (GLUCOPHAGE) 500 MG tablet, Take 500 mg by mouth daily with breakfast. , Disp: , Rfl:     promethazine (PHENERGAN) 12.5 MG Tab, Take 1 tablet (12.5 mg total) by mouth every 6 (six) hours as needed., Disp: 30 tablet, Rfl: 1     propranoloL (INDERAL) 40 MG tablet, Take 40 mg by mouth 2 (two) times daily., Disp: , Rfl:     pseudoephedrine (SUDAFED) 30 MG tablet, Take 30 mg by mouth every 4 (four) hours as needed for Congestion., Disp: , Rfl:     rosuvastatin (CRESTOR) 40 MG Tab, Take 1 tablet (40 mg total) by mouth once daily., Disp: 90 tablet, Rfl: 1    telmisartan (MICARDIS) 20 MG Tab, Take 1 tablet (20 mg total) by mouth once daily., Disp: 90 tablet, Rfl: 1  ALLERGIES:   Review of patient's allergies indicates:   Allergen Reactions    Gabapentin Hallucinations    Lyrica [pregabalin] Hallucinations    Mobic [meloxicam] Itching    Codeine Nausea Only     Pt gets nausea and itching from it.       VITAL SIGNS: [facility] 05/25/1998 (Approximate)      Review of Systems   Constitution: Negative for chills, fever, weakness and weight loss.   HENT: Negative for congestion.   Cardiovascular: Negative for chest pain and dyspnea on exertion.   Respiratory: Negative for cough and shortness of breath.   Hematologic/Lymphatic: Does not bruise/bleed easily.   Skin: Negative for rash and suspicious lesions.   Musculoskeletal: see HPI  Gastrointestinal: Negative for bowel incontinence, constipation,diarrhea, vomiting.   Genitourinary: Negative for bladder incontinence.   Neurological: Negative for numbness, paresthesias and sensory change.           PHYSICAL EXAMINATION    General:  The patient is alert and oriented x 3.  Mood is pleasant.  Observation of ears, eyes and nose reveal no gross abnormalities.  No labored breathing observed.      bilateral Foot and Ankle Exam    Standing examination demonstrates neutral ankle alignment, plantigrade foot. Medial longitudinal arch is neutral. Moderate swelling at dorsal aspect of bilateral feet, more prominent on left.  Non irritable ankle ROM, df15, pf35 deg. Hindfoot is flexible, subtalar joint supple. 5/5 TA/GSC/PTT and 5 peroneals without pain.  SILT SP/DP/PT and able to localize. Palpable DP and PT  pulses.    XRAYS:  Bilateral Ankle/Foot:30819 3 views (AP, lateral,mortise)  were ordered and reviewed.   No evidence of any fracture or dislocation.  There is bone-on-bone osteoarthritis of the second and third tarsometatarsal joints bilaterally, with increase in Meary's angle (convex downwards) indicative of mild pes planus deformity.      ASSESSMENT:     Ms. 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      PLAN:  - Discussed different shoe and insert options that would provide arch support  - Recommend voltaren gel and naproxen for pain relief  - If still experiencing significant pain before beach trip in October, plan to follow up with Dr. Euceda for ultrasound-guided injections     I have discussed the nature of this problem with the patient today.  We will plan to follow up as needed.       ARCADIO Moya MD  Orthopaedic Surgery Resident

## 2022-07-15 NOTE — PATIENT INSTRUCTIONS
"Today, you saw Dr. Fong and were diagnosed with midfoot osteoarthritis- 2-3rd TMT bone on bone arthritis.    We decided the next step(s) in in treatment is/are  RICE guidelines (see below)  Anti-inflammatory medications (see below)  Therapy: None  Activity: Use pain as your guide, advance your activities as tolerated   Shoes: On-cloud and gel heel cushion or over the counter inserts  If worsening pain, the next step would be ultrasound guided steroid injection  by Dr. Montanez    Thank you for allowing me to participate in your care.  We will see you back as needed to discuss next steps in treatment if current treatment plan does not provide relief.    How to treat inflammation?  1.) What does your doctor mean when they tell you to follow R.I.C.E. Guidelines?    Rest your ankle/foot by limiting activities that cause pain.  A good indicator of activity level is your pain the night following the activity and the day after.  If you have pain that lasts until the next day, you did too much.  Ice it to keep the swelling down. Don't put ice directly on the skin (use a thin piece of cloth between the ice bag and skin) and don't ice more than 20 minutes at a time to avoid frostbite.  Compressive bandages immobilize and support your injury.  Make sure it isn't too tight - your toes should not be turning purple and the wrap should not hurt.  Elevate your ankle above your heart level - "toes above your nose". This applies to acute injuries and should be followed for first 48 hours as well as afterward when you have increased pain and swelling after activity or therapy.    2.) Another common way of treating pain and inflammation from an injury is anti-inflammatory medications.  Dr. Fong may prescribe you a medication for inflammation or you can take an over-the-counter anti-inflammatory (also known as NSAIDs - nonsteroidal anti-inflammatory drugs).  These include such medications as aleve, motrin, ibuprofen, naproxen, etc.  " They should be taken as prescribed or according to the over-the-counter packaging instructions.  These medications can upset the stomach or rare cases causes ulcer disease or kidney injury.  If you are concerned about using these medications long-term, you should discuss it with you primary doctor.  You can also combine or substitute an NSAID with acetaminophen (commonly known as Tylenol).  Take according to bottle instructions, and you can take up to 3000 mg daily (important to keep in mind if you take other medications that contain acetaminophen).  Again long term use should be discussed with your primary doctor.

## 2022-07-19 ENCOUNTER — LAB VISIT (OUTPATIENT)
Dept: LAB | Facility: HOSPITAL | Age: 72
End: 2022-07-19
Attending: INTERNAL MEDICINE
Payer: MEDICARE

## 2022-07-19 DIAGNOSIS — E11.69 TYPE 2 DIABETES MELLITUS WITH OTHER SPECIFIED COMPLICATION, WITHOUT LONG-TERM CURRENT USE OF INSULIN: ICD-10-CM

## 2022-07-19 LAB
ESTIMATED AVG GLUCOSE: 151 MG/DL (ref 68–131)
HBA1C MFR BLD: 6.9 % (ref 4–5.6)

## 2022-07-19 PROCEDURE — 36415 COLL VENOUS BLD VENIPUNCTURE: CPT | Performed by: INTERNAL MEDICINE

## 2022-07-19 PROCEDURE — 83036 HEMOGLOBIN GLYCOSYLATED A1C: CPT | Performed by: INTERNAL MEDICINE

## 2022-08-03 ENCOUNTER — PATIENT MESSAGE (OUTPATIENT)
Dept: INTERNAL MEDICINE | Facility: CLINIC | Age: 72
End: 2022-08-03
Payer: MEDICARE

## 2022-08-03 DIAGNOSIS — E11.9 TYPE 2 DIABETES MELLITUS WITHOUT COMPLICATION, UNSPECIFIED WHETHER LONG TERM INSULIN USE: ICD-10-CM

## 2022-08-03 RX ORDER — BUTALBITAL, ACETAMINOPHEN AND CAFFEINE 50; 325; 40 MG/1; MG/1; MG/1
TABLET ORAL
Qty: 60 TABLET | Refills: 0 | Status: SHIPPED | OUTPATIENT
Start: 2022-08-03 | End: 2022-08-25 | Stop reason: SDUPTHER

## 2022-08-03 NOTE — TELEPHONE ENCOUNTER
No new care gaps identified.  [facility] Embedded Care Gaps. Reference number: [id number]. 8/03/2022   5:03:31 PM CDT

## 2022-08-03 NOTE — TELEPHONE ENCOUNTER
No new care gaps identified.  [facility] Embedded Care Gaps. Reference number: [id number]. 8/03/2022   10:22:36 AM ROCHELLET

## 2022-08-04 ENCOUNTER — PATIENT MESSAGE (OUTPATIENT)
Dept: INTERNAL MEDICINE | Facility: CLINIC | Age: 72
End: 2022-08-04
Payer: MEDICARE

## 2022-08-04 RX ORDER — BUTALBITAL, ACETAMINOPHEN AND CAFFEINE 50; 325; 40 MG/1; MG/1; MG/1
TABLET ORAL
Qty: 60 TABLET | Refills: 0 | OUTPATIENT
Start: 2022-08-04

## 2022-08-22 ENCOUNTER — PATIENT MESSAGE (OUTPATIENT)
Dept: INTERNAL MEDICINE | Facility: CLINIC | Age: 72
End: 2022-08-22
Payer: MEDICARE

## 2022-08-24 ENCOUNTER — PATIENT MESSAGE (OUTPATIENT)
Dept: ADMINISTRATIVE | Facility: HOSPITAL | Age: 72
End: 2022-08-24
Payer: MEDICARE

## 2022-08-25 ENCOUNTER — LAB VISIT (OUTPATIENT)
Dept: LAB | Facility: HOSPITAL | Age: 72
End: 2022-08-25
Attending: INTERNAL MEDICINE
Payer: MEDICARE

## 2022-08-25 ENCOUNTER — PATIENT MESSAGE (OUTPATIENT)
Dept: INTERNAL MEDICINE | Facility: CLINIC | Age: 72
End: 2022-08-25

## 2022-08-25 ENCOUNTER — OFFICE VISIT (OUTPATIENT)
Dept: INTERNAL MEDICINE | Facility: CLINIC | Age: 72
End: 2022-08-25
Payer: MEDICARE

## 2022-08-25 VITALS
DIASTOLIC BLOOD PRESSURE: 64 MMHG | WEIGHT: 151.25 LBS | OXYGEN SATURATION: 95 % | BODY MASS INDEX: 29.7 KG/M2 | TEMPERATURE: 99 F | HEIGHT: 60 IN | HEART RATE: 74 BPM | SYSTOLIC BLOOD PRESSURE: 110 MMHG

## 2022-08-25 DIAGNOSIS — E11.69 TYPE 2 DIABETES MELLITUS WITH OTHER SPECIFIED COMPLICATION, WITHOUT LONG-TERM CURRENT USE OF INSULIN: ICD-10-CM

## 2022-08-25 DIAGNOSIS — R63.4 WEIGHT LOSS: ICD-10-CM

## 2022-08-25 DIAGNOSIS — G44.89 CHRONIC MIXED HEADACHE SYNDROME: ICD-10-CM

## 2022-08-25 DIAGNOSIS — Z12.31 ENCOUNTER FOR SCREENING MAMMOGRAM FOR MALIGNANT NEOPLASM OF BREAST: ICD-10-CM

## 2022-08-25 DIAGNOSIS — R53.83 FATIGUE, UNSPECIFIED TYPE: ICD-10-CM

## 2022-08-25 DIAGNOSIS — F41.9 ANXIETY: ICD-10-CM

## 2022-08-25 DIAGNOSIS — I73.9 PERIPHERAL VASCULAR DISEASE: ICD-10-CM

## 2022-08-25 DIAGNOSIS — J32.9 SINUSITIS, UNSPECIFIED CHRONICITY, UNSPECIFIED LOCATION: ICD-10-CM

## 2022-08-25 DIAGNOSIS — R53.83 FATIGUE, UNSPECIFIED TYPE: Primary | ICD-10-CM

## 2022-08-25 LAB
ALBUMIN SERPL BCP-MCNC: 3.7 G/DL (ref 3.5–5.2)
ALP SERPL-CCNC: 126 U/L (ref 55–135)
ALT SERPL W/O P-5'-P-CCNC: 20 U/L (ref 10–44)
ANION GAP SERPL CALC-SCNC: 10 MMOL/L (ref 8–16)
AST SERPL-CCNC: 22 U/L (ref 10–40)
BASOPHILS # BLD AUTO: 0.06 K/UL (ref 0–0.2)
BASOPHILS NFR BLD: 1 % (ref 0–1.9)
BILIRUB SERPL-MCNC: 0.3 MG/DL (ref 0.1–1)
BUN SERPL-MCNC: 16 MG/DL (ref 8–23)
CALCIUM SERPL-MCNC: 9.9 MG/DL (ref 8.7–10.5)
CHLORIDE SERPL-SCNC: 105 MMOL/L (ref 95–110)
CO2 SERPL-SCNC: 26 MMOL/L (ref 23–29)
CREAT SERPL-MCNC: 0.9 MG/DL (ref 0.5–1.4)
DIFFERENTIAL METHOD: ABNORMAL
EOSINOPHIL # BLD AUTO: 0.1 K/UL (ref 0–0.5)
EOSINOPHIL NFR BLD: 2.2 % (ref 0–8)
ERYTHROCYTE [DISTWIDTH] IN BLOOD BY AUTOMATED COUNT: 14.8 % (ref 11.5–14.5)
EST. GFR  (NO RACE VARIABLE): >60 ML/MIN/1.73 M^2
GLUCOSE SERPL-MCNC: 148 MG/DL (ref 70–110)
HCT VFR BLD AUTO: 34.2 % (ref 37–48.5)
HGB BLD-MCNC: 10.7 G/DL (ref 12–16)
IMM GRANULOCYTES # BLD AUTO: 0.01 K/UL (ref 0–0.04)
IMM GRANULOCYTES NFR BLD AUTO: 0.2 % (ref 0–0.5)
LYMPHOCYTES # BLD AUTO: 1.9 K/UL (ref 1–4.8)
LYMPHOCYTES NFR BLD: 31.6 % (ref 18–48)
MCH RBC QN AUTO: 26.1 PG (ref 27–31)
MCHC RBC AUTO-ENTMCNC: 31.3 G/DL (ref 32–36)
MCV RBC AUTO: 83 FL (ref 82–98)
MONOCYTES # BLD AUTO: 0.6 K/UL (ref 0.3–1)
MONOCYTES NFR BLD: 10 % (ref 4–15)
NEUTROPHILS # BLD AUTO: 3.3 K/UL (ref 1.8–7.7)
NEUTROPHILS NFR BLD: 55 % (ref 38–73)
NRBC BLD-RTO: 0 /100 WBC
PLATELET # BLD AUTO: 378 K/UL (ref 150–450)
PMV BLD AUTO: 9.1 FL (ref 9.2–12.9)
POTASSIUM SERPL-SCNC: 5.9 MMOL/L (ref 3.5–5.1)
PROT SERPL-MCNC: 7.6 G/DL (ref 6–8.4)
RBC # BLD AUTO: 4.1 M/UL (ref 4–5.4)
SODIUM SERPL-SCNC: 141 MMOL/L (ref 136–145)
T4 FREE SERPL-MCNC: 0.89 NG/DL (ref 0.71–1.51)
TSH SERPL DL<=0.005 MIU/L-ACNC: 1.57 UIU/ML (ref 0.4–4)
WBC # BLD AUTO: 5.91 K/UL (ref 3.9–12.7)

## 2022-08-25 PROCEDURE — 3078F DIAST BP <80 MM HG: CPT | Mod: CPTII,S$GLB,, | Performed by: INTERNAL MEDICINE

## 2022-08-25 PROCEDURE — 3066F PR DOCUMENTATION OF TREATMENT FOR NEPHROPATHY: ICD-10-PCS | Mod: CPTII,S$GLB,, | Performed by: INTERNAL MEDICINE

## 2022-08-25 PROCEDURE — 4010F ACE/ARB THERAPY RXD/TAKEN: CPT | Mod: CPTII,S$GLB,, | Performed by: INTERNAL MEDICINE

## 2022-08-25 PROCEDURE — 99499 RISK ADDL DX/OHS AUDIT: ICD-10-PCS | Mod: HCNC,S$GLB,, | Performed by: INTERNAL MEDICINE

## 2022-08-25 PROCEDURE — 99214 OFFICE O/P EST MOD 30 MIN: CPT | Mod: S$GLB,,, | Performed by: INTERNAL MEDICINE

## 2022-08-25 PROCEDURE — 1159F MED LIST DOCD IN RCRD: CPT | Mod: CPTII,S$GLB,, | Performed by: INTERNAL MEDICINE

## 2022-08-25 PROCEDURE — 80053 COMPREHEN METABOLIC PANEL: CPT | Performed by: INTERNAL MEDICINE

## 2022-08-25 PROCEDURE — 99214 PR OFFICE/OUTPT VISIT, EST, LEVL IV, 30-39 MIN: ICD-10-PCS | Mod: S$GLB,,, | Performed by: INTERNAL MEDICINE

## 2022-08-25 PROCEDURE — 3074F PR MOST RECENT SYSTOLIC BLOOD PRESSURE < 130 MM HG: ICD-10-PCS | Mod: CPTII,S$GLB,, | Performed by: INTERNAL MEDICINE

## 2022-08-25 PROCEDURE — 3078F PR MOST RECENT DIASTOLIC BLOOD PRESSURE < 80 MM HG: ICD-10-PCS | Mod: CPTII,S$GLB,, | Performed by: INTERNAL MEDICINE

## 2022-08-25 PROCEDURE — 4010F PR ACE/ARB THEARPY RXD/TAKEN: ICD-10-PCS | Mod: CPTII,S$GLB,, | Performed by: INTERNAL MEDICINE

## 2022-08-25 PROCEDURE — 85025 COMPLETE CBC W/AUTO DIFF WBC: CPT | Performed by: INTERNAL MEDICINE

## 2022-08-25 PROCEDURE — 3074F SYST BP LT 130 MM HG: CPT | Mod: CPTII,S$GLB,, | Performed by: INTERNAL MEDICINE

## 2022-08-25 PROCEDURE — 1126F PR PAIN SEVERITY QUANTIFIED, NO PAIN PRESENT: ICD-10-PCS | Mod: CPTII,S$GLB,, | Performed by: INTERNAL MEDICINE

## 2022-08-25 PROCEDURE — 3008F BODY MASS INDEX DOCD: CPT | Mod: CPTII,S$GLB,, | Performed by: INTERNAL MEDICINE

## 2022-08-25 PROCEDURE — 3044F PR MOST RECENT HEMOGLOBIN A1C LEVEL <7.0%: ICD-10-PCS | Mod: CPTII,S$GLB,, | Performed by: INTERNAL MEDICINE

## 2022-08-25 PROCEDURE — 84443 ASSAY THYROID STIM HORMONE: CPT | Performed by: INTERNAL MEDICINE

## 2022-08-25 PROCEDURE — 84439 ASSAY OF FREE THYROXINE: CPT | Performed by: INTERNAL MEDICINE

## 2022-08-25 PROCEDURE — 3066F NEPHROPATHY DOC TX: CPT | Mod: CPTII,S$GLB,, | Performed by: INTERNAL MEDICINE

## 2022-08-25 PROCEDURE — 3062F POS MACROALBUMINURIA REV: CPT | Mod: CPTII,S$GLB,, | Performed by: INTERNAL MEDICINE

## 2022-08-25 PROCEDURE — 99499 UNLISTED E&M SERVICE: CPT | Mod: HCNC,S$GLB,, | Performed by: INTERNAL MEDICINE

## 2022-08-25 PROCEDURE — 3008F PR BODY MASS INDEX (BMI) DOCUMENTED: ICD-10-PCS | Mod: CPTII,S$GLB,, | Performed by: INTERNAL MEDICINE

## 2022-08-25 PROCEDURE — 1126F AMNT PAIN NOTED NONE PRSNT: CPT | Mod: CPTII,S$GLB,, | Performed by: INTERNAL MEDICINE

## 2022-08-25 PROCEDURE — 3044F HG A1C LEVEL LT 7.0%: CPT | Mod: CPTII,S$GLB,, | Performed by: INTERNAL MEDICINE

## 2022-08-25 PROCEDURE — 36415 COLL VENOUS BLD VENIPUNCTURE: CPT | Performed by: INTERNAL MEDICINE

## 2022-08-25 PROCEDURE — 99999 PR PBB SHADOW E&M-EST. PATIENT-LVL IV: CPT | Mod: PBBFAC,,, | Performed by: INTERNAL MEDICINE

## 2022-08-25 PROCEDURE — 99999 PR PBB SHADOW E&M-EST. PATIENT-LVL IV: ICD-10-PCS | Mod: PBBFAC,,, | Performed by: INTERNAL MEDICINE

## 2022-08-25 PROCEDURE — 3062F PR POS MACROALBUMINURIA RESULT DOCUMENTED/REVIEW: ICD-10-PCS | Mod: CPTII,S$GLB,, | Performed by: INTERNAL MEDICINE

## 2022-08-25 PROCEDURE — 1159F PR MEDICATION LIST DOCUMENTED IN MEDICAL RECORD: ICD-10-PCS | Mod: CPTII,S$GLB,, | Performed by: INTERNAL MEDICINE

## 2022-08-25 RX ORDER — METHOCARBAMOL 500 MG/1
1 TABLET, FILM COATED ORAL 3 TIMES DAILY PRN
COMMUNITY
Start: 2022-06-15 | End: 2022-10-06 | Stop reason: SDUPTHER

## 2022-08-25 RX ORDER — AMOXICILLIN AND CLAVULANATE POTASSIUM 875; 125 MG/1; MG/1
1 TABLET, FILM COATED ORAL 2 TIMES DAILY
Qty: 14 TABLET | Refills: 0 | Status: SHIPPED | OUTPATIENT
Start: 2022-08-25 | End: 2022-10-06

## 2022-08-25 RX ORDER — PREDNISONE 20 MG/1
20 TABLET ORAL DAILY
Qty: 5 TABLET | Refills: 0 | Status: SHIPPED | OUTPATIENT
Start: 2022-08-25 | End: 2022-12-30

## 2022-08-25 NOTE — PROGRESS NOTES
"[facility] Note    Chief Complaint    No chief complaint on file.    History of Present Illness      Jayla Loyd is a 71 y.o. female who presents today for chief complaint headache and congestion x2 weeks .     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 [employer], HFM outbreak there, she is afebrile   Due for mmg   This has been going on x2 weeks   Feels shes had 5-10# of unexpalined weight loss though says she has been skipping meals at work         Active Problem List with Overview Notes    Diagnosis Date Noted    Peripheral vascular disease 08/25/2022    Acute ankle pain 05/31/2022    Night sweats 05/25/2022    Cox's esophagus 02/16/2022    Right hip pain 12/20/2021    Chronic cough 04/13/2021    Tremor 02/15/2021    Sleep disturbance 02/15/2021    Venous insufficiency 02/15/2021    Visual hallucination 01/19/2021    Dizziness 01/19/2021    Subclinical hyperthyroidism 12/17/2020    Heat intolerance 11/16/2020    Hyperlipidemia 07/22/2020     On crestor       Arthritis of left shoulder region 07/16/2020     [facility] [place] [facility] pending [facility]       Anxiety 07/16/2020    Type 2 diabetes mellitus with other specified complication, without long-term current use of insulin      Sees dr butt had recent a1c, he also does foot exam      Allergic rhinitis due to pollen 07/08/2020    Encounter for screening mammogram for malignant neoplasm of breast 07/08/2020    Hematuria, unspecified 07/08/2020    Chronic idiopathic constipation 09/24/2019    Iron deficiency anemia, unspecified 06/30/2019    Hyperphosphatemia 08/19/2018    Hypertension, essential 08/19/2018     At goal on lasix and micardis, no chest pain or shortness of breath       Proteinuria, unspecified 08/19/2018     Seeing dr hanson       Body mass index (bmi) " 26.0-26.9, adult 11/03/2017     IMO Regulatory Update 10/1/2020      Gastroesophageal reflux disease 07/16/2014    Adenomatous polyp of colon 07/16/2014    Chronic mixed headache syndrome 07/16/2014     Has chronic migraines, uses fioricet   Tried amovig, has not tried triptan - not interested  Sees Charly Do- Neuro      Pure hypercholesterolemia 08/01/2012    Kidney stone 04/14/2011       Health Maintenance   Topic Date Due    TETANUS VACCINE  02/01/2008    Foot Exam  09/03/2021    Eye Exam  07/22/2022    Mammogram  08/10/2022    Hemoglobin A1c  01/19/2023    Lipid Panel  03/07/2023    DEXA Scan  08/11/2023    Low Dose Statin  08/25/2023    Hepatitis C Screening  Completed       Past Medical History:   Diagnosis Date    Carpal tunnel syndrome, right upper limb 06/23/2022    Diabetes mellitus type I     GERD (gastroesophageal reflux disease)     Hx of multiple pulmonary nodules 06/15/2022    Migraines     Proteinuria     Trigger finger, right ring finger 06/23/2022       Past Surgical History:   Procedure Laterality Date    CATARACT EXTRACTION      COSMETIC SURGERY  1971    rhinoplasty    EYE SURGERY  3 years ago    cataracts    SHOULDER SURGERY Left 09/2020    TUBAL LIGATION  1992       family history includes Bladder Cancer in her mother; Breast cancer in her maternal aunt and maternal grandmother; Cancer in her maternal aunt, maternal grandmother, mother, paternal aunt, and paternal grandmother; Dementia in her paternal grandmother; Diabetes in her paternal aunt; Heart disease in her father, maternal grandfather, mother, and paternal grandfather; Heart failure in her father; Hypertension in her father and mother; No Known Problems in her son.    Social History     Tobacco Use    Smoking status: Never Smoker    Smokeless tobacco: Never Used   Substance Use Topics    Alcohol use: Not Currently     Alcohol/week: 0.0 standard drinks     Comment: rarely    Drug use: Not Currently      Types: Amphetamines     Comment: per script from Dr. Vance       Review of Systems   Constitutional: Positive for malaise/fatigue and weight loss. Negative for fever.   HENT: Positive for congestion, sinus pain and sore throat. Negative for ear pain.    Respiratory: Negative for cough, sputum production, shortness of breath and wheezing.         Outpatient Encounter Medications as of 8/25/2022   Medication Sig Note Dispense Refill    ALPRAZolam (XANAX) 0.5 MG tablet Take 1 tablet (0.5 mg total) by mouth 2 (two) times daily as needed for Anxiety.  60 tablet 0    amitriptyline (ELAVIL) 10 MG tablet Take 10 mg by mouth every evening.       azelastine-fluticasone (DYMISTA) 137-50 mcg/spray Spry nassal spray USE 1 SPRAY INTO EACH NOSTRIL TWICE A DAY       butalbital-acetaminophen-caffeine -40 mg (FIORICET, ESGIC) -40 mg per tablet TAKE 1 TABLET 3 TIMES A DAY AS NEEDED FOR MIGRAINE  60 tablet 0    coenzyme Q10 100 mg capsule Take 100 mg by mouth once daily. 3/11/2021: As needed      esomeprazole (NEXIUM) 40 MG capsule TAKE 1 CAPSULE (40 MG TOTAL) BY MOUTH 2 (TWO) TIMES DAILY BEFORE MEALS.  180 capsule 0    FLUoxetine 20 MG capsule Take 1 capsule (20 mg total) by mouth once daily.  90 capsule 3    Lactobac no.41/Bifidobact no.7 (PROBIOTIC-10 ORAL) Take by mouth once daily.       meclizine (ANTIVERT) 25 mg tablet TAKE 1 TABLET BY MOUTH 2 TIMES DAILY AS NEEDED.  60 tablet 0    metformin (GLUCOPHAGE) 500 MG tablet Take 500 mg by mouth daily with breakfast.        methocarbamoL (ROBAXIN) 500 MG Tab Take 1 tablet by mouth 3 (three) times daily as needed.       promethazine (PHENERGAN) 12.5 MG Tab Take 1 tablet (12.5 mg total) by mouth every 6 (six) hours as needed.  30 tablet 1    propranoloL (INDERAL) 40 MG tablet Take 40 mg by mouth 2 (two) times daily.       pseudoephedrine (SUDAFED) 30 MG tablet Take 30 mg by mouth every 4 (four) hours as needed for Congestion. 3/11/2021: As needed       rosuvastatin (CRESTOR) 40 MG Tab Take 1 tablet (40 mg total) by mouth once daily.  90 tablet 1    telmisartan (MICARDIS) 20 MG Tab Take 1 tablet (20 mg total) by mouth once daily.  90 tablet 1    ACCU-CHEK SAPPHIRE PLUS TEST STRP Strp Use as directed every day   3    ACCU-CHEK SOFTCLIX LANCETS Misc USE AS DIRECTED EVERY DAY   3    amoxicillin-clavulanate 875-125mg (AUGMENTIN) 875-125 mg per tablet Take 1 tablet by mouth 2 (two) times daily.  14 tablet 0    predniSONE (DELTASONE) 20 MG tablet Take 1 tablet (20 mg total) by mouth once daily.  5 tablet 0     No facility-administered encounter medications on file as of 8/25/2022.        Review of patient's allergies indicates:   Allergen Reactions    Gabapentin Hallucinations    Lyrica [pregabalin] Hallucinations    Mobic [meloxicam] Itching           Physical Exam      Vital Signs  Temp: 98.6 °F (37 °C)  Temp src: Oral  Pulse: 74  SpO2: 95 %  BP: 110/64  BP Location: Right arm  Patient Position: Sitting  Pain Score: 0-No pain  Height and Weight  Height: 5' (152.4 cm)  Weight: 68.6 kg (151 lb 3.8 oz)  BSA (Calculated - sq m): 1.7 sq meters  BMI (Calculated): 29.5  Weight in (lb) to have BMI = 25: 127.7]    Physical Exam  Vitals reviewed.   Constitutional:       General: She is not in acute distress.     Appearance: She is well-developed. She is not diaphoretic.   HENT:      Head: Normocephalic and atraumatic.      Right Ear: External ear normal.      Left Ear: External ear normal.      Nose: Nose normal.   Eyes:      General:         Right eye: No discharge.         Left eye: No discharge.      Conjunctiva/sclera: Conjunctivae normal.   Pulmonary:      Effort: Pulmonary effort is normal. No respiratory distress.   Musculoskeletal:         General: Normal range of motion.      Cervical back: Normal range of motion.   Skin:     Coloration: Skin is not pale.      Findings: No rash.   Neurological:      Mental Status: She is alert and oriented to person, place, and  time.   Psychiatric:         Behavior: Behavior normal.         Thought Content: Thought content normal.         Judgment: Judgment normal.          Laboratory:  CBC:  No results for input(s): WBC, RBC, HGB, HCT, PLT, MCV, MCH, MCHC in the last 2160 hours.  CMP:  No results for input(s): GLU, CALCIUM, ALBUMIN, PROT, NA, K, CO2, CL, BUN, ALKPHOS, ALT, AST, BILITOT in the last 2160 hours.    Invalid input(s): CREATININ  URINALYSIS:  No results for input(s): COLORU, CLARITYU, SPECGRAV, PHUR, PROTEINUA, GLUCOSEU, BILIRUBINCON, BLOODU, WBCU, RBCU, BACTERIA, MUCUS, NITRITE, LEUKOCYTESUR, UROBILINOGEN, HYALINECASTS in the last 2160 hours.   LIPIDS:  No results for input(s): TSH, HDL, CHOL, TRIG, LDLCALC, CHOLHDL, NONHDLCHOL, TOTALCHOLEST in the last 2160 hours.  TSH:  No results for input(s): TSH in the last 2160 hours.  A1C:  Recent Labs   Lab Result Units 07/19/22  0807   Hemoglobin A1C % 6.9*       Radiology:      Assessment/Plan     Jayla Loyd is a 71 y.o.female with:    1. Fatigue, unspecified type  -     CBC Auto Differential  -     Comprehensive Metabolic Panel  -     T4, Free  -     TSH    2. Encounter for screening mammogram for malignant neoplasm of breast  -     Mammo Digital Screening Bilat w/ Raul    3. Sinusitis, unspecified chronicity, unspecified location  -     amoxicillin-clavulanate 875-125mg (AUGMENTIN) 875-125 mg per tablet  -     predniSONE (DELTASONE) 20 MG tablet    4. Peripheral vascular disease  Assessment & Plan:  No issues      5. Type 2 diabetes mellitus with other specified complication, without long-term current use of insulin  Overview:  Sees dr butt had recent a1c, he also does foot exam    Assessment & Plan:  At goal       6. Chronic mixed headache syndrome  Overview:  Has chronic migraines, uses fioricet   Tried amovig, has not tried triptan - not interested  Sees Charly Do- Neuro    Assessment & Plan:  Obtain outside records/imaging Discussed increasing amitriptyline to  15mg      7. Weight loss  Basic labs, schedule MMG        Use of the [facility] Patient Portal discussed and encouraged during today's visit  -Continue current medications and maintain follow up with specialists.  Return to clinic in 3..  Future Appointments   Date Time Provider Department Center   8/25/2022 11:00 AM LAB, [place] [facility] CLOTILDE [place]   11/30/2022 10:30 AM Geena Barnard MD [facility]       Geena Barnard MD  8/25/2022 10:37 AM    Primary Care Internal Medicine

## 2022-08-26 ENCOUNTER — PATIENT MESSAGE (OUTPATIENT)
Dept: INTERNAL MEDICINE | Facility: CLINIC | Age: 72
End: 2022-08-26
Payer: MEDICARE

## 2022-08-26 RX ORDER — ALPRAZOLAM 0.5 MG/1
0.5 TABLET ORAL 2 TIMES DAILY PRN
Qty: 60 TABLET | Refills: 0 | OUTPATIENT
Start: 2022-08-26

## 2022-08-26 RX ORDER — BUTALBITAL, ACETAMINOPHEN AND CAFFEINE 50; 325; 40 MG/1; MG/1; MG/1
TABLET ORAL
Qty: 60 TABLET | Refills: 0 | OUTPATIENT
Start: 2022-08-26

## 2022-08-26 NOTE — TELEPHONE ENCOUNTER
No new care gaps identified.  [facility] Embedded Care Gaps. Reference number: [id number]. 8/25/2022   10:55:53 PM CDT

## 2022-08-26 NOTE — TELEPHONE ENCOUNTER
No new care gaps identified.  [facility] Embedded Care Gaps. Reference number: [id number]. 8/25/2022   10:55:19 PM CDT

## 2022-08-29 ENCOUNTER — PATIENT MESSAGE (OUTPATIENT)
Dept: INTERNAL MEDICINE | Facility: CLINIC | Age: 72
End: 2022-08-29
Payer: MEDICARE

## 2022-08-29 RX ORDER — BUTALBITAL, ACETAMINOPHEN AND CAFFEINE 50; 325; 40 MG/1; MG/1; MG/1
TABLET ORAL
Qty: 60 TABLET | Refills: 0 | OUTPATIENT
Start: 2022-08-29

## 2022-08-29 RX ORDER — BUTALBITAL, ACETAMINOPHEN AND CAFFEINE 50; 325; 40 MG/1; MG/1; MG/1
TABLET ORAL
Qty: 60 TABLET | Refills: 0 | Status: SHIPPED | OUTPATIENT
Start: 2022-08-29 | End: 2022-10-01 | Stop reason: SDUPTHER

## 2022-08-29 NOTE — TELEPHONE ENCOUNTER
No new care gaps identified.  [facility] Embedded Care Gaps. Reference number: [id number]. 8/29/2022   10:55:24 AM CDT

## 2022-08-30 ENCOUNTER — PATIENT MESSAGE (OUTPATIENT)
Dept: INTERNAL MEDICINE | Facility: CLINIC | Age: 72
End: 2022-08-30
Payer: MEDICARE

## 2022-08-30 DIAGNOSIS — E87.5 SERUM POTASSIUM ELEVATED: Primary | ICD-10-CM

## 2022-08-31 ENCOUNTER — PATIENT MESSAGE (OUTPATIENT)
Dept: INTERNAL MEDICINE | Facility: CLINIC | Age: 72
End: 2022-08-31
Payer: MEDICARE

## 2022-09-06 ENCOUNTER — HOSPITAL ENCOUNTER (OUTPATIENT)
Dept: RADIOLOGY | Facility: HOSPITAL | Age: 72
Discharge: HOME | End: 2022-09-06
Attending: INTERNAL MEDICINE
Payer: MEDICARE

## 2022-09-06 DIAGNOSIS — R91.1 SOLITARY PULMONARY NODULE: ICD-10-CM

## 2022-09-06 PROCEDURE — 71250 CT CHEST WITHOUT CONTRAST: ICD-10-PCS | Mod: 26,,, | Performed by: RADIOLOGY

## 2022-09-06 PROCEDURE — 71250 CT THORAX DX C-: CPT | Mod: TC

## 2022-09-06 PROCEDURE — 71250 CT THORAX DX C-: CPT | Mod: 26,,, | Performed by: RADIOLOGY

## 2022-09-08 ENCOUNTER — LAB VISIT (OUTPATIENT)
Dept: LAB | Facility: HOSPITAL | Age: 72
End: 2022-09-08
Attending: INTERNAL MEDICINE
Payer: MEDICARE

## 2022-09-08 DIAGNOSIS — E87.5 SERUM POTASSIUM ELEVATED: ICD-10-CM

## 2022-09-08 LAB — POTASSIUM SERPL-SCNC: 5.5 MMOL/L (ref 3.5–5.1)

## 2022-09-08 PROCEDURE — 84132 ASSAY OF SERUM POTASSIUM: CPT | Performed by: INTERNAL MEDICINE

## 2022-09-08 PROCEDURE — 36415 COLL VENOUS BLD VENIPUNCTURE: CPT | Performed by: INTERNAL MEDICINE

## 2022-10-05 ENCOUNTER — PATIENT MESSAGE (OUTPATIENT)
Dept: INTERNAL MEDICINE | Facility: CLINIC | Age: 72
End: 2022-10-05
Payer: MEDICARE

## 2022-10-05 RX ORDER — BUTALBITAL, ACETAMINOPHEN AND CAFFEINE 50; 325; 40 MG/1; MG/1; MG/1
TABLET ORAL
Qty: 60 TABLET | Refills: 0 | Status: SHIPPED | OUTPATIENT
Start: 2022-10-05 | End: 2022-11-23 | Stop reason: SDUPTHER

## 2022-10-05 NOTE — TELEPHONE ENCOUNTER
No new care gaps identified.  [facility] Embedded Care Gaps. Reference number: [id number]. 10/05/2022   9:37:34 AM ROCHELLET

## 2022-10-05 NOTE — TELEPHONE ENCOUNTER
----- Message from Joy Ng sent at 10/5/2022  9:15 AM CDT -----  Contact: Susan @[facility] [phone]  Would like to receive medical advice.    Pharmacy name/number (copy/paste from chart):  .    [facility]/pharmacy #[id number] - [place], [place] - [address] [facility]  [address] [facility] [place] [address]  Phone: [phone] Fax: [phone]    Would they like a call back or a response via [facility]:  call back    Additional information:  Calling to speak with the nurse regarding butalbital-acetaminophen-caffeine -40 mg (FIORICET, ESGIC) -40 mg per tablet requires a KAYE number.

## 2022-10-06 ENCOUNTER — TELEPHONE (OUTPATIENT)
Dept: INTERNAL MEDICINE | Facility: CLINIC | Age: 72
End: 2022-10-06
Payer: MEDICARE

## 2022-10-06 ENCOUNTER — PATIENT MESSAGE (OUTPATIENT)
Dept: INTERNAL MEDICINE | Facility: CLINIC | Age: 72
End: 2022-10-06

## 2022-10-06 ENCOUNTER — OFFICE VISIT (OUTPATIENT)
Dept: INTERNAL MEDICINE | Facility: CLINIC | Age: 72
End: 2022-10-06
Payer: MEDICARE

## 2022-10-06 VITALS
TEMPERATURE: 99 F | BODY MASS INDEX: 29.64 KG/M2 | SYSTOLIC BLOOD PRESSURE: 116 MMHG | WEIGHT: 151 LBS | HEIGHT: 60 IN | DIASTOLIC BLOOD PRESSURE: 68 MMHG | OXYGEN SATURATION: 96 % | HEART RATE: 81 BPM

## 2022-10-06 DIAGNOSIS — R05.9 COUGH, UNSPECIFIED TYPE: ICD-10-CM

## 2022-10-06 DIAGNOSIS — U07.1 COVID-19: ICD-10-CM

## 2022-10-06 DIAGNOSIS — R06.2 WHEEZING: ICD-10-CM

## 2022-10-06 DIAGNOSIS — J32.9 SINUSITIS, UNSPECIFIED CHRONICITY, UNSPECIFIED LOCATION: Primary | ICD-10-CM

## 2022-10-06 LAB
CTP QC/QA: YES
CTP QC/QA: YES
FLUAV AG NPH QL: NEGATIVE
FLUBV AG NPH QL: NEGATIVE
SARS-COV-2 RDRP RESP QL NAA+PROBE: POSITIVE

## 2022-10-06 PROCEDURE — 99999 PR PBB SHADOW E&M-EST. PATIENT-LVL IV: ICD-10-PCS | Mod: PBBFAC,,, | Performed by: INTERNAL MEDICINE

## 2022-10-06 PROCEDURE — 3066F PR DOCUMENTATION OF TREATMENT FOR NEPHROPATHY: ICD-10-PCS | Mod: CPTII,S$GLB,, | Performed by: INTERNAL MEDICINE

## 2022-10-06 PROCEDURE — 3044F PR MOST RECENT HEMOGLOBIN A1C LEVEL <7.0%: ICD-10-PCS | Mod: CPTII,S$GLB,, | Performed by: INTERNAL MEDICINE

## 2022-10-06 PROCEDURE — 87635: ICD-10-PCS | Mod: QW,S$GLB,, | Performed by: INTERNAL MEDICINE

## 2022-10-06 PROCEDURE — 99999 PR PBB SHADOW E&M-EST. PATIENT-LVL IV: CPT | Mod: PBBFAC,,, | Performed by: INTERNAL MEDICINE

## 2022-10-06 PROCEDURE — 1159F MED LIST DOCD IN RCRD: CPT | Mod: CPTII,S$GLB,, | Performed by: INTERNAL MEDICINE

## 2022-10-06 PROCEDURE — 99214 PR OFFICE/OUTPT VISIT, EST, LEVL IV, 30-39 MIN: ICD-10-PCS | Mod: S$GLB,,, | Performed by: INTERNAL MEDICINE

## 2022-10-06 PROCEDURE — 3066F NEPHROPATHY DOC TX: CPT | Mod: CPTII,S$GLB,, | Performed by: INTERNAL MEDICINE

## 2022-10-06 PROCEDURE — 87804 POCT INFLUENZA A/B: ICD-10-PCS | Mod: 59,QW,S$GLB, | Performed by: INTERNAL MEDICINE

## 2022-10-06 PROCEDURE — 87804 INFLUENZA ASSAY W/OPTIC: CPT | Mod: QW,S$GLB,, | Performed by: INTERNAL MEDICINE

## 2022-10-06 PROCEDURE — 4010F ACE/ARB THERAPY RXD/TAKEN: CPT | Mod: CPTII,S$GLB,, | Performed by: INTERNAL MEDICINE

## 2022-10-06 PROCEDURE — 99214 OFFICE O/P EST MOD 30 MIN: CPT | Mod: S$GLB,,, | Performed by: INTERNAL MEDICINE

## 2022-10-06 PROCEDURE — 3008F PR BODY MASS INDEX (BMI) DOCUMENTED: ICD-10-PCS | Mod: CPTII,S$GLB,, | Performed by: INTERNAL MEDICINE

## 2022-10-06 PROCEDURE — 3078F DIAST BP <80 MM HG: CPT | Mod: CPTII,S$GLB,, | Performed by: INTERNAL MEDICINE

## 2022-10-06 PROCEDURE — 3044F HG A1C LEVEL LT 7.0%: CPT | Mod: CPTII,S$GLB,, | Performed by: INTERNAL MEDICINE

## 2022-10-06 PROCEDURE — 3074F SYST BP LT 130 MM HG: CPT | Mod: CPTII,S$GLB,, | Performed by: INTERNAL MEDICINE

## 2022-10-06 PROCEDURE — 3062F PR POS MACROALBUMINURIA RESULT DOCUMENTED/REVIEW: ICD-10-PCS | Mod: CPTII,S$GLB,, | Performed by: INTERNAL MEDICINE

## 2022-10-06 PROCEDURE — 3008F BODY MASS INDEX DOCD: CPT | Mod: CPTII,S$GLB,, | Performed by: INTERNAL MEDICINE

## 2022-10-06 PROCEDURE — 3074F PR MOST RECENT SYSTOLIC BLOOD PRESSURE < 130 MM HG: ICD-10-PCS | Mod: CPTII,S$GLB,, | Performed by: INTERNAL MEDICINE

## 2022-10-06 PROCEDURE — 4010F PR ACE/ARB THEARPY RXD/TAKEN: ICD-10-PCS | Mod: CPTII,S$GLB,, | Performed by: INTERNAL MEDICINE

## 2022-10-06 PROCEDURE — 3062F POS MACROALBUMINURIA REV: CPT | Mod: CPTII,S$GLB,, | Performed by: INTERNAL MEDICINE

## 2022-10-06 PROCEDURE — 1159F PR MEDICATION LIST DOCUMENTED IN MEDICAL RECORD: ICD-10-PCS | Mod: CPTII,S$GLB,, | Performed by: INTERNAL MEDICINE

## 2022-10-06 PROCEDURE — 87635 SARS-COV-2 COVID-19 AMP PRB: CPT | Mod: QW,S$GLB,, | Performed by: INTERNAL MEDICINE

## 2022-10-06 PROCEDURE — 3078F PR MOST RECENT DIASTOLIC BLOOD PRESSURE < 80 MM HG: ICD-10-PCS | Mod: CPTII,S$GLB,, | Performed by: INTERNAL MEDICINE

## 2022-10-06 RX ORDER — LEVOFLOXACIN 500 MG/1
500 TABLET, FILM COATED ORAL DAILY
Qty: 5 TABLET | Refills: 0 | Status: SHIPPED | OUTPATIENT
Start: 2022-10-06 | End: 2022-10-06

## 2022-10-06 RX ORDER — ALBUTEROL SULFATE 90 UG/1
2 AEROSOL, METERED RESPIRATORY (INHALATION) EVERY 6 HOURS PRN
Qty: 18 G | Refills: 0 | Status: SHIPPED | OUTPATIENT
Start: 2022-10-06 | End: 2022-10-28

## 2022-10-06 NOTE — TELEPHONE ENCOUNTER
No new care gaps identified.  [facility] Embedded Care Gaps. Reference number: [id number]. 10/06/2022   4:40:58 PM CDT

## 2022-10-06 NOTE — LETTER
October 6, 2022          No Recipients             [facility]  [address]  [facility] [address]  Phone: [phone]  Fax: [phone]   Patient: Jayla Loyd   YOB: 1950   Date of Visit: 10/6/2022     To Whom it may concern:    Due to her current health conditions I recommend against Jayla Loyd traveling at this time. Cancellation of her travel plans is appropriate at this time.         Sincerely,      Geena Barnard MD            CC    No Recipients    Enclosure

## 2022-10-06 NOTE — TELEPHONE ENCOUNTER
----- Message from Concepcion Do sent at 10/6/2022  9:37 AM CDT -----  Contact: self [phone]  Per pt will be in at 3 today.    Please call and advise

## 2022-10-06 NOTE — PROGRESS NOTES
[facility] Note    Chief Complaint      Chief Complaint   Patient presents with    Cough     Not productive but does have mucus stuck in chest     Sinus Problem     On going for the last 2 weeks at least, was een by ENT and given Omnicef and Rocephin but no improvement. Now noticing pressure in both ears    handi cap tag     Also needs letter for insurance      History of Present Illness      Jayla Loyd is a 71 y.o. female who presents today for chief complaint uri      Cough  This is a recurrent problem. The current episode started 1 to 4 weeks ago. The problem has been gradually worsening. The problem occurs every few minutes. The cough is Non-productive. Associated symptoms include headaches, heartburn, nasal congestion, postnasal drip, rhinorrhea, a sore throat, shortness of breath and wheezing. Pertinent negatives include no chest pain, chills, ear congestion, fever, hemoptysis or myalgias. The symptoms are aggravated by lying down. She has tried OTC cough suppressant for the symptoms. The treatment provided mild relief. Her past medical history is significant for asthma, bronchitis, environmental allergies and pneumonia.    Saw ENT [facility]ain 9.19 was given rocephin x1 and omnicef, no imporvement, now with purulent discharge, had been feeling bad for 7 days before being seen in ENT clinic, completed abx less ta 14 days ago, no fever     HA are better but still rhinorrhea, congestion, cough  Using nyquil and dayquil multi symptom   Works at a  and tehre is a lot of flu  She endorses wheezing     Wants handicap tag for OA has seen ortho    Non traumatic brusiing   Active Problem List with Overview Notes    Diagnosis Date Noted    Peripheral vascular disease 08/25/2022    Acute ankle pain 05/31/2022    Night sweats 05/25/2022    Cox's esophagus 02/16/2022    Right hip pain 12/20/2021    Chronic cough 04/13/2021    Tremor 02/15/2021    Sleep disturbance 02/15/2021    Venous  insufficiency 02/15/2021    Visual hallucination 01/19/2021    Dizziness 01/19/2021    Subclinical hyperthyroidism 12/17/2020    Heat intolerance 11/16/2020    Hyperlipidemia 07/22/2020     On crestor       Arthritis of left shoulder region 07/16/2020     seeing [place] at [facility] pending shoulder MRI       Anxiety 07/16/2020    Type 2 diabetes mellitus with other specified complication, without long-term current use of insulin      Sees dr butt had recent a1c, he also does foot exam      Allergic rhinitis due to pollen 07/08/2020    Encounter for screening mammogram for malignant neoplasm of breast 07/08/2020    Hematuria, unspecified 07/08/2020    Chronic idiopathic constipation 09/24/2019    Iron deficiency anemia, unspecified 06/30/2019    Hyperphosphatemia 08/19/2018    Hypertension, essential 08/19/2018     At goal on lasix and micardis, no chest pain or shortness of breath       Proteinuria, unspecified 08/19/2018     Seeing dr hanson       Body mass index (bmi) 26.0-26.9, adult 11/03/2017     IMO Regulatory Update 10/1/2020      Gastroesophageal reflux disease 07/16/2014    Adenomatous polyp of colon 07/16/2014    Chronic mixed headache syndrome 07/16/2014     Has chronic migraines, uses fioricet   Tried amovig, has not tried triptan - not interested  Sees Charly Do- Neuro      Pure hypercholesterolemia 08/01/2012    Kidney stone 04/14/2011       Health Maintenance   Topic Date Due    TETANUS VACCINE  02/01/2008    Foot Exam  09/03/2021    Eye Exam  07/22/2022    Mammogram  08/10/2022    Hemoglobin A1c  01/19/2023    Lipid Panel  03/07/2023    DEXA Scan  08/11/2023    Low Dose Statin  10/06/2023    Hepatitis C Screening  Completed       Past Medical History:   Diagnosis Date    Carpal tunnel syndrome, right upper limb 06/23/2022    Diabetes mellitus type I     GERD (gastroesophageal reflux disease)     Hx of multiple pulmonary nodules 06/15/2022    Migraines     Proteinuria     Trigger finger, right  ring finger 06/23/2022       Past Surgical History:   Procedure Laterality Date    CATARACT EXTRACTION      COSMETIC SURGERY  1971    rhinoplasty    EYE SURGERY  3 years ago    cataracts    JOINT REPLACEMENT  Twice in last 24 months    SHOULDER SURGERY Left 09/2020    TUBAL LIGATION  1992       family history includes Bladder Cancer in her mother; Breast cancer in her maternal aunt and maternal grandmother; Cancer in her maternal aunt, maternal grandmother, mother, paternal aunt, and paternal grandmother; Dementia in her paternal grandmother; Diabetes in her paternal aunt; Heart disease in her father, maternal grandfather, mother, and paternal grandfather; Heart failure in her father; Hypertension in her father and mother; No Known Problems in her son.    Social History     Tobacco Use    Smoking status: Never    Smokeless tobacco: Never   Substance Use Topics    Alcohol use: Not Currently     Comment: None    Drug use: Not Currently     Types: Other-see comments     Comment: None       Review of Systems   Constitutional:  Negative for chills and fever.   HENT:  Positive for postnasal drip, rhinorrhea and sore throat.    Respiratory:  Positive for cough, shortness of breath and wheezing. Negative for hemoptysis.    Cardiovascular:  Negative for chest pain.   Gastrointestinal:  Positive for heartburn.   Musculoskeletal:  Negative for myalgias.   Neurological:  Positive for headaches.   Endo/Heme/Allergies:  Positive for environmental allergies.      Outpatient Encounter Medications as of 10/6/2022   Medication Sig Note Dispense Refill    ACCU-CHEK SAPPHIRE PLUS TEST STRP Strp Use as directed every day   3    ACCU-CHEK SOFTCLIX LANCETS Misc USE AS DIRECTED EVERY DAY   3    ALPRAZolam (XANAX) 0.5 MG tablet Take 1 tablet (0.5 mg total) by mouth 2 (two) times daily as needed for Anxiety.  60 tablet 0    amitriptyline (ELAVIL) 10 MG tablet Take 10 mg by mouth every evening.       azelastine-fluticasone (DYMISTA) 137-50  mcg/spray [place] nassal spray USE 1 SPRAY INTO EACH NOSTRIL TWICE A DAY       butalbital-acetaminophen-caffeine -40 mg (FIORICET, ESGIC) -40 mg per tablet TAKE 1 TABLET 3 TIMES A DAY AS NEEDED FOR MIGRAINE  60 tablet 0    coenzyme Q10 100 mg capsule Take 100 mg by mouth once daily. 3/11/2021: As needed      esomeprazole (NEXIUM) 40 MG capsule Take 1 capsule (40 mg total) by mouth 2 (two) times daily before meals.  180 capsule 3    FLUoxetine 20 MG capsule Take 1 capsule (20 mg total) by mouth once daily.  90 capsule 3    Lactobac no.41/Bifidobact no.7 (PROBIOTIC-10 ORAL) Take by mouth once daily.       meclizine (ANTIVERT) 25 mg tablet Take 1 tablet (25 mg total) by mouth 2 (two) times daily as needed.  60 tablet 0    metformin (GLUCOPHAGE) 500 MG tablet Take 500 mg by mouth daily with breakfast.        methocarbamoL (ROBAXIN) 500 MG Tab Take 1 tablet by mouth 3 (three) times daily as needed.       predniSONE (DELTASONE) 20 MG tablet Take 1 tablet (20 mg total) by mouth once daily.  5 tablet 0    promethazine (PHENERGAN) 12.5 MG Tab Take 1 tablet (12.5 mg total) by mouth every 6 (six) hours as needed.  30 tablet 1    propranoloL (INDERAL) 40 MG tablet Take 40 mg by mouth 2 (two) times daily.       rosuvastatin (CRESTOR) 40 MG Tab Take 1 tablet (40 mg total) by mouth once daily.  90 tablet 1    telmisartan (MICARDIS) 20 MG Tab Take 1 tablet (20 mg total) by mouth once daily.  90 tablet 1    [DISCONTINUED] pseudoephedrine (SUDAFED) 30 MG tablet Take 30 mg by mouth every 4 (four) hours as needed for Congestion. 3/11/2021: As needed      albuterol (VENTOLIN HFA) 90 mcg/actuation inhaler Inhale 2 puffs into the lungs every 6 (six) hours as needed for Wheezing. Rescue  18 g 0    [DISCONTINUED] amoxicillin-clavulanate 875-125mg (AUGMENTIN) 875-125 mg per tablet Take 1 tablet by mouth 2 (two) times daily.  14 tablet 0    [DISCONTINUED] brompheniramine-pseudoephedrine-dextromethorphan (DIMETAPP DM) 1-15-5 mg/5 mL  Elix Take 5 mLs by mouth every 6 (six) hours as needed (cough).  100 mL 0    [DISCONTINUED] levoFLOXacin (LEVAQUIN) 500 MG tablet Take 1 tablet (500 mg total) by mouth once daily.  5 tablet 0     No facility-administered encounter medications on file as of 10/6/2022.        Review of patient's allergies indicates:   Allergen Reactions    Gabapentin Hallucinations    Lyrica [pregabalin] Hallucinations    Mobic [meloxicam] Itching           Physical Exam      Vital Signs  Temp: 98.5 °F (36.9 °C)  Pulse: 81  SpO2: 96 %  BP: 116/68  BP Location: Left arm  Patient Position: Sitting  Height and Weight  Height: 5' (152.4 cm)  Weight: 68.5 kg (151 lb 0.2 oz)  BSA (Calculated - sq m): 1.7 sq meters  BMI (Calculated): 29.5  Weight in (lb) to have BMI = 25: 127.7]    Physical Exam  Vitals reviewed.   Constitutional:       General: She is not in acute distress.     Appearance: She is well-developed. She is not diaphoretic.   HENT:      Head: Normocephalic and atraumatic.      Right Ear: External ear normal.      Left Ear: External ear normal.      Nose: Nose normal.   Eyes:      General:         Right eye: No discharge.         Left eye: No discharge.      Conjunctiva/sclera: Conjunctivae normal.   Pulmonary:      Effort: Pulmonary effort is normal. No respiratory distress.      Breath sounds: No wheezing.   Musculoskeletal:         General: Normal range of motion.      Cervical back: Normal range of motion.   Skin:     Coloration: Skin is not pale.      Findings: No rash.   Neurological:      Mental Status: She is alert and oriented to person, place, and time.   Psychiatric:         Behavior: Behavior normal.         Thought Content: Thought content normal.        Laboratory:  CBC:  Recent Labs   Lab Result Units 08/25/22  1050   WBC K/uL 5.91   RBC M/uL 4.10   Hemoglobin g/dL 10.7*   Hematocrit % 34.2*   Platelets K/uL 378   MCV fL 83   MCH pg 26.1*   MCHC g/dL 31.3*     CMP:  Recent Labs   Lab Result Units 08/25/22  1050  09/08/22  1009   Glucose mg/dL 148*  --    Calcium mg/dL 9.9  --    Albumin g/dL 3.7  --    Total Protein g/dL 7.6  --    Sodium mmol/L 141  --    Potassium mmol/L 5.9* 5.5*   CO2 mmol/L 26  --    Chloride mmol/L 105  --    BUN mg/dL 16  --    Alkaline Phosphatase U/L 126  --    ALT U/L 20  --    AST U/L 22  --    Total Bilirubin mg/dL 0.3  --      URINALYSIS:  No results for input(s): COLORU, CLARITYU, SPECGRAV, PHUR, PROTEINUA, GLUCOSEU, BILIRUBINCON, BLOODU, WBCU, RBCU, BACTERIA, MUCUS, NITRITE, LEUKOCYTESUR, UROBILINOGEN, HYALINECASTS in the last 2160 hours.   LIPIDS:  Recent Labs   Lab Result Units 08/25/22  1050   TSH uIU/mL 1.567     TSH:  Recent Labs   Lab Result Units 08/25/22  1050   TSH uIU/mL 1.567     A1C:  Recent Labs   Lab Result Units 07/19/22  0807   Hemoglobin A1C % 6.9*           Assessment/Plan     Jayla Loyd is a 71 y.o.female with:    1. Sinusitis, unspecified chronicity, unspecified location  -     Discontinue: levoFLOXacin (LEVAQUIN) 500 MG tablet; Take 1 tablet (500 mg total) by mouth once daily.  Dispense: 5 tablet; Refill: 0  -     Discontinue: brompheniramine-pseudoephedrine-dextromethorphan (DIMETAPP DM) 1-15-5 mg/5 mL Elix; Take 5 mLs by mouth every 6 (six) hours as needed (cough).  Dispense: 100 mL; Refill: 0    2. Wheezing  -     albuterol (VENTOLIN HFA) 90 mcg/actuation inhaler; Inhale 2 puffs into the lungs every 6 (six) hours as needed for Wheezing. Rescue  Dispense: 18 g; Refill: 0    3. COVID-19   Symptomatic management   3 risk score         Future Appointments   Date Time Provider Department Center   11/30/2022 10:30 AM Geena Barnard MD [facility]       Geena Barnard MD  10/6/2022 3:29 PM    Primary Care Internal Medicine

## 2022-10-06 NOTE — LETTER
[address] ? [place], [address] ? Phone [phone] ? Fax [phone]           Return to Work/School    Patient: Jayla Lody  YOB: 1950   Date: 10/06/2022      To Whom It May Concern:     Jayla Loyd was in contact with/seen in my office on 10/06/2022. COVID-19 is present in our communities across the state. Not all patients are eligible or appropriate to be tested. In this situation, your employee meets the following criteria:     Jayla Loyd has met the criteria for COVID-19 testing and has a POSITIVE result. She can return to work once they are asymptomatic for 24 hours without the use of fever reducing medications AND at least five days from the start of symptoms (or from the first positive result if they have no symptoms).      If you have any questions or concerns, or if I can be of further assistance, please do not hesitate to contact me.     Sincerely,    Geena Barnard MD

## 2022-10-07 ENCOUNTER — PATIENT MESSAGE (OUTPATIENT)
Dept: INTERNAL MEDICINE | Facility: CLINIC | Age: 72
End: 2022-10-07
Payer: MEDICARE

## 2022-10-07 ENCOUNTER — TELEPHONE (OUTPATIENT)
Dept: INTERNAL MEDICINE | Facility: CLINIC | Age: 72
End: 2022-10-07
Payer: MEDICARE

## 2022-10-07 NOTE — TELEPHONE ENCOUNTER
----- Message from Aide Cuadra sent at 10/7/2022  2:45 PM CDT -----  Contact: [phone]  [facility] is calling to see if they can change one of the pts medications     Brotap Dm change to 230 intent

## 2022-10-11 RX ORDER — METHOCARBAMOL 500 MG/1
500 TABLET, FILM COATED ORAL 3 TIMES DAILY PRN
Qty: 60 TABLET | Refills: 1 | Status: SHIPPED | OUTPATIENT
Start: 2022-10-11 | End: 2022-12-05

## 2022-10-28 ENCOUNTER — PATIENT MESSAGE (OUTPATIENT)
Dept: INTERNAL MEDICINE | Facility: CLINIC | Age: 72
End: 2022-10-28
Payer: MEDICARE

## 2022-11-23 ENCOUNTER — PATIENT MESSAGE (OUTPATIENT)
Dept: INTERNAL MEDICINE | Facility: CLINIC | Age: 72
End: 2022-11-23
Payer: MEDICARE

## 2022-11-23 RX ORDER — BUTALBITAL, ACETAMINOPHEN AND CAFFEINE 50; 325; 40 MG/1; MG/1; MG/1
TABLET ORAL
Qty: 60 TABLET | Refills: 0 | Status: SHIPPED | OUTPATIENT
Start: 2022-11-23 | End: 2022-11-30 | Stop reason: SDUPTHER

## 2022-11-23 NOTE — TELEPHONE ENCOUNTER
No new care gaps identified.  [facility] Embedded Care Gaps. Reference number: [id number]. 11/23/2022   9:55:46 AM CST

## 2022-11-30 ENCOUNTER — PATIENT MESSAGE (OUTPATIENT)
Dept: INTERNAL MEDICINE | Facility: CLINIC | Age: 72
End: 2022-11-30
Payer: MEDICARE

## 2022-12-01 ENCOUNTER — PATIENT MESSAGE (OUTPATIENT)
Dept: INTERNAL MEDICINE | Facility: CLINIC | Age: 72
End: 2022-12-01
Payer: MEDICARE

## 2022-12-01 DIAGNOSIS — G44.89 CHRONIC MIXED HEADACHE SYNDROME: Primary | ICD-10-CM

## 2022-12-02 RX ORDER — BUTALBITAL, ASPIRIN, AND CAFFEINE 325; 50; 40 MG/1; MG/1; MG/1
1 CAPSULE ORAL EVERY 6 HOURS PRN
Qty: 30 CAPSULE | Refills: 0 | Status: SHIPPED | OUTPATIENT
Start: 2022-12-02 | End: 2023-01-01

## 2022-12-05 ENCOUNTER — PATIENT MESSAGE (OUTPATIENT)
Dept: INTERNAL MEDICINE | Facility: CLINIC | Age: 72
End: 2022-12-05
Payer: MEDICARE

## 2022-12-12 ENCOUNTER — PATIENT MESSAGE (OUTPATIENT)
Dept: INTERNAL MEDICINE | Facility: CLINIC | Age: 72
End: 2022-12-12
Payer: MEDICARE

## 2022-12-12 RX ORDER — METFORMIN HYDROCHLORIDE 500 MG/1
500 TABLET ORAL 2 TIMES DAILY WITH MEALS
Qty: 60 TABLET | Refills: 0 | Status: SHIPPED | OUTPATIENT
Start: 2022-12-12 | End: 2023-03-08 | Stop reason: SDUPTHER

## 2022-12-12 RX ORDER — MECLIZINE HYDROCHLORIDE 25 MG/1
25 TABLET ORAL 2 TIMES DAILY PRN
Qty: 60 TABLET | Refills: 0 | Status: SHIPPED | OUTPATIENT
Start: 2022-12-12 | End: 2023-01-04 | Stop reason: SDUPTHER

## 2022-12-12 NOTE — TELEPHONE ENCOUNTER
No new care gaps identified.  [facility] Embedded Care Gaps. Reference number: [id number]. 12/12/2022   2:14:43 PM CST

## 2022-12-12 NOTE — TELEPHONE ENCOUNTER
No new care gaps identified.  [facility] Embedded Care Gaps. Reference number: [id number]. 12/11/2022   10:15:23 PM CST

## 2022-12-14 RX ORDER — ROSUVASTATIN CALCIUM 40 MG/1
TABLET, COATED ORAL
Qty: 90 TABLET | Refills: 0 | Status: SHIPPED | OUTPATIENT
Start: 2022-12-14 | End: 2023-12-11 | Stop reason: SDUPTHER

## 2022-12-14 NOTE — TELEPHONE ENCOUNTER
Care Due:                  Date            Visit Type   Department     Provider  --------------------------------------------------------------------------------                                MYCHART                              FOLLOWUP/OF  [facility] PRIMARY  Last Visit: 10-      FICE VISIT   CARE           Geena Barnard                              MYCHART                              FOLLOWUP/[facility] PRIMARY  Next Visit: 12-      FICE VISIT   CARE           Geena Barnard                                                            Last  Test          Frequency    Reason                     Performed    Due Date  --------------------------------------------------------------------------------    HBA1C.......  6 months...  metFORMIN................  07- 01-    [facility] Embedded Care Gaps. Reference number: [id number]. 12/14/2022   12:16:44 AM CST

## 2022-12-14 NOTE — TELEPHONE ENCOUNTER
Refill Decision Note   Jayla Loyd  is requesting a refill authorization.  Brief Assessment and Rationale for Refill:  Approve    -Medication-Related Problems Identified: Requires labs  Medication Therapy Plan:       Medication Reconciliation Completed: No   Comments:     Provider Staff:     Action is required for this patient.   Please see care gap opportunities below in Care Due Message.     Thanks!  [facility]     Appointments      Date Provider   Last Visit   10/6/2022 Geena Barnard MD   Next Visit   12/30/2022 Geena Barnard MD     Note composed:3:54 PM 12/14/2022           Note composed:3:54 PM 12/14/2022

## 2022-12-15 ENCOUNTER — PATIENT MESSAGE (OUTPATIENT)
Dept: INTERNAL MEDICINE | Facility: CLINIC | Age: 72
End: 2022-12-15
Payer: MEDICARE

## 2022-12-30 ENCOUNTER — OFFICE VISIT (OUTPATIENT)
Dept: INTERNAL MEDICINE | Facility: CLINIC | Age: 72
End: 2022-12-30
Payer: MEDICARE

## 2022-12-30 ENCOUNTER — PATIENT MESSAGE (OUTPATIENT)
Dept: INTERNAL MEDICINE | Facility: CLINIC | Age: 72
End: 2022-12-30

## 2022-12-30 VITALS
OXYGEN SATURATION: 95 % | BODY MASS INDEX: 30.43 KG/M2 | WEIGHT: 155 LBS | DIASTOLIC BLOOD PRESSURE: 68 MMHG | TEMPERATURE: 99 F | HEIGHT: 60 IN | SYSTOLIC BLOOD PRESSURE: 118 MMHG | HEART RATE: 71 BPM

## 2022-12-30 DIAGNOSIS — R07.81 RIB PAIN ON LEFT SIDE: ICD-10-CM

## 2022-12-30 DIAGNOSIS — E87.5 HYPERKALEMIA: ICD-10-CM

## 2022-12-30 DIAGNOSIS — M19.212 OTHER SECONDARY OSTEOARTHRITIS OF BOTH SHOULDERS: ICD-10-CM

## 2022-12-30 DIAGNOSIS — E11.69 TYPE 2 DIABETES MELLITUS WITH OTHER SPECIFIED COMPLICATION, WITHOUT LONG-TERM CURRENT USE OF INSULIN: ICD-10-CM

## 2022-12-30 DIAGNOSIS — E78.5 HYPERLIPIDEMIA, UNSPECIFIED HYPERLIPIDEMIA TYPE: ICD-10-CM

## 2022-12-30 DIAGNOSIS — M19.211 OTHER SECONDARY OSTEOARTHRITIS OF BOTH SHOULDERS: ICD-10-CM

## 2022-12-30 DIAGNOSIS — Z00.00 WELLNESS EXAMINATION: Primary | ICD-10-CM

## 2022-12-30 DIAGNOSIS — F41.9 ANXIETY: ICD-10-CM

## 2022-12-30 DIAGNOSIS — I10 HYPERTENSION, ESSENTIAL: ICD-10-CM

## 2022-12-30 PROCEDURE — 3078F DIAST BP <80 MM HG: CPT | Mod: HCNC,CPTII,S$GLB, | Performed by: INTERNAL MEDICINE

## 2022-12-30 PROCEDURE — 99999 PR PBB SHADOW E&M-EST. PATIENT-LVL IV: ICD-10-PCS | Mod: PBBFAC,HCNC,, | Performed by: INTERNAL MEDICINE

## 2022-12-30 PROCEDURE — 99999 PR PBB SHADOW E&M-EST. PATIENT-LVL IV: CPT | Mod: PBBFAC,HCNC,, | Performed by: INTERNAL MEDICINE

## 2022-12-30 PROCEDURE — 1159F PR MEDICATION LIST DOCUMENTED IN MEDICAL RECORD: ICD-10-PCS | Mod: HCNC,CPTII,S$GLB, | Performed by: INTERNAL MEDICINE

## 2022-12-30 PROCEDURE — 3074F SYST BP LT 130 MM HG: CPT | Mod: HCNC,CPTII,S$GLB, | Performed by: INTERNAL MEDICINE

## 2022-12-30 PROCEDURE — 3078F PR MOST RECENT DIASTOLIC BLOOD PRESSURE < 80 MM HG: ICD-10-PCS | Mod: HCNC,CPTII,S$GLB, | Performed by: INTERNAL MEDICINE

## 2022-12-30 PROCEDURE — 3008F PR BODY MASS INDEX (BMI) DOCUMENTED: ICD-10-PCS | Mod: HCNC,CPTII,S$GLB, | Performed by: INTERNAL MEDICINE

## 2022-12-30 PROCEDURE — 3008F BODY MASS INDEX DOCD: CPT | Mod: HCNC,CPTII,S$GLB, | Performed by: INTERNAL MEDICINE

## 2022-12-30 PROCEDURE — 1159F MED LIST DOCD IN RCRD: CPT | Mod: HCNC,CPTII,S$GLB, | Performed by: INTERNAL MEDICINE

## 2022-12-30 PROCEDURE — 3074F PR MOST RECENT SYSTOLIC BLOOD PRESSURE < 130 MM HG: ICD-10-PCS | Mod: HCNC,CPTII,S$GLB, | Performed by: INTERNAL MEDICINE

## 2022-12-30 PROCEDURE — 99397 PR PREVENTIVE VISIT,EST,65 & OVER: ICD-10-PCS | Mod: HCNC,S$GLB,, | Performed by: INTERNAL MEDICINE

## 2022-12-30 PROCEDURE — 99397 PER PM REEVAL EST PAT 65+ YR: CPT | Mod: HCNC,S$GLB,, | Performed by: INTERNAL MEDICINE

## 2022-12-30 RX ORDER — FLUOXETINE HYDROCHLORIDE 40 MG/1
40 CAPSULE ORAL DAILY
Qty: 90 CAPSULE | Refills: 3 | Status: SHIPPED | OUTPATIENT
Start: 2022-12-30 | End: 2024-01-24 | Stop reason: SDUPTHER

## 2022-12-30 RX ORDER — DEXTROSE 4 G
1 TABLET,CHEWABLE ORAL 3 TIMES DAILY
Qty: 1 EACH | Refills: 0 | Status: SHIPPED | OUTPATIENT
Start: 2022-12-30 | End: 2023-08-09

## 2022-12-30 RX ORDER — LANCETS
EACH MISCELLANEOUS
Qty: 200 EACH | Refills: 3 | Status: SHIPPED | OUTPATIENT
Start: 2022-12-30 | End: 2023-08-09

## 2022-12-30 RX ORDER — TRAMADOL HYDROCHLORIDE 50 MG/1
50 TABLET ORAL EVERY 6 HOURS PRN
Qty: 21 TABLET | Refills: 0 | Status: SHIPPED | OUTPATIENT
Start: 2022-12-30 | End: 2023-03-07 | Stop reason: SDUPTHER

## 2022-12-30 RX ORDER — BLOOD SUGAR DIAGNOSTIC
STRIP MISCELLANEOUS
Qty: 200 EACH | Refills: 3 | Status: SHIPPED | OUTPATIENT
Start: 2022-12-30 | End: 2023-08-09

## 2022-12-30 RX ORDER — AZELASTINE 1 MG/ML
1 SPRAY, METERED NASAL
COMMUNITY
Start: 2022-10-26 | End: 2024-02-06

## 2022-12-30 NOTE — PROGRESS NOTES
[facility] Note    Chief Complaint      Chief Complaint   Patient presents with    Annual Exam     History of Present Illness      Jayla Loyd is a 72 y.o. female who presents today for chief complaint annual wellness .     HPI   Fell at work and talked to her ortho Esperanza at , had L shoulder pain, has had prior shoulder repair of some point  she thinks shoulder replacement, she wanted tramadol but ortho refused   Had full labs in August   Did have hyperK last labs, had prev been normal, if driss and not resolved may stop arb   DM A1c at goal 6.9 mild microalb and nl RF, on metformin     HLD LDL 79 on crestor   HTN at goal on micardis   CTS saw Jumana has left over opiates from this suggested she take those for her shoulder   Having generalized OA issues feet and shoulders   She takes fluoxetine and prn xanax for anxiety, uses elavil for sleep, wants to increase to 40, otherwise working well   She is on ppi     Mood and sleep as above   Mmg utd   Dexa 2018  Active Problem List with Overview Notes    Diagnosis Date Noted    Osteoarthritis of both shoulders 02/03/2023    Peripheral vascular disease 08/25/2022    Acute ankle pain 05/31/2022    Night sweats 05/25/2022    Cox's esophagus 02/16/2022    Right hip pain 12/20/2021    Chronic cough 04/13/2021    Tremor 02/15/2021    Sleep disturbance 02/15/2021    Venous insufficiency 02/15/2021    Dizziness 01/19/2021    Subclinical hyperthyroidism 12/17/2020    Heat intolerance 11/16/2020    Hyperlipidemia 07/22/2020     On crestor       Arthritis of left shoulder region 07/16/2020     seeing [place] at [facility] pending shoulder MRI       Anxiety 07/16/2020    Type 2 diabetes mellitus with other specified complication, without long-term current use of insulin      Sees dr butt had recent a1c, he also does foot exam      Allergic rhinitis due to pollen 07/08/2020    Encounter for screening mammogram for malignant neoplasm of breast  07/08/2020    Hematuria, unspecified 07/08/2020    Chronic idiopathic constipation 09/24/2019    Iron deficiency anemia, unspecified 06/30/2019    Hyperphosphatemia 08/19/2018    Hypertension, essential 08/19/2018     At goal on lasix and micardis, no chest pain or shortness of breath       Proteinuria, unspecified 08/19/2018     Seeing dr hanson       Body mass index (bmi) 26.0-26.9, adult 11/03/2017     IMO Regulatory Update 10/1/2020      Gastroesophageal reflux disease 07/16/2014    Adenomatous polyp of colon 07/16/2014    Chronic mixed headache syndrome 07/16/2014     Has chronic migraines, uses fioricet   Tried amovig, has not tried triptan - not interested  Sees Charly Do- Neuro      Pure hypercholesterolemia 08/01/2012    Kidney stone 04/14/2011       Health Maintenance   Topic Date Due    TETANUS VACCINE  02/01/2008    Foot Exam  09/03/2021    Eye Exam  07/22/2022    Hemoglobin A1c  01/19/2023    Lipid Panel  03/07/2023    DEXA Scan  08/11/2023    Mammogram  12/08/2023    Low Dose Statin  12/30/2023    Hepatitis C Screening  Completed       Past Medical History:   Diagnosis Date    Carpal tunnel syndrome, right upper limb 06/23/2022    Diabetes mellitus type I     GERD (gastroesophageal reflux disease)     Hx of multiple pulmonary nodules 06/15/2022    Migraines     Proteinuria     Trigger finger, right ring finger 06/23/2022       Past Surgical History:   Procedure Laterality Date    CATARACT EXTRACTION      COSMETIC SURGERY  1971    rhinoplasty    EYE SURGERY  3 years ago    cataracts    JOINT REPLACEMENT  Twice in last 24 months    SHOULDER SURGERY Left 09/2020    TUBAL LIGATION  1992       family history includes Bladder Cancer in her mother; Breast cancer in her maternal aunt and maternal grandmother; Cancer in her maternal aunt, maternal grandmother, mother, paternal aunt, and paternal grandmother; Dementia in her paternal grandmother; Diabetes in her paternal aunt; Heart disease in her father,  maternal grandfather, mother, and paternal grandfather; Heart failure in her father; Hypertension in her father and mother; No Known Problems in her son.    Social History     Tobacco Use    Smoking status: Never    Smokeless tobacco: Never   Substance Use Topics    Alcohol use: Not Currently     Comment: None    Drug use: Not Currently     Types: Other-see comments     Comment: None       Review of Systems   Constitutional:  Negative for chills, fever, malaise/fatigue and weight loss.   Respiratory:  Negative for cough, sputum production, shortness of breath and wheezing.    Cardiovascular:  Negative for chest pain, palpitations, orthopnea and leg swelling.   Gastrointestinal:  Negative for constipation, diarrhea, nausea and vomiting.   Genitourinary:  Negative for dysuria, frequency, hematuria and urgency.   Musculoskeletal:  Positive for joint pain.      Outpatient Encounter Medications as of 2022   Medication Sig Note Dispense Refill    albuterol (PROVENTIL/VENTOLIN HFA) 90 mcg/actuation inhaler INHALE 2 PUFFS INTO THE LUNGS EVERY 6 (SIX) HOURS AS NEEDED FOR WHEEZING. RESCUE  18 g 1    amitriptyline (ELAVIL) 10 MG tablet Take 10 mg by mouth every evening.       azelastine (ASTELIN) 137 mcg (0.1 %) nasal spray 1 spray by Nasal route.       [] butalbital-aspirin-caffeine -40 mg (FIORINAL) -40 mg Cap Take 1 capsule by mouth every 6 (six) hours as needed.  30 capsule 0    coenzyme Q10 100 mg capsule Take 100 mg by mouth once daily. 3/11/2021: As needed      esomeprazole (NEXIUM) 40 MG capsule Take 1 capsule (40 mg total) by mouth 2 (two) times daily before meals.  180 capsule 3    Lactobac no.41/Bifidobact no.7 (PROBIOTIC-10 ORAL) Take by mouth once daily.       metFORMIN (GLUCOPHAGE) 500 MG tablet Take 1 tablet (500 mg total) by mouth 2 (two) times daily with meals.  60 tablet 0    methocarbamoL (ROBAXIN) 500 MG Tab TAKE 1 TABLET (500 MG TOTAL) BY MOUTH 3 (THREE) TIMES DAILY AS NEEDED  (PAIN).  60 tablet 1    promethazine (PHENERGAN) 12.5 MG Tab Take 1 tablet (12.5 mg total) by mouth every 6 (six) hours as needed.  30 tablet 1    propranoloL (INDERAL) 40 MG tablet Take 40 mg by mouth 2 (two) times daily.       rosuvastatin (CRESTOR) 40 MG Tab Take 1 tablet (40 mg total) by mouth once daily.  90 tablet 0    telmisartan (MICARDIS) 20 MG Tab TAKE 1 TABLET BY MOUTH EVERY DAY  90 tablet 1    [DISCONTINUED] ACCU-CHEK SAPPHIRE PLUS TEST STRP Strp Use as directed every day   3    [DISCONTINUED] ACCU-CHEK SOFTCLIX LANCETS Misc USE AS DIRECTED EVERY DAY   3    [DISCONTINUED] ALPRAZolam (XANAX) 0.5 MG tablet Take 1 tablet (0.5 mg total) by mouth 2 (two) times daily as needed for Anxiety.  60 tablet 0    [DISCONTINUED] FLUoxetine 20 MG capsule Take 1 capsule (20 mg total) by mouth once daily.  90 capsule 3    [DISCONTINUED] meclizine (ANTIVERT) 25 mg tablet Take 1 tablet (25 mg total) by mouth 2 (two) times daily as needed.  60 tablet 0    ACCU-CHEK SAPPHIRE PLUS TEST STRP Strp Use as directed every day  200 each 3    ACCU-CHEK SOFTCLIX LANCETS Misc USE AS DIRECTED EVERY DAY  200 each 3    blood-glucose meter Misc 1 each by Misc.(Non-Drug; Combo Route) route 3 (three) times daily.  1 each 0    FLUoxetine 40 MG capsule Take 1 capsule (40 mg total) by mouth once daily.  90 capsule 3    traMADoL (ULTRAM) 50 mg tablet Take 1 tablet (50 mg total) by mouth every 6 (six) hours as needed for Pain.  21 tablet 0    [DISCONTINUED] azelastine-fluticasone (DYMISTA) 137-50 mcg/spray Spry nassal spray USE 1 SPRAY INTO EACH NOSTRIL TWICE A DAY       [DISCONTINUED] predniSONE (DELTASONE) 20 MG tablet Take 1 tablet (20 mg total) by mouth once daily.  5 tablet 0    [DISCONTINUED] telmisartan (MICARDIS) 20 MG Tab Take 1 tablet (20 mg total) by mouth once daily.  90 tablet 1     No facility-administered encounter medications on file as of 12/30/2022.        Review of patient's allergies indicates:   Allergen Reactions    Gabapentin  Hallucinations    Lyrica [pregabalin] Hallucinations    Mobic [meloxicam] Itching           Physical Exam      Vital Signs  Temp: 98.7 °F (37.1 °C)  Pulse: 71  SpO2: 95 %  BP: 118/68  BP Location: Left arm  Patient Position: Sitting  Height and Weight  Height: 5' (152.4 cm)  Weight: 70.3 kg (154 lb 15.7 oz)  BSA (Calculated - sq m): 1.73 sq meters  BMI (Calculated): 30.3  Weight in (lb) to have BMI = 25: 127.7]    Physical Exam  Vitals reviewed.   Constitutional:       General: She is not in acute distress.     Appearance: She is well-developed. She is not diaphoretic.   HENT:      Head: Normocephalic and atraumatic.      Right Ear: External ear normal.      Left Ear: External ear normal.      Nose: Nose normal.   Eyes:      General:         Right eye: No discharge.         Left eye: No discharge.      Conjunctiva/sclera: Conjunctivae normal.   Cardiovascular:      Rate and Rhythm: Normal rate and regular rhythm.      Heart sounds: Normal heart sounds.   Pulmonary:      Effort: Pulmonary effort is normal. No respiratory distress.      Breath sounds: Normal breath sounds. No wheezing.   Musculoskeletal:         General: Normal range of motion.      Cervical back: Normal range of motion.   Skin:     Coloration: Skin is not pale.      Findings: No rash.   Neurological:      Mental Status: She is alert and oriented to person, place, and time.   Psychiatric:         Behavior: Behavior normal.         Thought Content: Thought content normal.        Laboratory:  CBC:  No results for input(s): WBC, RBC, HGB, HCT, PLT, MCV, MCH, MCHC in the last 2160 hours.  CMP:  Recent Labs   Lab Result Units 01/03/23  0951   Glucose mg/dL 145*   Calcium mg/dL 10.0   Sodium mmol/L 141   Potassium mmol/L 4.4   CO2 mmol/L 28   Chloride mmol/L 103   BUN mg/dL 13     URINALYSIS:  No results for input(s): COLORU, CLARITYU, SPECGRAV, PHUR, PROTEINUA, GLUCOSEU, BILIRUBINCON, BLOODU, WBCU, RBCU, BACTERIA, MUCUS, NITRITE, LEUKOCYTESUR,  UROBILINOGEN, HYALINECASTS in the last 2160 hours.   LIPIDS:  No results for input(s): TSH, HDL, CHOL, TRIG, LDLCALC, CHOLHDL, NONHDLCHOL, TOTALCHOLEST in the last 2160 hours.  TSH:  No results for input(s): TSH in the last 2160 hours.  A1C:  No results for input(s): HGBA1C in the last 2160 hours.    Radiology:      Assessment/Plan     Jayla Loyd is a 72 y.o.female with:    1. Wellness examination    2. Hyperkalemia  -     BASIC METABOLIC PANEL; Future; Expected date: 12/30/2022    3. Other secondary osteoarthritis of both shoulders  Assessment & Plan:  Ok for prn tramadol counseled not to mix withpain medication or benzos    Orders:  -     traMADoL (ULTRAM) 50 mg tablet; Take 1 tablet (50 mg total) by mouth every 6 (six) hours as needed for Pain.  Dispense: 21 tablet; Refill: 0    4. Anxiety  Assessment & Plan:  Increase prozac to 40 mg     Orders:  -     FLUoxetine 40 MG capsule; Take 1 capsule (40 mg total) by mouth once daily.  Dispense: 90 capsule; Refill: 3    5. Type 2 diabetes mellitus with other specified complication, without long-term current use of insulin  Overview:  Sees dr butt had recent a1c, he also does foot exam    Assessment & Plan:  At goal    Orders:  -     ACCU-CHEK SAPPHIRE PLUS TEST STRP Strp; Use as directed every day  Dispense: 200 each; Refill: 3  -     ACCU-CHEK SOFTCLIX LANCETS Misc; USE AS DIRECTED EVERY DAY  Dispense: 200 each; Refill: 3  -     blood-glucose meter Misc; 1 each by Misc.(Non-Drug; Combo Route) route 3 (three) times daily.  Dispense: 1 each; Refill: 0    6. Rib pain on left side  -     X-Ray Chest PA And Lateral; Future; Expected date: 12/30/2022    7. Hyperlipidemia, unspecified hyperlipidemia type  Overview:  On crestor     Assessment & Plan:  Near dm goal       8. Hypertension, essential  Overview:  At goal on lasix and micardis, no chest pain or shortness of breath     Assessment & Plan:  At goal            Use of the [employer] Patient Portal discussed and  encouraged during today's visit  -Continue current medications and maintain follow up with specialists.  Return to clinic in 6 months .  Future Appointments   Date Time Provider Department Center   7/5/2023 10:00 AM Geena Barnard MD [facility]       Geena Barnard MD  2/3/2023 1:48 PM    Primary Care Internal Medicine

## 2023-01-03 ENCOUNTER — LAB VISIT (OUTPATIENT)
Dept: LAB | Facility: HOSPITAL | Age: 73
End: 2023-01-03
Attending: INTERNAL MEDICINE
Payer: MEDICARE

## 2023-01-03 DIAGNOSIS — E87.5 HYPERKALEMIA: ICD-10-CM

## 2023-01-03 LAB
ANION GAP SERPL CALC-SCNC: 10 MMOL/L (ref 8–16)
BUN SERPL-MCNC: 13 MG/DL (ref 8–23)
CALCIUM SERPL-MCNC: 10 MG/DL (ref 8.7–10.5)
CHLORIDE SERPL-SCNC: 103 MMOL/L (ref 95–110)
CO2 SERPL-SCNC: 28 MMOL/L (ref 23–29)
CREAT SERPL-MCNC: 0.8 MG/DL (ref 0.5–1.4)
EST. GFR  (NO RACE VARIABLE): >60 ML/MIN/1.73 M^2
GLUCOSE SERPL-MCNC: 145 MG/DL (ref 70–110)
POTASSIUM SERPL-SCNC: 4.4 MMOL/L (ref 3.5–5.1)
SODIUM SERPL-SCNC: 141 MMOL/L (ref 136–145)

## 2023-01-03 PROCEDURE — 36415 COLL VENOUS BLD VENIPUNCTURE: CPT | Mod: HCNC | Performed by: INTERNAL MEDICINE

## 2023-01-03 PROCEDURE — 80048 BASIC METABOLIC PNL TOTAL CA: CPT | Mod: HCNC | Performed by: INTERNAL MEDICINE

## 2023-01-25 ENCOUNTER — PATIENT MESSAGE (OUTPATIENT)
Dept: INTERNAL MEDICINE | Facility: CLINIC | Age: 73
End: 2023-01-25
Payer: MEDICARE

## 2023-01-25 ENCOUNTER — TELEPHONE (OUTPATIENT)
Dept: INTERNAL MEDICINE | Facility: CLINIC | Age: 73
End: 2023-01-25
Payer: MEDICARE

## 2023-01-25 DIAGNOSIS — R51.9 CHRONIC NONINTRACTABLE HEADACHE, UNSPECIFIED HEADACHE TYPE: Primary | ICD-10-CM

## 2023-01-25 DIAGNOSIS — G89.29 CHRONIC NONINTRACTABLE HEADACHE, UNSPECIFIED HEADACHE TYPE: Primary | ICD-10-CM

## 2023-01-25 RX ORDER — BUTALBITAL, ACETAMINOPHEN AND CAFFEINE 50; 325; 40 MG/1; MG/1; MG/1
1 TABLET ORAL EVERY 6 HOURS PRN
Qty: 60 TABLET | Refills: 1 | Status: SHIPPED | OUTPATIENT
Start: 2023-01-25 | End: 2023-01-26 | Stop reason: SDUPTHER

## 2023-01-25 NOTE — TELEPHONE ENCOUNTER
Called [facility] they stated they will not accept this medication from you electronic, you have to write a paper rx .

## 2023-01-25 NOTE — TELEPHONE ENCOUNTER
----- Message from Ute Ashley sent at 1/25/2023  3:45 PM CST -----  Contact: Ela @ [phone]  [facility] explained that the Rx  butalbital-acetaminophen-caffeine -40 mg (FIORICET, ESGIC) -40 mg per tablet that was sent over is not valid and cannot be filled for the pt. Please advise.     [facility] #[id number] - [place] - [address] [facility]  [address] [facility] [address]  Phone: [phone] Fax: [phone]  Hours: Not open 24 hours

## 2023-01-26 RX ORDER — BUTALBITAL, ACETAMINOPHEN AND CAFFEINE 50; 325; 40 MG/1; MG/1; MG/1
1 TABLET ORAL EVERY 6 HOURS PRN
Qty: 60 TABLET | Refills: 1 | Status: SHIPPED | OUTPATIENT
Start: 2023-01-26 | End: 2023-02-25

## 2023-02-03 PROBLEM — M19.011 OSTEOARTHRITIS OF BOTH SHOULDERS: Status: ACTIVE | Noted: 2023-02-03

## 2023-02-03 PROBLEM — R44.1 VISUAL HALLUCINATION: Status: RESOLVED | Noted: 2021-01-19 | Resolved: 2023-02-03

## 2023-02-03 PROBLEM — M19.012 OSTEOARTHRITIS OF BOTH SHOULDERS: Status: ACTIVE | Noted: 2023-02-03

## 2023-02-07 DIAGNOSIS — Z00.00 ENCOUNTER FOR MEDICARE ANNUAL WELLNESS EXAM: ICD-10-CM

## 2023-02-09 DIAGNOSIS — Z00.00 ENCOUNTER FOR MEDICARE ANNUAL WELLNESS EXAM: ICD-10-CM

## 2023-02-21 DIAGNOSIS — F41.9 ANXIETY: ICD-10-CM

## 2023-02-21 RX ORDER — FLUOXETINE HYDROCHLORIDE 20 MG/1
CAPSULE ORAL
Qty: 90 CAPSULE | Refills: 3 | OUTPATIENT
Start: 2023-02-21

## 2023-02-21 NOTE — TELEPHONE ENCOUNTER
Care Due:                  Date            Visit Type   Department     Provider  --------------------------------------------------------------------------------                                MYCHART                              FOLLOWUP/OF  [facility] PRIMARY  Last Visit: 12-      FICE VISIT   CARE           Geena Barnard                               -                              PRIMARY  Next Visit: 07-      CARE ([facility])   None Found     Geena Barnard                                                            Last  Test          Frequency    Reason                     Performed    Due Date  --------------------------------------------------------------------------------    HBA1C.......  6 months...  metFORMIN................  07- 01-    Lipid Panel.  12 months..  rosuvastatin.............  03- 03-    [facility] Embedded Care Gaps. Reference number: [id number]. 2/21/2023   12:14:19 AM CST

## 2023-02-21 NOTE — TELEPHONE ENCOUNTER
Refill Decision Note   Jayla Loyd  is requesting a refill authorization.  Brief Assessment and Rationale for Refill:  Quick Discontinue     Medication Therapy Plan:  Dose adjustment 12/30/22    Medication Reconciliation Completed: No   Comments:     Provider Staff:     Action is required for this patient.   Please see care gap opportunities below in Care Due Message.     Thanks!  [facility]     Appointments      Date Provider   Last Visit   12/30/2022 Geena Barnard MD   Next Visit   7/5/2023 Geena Barnard MD     Note composed:2:30 PM 02/21/2023           Note composed:2:30 PM 02/21/2023

## 2023-03-16 ENCOUNTER — PATIENT MESSAGE (OUTPATIENT)
Dept: PRIMARY CARE CLINIC | Facility: CLINIC | Age: 73
End: 2023-03-16
Payer: MEDICARE

## 2023-03-16 RX ORDER — METFORMIN HYDROCHLORIDE 500 MG/1
2 TABLET, EXTENDED RELEASE ORAL NIGHTLY
COMMUNITY
Start: 2022-12-12 | End: 2023-06-01 | Stop reason: SDUPTHER

## 2023-03-19 DIAGNOSIS — F41.9 ANXIETY: ICD-10-CM

## 2023-03-20 RX ORDER — ALPRAZOLAM 0.5 MG/1
0.5 TABLET ORAL 2 TIMES DAILY PRN
Qty: 60 TABLET | Refills: 0 | Status: SHIPPED | OUTPATIENT
Start: 2023-03-20 | End: 2023-05-10 | Stop reason: SDUPTHER

## 2023-03-20 NOTE — TELEPHONE ENCOUNTER
No new care gaps identified.  [facility] Embedded Care Gaps. Reference number: [id number]. 3/19/2023   8:52:57 PM CDT

## 2023-03-29 ENCOUNTER — OFFICE VISIT (OUTPATIENT)
Dept: INTERNAL MEDICINE | Facility: CLINIC | Age: 73
End: 2023-03-29
Payer: MEDICARE

## 2023-03-29 ENCOUNTER — PATIENT MESSAGE (OUTPATIENT)
Dept: PRIMARY CARE CLINIC | Facility: CLINIC | Age: 73
End: 2023-03-29
Payer: MEDICARE

## 2023-03-29 VITALS
SYSTOLIC BLOOD PRESSURE: 130 MMHG | DIASTOLIC BLOOD PRESSURE: 68 MMHG | HEIGHT: 60 IN | BODY MASS INDEX: 28.27 KG/M2 | HEART RATE: 73 BPM | WEIGHT: 144 LBS | RESPIRATION RATE: 14 BRPM

## 2023-03-29 DIAGNOSIS — E78.5 HYPERLIPIDEMIA ASSOCIATED WITH TYPE 2 DIABETES MELLITUS: ICD-10-CM

## 2023-03-29 DIAGNOSIS — R26.9 ABNORMALITY OF GAIT AND MOBILITY: ICD-10-CM

## 2023-03-29 DIAGNOSIS — I73.9 PERIPHERAL VASCULAR DISEASE: ICD-10-CM

## 2023-03-29 DIAGNOSIS — I15.2 HYPERTENSION ASSOCIATED WITH DIABETES: ICD-10-CM

## 2023-03-29 DIAGNOSIS — G44.89 CHRONIC MIXED HEADACHE SYNDROME: ICD-10-CM

## 2023-03-29 DIAGNOSIS — E11.9 TYPE 2 DIABETES MELLITUS WITHOUT COMPLICATION, WITHOUT LONG-TERM CURRENT USE OF INSULIN: ICD-10-CM

## 2023-03-29 DIAGNOSIS — R93.89 ABNORMAL CT OF THE CHEST: ICD-10-CM

## 2023-03-29 DIAGNOSIS — E11.69 HYPERLIPIDEMIA ASSOCIATED WITH TYPE 2 DIABETES MELLITUS: ICD-10-CM

## 2023-03-29 DIAGNOSIS — Z00.00 ENCOUNTER FOR MEDICARE ANNUAL WELLNESS EXAM: ICD-10-CM

## 2023-03-29 DIAGNOSIS — E78.00 TYPE 2 DIABETES MELLITUS WITH HYPERCHOLESTEROLEMIA: ICD-10-CM

## 2023-03-29 DIAGNOSIS — E11.59 HYPERTENSION ASSOCIATED WITH DIABETES: ICD-10-CM

## 2023-03-29 DIAGNOSIS — R25.1 TREMOR: ICD-10-CM

## 2023-03-29 DIAGNOSIS — E11.69 TYPE 2 DIABETES MELLITUS WITH HYPERCHOLESTEROLEMIA: ICD-10-CM

## 2023-03-29 DIAGNOSIS — R31.9 HEMATURIA, UNSPECIFIED TYPE: ICD-10-CM

## 2023-03-29 DIAGNOSIS — R91.8 LUNG NODULES: ICD-10-CM

## 2023-03-29 DIAGNOSIS — K22.70 BARRETT'S ESOPHAGUS WITHOUT DYSPLASIA: ICD-10-CM

## 2023-03-29 DIAGNOSIS — E11.69 TYPE 2 DIABETES MELLITUS WITH OTHER SPECIFIED COMPLICATION, WITHOUT LONG-TERM CURRENT USE OF INSULIN: Primary | ICD-10-CM

## 2023-03-29 DIAGNOSIS — E66.3 OVERWEIGHT (BMI 25.0-29.9): ICD-10-CM

## 2023-03-29 DIAGNOSIS — Z00.00 ENCOUNTER FOR PREVENTIVE HEALTH EXAMINATION: ICD-10-CM

## 2023-03-29 DIAGNOSIS — I70.0 AORTIC ARCH ATHEROSCLEROSIS: ICD-10-CM

## 2023-03-29 DIAGNOSIS — E11.9 DM TYPE 2 WITHOUT RETINOPATHY: ICD-10-CM

## 2023-03-29 PROBLEM — Z12.31 ENCOUNTER FOR SCREENING MAMMOGRAM FOR MALIGNANT NEOPLASM OF BREAST: Status: RESOLVED | Noted: 2020-07-08 | Resolved: 2023-03-29

## 2023-03-29 PROBLEM — K59.04 CHRONIC IDIOPATHIC CONSTIPATION: Status: RESOLVED | Noted: 2019-09-24 | Resolved: 2023-03-29

## 2023-03-29 PROBLEM — M25.551 RIGHT HIP PAIN: Status: RESOLVED | Noted: 2021-12-20 | Resolved: 2023-03-29

## 2023-03-29 PROBLEM — R68.89 HEAT INTOLERANCE: Status: RESOLVED | Noted: 2020-11-16 | Resolved: 2023-03-29

## 2023-03-29 PROCEDURE — 1101F PT FALLS ASSESS-DOCD LE1/YR: CPT | Mod: HCNC,CPTII,S$GLB, | Performed by: INTERNAL MEDICINE

## 2023-03-29 PROCEDURE — 1170F PR FUNCTIONAL STATUS ASSESSED: ICD-10-PCS | Mod: HCNC,CPTII,S$GLB, | Performed by: INTERNAL MEDICINE

## 2023-03-29 PROCEDURE — G0439 PPPS, SUBSEQ VISIT: HCPCS | Mod: HCNC,S$GLB,, | Performed by: INTERNAL MEDICINE

## 2023-03-29 PROCEDURE — 3075F PR MOST RECENT SYSTOLIC BLOOD PRESS GE 130-139MM HG: ICD-10-PCS | Mod: HCNC,CPTII,S$GLB, | Performed by: INTERNAL MEDICINE

## 2023-03-29 PROCEDURE — 99999 PR PBB SHADOW E&M-EST. PATIENT-LVL V: ICD-10-PCS | Mod: PBBFAC,HCNC,,

## 2023-03-29 PROCEDURE — 1160F RVW MEDS BY RX/DR IN RCRD: CPT | Mod: HCNC,CPTII,S$GLB, | Performed by: INTERNAL MEDICINE

## 2023-03-29 PROCEDURE — 3008F PR BODY MASS INDEX (BMI) DOCUMENTED: ICD-10-PCS | Mod: HCNC,CPTII,S$GLB, | Performed by: INTERNAL MEDICINE

## 2023-03-29 PROCEDURE — 1159F MED LIST DOCD IN RCRD: CPT | Mod: HCNC,CPTII,S$GLB, | Performed by: INTERNAL MEDICINE

## 2023-03-29 PROCEDURE — 3078F DIAST BP <80 MM HG: CPT | Mod: HCNC,CPTII,S$GLB, | Performed by: INTERNAL MEDICINE

## 2023-03-29 PROCEDURE — 1101F PR PT FALLS ASSESS DOC 0-1 FALLS W/OUT INJ PAST YR: ICD-10-PCS | Mod: HCNC,CPTII,S$GLB, | Performed by: INTERNAL MEDICINE

## 2023-03-29 PROCEDURE — 1125F PR PAIN SEVERITY QUANTIFIED, PAIN PRESENT: ICD-10-PCS | Mod: HCNC,CPTII,S$GLB, | Performed by: INTERNAL MEDICINE

## 2023-03-29 PROCEDURE — 1170F FXNL STATUS ASSESSED: CPT | Mod: HCNC,CPTII,S$GLB, | Performed by: INTERNAL MEDICINE

## 2023-03-29 PROCEDURE — 1160F PR REVIEW ALL MEDS BY PRESCRIBER/CLIN PHARMACIST DOCUMENTED: ICD-10-PCS | Mod: HCNC,CPTII,S$GLB, | Performed by: INTERNAL MEDICINE

## 2023-03-29 PROCEDURE — 3008F BODY MASS INDEX DOCD: CPT | Mod: HCNC,CPTII,S$GLB, | Performed by: INTERNAL MEDICINE

## 2023-03-29 PROCEDURE — 3288F PR FALLS RISK ASSESSMENT DOCUMENTED: ICD-10-PCS | Mod: HCNC,CPTII,S$GLB, | Performed by: INTERNAL MEDICINE

## 2023-03-29 PROCEDURE — 1159F PR MEDICATION LIST DOCUMENTED IN MEDICAL RECORD: ICD-10-PCS | Mod: HCNC,CPTII,S$GLB, | Performed by: INTERNAL MEDICINE

## 2023-03-29 PROCEDURE — 1125F AMNT PAIN NOTED PAIN PRSNT: CPT | Mod: HCNC,CPTII,S$GLB, | Performed by: INTERNAL MEDICINE

## 2023-03-29 PROCEDURE — 3075F SYST BP GE 130 - 139MM HG: CPT | Mod: HCNC,CPTII,S$GLB, | Performed by: INTERNAL MEDICINE

## 2023-03-29 PROCEDURE — 3288F FALL RISK ASSESSMENT DOCD: CPT | Mod: HCNC,CPTII,S$GLB, | Performed by: INTERNAL MEDICINE

## 2023-03-29 PROCEDURE — G0439 PR MEDICARE ANNUAL WELLNESS SUBSEQUENT VISIT: ICD-10-PCS | Mod: HCNC,S$GLB,, | Performed by: INTERNAL MEDICINE

## 2023-03-29 PROCEDURE — 3078F PR MOST RECENT DIASTOLIC BLOOD PRESSURE < 80 MM HG: ICD-10-PCS | Mod: HCNC,CPTII,S$GLB, | Performed by: INTERNAL MEDICINE

## 2023-03-29 PROCEDURE — 99999 PR PBB SHADOW E&M-EST. PATIENT-LVL V: CPT | Mod: PBBFAC,HCNC,,

## 2023-03-29 NOTE — PATIENT INSTRUCTIONS
Counseling and Referral of Other Preventative  (Italic type indicates deductible and co-insurance are waived)    Patient Name: Jayla Loyd  Today's Date: 3/29/2023    Health Maintenance         Date Due Completion Date    Eye Exam Current- external   7/22/2021    Hemoglobin A1c Ordered-please follow up with PCP on results   7/19/2022    Lipid Panel Ordered-please follow up with PCP on results 3/7/2022    TETANUS VACCINE In the future for skin trauma   2/1/1998    Foot Exam Scheduled w PCP   9/3/2020    Diabetes Urine Screening 07/19/2023 7/19/2022    DEXA Scan 08/11/2022 8/11/2020    Mammogram 12/08/2023 12/8/2022    Low Dose Statin 03/29/2024 3/29/2023    Colorectal Cancer Screening    Abnormal ct chest- recommended pulmonary evaluation for multlple nodules    Problem list update    Gradual weight loss, diabetic mediterranean diet handouts, exercise         02/08/2027 2/8/2022          Orders Placed This Encounter   Procedures    Hemoglobin A1C    Lipid Panel    Ambulatory referral/consult to Pulmonology     The following information is provided to all patients.  This information is to help you find resources for any of the problems found today that may be affecting your health:                Living healthy guide: www.[facility].[place].gov      Understanding Diabetes: www.diabetes.org      Eating healthy: www.cdc.gov/healthyweight      CDC home safety checklist: www.cdc.gov/steadi/patient.html      Agency on Aging: www.goea.[place].[facility]      Alcoholics anonymous (AA): www.aa.org      Physical Activity: www.adrianna.nih.gov/af2onvb      Tobacco use: www.quitwithusla.org

## 2023-03-29 NOTE — PROGRESS NOTES
Jayla Loyd presented for a  Medicare AWV and comprehensive Health Risk Assessment today. The following components were reviewed and updated:    Medical history  Family History  Social history  Allergies and Current Medications  Health Risk Assessment  Health Maintenance  Care Team -external [facility], neurology,GI [facility]    ** See Completed Assessments for Annual Wellness Visit within the encounter summary.**       The following assessments were completed:  Living Situation  CAGE  Depression Screening  Timed Get Up and Go  Whisper Test  Cognitive Function Screening  Nutrition Screening  ADL Screening  PAQ Screening      Vitals:    03/29/23 0933   BP: 130/68   BP Location: Left arm   Patient Position: Sitting   Pulse: 73   Resp: 14   Weight: 65.3 kg (144 lb)   Height: 5' (1.524 m)     Body mass index is 28.12 kg/m².  Physical Exam  Constitutional:       Comments: Younger in appearance than age   HENT:      Right Ear: There is no impacted cerumen.      Left Ear: There is no impacted cerumen.   Eyes:      General: No scleral icterus.  Cardiovascular:      Rate and Rhythm: Normal rate and regular rhythm.   Pulmonary:      Effort: Pulmonary effort is normal.      Breath sounds: Rales present.      Comments: Rattling gardenia posterior lung fields when pt is coughing- no wheezing  Abdominal:      Palpations: Abdomen is soft.   Musculoskeletal:         General: No swelling. Normal range of motion.   Skin:     General: Skin is warm and dry.   Neurological:      Mental Status: She is alert and oriented to person, place, and time.   Psychiatric:         Mood and Affect: Mood normal.         Behavior: Behavior normal.         Thought Content: Thought content normal.         Judgment: Judgment normal.          Medication review & Opioid screening perform during rooming section. Ultram on md list ordered by another provider- states PRN use.  Review for substance use disorder screening performed during rooming section. No  substance abuse noted on screening.      Diagnoses and health risks identified today and associated recommendations/orders:    1. Encounter for Medicare annual wellness exam  Here for Health Risk Assessment/Annual Wellness Visit.  Health maintenance reviewed and updated. Follow up in one year.   - Ambulatory Referral/Consult to Enhanced Annual Wellness Visit (eAWV)    2. Type 2 diabetes mellitus with other specified complication, without long-term current use of insulin  Stable followed by PCP  - Hemoglobin A1C; Future    3. Hematuria, unspecified type  Stable followed by PCP    4. Chronic mixed headache syndrome  Stable followed by PCP    5. Tremor  Stable followed by PCP, neurology    6. Hypertension associated with diabetes    - Lipid Panel; Future    7. Type 2 diabetes mellitus with hypercholesterolemia  Stable followed by PCP, endocrinology  - Lipid Panel; Future    8. Peripheral vascular disease  Stable followed by PCP    9. Abnormal CT of the chest  Stable followed by PCP  - Ambulatory referral/consult to Pulmonology; Future    10. Lung nodules  Stable followed by PCP  - Ambulatory referral/consult to Pulmonology; Future    11. Aortic arch atherosclerosis  Stable followed by PCP    12. Abnormality of gait and mobility  Stable followed by PCP    13. Encounter for preventive health examination  Here for Health Risk Assessment/Annual Wellness Visit.  Health maintenance reviewed and updated. Follow up in one year.        14. Overweight (BMI 25.0-29.9)  Chronic  Followed by PCP.   Centers for Disease Control and Prevention (CDC)  weight recommendations for current BMI & ideal BMI range discussed with patient.  Recommended  gradual weight loss,  mediterranean diet ,structured regular exercise every day.       15. Hyperlipidemia associated with type 2 diabetes mellitus  Stable followed by PCP    16. Cox's esophagus without dysplasia  Stable followed by external GI    17. Type 2 diabetes mellitus without  complication, without long-term current use of insulin  Stable followed by PCP    18. DM type 2 without retinopathy ( patient states no retinopathy)  Stable followed by external opth clinic      Provided Jayla with a 5-10 year written screening schedule and personal prevention plan. Recommendations were developed using the USPSTF age appropriate recommendations. Education, counseling, and referrals were provided as needed. After Visit Summary printed and given to patient which includes a list of additional screenings\tests needed. Follow up in about 1 year (around 3/29/2024) for HRA.  Abnormal ct chest- recommended pulmonary evaluation for multlple nodules. Recently seen at urgent care-RX w doxy- pt states.Problem list reviewed & updated with patient. Home  -not check daily.DXA done at [facility] last Nov- per pt. EGD per Dr Easton 2/8/2022- has barretts- no dysphasia noted on report. Pt had coughing during HRA & mouth was not covered- I provided her with a kleenex to use to cover mouth when coughing. Medicare care gaps: A1C, lipid ordered- pt instructed to F/U with PCP regarding results. Pt verbalized understanding.    Gradual weight loss, diabetic mediterranean diet handouts, exercise  Mariza Holley, CHARLES I offered to discuss advanced care planning, including how to pick a person who would make decisions for you if you were unable to make them for yourself, called a health care power of , and what kind of decisions you might make such as use of life sustaining treatments such as ventilators and tube feeding when faced with a life limiting illness recorded on a living will that they will need to know. (How you want to be cared for as you near the end of your natural life)     X  Patient has advanced directives written and agrees to provide copies to the institution.

## 2023-04-03 ENCOUNTER — PATIENT MESSAGE (OUTPATIENT)
Dept: ADMINISTRATIVE | Facility: HOSPITAL | Age: 73
End: 2023-04-03
Payer: MEDICARE

## 2023-04-03 DIAGNOSIS — E11.69 TYPE 2 DIABETES MELLITUS WITH OTHER SPECIFIED COMPLICATION, WITHOUT LONG-TERM CURRENT USE OF INSULIN: ICD-10-CM

## 2023-04-03 DIAGNOSIS — Z00.00 WELLNESS EXAMINATION: Primary | ICD-10-CM

## 2023-04-05 ENCOUNTER — PATIENT OUTREACH (OUTPATIENT)
Dept: ADMINISTRATIVE | Facility: HOSPITAL | Age: 73
End: 2023-04-05
Payer: MEDICARE

## 2023-04-05 NOTE — PROGRESS NOTES
Health Maintenance Due   Topic Date Due    Eye Exam  07/22/2022    Hemoglobin A1c  01/19/2023    Lipid Panel  03/07/2023     Chart review done. HM updated. Immunizations reviewed & updated. Care Everywhere updated.  Pt has PCP visit scheduled 7/5/23. Asked PCP team how they would like to handle labs.

## 2023-04-06 NOTE — TELEPHONE ENCOUNTER
No new care gaps identified.  [facility] Embedded Care Gaps. Reference number: [id number]. 4/06/2023   11:35:20 AM CDT

## 2023-04-07 NOTE — TELEPHONE ENCOUNTER
Refill Routing Note   Medication(s) are not appropriate for processing by [facility] for the following reason(s):      Required labs outdated    [facility] action(s):  Defer              Appointments  past 12m or future 3m with PCP    Date Provider   Last Visit   12/30/2022 Geena Barnard MD   Next Visit   7/5/2023 Geena Barnard MD   ED visits in past 90 days: 0        Note composed:8:08 PM 04/06/2023

## 2023-04-10 ENCOUNTER — PATIENT MESSAGE (OUTPATIENT)
Dept: ORTHOPEDICS | Facility: CLINIC | Age: 73
End: 2023-04-10
Payer: MEDICARE

## 2023-04-10 ENCOUNTER — TELEPHONE (OUTPATIENT)
Dept: ORTHOPEDICS | Facility: CLINIC | Age: 73
End: 2023-04-10
Payer: MEDICARE

## 2023-04-10 ENCOUNTER — PATIENT MESSAGE (OUTPATIENT)
Dept: PRIMARY CARE CLINIC | Facility: CLINIC | Age: 73
End: 2023-04-10
Payer: MEDICARE

## 2023-04-10 DIAGNOSIS — G44.89 CHRONIC MIXED HEADACHE SYNDROME: Primary | ICD-10-CM

## 2023-04-10 RX ORDER — BUTALBITAL, ASPIRIN, AND CAFFEINE 325; 50; 40 MG/1; MG/1; MG/1
1 CAPSULE ORAL EVERY 6 HOURS PRN
Qty: 30 CAPSULE | Refills: 0 | Status: SHIPPED | OUTPATIENT
Start: 2023-04-10 | End: 2023-04-19 | Stop reason: SDUPTHER

## 2023-04-10 RX ORDER — METFORMIN HYDROCHLORIDE 500 MG/1
TABLET ORAL
Qty: 60 TABLET | Refills: 0 | Status: SHIPPED | OUTPATIENT
Start: 2023-04-10 | End: 2023-05-04

## 2023-04-10 RX ORDER — BUTALBITAL, ASPIRIN, AND CAFFEINE 325; 50; 40 MG/1; MG/1; MG/1
1 CAPSULE ORAL EVERY 6 HOURS PRN
COMMUNITY
Start: 2022-12-02 | End: 2023-04-10 | Stop reason: SDUPTHER

## 2023-04-10 NOTE — TELEPHONE ENCOUNTER
Unable to leave voicemail as mailbox is not setup. Sent message in [facility] to reschedule 5/4/23 appt    Jsoue Dorantes MS, OTC   Sports Medicine Assistant   [facility]  (P) [phone]  (F) [phone]

## 2023-04-18 ENCOUNTER — PATIENT MESSAGE (OUTPATIENT)
Dept: PRIMARY CARE CLINIC | Facility: CLINIC | Age: 73
End: 2023-04-18
Payer: MEDICARE

## 2023-04-18 DIAGNOSIS — G44.89 CHRONIC MIXED HEADACHE SYNDROME: ICD-10-CM

## 2023-04-19 RX ORDER — BUTALBITAL, ASPIRIN, AND CAFFEINE 325; 50; 40 MG/1; MG/1; MG/1
1 CAPSULE ORAL EVERY 6 HOURS PRN
Qty: 60 CAPSULE | Refills: 0 | Status: SHIPPED | OUTPATIENT
Start: 2023-04-19 | End: 2023-06-01 | Stop reason: SDUPTHER

## 2023-04-28 RX ORDER — MECLIZINE HYDROCHLORIDE 25 MG/1
25 TABLET ORAL 2 TIMES DAILY PRN
Qty: 60 TABLET | Refills: 0 | Status: SHIPPED | OUTPATIENT
Start: 2023-04-28 | End: 2023-06-14 | Stop reason: SDUPTHER

## 2023-04-28 NOTE — TELEPHONE ENCOUNTER
Care Due:                  Date            Visit Type   Department     Provider  --------------------------------------------------------------------------------                                MYCHART                              FOLLOWUP/OF  [facility] PRIMARY  Last Visit: 12-      FICE VISIT   CARE           Geena Barnard                               -                              PRIMARY  Next Visit: 07-      CARE ([facility])   None Found     Geena Barnard                                                            Last  Test          Frequency    Reason                     Performed    Due Date  --------------------------------------------------------------------------------    HBA1C.......  6 months...  metFORMIN................  07- 01-    Lipid Panel.  12 months..  rosuvastatin.............  03- 03-    Health [facility] Embedded Care Due Messages. Reference number: [id number].   4/28/2023 12:11:09 PM CDT

## 2023-05-10 DIAGNOSIS — F41.9 ANXIETY: ICD-10-CM

## 2023-05-10 NOTE — TELEPHONE ENCOUNTER
No care due was identified.  [facility] Embedded Care Due Messages. Reference number: [id number].   5/10/2023 11:57:14 AM CDT

## 2023-05-11 RX ORDER — ALPRAZOLAM 0.5 MG/1
0.5 TABLET ORAL 2 TIMES DAILY PRN
Qty: 60 TABLET | Refills: 0 | Status: SHIPPED | OUTPATIENT
Start: 2023-05-11 | End: 2023-06-01 | Stop reason: SDUPTHER

## 2023-05-16 ENCOUNTER — OFFICE VISIT (OUTPATIENT)
Dept: ORTHOPEDICS | Facility: CLINIC | Age: 73
End: 2023-05-16
Payer: MEDICARE

## 2023-05-16 DIAGNOSIS — M19.072 OSTEOARTHRITIS OF MIDFOOT, LEFT: Primary | ICD-10-CM

## 2023-05-16 PROCEDURE — 4010F ACE/ARB THERAPY RXD/TAKEN: CPT | Mod: CPTII,S$GLB,, | Performed by: ORTHOPAEDIC SURGERY

## 2023-05-16 PROCEDURE — 1159F PR MEDICATION LIST DOCUMENTED IN MEDICAL RECORD: ICD-10-PCS | Mod: CPTII,S$GLB,, | Performed by: ORTHOPAEDIC SURGERY

## 2023-05-16 PROCEDURE — 99999 PR PBB SHADOW E&M-EST. PATIENT-LVL III: CPT | Mod: PBBFAC,,, | Performed by: ORTHOPAEDIC SURGERY

## 2023-05-16 PROCEDURE — 1125F PR PAIN SEVERITY QUANTIFIED, PAIN PRESENT: ICD-10-PCS | Mod: CPTII,S$GLB,, | Performed by: ORTHOPAEDIC SURGERY

## 2023-05-16 PROCEDURE — 4010F PR ACE/ARB THEARPY RXD/TAKEN: ICD-10-PCS | Mod: CPTII,S$GLB,, | Performed by: ORTHOPAEDIC SURGERY

## 2023-05-16 PROCEDURE — 99214 OFFICE O/P EST MOD 30 MIN: CPT | Mod: S$GLB,,, | Performed by: ORTHOPAEDIC SURGERY

## 2023-05-16 PROCEDURE — 1125F AMNT PAIN NOTED PAIN PRSNT: CPT | Mod: CPTII,S$GLB,, | Performed by: ORTHOPAEDIC SURGERY

## 2023-05-16 PROCEDURE — 99214 PR OFFICE/OUTPT VISIT, EST, LEVL IV, 30-39 MIN: ICD-10-PCS | Mod: S$GLB,,, | Performed by: ORTHOPAEDIC SURGERY

## 2023-05-16 PROCEDURE — 99999 PR PBB SHADOW E&M-EST. PATIENT-LVL III: ICD-10-PCS | Mod: PBBFAC,,, | Performed by: ORTHOPAEDIC SURGERY

## 2023-05-16 PROCEDURE — 1159F MED LIST DOCD IN RCRD: CPT | Mod: CPTII,S$GLB,, | Performed by: ORTHOPAEDIC SURGERY

## 2023-05-16 NOTE — PROGRESS NOTES
Subjective:   Chief complaint: Follow-up bilateral foot pain    HPI:   Jayla Loyd is a 72 y.o. female who presents today for follow-up.  Pain is 1/10.  Pain is left worse than right and localized to the midfoot.      ROS:  Musculoskeletal: per HPI     Objective:   Exam:  There were no vitals filed for this visit.  General: No acute distress, well-appearing  Musculoskeletal: TTP about midoot with slight prominence noted.  No collapse seen.    Imaging:  Prior radiographs were independently interpreted by me.    Standing bilateral foot x-rays reviewed and demonstrate some mild metatarsus adductus without stress reaction.  There is 2-3rd end-stage OA changes without 1-2 interval widening.              Assessment:     1. Osteoarthritis of midfoot, left         Patient is seen for multiple problems (not at treatment goal) with ADLs and/or QOL likely to be impacted without treatment.    Data:  1 results independently interpreted    Treatment plan: Corticosteroid injection management discussed and offered.  Referral placed to IR.     I reviewed imaging, clinical history, and diagnosis as above with the patient. I attempted to use layman's terms to educate the patient as well as utilize foot models and/or pictures.   I personally went through imaging with the patient.  I emphasized the role for non-operative treatment.  Non-operative treatment discussed with patient include: Anti-inflammatory medications or creams, RICE modalities, and Corticosteroid injections.  Arthrodesis vs DPN neurectomy (Gus Mcgrath et al) would be reserved for refractory symptoms.  Given patient strongly interested in avoiding any further orthopedic surgery, CSIs are preferable management but briefly discussed the early literature for neurectomy of the deep peroneal nerve for pain relief of midfoot OA.        Plan:       1.  Therapy: None  2.  Symptomatic treatment: RICE modalities recommended with emphasis on ice and elevation as needed and OTC  NSAIDs + APAP recommended  3.  Restrictions: Advance activity as tolerated, use pain as guide  4.  Brace/orthotics/etc: None  5.  Next step: IR-guided 2-3rd TMT CSI  6.  Follow-up: --phone f/up in 2 weeks after injection      Orders Placed This Encounter   Procedures    IR Aspiration Injection Small Joint W FL     Left 2nd-3rd TMT     Standing Status:   Future     Standing Expiration Date:   5/16/2024     Order Specific Question:   Has the patient had a prior contrast or iodine reaction?     Answer:   No     Order Specific Question:   Is the patient currently on any blood thinners like Aspirin, Coumadin, or Plavix?     Answer:   No     Order Specific Question:   Is the patient on any Metformin drug, such as Glucophage or Glucovance?     Answer:   No     Order Specific Question:   May the Radiologist modify the order per protocol to meet the clinical needs of the patient?     Answer:   Yes     Order Specific Question:   Release to patient     Answer:   Immediate    Ambulatory referral/consult  to [facility] Interventional RAD     Standing Status:   Future     Standing Expiration Date:   6/16/2024     Referral Priority:   Routine     Referral Type:   Consultation     Number of Visits Requested:   1       Past Medical History:   Diagnosis Date    Carpal tunnel syndrome, right upper limb 06/23/2022    Diabetes mellitus type I     GERD (gastroesophageal reflux disease)     Hx of multiple pulmonary nodules 06/15/2022    Migraines     Proteinuria     Trigger finger, right ring finger 06/23/2022       Past Surgical History:   Procedure Laterality Date    CATARACT EXTRACTION      COSMETIC SURGERY  1971    rhinoplasty    EYE SURGERY  3 years ago    cataracts    JOINT REPLACEMENT  Twice in last 24 months    SHOULDER SURGERY Left 09/2020    TUBAL LIGATION  1992       Family History   Problem Relation Age of Onset    Bladder Cancer Mother     Hypertension Mother         CABG    Cancer Mother     Heart disease Mother     Heart failure  Father         CABG    Hypertension Father     Heart disease Father     Stroke Brother     No Known Problems Son     Cancer Maternal Aunt     Breast cancer Maternal Aunt     Cancer Paternal Aunt     Diabetes Paternal Aunt     Cancer Maternal Grandmother     Breast cancer Maternal Grandmother     Heart disease Maternal Grandfather     Cancer Paternal Grandmother     Dementia Paternal Grandmother     Heart disease Paternal Grandfather        Social History     Socioeconomic History    Marital status:    Tobacco Use    Smoking status: Never    Smokeless tobacco: Never   Substance and Sexual Activity    Alcohol use: Yes     Comment: rarely    Drug use: Not Currently     Types: Other-see comments     Comment: None    Sexual activity: Yes     Partners: Male     Birth control/protection: Post-menopausal     Social Determinants of Health     Financial Resource Strain: Low Risk     Difficulty of Paying Living Expenses: Not hard at all   Food Insecurity: No Food Insecurity    Worried About Running Out of Food in the Last Year: Never true    Ran Out of Food in the Last Year: Never true   Transportation Needs: No Transportation Needs    Lack of Transportation (Medical): No    Lack of Transportation (Non-Medical): No   Physical Activity: Inactive    Days of Exercise per Week: 0 days    Minutes of Exercise per Session: 0 min   Stress: Stress Concern Present    Feeling of Stress : To some extent   Social Connections: Unknown    Frequency of Communication with Friends and Family: More than three times a week    Frequency of Social Gatherings with Friends and Family: More than three times a week    Active Member of Clubs or Organizations: No    Attends Club or Organization Meetings: Never    Marital Status:    Housing Stability: Low Risk     Unable to Pay for Housing in the Last Year: No    Number of Places Lived in the Last Year: 1    Unstable Housing in the Last Year: No

## 2023-05-22 ENCOUNTER — PATIENT MESSAGE (OUTPATIENT)
Dept: ORTHOPEDICS | Facility: CLINIC | Age: 73
End: 2023-05-22
Payer: MEDICARE

## 2023-06-01 ENCOUNTER — PATIENT MESSAGE (OUTPATIENT)
Dept: ORTHOPEDICS | Facility: CLINIC | Age: 73
End: 2023-06-01
Payer: MEDICARE

## 2023-06-01 ENCOUNTER — PATIENT MESSAGE (OUTPATIENT)
Dept: PRIMARY CARE CLINIC | Facility: CLINIC | Age: 73
End: 2023-06-01
Payer: MEDICARE

## 2023-06-01 DIAGNOSIS — F41.9 ANXIETY: ICD-10-CM

## 2023-06-01 DIAGNOSIS — G44.89 CHRONIC MIXED HEADACHE SYNDROME: ICD-10-CM

## 2023-06-01 RX ORDER — BUTALBITAL, ASPIRIN, AND CAFFEINE 325; 50; 40 MG/1; MG/1; MG/1
1 CAPSULE ORAL EVERY 6 HOURS PRN
Qty: 60 CAPSULE | Refills: 0 | Status: SHIPPED | OUTPATIENT
Start: 2023-06-01 | End: 2023-07-26 | Stop reason: SDUPTHER

## 2023-06-01 RX ORDER — METFORMIN HYDROCHLORIDE 500 MG/1
1000 TABLET, EXTENDED RELEASE ORAL NIGHTLY
Qty: 90 TABLET | Refills: 1 | Status: SHIPPED | OUTPATIENT
Start: 2023-06-01 | End: 2023-08-24 | Stop reason: SDUPTHER

## 2023-06-01 RX ORDER — ALPRAZOLAM 0.5 MG/1
0.5 TABLET ORAL 2 TIMES DAILY PRN
Qty: 60 TABLET | Refills: 0 | Status: SHIPPED | OUTPATIENT
Start: 2023-06-01 | End: 2023-06-14 | Stop reason: SDUPTHER

## 2023-06-02 ENCOUNTER — HOSPITAL ENCOUNTER (OUTPATIENT)
Dept: INTERVENTIONAL RADIOLOGY/VASCULAR | Facility: HOSPITAL | Age: 73
Discharge: HOME | End: 2023-06-02
Attending: ORTHOPAEDIC SURGERY
Payer: MEDICARE

## 2023-06-02 VITALS
RESPIRATION RATE: 18 BRPM | SYSTOLIC BLOOD PRESSURE: 125 MMHG | HEART RATE: 52 BPM | OXYGEN SATURATION: 99 % | DIASTOLIC BLOOD PRESSURE: 61 MMHG

## 2023-06-02 DIAGNOSIS — M19.072 OSTEOARTHRITIS OF MIDFOOT, LEFT: ICD-10-CM

## 2023-06-02 PROCEDURE — 25500020 PHARM REV CODE 255: Performed by: ORTHOPAEDIC SURGERY

## 2023-06-02 PROCEDURE — 27200940 IR ASPIRATION INJECTION SMALL JOINT W FLUORO

## 2023-06-02 PROCEDURE — 77002 NEEDLE LOCALIZATION BY XRAY: CPT | Mod: 26,,, | Performed by: RADIOLOGY

## 2023-06-02 PROCEDURE — 77002 IR ASPIRATION INJECTION SMALL JOINT W FLUORO: ICD-10-PCS | Mod: 26,,, | Performed by: RADIOLOGY

## 2023-06-02 PROCEDURE — 20600 IR ASPIRATION INJECTION SMALL JOINT W FLUORO: ICD-10-PCS | Mod: LT,,, | Performed by: RADIOLOGY

## 2023-06-02 PROCEDURE — 20600 DRAIN/INJ JOINT/BURSA W/O US: CPT | Mod: LT | Performed by: RADIOLOGY

## 2023-06-02 PROCEDURE — 63600175 PHARM REV CODE 636 W HCPCS: Performed by: PSYCHIATRY & NEUROLOGY

## 2023-06-02 RX ORDER — METHYLPREDNISOLONE ACETATE 40 MG/ML
INJECTION, SUSPENSION INTRA-ARTICULAR; INTRALESIONAL; INTRAMUSCULAR; SOFT TISSUE
Status: COMPLETED | OUTPATIENT
Start: 2023-06-02 | End: 2023-06-02

## 2023-06-02 RX ADMIN — IOHEXOL 1 ML: 300 INJECTION, SOLUTION INTRAVENOUS at 03:06

## 2023-06-02 RX ADMIN — METHYLPREDNISOLONE ACETATE 40 MG: 40 INJECTION, SUSPENSION INTRA-ARTICULAR; INTRALESIONAL; INTRAMUSCULAR; SOFT TISSUE at 03:06

## 2023-06-04 NOTE — TELEPHONE ENCOUNTER
Care Due:                  Date            Visit Type   Department     Provider  --------------------------------------------------------------------------------                                MYCHART                              FOLLOWUP/OF  [facility] PRIMARY  Last Visit: 12-      FICE VISIT   CARE           Geena Barnard                               -                              PRIMARY  Next Visit: 07-      CARE ([facility])   None Found     Geena Barnard                                                            Last  Test          Frequency    Reason                     Performed    Due Date  --------------------------------------------------------------------------------    CMP.........  12 months..  rosuvastatin.............  08- 08-    Health [facility] Embedded Care Due Messages. Reference number: [id number].   6/04/2023 12:37:53 PM CDT

## 2023-06-05 RX ORDER — METFORMIN HYDROCHLORIDE 500 MG/1
TABLET ORAL
Qty: 60 TABLET | Refills: 0 | OUTPATIENT
Start: 2023-06-05

## 2023-06-05 NOTE — TELEPHONE ENCOUNTER
Refill Decision Note   Jayla Loyd  is requesting a refill authorization.  Brief Assessment and Rationale for Refill:  Quick Discontinue     Medication Therapy Plan:  FOVS; Receipt confirmed by pharmacy (6/1/2023  6:00 PM CDT)    Medication Reconciliation Completed: No   Comments:     Provider Staff:     Action is required for this patient.   Please see care gap opportunities below in Care Due Message.     Thanks!  [facility]     Appointments      Date Provider   Last Visit   12/30/2022 Geena Barnard MD   Next Visit   7/5/2023 Geena Barnard MD     Note composed:12:49 PM 06/05/2023           Note composed:12:49 PM 06/05/2023

## 2023-06-14 ENCOUNTER — PATIENT MESSAGE (OUTPATIENT)
Dept: PRIMARY CARE CLINIC | Facility: CLINIC | Age: 73
End: 2023-06-14
Payer: MEDICARE

## 2023-06-14 DIAGNOSIS — F41.9 ANXIETY: ICD-10-CM

## 2023-06-15 ENCOUNTER — PATIENT MESSAGE (OUTPATIENT)
Dept: PRIMARY CARE CLINIC | Facility: CLINIC | Age: 73
End: 2023-06-15
Payer: MEDICARE

## 2023-06-15 DIAGNOSIS — F41.9 ANXIETY: ICD-10-CM

## 2023-06-15 RX ORDER — ALPRAZOLAM 0.5 MG/1
0.5 TABLET ORAL 2 TIMES DAILY PRN
Qty: 60 TABLET | Refills: 0 | OUTPATIENT
Start: 2023-06-15

## 2023-06-15 RX ORDER — MECLIZINE HYDROCHLORIDE 25 MG/1
25 TABLET ORAL 2 TIMES DAILY PRN
Qty: 60 TABLET | Refills: 0 | Status: SHIPPED | OUTPATIENT
Start: 2023-06-15 | End: 2023-08-07 | Stop reason: SDUPTHER

## 2023-06-15 NOTE — TELEPHONE ENCOUNTER
No care due was identified.  [facility] Embedded Care Due Messages. Reference number: [id number].   6/14/2023 10:03:14 PM CDT

## 2023-06-16 RX ORDER — ALPRAZOLAM 0.5 MG/1
0.5 TABLET ORAL 2 TIMES DAILY PRN
Qty: 60 TABLET | Refills: 0 | OUTPATIENT
Start: 2023-06-16

## 2023-06-19 ENCOUNTER — PATIENT MESSAGE (OUTPATIENT)
Dept: ORTHOPEDICS | Facility: CLINIC | Age: 73
End: 2023-06-19
Payer: MEDICARE

## 2023-06-27 RX ORDER — TELMISARTAN 20 MG/1
TABLET ORAL
Qty: 90 TABLET | Refills: 1 | Status: SHIPPED | OUTPATIENT
Start: 2023-06-27

## 2023-06-27 NOTE — TELEPHONE ENCOUNTER
No care due was identified.  [facility] Embedded Care Due Messages. Reference number: [id number].   6/27/2023 6:13:10 AM CDT

## 2023-06-27 NOTE — TELEPHONE ENCOUNTER
Refill Decision Note   Jayla Loyd  is requesting a refill authorization.  Brief Assessment and Rationale for Refill:  Approve     Medication Therapy Plan:       Medication Reconciliation Completed: No   Comments:     No Care Gaps recommended.     Note composed:8:47 AM 06/27/2023

## 2023-07-14 ENCOUNTER — PATIENT OUTREACH (OUTPATIENT)
Dept: ADMINISTRATIVE | Facility: HOSPITAL | Age: 73
End: 2023-07-14
Payer: MEDICARE

## 2023-07-14 NOTE — PROGRESS NOTES
Health Maintenance Due   Topic Date Due    TETANUS VACCINE  02/01/2008    Foot Exam  09/03/2021    Hemoglobin A1c  01/19/2023    Lipid Panel  03/07/2023    Diabetes Urine Screening  07/19/2023    DEXA Scan  08/11/2023     Chart review done.  updated. Immunizations reviewed & updated. Care Everywhere updated.

## 2023-07-25 ENCOUNTER — PATIENT MESSAGE (OUTPATIENT)
Dept: PRIMARY CARE CLINIC | Facility: CLINIC | Age: 73
End: 2023-07-25
Payer: MEDICARE

## 2023-07-25 DIAGNOSIS — E11.69 TYPE 2 DIABETES MELLITUS WITH OTHER SPECIFIED COMPLICATION, WITHOUT LONG-TERM CURRENT USE OF INSULIN: Primary | ICD-10-CM

## 2023-07-26 ENCOUNTER — PATIENT MESSAGE (OUTPATIENT)
Dept: PRIMARY CARE CLINIC | Facility: CLINIC | Age: 73
End: 2023-07-26
Payer: MEDICARE

## 2023-07-26 DIAGNOSIS — G44.89 CHRONIC MIXED HEADACHE SYNDROME: ICD-10-CM

## 2023-07-26 RX ORDER — BUTALBITAL, ASPIRIN, AND CAFFEINE 325; 50; 40 MG/1; MG/1; MG/1
1 CAPSULE ORAL EVERY 6 HOURS PRN
Qty: 60 CAPSULE | Refills: 0 | Status: SHIPPED | OUTPATIENT
Start: 2023-07-26 | End: 2023-10-31 | Stop reason: SDUPTHER

## 2023-08-01 ENCOUNTER — LAB VISIT (OUTPATIENT)
Dept: LAB | Facility: HOSPITAL | Age: 73
End: 2023-08-01
Attending: INTERNAL MEDICINE
Payer: MEDICARE

## 2023-08-01 DIAGNOSIS — E11.69 TYPE 2 DIABETES MELLITUS WITH OTHER SPECIFIED COMPLICATION, WITHOUT LONG-TERM CURRENT USE OF INSULIN: ICD-10-CM

## 2023-08-01 LAB
ALBUMIN SERPL BCP-MCNC: 3.3 G/DL (ref 3.5–5.2)
ALP SERPL-CCNC: 131 U/L (ref 55–135)
ALT SERPL W/O P-5'-P-CCNC: 16 U/L (ref 10–44)
ANION GAP SERPL CALC-SCNC: 8 MMOL/L (ref 8–16)
AST SERPL-CCNC: 19 U/L (ref 10–40)
BASOPHILS # BLD AUTO: 0.05 K/UL (ref 0–0.2)
BASOPHILS NFR BLD: 0.7 % (ref 0–1.9)
BILIRUB SERPL-MCNC: 0.2 MG/DL (ref 0.1–1)
BUN SERPL-MCNC: 14 MG/DL (ref 8–23)
CALCIUM SERPL-MCNC: 9.4 MG/DL (ref 8.7–10.5)
CHLORIDE SERPL-SCNC: 107 MMOL/L (ref 95–110)
CHOLEST SERPL-MCNC: 166 MG/DL (ref 120–199)
CHOLEST/HDLC SERPL: 2.3 {RATIO} (ref 2–5)
CO2 SERPL-SCNC: 27 MMOL/L (ref 23–29)
CREAT SERPL-MCNC: 0.8 MG/DL (ref 0.5–1.4)
DIFFERENTIAL METHOD: ABNORMAL
EOSINOPHIL # BLD AUTO: 0.3 K/UL (ref 0–0.5)
EOSINOPHIL NFR BLD: 3.5 % (ref 0–8)
ERYTHROCYTE [DISTWIDTH] IN BLOOD BY AUTOMATED COUNT: 14.7 % (ref 11.5–14.5)
EST. GFR  (NO RACE VARIABLE): >60 ML/MIN/1.73 M^2
ESTIMATED AVG GLUCOSE: 146 MG/DL (ref 68–131)
GLUCOSE SERPL-MCNC: 162 MG/DL (ref 70–110)
HBA1C MFR BLD: 6.7 % (ref 4–5.6)
HCT VFR BLD AUTO: 33.7 % (ref 37–48.5)
HDLC SERPL-MCNC: 71 MG/DL (ref 40–75)
HDLC SERPL: 42.8 % (ref 20–50)
HGB BLD-MCNC: 10.2 G/DL (ref 12–16)
IMM GRANULOCYTES # BLD AUTO: 0.02 K/UL (ref 0–0.04)
IMM GRANULOCYTES NFR BLD AUTO: 0.3 % (ref 0–0.5)
LDLC SERPL CALC-MCNC: 77.4 MG/DL (ref 63–159)
LYMPHOCYTES # BLD AUTO: 2.7 K/UL (ref 1–4.8)
LYMPHOCYTES NFR BLD: 36.2 % (ref 18–48)
MCH RBC QN AUTO: 26.6 PG (ref 27–31)
MCHC RBC AUTO-ENTMCNC: 30.3 G/DL (ref 32–36)
MCV RBC AUTO: 88 FL (ref 82–98)
MONOCYTES # BLD AUTO: 0.4 K/UL (ref 0.3–1)
MONOCYTES NFR BLD: 5.9 % (ref 4–15)
NEUTROPHILS # BLD AUTO: 4 K/UL (ref 1.8–7.7)
NEUTROPHILS NFR BLD: 53.4 % (ref 38–73)
NONHDLC SERPL-MCNC: 95 MG/DL
NRBC BLD-RTO: 0 /100 WBC
PLATELET # BLD AUTO: 377 K/UL (ref 150–450)
PMV BLD AUTO: 9.8 FL (ref 9.2–12.9)
POTASSIUM SERPL-SCNC: 4.8 MMOL/L (ref 3.5–5.1)
PROT SERPL-MCNC: 6.6 G/DL (ref 6–8.4)
RBC # BLD AUTO: 3.84 M/UL (ref 4–5.4)
SODIUM SERPL-SCNC: 142 MMOL/L (ref 136–145)
TRIGL SERPL-MCNC: 88 MG/DL (ref 30–150)
WBC # BLD AUTO: 7.43 K/UL (ref 3.9–12.7)

## 2023-08-01 PROCEDURE — 80053 COMPREHEN METABOLIC PANEL: CPT | Mod: HCNC | Performed by: INTERNAL MEDICINE

## 2023-08-01 PROCEDURE — 83036 HEMOGLOBIN GLYCOSYLATED A1C: CPT | Mod: HCNC | Performed by: INTERNAL MEDICINE

## 2023-08-01 PROCEDURE — 80061 LIPID PANEL: CPT | Mod: HCNC | Performed by: INTERNAL MEDICINE

## 2023-08-01 PROCEDURE — 85025 COMPLETE CBC W/AUTO DIFF WBC: CPT | Mod: HCNC | Performed by: INTERNAL MEDICINE

## 2023-08-01 PROCEDURE — 36415 COLL VENOUS BLD VENIPUNCTURE: CPT | Performed by: INTERNAL MEDICINE

## 2023-08-07 NOTE — TELEPHONE ENCOUNTER
No care due was identified.  Health [facility] Embedded Care Due Messages. Reference number: [id number].   8/07/2023 4:03:01 PM CDT

## 2023-08-08 RX ORDER — MECLIZINE HYDROCHLORIDE 25 MG/1
25 TABLET ORAL 2 TIMES DAILY PRN
Qty: 60 TABLET | Refills: 0 | Status: SHIPPED | OUTPATIENT
Start: 2023-08-08 | End: 2023-09-20 | Stop reason: SDUPTHER

## 2023-08-09 ENCOUNTER — PATIENT MESSAGE (OUTPATIENT)
Dept: PRIMARY CARE CLINIC | Facility: CLINIC | Age: 73
End: 2023-08-09

## 2023-08-09 ENCOUNTER — OFFICE VISIT (OUTPATIENT)
Dept: PRIMARY CARE CLINIC | Facility: CLINIC | Age: 73
End: 2023-08-09
Payer: MEDICARE

## 2023-08-09 VITALS
DIASTOLIC BLOOD PRESSURE: 64 MMHG | WEIGHT: 158.31 LBS | HEART RATE: 85 BPM | OXYGEN SATURATION: 96 % | HEIGHT: 60 IN | BODY MASS INDEX: 31.08 KG/M2 | SYSTOLIC BLOOD PRESSURE: 116 MMHG

## 2023-08-09 DIAGNOSIS — R91.8 LUNG NODULES: ICD-10-CM

## 2023-08-09 DIAGNOSIS — E11.69 HYPERLIPIDEMIA ASSOCIATED WITH TYPE 2 DIABETES MELLITUS: ICD-10-CM

## 2023-08-09 DIAGNOSIS — R59.0 LOCALIZED ENLARGED LYMPH NODES: ICD-10-CM

## 2023-08-09 DIAGNOSIS — R91.8 OTHER NONSPECIFIC ABNORMAL FINDING OF LUNG FIELD: ICD-10-CM

## 2023-08-09 DIAGNOSIS — Z12.31 ENCOUNTER FOR SCREENING MAMMOGRAM FOR MALIGNANT NEOPLASM OF BREAST: ICD-10-CM

## 2023-08-09 DIAGNOSIS — E78.5 HYPERLIPIDEMIA ASSOCIATED WITH TYPE 2 DIABETES MELLITUS: ICD-10-CM

## 2023-08-09 DIAGNOSIS — I70.0 AORTIC ARCH ATHEROSCLEROSIS: ICD-10-CM

## 2023-08-09 DIAGNOSIS — I15.2 HYPERTENSION ASSOCIATED WITH DIABETES: ICD-10-CM

## 2023-08-09 DIAGNOSIS — E11.9 DM TYPE 2 WITHOUT RETINOPATHY: ICD-10-CM

## 2023-08-09 DIAGNOSIS — E11.59 HYPERTENSION ASSOCIATED WITH DIABETES: ICD-10-CM

## 2023-08-09 DIAGNOSIS — R59.0 LAD (LYMPHADENOPATHY), SUBMANDIBULAR: Primary | ICD-10-CM

## 2023-08-09 PROCEDURE — 4010F ACE/ARB THERAPY RXD/TAKEN: CPT | Mod: CPTII,S$GLB,, | Performed by: INTERNAL MEDICINE

## 2023-08-09 PROCEDURE — 3078F PR MOST RECENT DIASTOLIC BLOOD PRESSURE < 80 MM HG: ICD-10-PCS | Mod: CPTII,S$GLB,, | Performed by: INTERNAL MEDICINE

## 2023-08-09 PROCEDURE — 3074F SYST BP LT 130 MM HG: CPT | Mod: CPTII,S$GLB,, | Performed by: INTERNAL MEDICINE

## 2023-08-09 PROCEDURE — 1160F RVW MEDS BY RX/DR IN RCRD: CPT | Mod: CPTII,S$GLB,, | Performed by: INTERNAL MEDICINE

## 2023-08-09 PROCEDURE — 99999 PR PBB SHADOW E&M-EST. PATIENT-LVL IV: CPT | Mod: PBBFAC,,, | Performed by: INTERNAL MEDICINE

## 2023-08-09 PROCEDURE — 3066F PR DOCUMENTATION OF TREATMENT FOR NEPHROPATHY: ICD-10-PCS | Mod: CPTII,S$GLB,, | Performed by: INTERNAL MEDICINE

## 2023-08-09 PROCEDURE — 3008F BODY MASS INDEX DOCD: CPT | Mod: CPTII,S$GLB,, | Performed by: INTERNAL MEDICINE

## 2023-08-09 PROCEDURE — 3044F PR MOST RECENT HEMOGLOBIN A1C LEVEL <7.0%: ICD-10-PCS | Mod: CPTII,S$GLB,, | Performed by: INTERNAL MEDICINE

## 2023-08-09 PROCEDURE — 3066F NEPHROPATHY DOC TX: CPT | Mod: CPTII,S$GLB,, | Performed by: INTERNAL MEDICINE

## 2023-08-09 PROCEDURE — 3074F PR MOST RECENT SYSTOLIC BLOOD PRESSURE < 130 MM HG: ICD-10-PCS | Mod: CPTII,S$GLB,, | Performed by: INTERNAL MEDICINE

## 2023-08-09 PROCEDURE — 99214 OFFICE O/P EST MOD 30 MIN: CPT | Mod: S$GLB,,, | Performed by: INTERNAL MEDICINE

## 2023-08-09 PROCEDURE — 1159F MED LIST DOCD IN RCRD: CPT | Mod: CPTII,S$GLB,, | Performed by: INTERNAL MEDICINE

## 2023-08-09 PROCEDURE — 99214 PR OFFICE/OUTPT VISIT, EST, LEVL IV, 30-39 MIN: ICD-10-PCS | Mod: S$GLB,,, | Performed by: INTERNAL MEDICINE

## 2023-08-09 PROCEDURE — 4010F PR ACE/ARB THEARPY RXD/TAKEN: ICD-10-PCS | Mod: CPTII,S$GLB,, | Performed by: INTERNAL MEDICINE

## 2023-08-09 PROCEDURE — 1160F PR REVIEW ALL MEDS BY PRESCRIBER/CLIN PHARMACIST DOCUMENTED: ICD-10-PCS | Mod: CPTII,S$GLB,, | Performed by: INTERNAL MEDICINE

## 2023-08-09 PROCEDURE — 3060F PR POS MICROALBUMINURIA RESULT DOCUMENTED/REVIEW: ICD-10-PCS | Mod: CPTII,S$GLB,, | Performed by: INTERNAL MEDICINE

## 2023-08-09 PROCEDURE — 99999 PR PBB SHADOW E&M-EST. PATIENT-LVL IV: ICD-10-PCS | Mod: PBBFAC,,, | Performed by: INTERNAL MEDICINE

## 2023-08-09 PROCEDURE — 3078F DIAST BP <80 MM HG: CPT | Mod: CPTII,S$GLB,, | Performed by: INTERNAL MEDICINE

## 2023-08-09 PROCEDURE — 3060F POS MICROALBUMINURIA REV: CPT | Mod: CPTII,S$GLB,, | Performed by: INTERNAL MEDICINE

## 2023-08-09 PROCEDURE — 3044F HG A1C LEVEL LT 7.0%: CPT | Mod: CPTII,S$GLB,, | Performed by: INTERNAL MEDICINE

## 2023-08-09 PROCEDURE — 1159F PR MEDICATION LIST DOCUMENTED IN MEDICAL RECORD: ICD-10-PCS | Mod: CPTII,S$GLB,, | Performed by: INTERNAL MEDICINE

## 2023-08-09 PROCEDURE — 3008F PR BODY MASS INDEX (BMI) DOCUMENTED: ICD-10-PCS | Mod: CPTII,S$GLB,, | Performed by: INTERNAL MEDICINE

## 2023-08-09 NOTE — PROGRESS NOTES
[facility] Note    Chief Complaint      Chief Complaint   Patient presents with    Follow-up     6 mth     History of Present Illness      Jayla Loyd is a 72 y.o. female who presents today for chief complaint follow up chronic issues .     HPI   Last seen 12.22 annual   She is no longer working at [employer]   Right submandibualr mobile tender LAD, hard, mobile     Ct chest in 9.22 mult plum nodules, never smoker   Mmg 12.22  DM A1c at goal 6.7 mild microalb and nl RF, on metformin. We discussed glp1 but she declines now base don possible side effects    HLD LDL 77 on crestor   HTN at goal on micardis   She sees [facility]   Active Problem List with Overview Notes    Diagnosis Date Noted    Abnormal CT of the chest 03/29/2023    Lung nodules 03/29/2023    Aortic arch atherosclerosis 03/29/2023     Mild calcification of aortic arch and cusps seen on CT         Overweight (BMI 25.0-29.9) 03/29/2023    Type 2 diabetes mellitus without complication, without long-term current use of insulin 03/29/2023    DM type 2 without retinopathy 03/29/2023     Per pt      Osteoarthritis of both shoulders 02/03/2023    Peripheral vascular disease 08/25/2022    Acute ankle pain 05/31/2022    Night sweats 05/25/2022    Cox's esophagus 02/16/2022    Chronic cough 04/13/2021    Tremor 02/15/2021    Sleep disturbance 02/15/2021    Venous insufficiency 02/15/2021    Dizziness 01/19/2021    Subclinical hyperthyroidism 12/17/2020    Hyperlipidemia associated with type 2 diabetes mellitus 07/22/2020     On crestor       Arthritis of left shoulder region 07/16/2020     seeing [facility] pending shoulder MRI       Anxiety 07/16/2020    Type 2 diabetes mellitus with other specified complication, without long-term current use of insulin      Sees dr butt had recent a1c, he also does foot exam      Allergic rhinitis due to pollen 07/08/2020    Hematuria, unspecified 07/08/2020    Iron deficiency  anemia, unspecified 06/30/2019    Hyperphosphatemia 08/19/2018    Hypertension associated with diabetes 08/19/2018     At goal on lasix and micardis, no chest pain or shortness of breath       Proteinuria, unspecified 08/19/2018     Seeing dr hanson       Gastroesophageal reflux disease 07/16/2014    Adenomatous polyp of colon 07/16/2014    Chronic mixed headache syndrome 07/16/2014     Has chronic migraines, uses fioricet   Tried amovig, has not tried triptan - not interested  Sees [facility]      Type 2 diabetes mellitus with hypercholesterolemia 08/01/2012       Health Maintenance   Topic Date Due    TETANUS VACCINE  02/01/2008    Foot Exam  09/03/2021    DEXA Scan  08/11/2023    Mammogram  12/08/2023    Hemoglobin A1c  02/01/2024    Eye Exam  03/09/2024    Lipid Panel  08/01/2024    High Dose Statin  08/09/2024    Hepatitis C Screening  Completed       Past Medical History:   Diagnosis Date    Carpal tunnel syndrome, right upper limb 06/23/2022    Diabetes mellitus type I     GERD (gastroesophageal reflux disease)     Hx of multiple pulmonary nodules 06/15/2022    Migraines     Proteinuria     Trigger finger, right ring finger 06/23/2022       Past Surgical History:   Procedure Laterality Date    CATARACT EXTRACTION      COSMETIC SURGERY  1971    rhinoplasty    EYE SURGERY  3 years ago    cataracts    JOINT REPLACEMENT  Twice in last 24 months    SHOULDER SURGERY Left 09/2020    TUBAL LIGATION  1992       family history includes Bladder Cancer in her mother; Breast cancer in her maternal aunt and maternal grandmother; Cancer in her maternal aunt, maternal grandmother, mother, paternal aunt, and paternal grandmother; Dementia in her paternal grandmother; Diabetes in her paternal aunt; Heart disease in her father, maternal grandfather, mother, and paternal grandfather; Heart failure in her father; Hypertension in her father and mother; No Known Problems in her son; Stroke in her brother.    Social  History     Tobacco Use    Smoking status: Never     Passive exposure: Never    Smokeless tobacco: Never   Substance Use Topics    Alcohol use: Yes     Comment: rarely    Drug use: Not Currently     Types: Other-see comments     Comment: None       Review of Systems   Constitutional:  Negative for chills, fever, malaise/fatigue and weight loss.   Respiratory:  Negative for cough, sputum production, shortness of breath and wheezing.    Cardiovascular:  Negative for chest pain, palpitations, orthopnea and leg swelling.   Gastrointestinal:  Negative for constipation, diarrhea, nausea and vomiting.   Genitourinary:  Negative for dysuria, frequency, hematuria and urgency.        Outpatient Encounter Medications as of 8/9/2023   Medication Sig Note Dispense Refill    albuterol (PROVENTIL/VENTOLIN HFA) 90 mcg/actuation inhaler INHALE 2 PUFFS INTO THE LUNGS EVERY 6 (SIX) HOURS AS NEEDED FOR WHEEZING. RESCUE  18 g 1    ALPRAZolam (XANAX) 0.5 MG tablet Take 1 tablet (0.5 mg total) by mouth 2 (two) times daily as needed for Anxiety.  60 tablet 0    amitriptyline (ELAVIL) 10 MG tablet Take 10 mg by mouth every evening.       butalbital-aspirin-caffeine -40 mg (FIORINAL) -40 mg Cap Take 1 capsule by mouth every 6 (six) hours as needed.  60 capsule 0    coenzyme Q10 100 mg capsule Take 100 mg by mouth once daily. 3/11/2021: As needed      esomeprazole (NEXIUM) 40 MG capsule Take 1 capsule (40 mg total) by mouth 2 (two) times daily before meals.  180 capsule 3    FLUoxetine 40 MG capsule Take 1 capsule (40 mg total) by mouth once daily.  90 capsule 3    Lactobac no.41/Bifidobact no.7 (PROBIOTIC-10 ORAL) Take by mouth once daily.       meclizine (ANTIVERT) 25 mg tablet Take 1 tablet (25 mg total) by mouth 2 (two) times daily as needed.  60 tablet 0    metFORMIN (GLUCOPHAGE-XR) 500 MG ER 24hr tablet Take 2 tablets (1,000 mg total) by mouth every evening.  90 tablet 1    methocarbamoL (ROBAXIN) 500 MG Tab TAKE 1 TABLET  (500 MG TOTAL) BY MOUTH 3 (THREE) TIMES DAILY AS NEEDED (PAIN).  60 tablet 1    rosuvastatin (CRESTOR) 40 MG Tab Take 1 tablet (40 mg total) by mouth once daily.  90 tablet 0    telmisartan (MICARDIS) 20 MG Tab TAKE 1 TABLET BY MOUTH EVERY DAY  90 tablet 1    traMADoL (ULTRAM) 50 mg tablet Take 1 tablet (50 mg total) by mouth every 6 (six) hours as needed for Pain.  21 tablet 0    azelastine (ASTELIN) 137 mcg (0.1 %) nasal spray 1 spray by Nasal route.       [DISCONTINUED] ACCU-CHEK SAPPHIRE PLUS TEST STRP Strp Use as directed every day (Patient not taking: Reported on 8/9/2023)  200 each 3    [DISCONTINUED] ACCU-CHEK SOFTCLIX LANCETS Misc USE AS DIRECTED EVERY DAY (Patient not taking: Reported on 8/9/2023)  200 each 3    [DISCONTINUED] blood-glucose meter Misc 1 each by Misc.(Non-Drug; Combo Route) route 3 (three) times daily. (Patient not taking: Reported on 8/9/2023)  1 each 0    [DISCONTINUED] butalbital-aspirin-caffeine -40 mg (FIORINAL) -40 mg Cap Take 1 capsule by mouth every 6 (six) hours as needed.  60 capsule 0    [DISCONTINUED] meclizine (ANTIVERT) 25 mg tablet Take 1 tablet (25 mg total) by mouth 2 (two) times daily as needed.  60 tablet 0    [DISCONTINUED] promethazine (PHENERGAN) 12.5 MG Tab Take 1 tablet (12.5 mg total) by mouth every 6 (six) hours as needed. (Patient not taking: Reported on 8/9/2023)  30 tablet 1    [DISCONTINUED] propranoloL (INDERAL) 40 MG tablet Take 40 mg by mouth 2 (two) times daily.        No facility-administered encounter medications on file as of 8/9/2023.        Review of patient's allergies indicates:   Allergen Reactions    Gabapentin Hallucinations    Lyrica [pregabalin] Hallucinations    Mobic [meloxicam] Itching           Physical Exam      Vital Signs  Pulse: 85  SpO2: 96 %  BP: 116/64  BP Location: Right arm  Patient Position: Sitting  Height and Weight  Height: 5' (152.4 cm)  Weight: 71.8 kg (158 lb 4.6 oz)  BSA (Calculated - sq m): 1.74 sq meters  BMI  "(Calculated): 30.9  Weight in (lb) to have BMI = 25: 127.7]    Physical Exam  Vitals reviewed.   Constitutional:       General: She is not in acute distress.     Appearance: She is well-developed. She is not diaphoretic.   HENT:      Head: Normocephalic and atraumatic.      Right Ear: External ear normal.      Left Ear: External ear normal.      Nose: Nose normal.   Eyes:      General:         Right eye: No discharge.         Left eye: No discharge.      Conjunctiva/sclera: Conjunctivae normal.   Pulmonary:      Effort: Pulmonary effort is normal. No respiratory distress.   Musculoskeletal:         General: Normal range of motion.      Cervical back: Normal range of motion.   Skin:     Coloration: Skin is not pale.      Findings: No rash.   Neurological:      Mental Status: She is alert and oriented to person, place, and time.   Psychiatric:         Behavior: Behavior normal.         Thought Content: Thought content normal.          Laboratory:  CBC:  Recent Labs   Lab Result Units 08/01/23  0947   WBC K/uL 7.43   RBC M/uL 3.84*   Hemoglobin g/dL 10.2*   Hematocrit % 33.7*   Platelets K/uL 377   MCV fL 88   MCH pg 26.6*   MCHC g/dL 30.3*     CMP:  Recent Labs   Lab Result Units 08/01/23  0947   Glucose mg/dL 162*   Calcium mg/dL 9.4   Albumin g/dL 3.3*   Total Protein g/dL 6.6   Sodium mmol/L 142   Potassium mmol/L 4.8   CO2 mmol/L 27   Chloride mmol/L 107   BUN mg/dL 14   Alkaline Phosphatase U/L 131   ALT U/L 16   AST U/L 19   Total Bilirubin mg/dL 0.2     URINALYSIS:  No results for input(s): "COLORU", "CLARITYU", "SPECGRAV", "PHUR", "PROTEINUA", "GLUCOSEU", "BILIRUBINCON", "BLOODU", "WBCU", "RBCU", "BACTERIA", "MUCUS", "NITRITE", "LEUKOCYTESUR", "UROBILINOGEN", "HYALINECASTS" in the last 2160 hours.   LIPIDS:  Recent Labs   Lab Result Units 08/01/23  0947   HDL mg/dL 71   Cholesterol mg/dL 166   Triglycerides mg/dL 88   LDL Cholesterol mg/dL 77.4   HDL/Cholesterol Ratio % 42.8   Non-HDL Cholesterol mg/dL 95 " "  Total Cholesterol/HDL Ratio  2.3     TSH:  No results for input(s): "TSH" in the last 2160 hours.  A1C:  Recent Labs   Lab Result Units 08/01/23  0947   Hemoglobin A1C % 6.7*       Radiology:      Assessment/Plan     Jayla Loyd is a 72 y.o.female with:    1. LAD (lymphadenopathy), submandibular  -     CT Soft Tissue Neck WO Contrast; Future; Expected date: 08/09/2023    2. Aortic arch atherosclerosis  Overview:  Mild calcification of aortic arch and cusps seen on CT         3. Localized enlarged lymph nodes  -     CT Soft Tissue Neck WO Contrast; Future; Expected date: 08/09/2023    4. Other nonspecific abnormal finding of lung field    5. Encounter for screening mammogram for malignant neoplasm of breast  -     Mammo Digital Screening Bilat w/ Raul; Future; Expected date: 12/08/2023    6. Lung nodules  Assessment & Plan:  Never smoker       7. DM type 2 without retinopathy  Overview:  Per pt    Assessment & Plan:  At goal       8. Hyperlipidemia associated with type 2 diabetes mellitus  Overview:  On crestor     Assessment & Plan:  Near goal      9. Hypertension associated with diabetes  Overview:  At goal on lasix and micardis, no chest pain or shortness of breath     Assessment & Plan:  At goal            Geena Barnard MD  8/9/2023 8:43 AM    Primary Care Internal Medicine                     "

## 2023-08-21 ENCOUNTER — PATIENT MESSAGE (OUTPATIENT)
Dept: PRIMARY CARE CLINIC | Facility: CLINIC | Age: 73
End: 2023-08-21
Payer: MEDICARE

## 2023-08-24 DIAGNOSIS — M19.212 OTHER SECONDARY OSTEOARTHRITIS OF BOTH SHOULDERS: ICD-10-CM

## 2023-08-24 DIAGNOSIS — M19.211 OTHER SECONDARY OSTEOARTHRITIS OF BOTH SHOULDERS: ICD-10-CM

## 2023-08-24 DIAGNOSIS — F41.9 ANXIETY: ICD-10-CM

## 2023-08-24 NOTE — TELEPHONE ENCOUNTER
No care due was identified.  [facility] Embedded Care Due Messages. Reference number: [id number].   8/24/2023 1:21:05 PM CDT

## 2023-08-28 RX ORDER — ALPRAZOLAM 0.5 MG/1
0.5 TABLET ORAL 2 TIMES DAILY PRN
Qty: 60 TABLET | Refills: 0 | Status: SHIPPED | OUTPATIENT
Start: 2023-08-28 | End: 2024-01-04 | Stop reason: SDUPTHER

## 2023-08-28 RX ORDER — TRAMADOL HYDROCHLORIDE 50 MG/1
50 TABLET ORAL EVERY 6 HOURS PRN
Qty: 21 TABLET | Refills: 0 | Status: SHIPPED | OUTPATIENT
Start: 2023-08-28

## 2023-08-28 RX ORDER — METFORMIN HYDROCHLORIDE 500 MG/1
1000 TABLET, EXTENDED RELEASE ORAL NIGHTLY
Qty: 90 TABLET | Refills: 1 | Status: SHIPPED | OUTPATIENT
Start: 2023-08-28 | End: 2023-08-31

## 2023-08-28 NOTE — TELEPHONE ENCOUNTER
No care due was identified.  Health [facility] Embedded Care Due Messages. Reference number: [id number].   8/28/2023 7:30:09 AM CDT

## 2023-08-28 NOTE — TELEPHONE ENCOUNTER
Refill Routing Note   Medication(s) are not appropriate for processing by [facility] for the following reason(s):      New or recently adjusted medication    [facility] action(s):  Defer Care Due:  None identified            Appointments  past 12m or future 3m with PCP    Date Provider   Last Visit   8/9/2023 Geena Barnard MD   Next Visit   8/29/2023 Geena Barnard MD   ED visits in past 90 days: 0        Note composed:7:33 AM 08/28/2023

## 2023-08-29 RX ORDER — METFORMIN HYDROCHLORIDE 500 MG/1
1000 TABLET, EXTENDED RELEASE ORAL NIGHTLY
Qty: 180 TABLET | Refills: 1 | Status: SHIPPED | OUTPATIENT
Start: 2023-08-29 | End: 2023-11-17 | Stop reason: SDUPTHER

## 2023-08-31 ENCOUNTER — OFFICE VISIT (OUTPATIENT)
Dept: PRIMARY CARE CLINIC | Facility: CLINIC | Age: 73
End: 2023-08-31
Payer: MEDICARE

## 2023-08-31 ENCOUNTER — PATIENT MESSAGE (OUTPATIENT)
Dept: PRIMARY CARE CLINIC | Facility: CLINIC | Age: 73
End: 2023-08-31

## 2023-08-31 ENCOUNTER — TELEPHONE (OUTPATIENT)
Dept: PRIMARY CARE CLINIC | Facility: CLINIC | Age: 73
End: 2023-08-31

## 2023-08-31 DIAGNOSIS — F41.1 GAD (GENERALIZED ANXIETY DISORDER): Primary | ICD-10-CM

## 2023-08-31 PROCEDURE — 4010F ACE/ARB THERAPY RXD/TAKEN: CPT | Mod: CPTII,95,, | Performed by: INTERNAL MEDICINE

## 2023-08-31 PROCEDURE — 3066F NEPHROPATHY DOC TX: CPT | Mod: CPTII,95,, | Performed by: INTERNAL MEDICINE

## 2023-08-31 PROCEDURE — 3060F PR POS MICROALBUMINURIA RESULT DOCUMENTED/REVIEW: ICD-10-PCS | Mod: CPTII,95,, | Performed by: INTERNAL MEDICINE

## 2023-08-31 PROCEDURE — 3066F PR DOCUMENTATION OF TREATMENT FOR NEPHROPATHY: ICD-10-PCS | Mod: CPTII,95,, | Performed by: INTERNAL MEDICINE

## 2023-08-31 PROCEDURE — 1160F PR REVIEW ALL MEDS BY PRESCRIBER/CLIN PHARMACIST DOCUMENTED: ICD-10-PCS | Mod: CPTII,95,, | Performed by: INTERNAL MEDICINE

## 2023-08-31 PROCEDURE — 3060F POS MICROALBUMINURIA REV: CPT | Mod: CPTII,95,, | Performed by: INTERNAL MEDICINE

## 2023-08-31 PROCEDURE — 4010F PR ACE/ARB THEARPY RXD/TAKEN: ICD-10-PCS | Mod: CPTII,95,, | Performed by: INTERNAL MEDICINE

## 2023-08-31 PROCEDURE — 1160F RVW MEDS BY RX/DR IN RCRD: CPT | Mod: CPTII,95,, | Performed by: INTERNAL MEDICINE

## 2023-08-31 PROCEDURE — 1159F PR MEDICATION LIST DOCUMENTED IN MEDICAL RECORD: ICD-10-PCS | Mod: CPTII,95,, | Performed by: INTERNAL MEDICINE

## 2023-08-31 PROCEDURE — 3044F HG A1C LEVEL LT 7.0%: CPT | Mod: CPTII,95,, | Performed by: INTERNAL MEDICINE

## 2023-08-31 PROCEDURE — 3044F PR MOST RECENT HEMOGLOBIN A1C LEVEL <7.0%: ICD-10-PCS | Mod: CPTII,95,, | Performed by: INTERNAL MEDICINE

## 2023-08-31 PROCEDURE — 1159F MED LIST DOCD IN RCRD: CPT | Mod: CPTII,95,, | Performed by: INTERNAL MEDICINE

## 2023-08-31 PROCEDURE — 99214 PR OFFICE/OUTPT VISIT, EST, LEVL IV, 30-39 MIN: ICD-10-PCS | Mod: 95,,, | Performed by: INTERNAL MEDICINE

## 2023-08-31 PROCEDURE — 99214 OFFICE O/P EST MOD 30 MIN: CPT | Mod: 95,,, | Performed by: INTERNAL MEDICINE

## 2023-08-31 RX ORDER — BUSPIRONE HYDROCHLORIDE 10 MG/1
10 TABLET ORAL 2 TIMES DAILY
Qty: 60 TABLET | Refills: 11 | Status: SHIPPED | OUTPATIENT
Start: 2023-08-31 | End: 2024-01-01

## 2023-08-31 NOTE — PROGRESS NOTES
[facility]  Internal Medicine Clinic Note  The patient location is: [place]  The chief complaint leading to consultation is: DOMINIQUE    Visit type: audiovisual    Face to Face time with patient: 10   10 minutes of total time spent on the encounter, which includes face to face time and non-face to face time preparing to see the patient (eg, review of tests), Obtaining and/or reviewing separately obtained history, Documenting clinical information in the electronic or other health record, Independently interpreting results (not separately reported) and communicating results to the patient/family/caregiver, or Care coordination (not separately reported).         Each patient to whom he or she provides medical services by telemedicine is:  (1) informed of the relationship between the physician and patient and the respective role of any other health care provider with respect to management of the patient; and (2) notified that he or she may decline to receive medical services by telemedicine and may withdraw from such care at any time.    Notes:     Chief Complaint      Chief Complaint   Patient presents with    Anxiety     History of Present Illness      Jayla Loyd is a 72 y.o. female who presents today for chief complaint DOMINIQUE.   Anxiety  Patient reports no chest pain or palpitations.          Personal family issues: her son, her grandson. Son is living with her   She is trying to avoid xanax to manage her anxiety. Instead she is using prn amitriptyline   Active Problem List with Overview Notes    Diagnosis Date Noted    Abnormal CT of the chest 03/29/2023    Lung nodules 03/29/2023    Aortic arch atherosclerosis 03/29/2023     Mild calcification of aortic arch and cusps seen on CT         Overweight (BMI 25.0-29.9) 03/29/2023    Type 2 diabetes mellitus without complication, without long-term current use of insulin 03/29/2023    DM type 2 without retinopathy 03/29/2023     Per pt      Osteoarthritis of both shoulders  02/03/2023    Peripheral vascular disease 08/25/2022    Acute ankle pain 05/31/2022    Night sweats 05/25/2022    Cox's esophagus 02/16/2022    Chronic cough 04/13/2021    Tremor 02/15/2021    Sleep disturbance 02/15/2021    Venous insufficiency 02/15/2021    Dizziness 01/19/2021    Subclinical hyperthyroidism 12/17/2020    Hyperlipidemia associated with type 2 diabetes mellitus 07/22/2020     On crestor       Arthritis of left shoulder region 07/16/2020     seeing [place] at [facility] pending shoulder MRI       Anxiety 07/16/2020    Type 2 diabetes mellitus with other specified complication, without long-term current use of insulin      Sees dr butt had recent a1c, he also does foot exam      Allergic rhinitis due to pollen 07/08/2020    Hematuria, unspecified 07/08/2020    Iron deficiency anemia, unspecified 06/30/2019    Hyperphosphatemia 08/19/2018    Hypertension associated with diabetes 08/19/2018     At goal on lasix and micardis, no chest pain or shortness of breath       Proteinuria, unspecified 08/19/2018     Seeing dr hanson       Gastroesophageal reflux disease 07/16/2014    Adenomatous polyp of colon 07/16/2014    Chronic mixed headache syndrome 07/16/2014     Has chronic migraines, uses fioricet   Tried amovig, has not tried triptan - not interested  Sees [facility]- [facility]      Type 2 diabetes mellitus with hypercholesterolemia 08/01/2012       Health Maintenance   Topic Date Due    TETANUS VACCINE  02/01/2008    Foot Exam  09/03/2021    DEXA Scan  08/11/2023    Mammogram  12/08/2023    Hemoglobin A1c  02/01/2024    Eye Exam  03/09/2024    Lipid Panel  08/01/2024    High Dose Statin  08/09/2024    Colorectal Cancer Screening  02/08/2027    Hepatitis C Screening  Completed    Shingles Vaccine  Discontinued       Past Medical History:   Diagnosis Date    Carpal tunnel syndrome, right upper limb 06/23/2022    Diabetes mellitus type I     GERD (gastroesophageal reflux disease)     Hx of multiple  pulmonary nodules 06/15/2022    Migraines     Proteinuria     Trigger finger, right ring finger 06/23/2022       Past Surgical History:   Procedure Laterality Date    CATARACT EXTRACTION      COSMETIC SURGERY  1971    rhinoplasty    EYE SURGERY  3 years ago    cataracts    JOINT REPLACEMENT  Twice in last 24 months    SHOULDER SURGERY Left 09/2020    TUBAL LIGATION  1992       family history includes Bladder Cancer in her mother; Breast cancer in her maternal aunt and maternal grandmother; Cancer in her maternal aunt, maternal grandmother, mother, paternal aunt, and paternal grandmother; Dementia in her paternal grandmother; Diabetes in her paternal aunt; Heart disease in her father, maternal grandfather, mother, and paternal grandfather; Heart failure in her father; Hypertension in her father and mother; No Known Problems in her son; Stroke in her brother.    Social History     Tobacco Use    Smoking status: Never     Passive exposure: Never    Smokeless tobacco: Never   Substance Use Topics    Alcohol use: Yes     Comment: rarely    Drug use: Not Currently     Types: Other-see comments     Comment: None       Review of Systems   HENT:  Negative for hearing loss.    Eyes:  Negative for discharge.   Respiratory:  Negative for wheezing.    Cardiovascular:  Negative for chest pain and palpitations.   Gastrointestinal:  Negative for blood in stool, constipation, diarrhea and vomiting.   Genitourinary:  Negative for dysuria and hematuria.   Musculoskeletal:  Negative for neck pain.   Neurological:  Positive for headaches. Negative for weakness.   Endo/Heme/Allergies:  Negative for polydipsia.        Outpatient Encounter Medications as of 8/31/2023   Medication Sig Note Dispense Refill    albuterol (PROVENTIL/VENTOLIN HFA) 90 mcg/actuation inhaler INHALE 2 PUFFS INTO THE LUNGS EVERY 6 (SIX) HOURS AS NEEDED FOR WHEEZING. RESCUE  18 g 1    ALPRAZolam (XANAX) 0.5 MG tablet Take 1 tablet (0.5 mg total) by mouth 2 (two) times  daily as needed for Anxiety.  60 tablet 0    amitriptyline (ELAVIL) 10 MG tablet Take 10 mg by mouth every evening.       azelastine (ASTELIN) 137 mcg (0.1 %) nasal spray 1 spray by Nasal route.       busPIRone (BUSPAR) 10 MG tablet Take 1 tablet (10 mg total) by mouth 2 (two) times daily.  60 tablet 11    butalbital-aspirin-caffeine -40 mg (FIORINAL) -40 mg Cap Take 1 capsule by mouth every 6 (six) hours as needed.  60 capsule 0    coenzyme Q10 100 mg capsule Take 100 mg by mouth once daily. 3/11/2021: As needed      esomeprazole (NEXIUM) 40 MG capsule Take 1 capsule (40 mg total) by mouth 2 (two) times daily before meals.  180 capsule 3    FLUoxetine 40 MG capsule Take 1 capsule (40 mg total) by mouth once daily.  90 capsule 3    Lactobac no.41/Bifidobact no.7 (PROBIOTIC-10 ORAL) Take by mouth once daily.       meclizine (ANTIVERT) 25 mg tablet Take 1 tablet (25 mg total) by mouth 2 (two) times daily as needed.  60 tablet 0    metFORMIN (GLUCOPHAGE-XR) 500 MG ER 24hr tablet TAKE 2 TABLETS (1,000 MG TOTAL) BY MOUTH EVERY EVENING.  180 tablet 1    methocarbamoL (ROBAXIN) 500 MG Tab TAKE 1 TABLET (500 MG TOTAL) BY MOUTH 3 (THREE) TIMES DAILY AS NEEDED (PAIN).  60 tablet 1    rosuvastatin (CRESTOR) 40 MG Tab Take 1 tablet (40 mg total) by mouth once daily.  90 tablet 0    telmisartan (MICARDIS) 20 MG Tab TAKE 1 TABLET BY MOUTH EVERY DAY  90 tablet 1    traMADoL (ULTRAM) 50 mg tablet Take 1 tablet (50 mg total) by mouth every 6 (six) hours as needed for Pain.  21 tablet 0    [DISCONTINUED] metFORMIN (GLUCOPHAGE-XR) 500 MG ER 24hr tablet Take 2 tablets (1,000 mg total) by mouth every evening.  90 tablet 1     No facility-administered encounter medications on file as of 8/31/2023.        Review of patient's allergies indicates:   Allergen Reactions    Gabapentin Hallucinations    Lyrica [pregabalin] Hallucinations    Mobic [meloxicam] Itching           Physical Exam       ]    Physical Exam  Vitals reviewed.  "  Constitutional:       General: She is not in acute distress.     Appearance: She is well-developed. She is not diaphoretic.   HENT:      Head: Normocephalic and atraumatic.      Right Ear: External ear normal.      Left Ear: External ear normal.      Nose: Nose normal.   Eyes:      General:         Right eye: No discharge.         Left eye: No discharge.      Conjunctiva/sclera: Conjunctivae normal.   Pulmonary:      Effort: Pulmonary effort is normal. No respiratory distress.   Musculoskeletal:         General: Normal range of motion.      Cervical back: Normal range of motion.   Skin:     Coloration: Skin is not pale.      Findings: No rash.   Neurological:      Mental Status: She is alert and oriented to person, place, and time.   Psychiatric:         Behavior: Behavior normal.         Thought Content: Thought content normal.          Laboratory:  CBC:  Recent Labs   Lab Result Units 08/01/23  0947   WBC K/uL 7.43   RBC M/uL 3.84*   Hemoglobin g/dL 10.2*   Hematocrit % 33.7*   Platelets K/uL 377   MCV fL 88   MCH pg 26.6*   MCHC g/dL 30.3*     CMP:  Recent Labs   Lab Result Units 08/01/23  0947   Glucose mg/dL 162*   Calcium mg/dL 9.4   Albumin g/dL 3.3*   Total Protein g/dL 6.6   Sodium mmol/L 142   Potassium mmol/L 4.8   CO2 mmol/L 27   Chloride mmol/L 107   BUN mg/dL 14   Alkaline Phosphatase U/L 131   ALT U/L 16   AST U/L 19   Total Bilirubin mg/dL 0.2     URINALYSIS:  No results for input(s): "COLORU", "CLARITYU", "SPECGRAV", "PHUR", "PROTEINUA", "GLUCOSEU", "BILIRUBINCON", "BLOODU", "WBCU", "RBCU", "BACTERIA", "MUCUS", "NITRITE", "LEUKOCYTESUR", "UROBILINOGEN", "HYALINECASTS" in the last 2160 hours.   LIPIDS:  Recent Labs   Lab Result Units 08/01/23  0947   HDL mg/dL 71   Cholesterol mg/dL 166   Triglycerides mg/dL 88   LDL Cholesterol mg/dL 77.4   HDL/Cholesterol Ratio % 42.8   Non-HDL Cholesterol mg/dL 95   Total Cholesterol/HDL Ratio  2.3     TSH:  No results for input(s): "TSH" in the last 2160 " hours.  A1C:  Recent Labs   Lab Result Units 08/01/23  0947   Hemoglobin A1C % 6.7*       Assessment/Plan     Jayla Loyd is a 72 y.o.female with:    1. DOMINIQUE (generalized anxiety disorder)  -     busPIRone (BUSPAR) 10 MG tablet; Take 1 tablet (10 mg total) by mouth 2 (two) times daily.  Dispense: 60 tablet; Refill: 11          Use of the [facility] Patient Portal discussed and encouraged during today's visit  -Continue current medications and maintain follow up with specialists.    No future appointments.    Geena Barnard MD  8/31/2023 11:26 AM    Primary Care Internal Medicine

## 2023-08-31 NOTE — TELEPHONE ENCOUNTER
----- Message from Geena Barnard MD sent at 8/31/2023 11:37 AM CDT -----  FU 4 MONTHS FROM LAST APPT

## 2023-09-07 ENCOUNTER — PATIENT MESSAGE (OUTPATIENT)
Dept: PRIMARY CARE CLINIC | Facility: CLINIC | Age: 73
End: 2023-09-07
Payer: MEDICARE

## 2023-09-20 ENCOUNTER — PATIENT MESSAGE (OUTPATIENT)
Dept: PRIMARY CARE CLINIC | Facility: CLINIC | Age: 73
End: 2023-09-20
Payer: MEDICARE

## 2023-09-21 RX ORDER — PROMETHAZINE HYDROCHLORIDE 12.5 MG/1
12.5 TABLET ORAL EVERY 6 HOURS PRN
Qty: 30 TABLET | Refills: 1 | Status: SHIPPED | OUTPATIENT
Start: 2023-09-21 | End: 2023-11-17

## 2023-09-21 RX ORDER — MECLIZINE HYDROCHLORIDE 25 MG/1
25 TABLET ORAL 2 TIMES DAILY PRN
Qty: 60 TABLET | Refills: 0 | Status: SHIPPED | OUTPATIENT
Start: 2023-09-21 | End: 2023-11-15 | Stop reason: SDUPTHER

## 2023-09-21 NOTE — TELEPHONE ENCOUNTER
No care due was identified.  [facility] Embedded Care Due Messages. Reference number: [id number].   9/20/2023 9:31:48 PM CDT

## 2023-10-01 ENCOUNTER — PATIENT MESSAGE (OUTPATIENT)
Dept: PRIMARY CARE CLINIC | Facility: CLINIC | Age: 73
End: 2023-10-01
Payer: MEDICARE

## 2023-10-02 RX ORDER — DICYCLOMINE HYDROCHLORIDE 10 MG/1
10 CAPSULE ORAL
Qty: 120 CAPSULE | Refills: 0 | Status: SHIPPED | OUTPATIENT
Start: 2023-10-02 | End: 2023-11-01

## 2023-10-04 ENCOUNTER — PATIENT MESSAGE (OUTPATIENT)
Dept: PRIMARY CARE CLINIC | Facility: CLINIC | Age: 73
End: 2023-10-04
Payer: MEDICARE

## 2023-10-04 DIAGNOSIS — M79.673 PAIN OF FOOT, UNSPECIFIED LATERALITY: Primary | ICD-10-CM

## 2023-10-12 ENCOUNTER — PATIENT MESSAGE (OUTPATIENT)
Dept: ADMINISTRATIVE | Facility: HOSPITAL | Age: 73
End: 2023-10-12
Payer: MEDICARE

## 2023-10-19 ENCOUNTER — OFFICE VISIT (OUTPATIENT)
Dept: ORTHOPEDICS | Facility: CLINIC | Age: 73
End: 2023-10-19
Payer: MEDICARE

## 2023-10-19 VITALS — BODY MASS INDEX: 31.08 KG/M2 | HEIGHT: 60 IN | WEIGHT: 158.31 LBS

## 2023-10-19 DIAGNOSIS — M19.072 OSTEOARTHRITIS OF MIDFOOT, LEFT: Primary | ICD-10-CM

## 2023-10-19 DIAGNOSIS — M79.673 PAIN OF FOOT, UNSPECIFIED LATERALITY: ICD-10-CM

## 2023-10-19 PROCEDURE — 1125F AMNT PAIN NOTED PAIN PRSNT: CPT | Mod: HCNC,CPTII,S$GLB, | Performed by: ORTHOPAEDIC SURGERY

## 2023-10-19 PROCEDURE — 99214 PR OFFICE/OUTPT VISIT, EST, LEVL IV, 30-39 MIN: ICD-10-PCS | Mod: HCNC,S$GLB,, | Performed by: ORTHOPAEDIC SURGERY

## 2023-10-19 PROCEDURE — 1125F PR PAIN SEVERITY QUANTIFIED, PAIN PRESENT: ICD-10-PCS | Mod: HCNC,CPTII,S$GLB, | Performed by: ORTHOPAEDIC SURGERY

## 2023-10-19 PROCEDURE — 3044F HG A1C LEVEL LT 7.0%: CPT | Mod: HCNC,CPTII,S$GLB, | Performed by: ORTHOPAEDIC SURGERY

## 2023-10-19 PROCEDURE — 1101F PR PT FALLS ASSESS DOC 0-1 FALLS W/OUT INJ PAST YR: ICD-10-PCS | Mod: HCNC,CPTII,S$GLB, | Performed by: ORTHOPAEDIC SURGERY

## 2023-10-19 PROCEDURE — 3044F PR MOST RECENT HEMOGLOBIN A1C LEVEL <7.0%: ICD-10-PCS | Mod: HCNC,CPTII,S$GLB, | Performed by: ORTHOPAEDIC SURGERY

## 2023-10-19 PROCEDURE — 3060F POS MICROALBUMINURIA REV: CPT | Mod: HCNC,CPTII,S$GLB, | Performed by: ORTHOPAEDIC SURGERY

## 2023-10-19 PROCEDURE — 4010F PR ACE/ARB THEARPY RXD/TAKEN: ICD-10-PCS | Mod: HCNC,CPTII,S$GLB, | Performed by: ORTHOPAEDIC SURGERY

## 2023-10-19 PROCEDURE — 1159F PR MEDICATION LIST DOCUMENTED IN MEDICAL RECORD: ICD-10-PCS | Mod: HCNC,CPTII,S$GLB, | Performed by: ORTHOPAEDIC SURGERY

## 2023-10-19 PROCEDURE — 3060F PR POS MICROALBUMINURIA RESULT DOCUMENTED/REVIEW: ICD-10-PCS | Mod: HCNC,CPTII,S$GLB, | Performed by: ORTHOPAEDIC SURGERY

## 2023-10-19 PROCEDURE — 1160F RVW MEDS BY RX/DR IN RCRD: CPT | Mod: HCNC,CPTII,S$GLB, | Performed by: ORTHOPAEDIC SURGERY

## 2023-10-19 PROCEDURE — 99214 OFFICE O/P EST MOD 30 MIN: CPT | Mod: HCNC,S$GLB,, | Performed by: ORTHOPAEDIC SURGERY

## 2023-10-19 PROCEDURE — 3288F PR FALLS RISK ASSESSMENT DOCUMENTED: ICD-10-PCS | Mod: HCNC,CPTII,S$GLB, | Performed by: ORTHOPAEDIC SURGERY

## 2023-10-19 PROCEDURE — 3066F NEPHROPATHY DOC TX: CPT | Mod: HCNC,CPTII,S$GLB, | Performed by: ORTHOPAEDIC SURGERY

## 2023-10-19 PROCEDURE — 3066F PR DOCUMENTATION OF TREATMENT FOR NEPHROPATHY: ICD-10-PCS | Mod: HCNC,CPTII,S$GLB, | Performed by: ORTHOPAEDIC SURGERY

## 2023-10-19 PROCEDURE — 3008F BODY MASS INDEX DOCD: CPT | Mod: HCNC,CPTII,S$GLB, | Performed by: ORTHOPAEDIC SURGERY

## 2023-10-19 PROCEDURE — 1101F PT FALLS ASSESS-DOCD LE1/YR: CPT | Mod: HCNC,CPTII,S$GLB, | Performed by: ORTHOPAEDIC SURGERY

## 2023-10-19 PROCEDURE — 4010F ACE/ARB THERAPY RXD/TAKEN: CPT | Mod: HCNC,CPTII,S$GLB, | Performed by: ORTHOPAEDIC SURGERY

## 2023-10-19 PROCEDURE — 99999 PR PBB SHADOW E&M-EST. PATIENT-LVL IV: CPT | Mod: PBBFAC,HCNC,, | Performed by: ORTHOPAEDIC SURGERY

## 2023-10-19 PROCEDURE — 3288F FALL RISK ASSESSMENT DOCD: CPT | Mod: HCNC,CPTII,S$GLB, | Performed by: ORTHOPAEDIC SURGERY

## 2023-10-19 PROCEDURE — 1159F MED LIST DOCD IN RCRD: CPT | Mod: HCNC,CPTII,S$GLB, | Performed by: ORTHOPAEDIC SURGERY

## 2023-10-19 PROCEDURE — 1160F PR REVIEW ALL MEDS BY PRESCRIBER/CLIN PHARMACIST DOCUMENTED: ICD-10-PCS | Mod: HCNC,CPTII,S$GLB, | Performed by: ORTHOPAEDIC SURGERY

## 2023-10-19 PROCEDURE — 99999 PR PBB SHADOW E&M-EST. PATIENT-LVL IV: ICD-10-PCS | Mod: PBBFAC,HCNC,, | Performed by: ORTHOPAEDIC SURGERY

## 2023-10-19 PROCEDURE — 3008F PR BODY MASS INDEX (BMI) DOCUMENTED: ICD-10-PCS | Mod: HCNC,CPTII,S$GLB, | Performed by: ORTHOPAEDIC SURGERY

## 2023-10-19 NOTE — PROGRESS NOTES
DATE: 10/19/2023  PATIENT: Jayla Loyd    CHIEF COMPLAINT: left foot pain    HISTORY:  Jayla Loyd is a 72 y.o. female here for evaluation of left foot pain.  The patient was previously seen by Dr. Fong and noted to have bilateral midfoot arthritis primarily in the 2nd and 3rd TMT joints.  She endorses left midfoot pain today, but denies right foot pain.  She pursued nonoperative management and had an IR guided corticosteroid injection into the left TMT joint in June.  She reports she had approximally 4 months or of relief from this injection.  She continues other treatments such as Tylenol and NSAIDs with some relief.  The pain is worse with prolonged standing and ambulation.  She has not tried shoes with significant arch support, but states she is planning to by well supporting shoes soon.  She is not interested in surgery at this time and would like to pursue a repeat injection if possible.  She denies pain outside of the mid feet.  She additionally denies numbness, and tingling in the bilateral feet.    PAST MEDICAL/SURGICAL HISTORY:  Past Medical History:   Diagnosis Date    Carpal tunnel syndrome, right upper limb 06/23/2022    Diabetes mellitus type I     GERD (gastroesophageal reflux disease)     Hx of multiple pulmonary nodules 06/15/2022    Migraines     Proteinuria     Trigger finger, right ring finger 06/23/2022     Past Surgical History:   Procedure Laterality Date    CATARACT EXTRACTION      COSMETIC SURGERY  1971    rhinoplasty    EYE SURGERY  3 years ago    cataracts    JOINT REPLACEMENT  Twice in last 24 months    SHOULDER SURGERY Left 09/2020    TUBAL LIGATION  1992       Current Medications:   Current Outpatient Medications:     albuterol (PROVENTIL/VENTOLIN HFA) 90 mcg/actuation inhaler, INHALE 2 PUFFS INTO THE LUNGS EVERY 6 (SIX) HOURS AS NEEDED FOR WHEEZING. RESCUE, Disp: 18 g, Rfl: 1    ALPRAZolam (XANAX) 0.5 MG tablet, Take 1 tablet (0.5 mg total) by mouth 2 (two) times daily  as needed for Anxiety., Disp: 60 tablet, Rfl: 0    amitriptyline (ELAVIL) 10 MG tablet, Take 10 mg by mouth every evening., Disp: , Rfl:     azelastine (ASTELIN) 137 mcg (0.1 %) nasal spray, 1 spray by Nasal route., Disp: , Rfl:     butalbital-aspirin-caffeine -40 mg (FIORINAL) -40 mg Cap, Take 1 capsule by mouth every 6 (six) hours as needed., Disp: 60 capsule, Rfl: 0    coenzyme Q10 100 mg capsule, Take 100 mg by mouth once daily., Disp: , Rfl:     dicyclomine (BENTYL) 10 MG capsule, Take 1 capsule (10 mg total) by mouth 4 (four) times daily before meals and nightly., Disp: 120 capsule, Rfl: 0    esomeprazole (NEXIUM) 40 MG capsule, Take 1 capsule (40 mg total) by mouth 2 (two) times daily before meals., Disp: 180 capsule, Rfl: 3    FLUoxetine 40 MG capsule, Take 1 capsule (40 mg total) by mouth once daily., Disp: 90 capsule, Rfl: 3    Lactobac no.41/Bifidobact no.7 (PROBIOTIC-10 ORAL), Take by mouth once daily., Disp: , Rfl:     meclizine (ANTIVERT) 25 mg tablet, Take 1 tablet (25 mg total) by mouth 2 (two) times daily as needed., Disp: 60 tablet, Rfl: 0    metFORMIN (GLUCOPHAGE-XR) 500 MG ER 24hr tablet, TAKE 2 TABLETS (1,000 MG TOTAL) BY MOUTH EVERY EVENING., Disp: 180 tablet, Rfl: 1    methocarbamoL (ROBAXIN) 500 MG Tab, TAKE 1 TABLET (500 MG TOTAL) BY MOUTH 3 (THREE) TIMES DAILY AS NEEDED (PAIN)., Disp: 60 tablet, Rfl: 1    promethazine (PHENERGAN) 12.5 MG Tab, Take 1 tablet (12.5 mg total) by mouth every 6 (six) hours as needed (nausea)., Disp: 30 tablet, Rfl: 1    rosuvastatin (CRESTOR) 40 MG Tab, Take 1 tablet (40 mg total) by mouth once daily., Disp: 90 tablet, Rfl: 0    telmisartan (MICARDIS) 20 MG Tab, TAKE 1 TABLET BY MOUTH EVERY DAY, Disp: 90 tablet, Rfl: 1    traMADoL (ULTRAM) 50 mg tablet, Take 1 tablet (50 mg total) by mouth every 6 (six) hours as needed for Pain., Disp: 21 tablet, Rfl: 0    busPIRone (BUSPAR) 10 MG tablet, Take 1 tablet (10 mg total) by mouth 2 (two) times daily.,  Disp: 60 tablet, Rfl: 11    Social History:   Social History     Socioeconomic History    Marital status:    Tobacco Use    Smoking status: Never     Passive exposure: Never    Smokeless tobacco: Never   Substance and Sexual Activity    Alcohol use: Yes     Comment: rarely    Drug use: Not Currently     Types: Other-see comments     Comment: None    Sexual activity: Yes     Partners: Male     Birth control/protection: Post-menopausal     Social Determinants of Health     Financial Resource Strain: Low Risk  (3/29/2023)    Overall Financial Resource Strain (CARDIA)     Difficulty of Paying Living Expenses: Not hard at all   Food Insecurity: No Food Insecurity (3/29/2023)    Hunger Vital Sign     Worried About Running Out of Food in the Last Year: Never true     Ran Out of Food in the Last Year: Never true   Transportation Needs: No Transportation Needs (3/29/2023)    PRAPARE - Transportation     Lack of Transportation (Medical): No     Lack of Transportation (Non-Medical): No   Physical Activity: Inactive (10/6/2022)    Exercise Vital Sign     Days of Exercise per Week: 0 days     Minutes of Exercise per Session: 0 min   Stress: Stress Concern Present (3/29/2023)    [employer] [place] [employer] - Occupational Stress Questionnaire     Feeling of Stress : To some extent   Social Connections: Unknown (3/29/2023)    Social Connection and Isolation Panel [NHANES]     Marital Status:    Housing Stability: Unknown (3/29/2023)    Housing Stability Vital Sign     Unable to Pay for Housing in the Last Year: No     Unstable Housing in the Last Year: No       REVIEW OF SYSTEMS:  Constitutional: Denies fever/chills   Neurological: Denies numbness/tingling (any exceptions noted in orthopaedic exam)    Psychiatric/Behavioral: Denies change in normal mood   Eyes: Denies change in vision   Cardiovascular: Denies chest pain   Respiratory: Denies shortness of breath   Hematologic/Lymphatic: Denies easy  bleeding/bruising    Skin: Denies new rash or skin lesions    Gastrointestinal: Denies nausea/vomitting/diarrhea, change in bowel habits, abdominal pain    Allergic/Immunologic: Denies adverse reactions to current medications   Musculoskeletal: see HPI     PHYSICAL EXAMINATION:  General: The patient is a 72 y.o. female in no apparent distress.  Psych: Normal mood and affect  HEENT:  NCAT, sclera nonicteric  Lungs:  Respirations are equal and unlabored.    Left Foot and Ankle Exam    Skin intact throughout.  No open wounds.    Standing alignment of the hindfoot, midfoot, and forefoot normal.  Minimal edema over the dorsal midfoot.  No erythema or ecchymoses.    Minimal tenderness to palpation around the 1st, 2nd, and 3rd TMT joints - patient does localize standing pain to this area  Range of motion of the hindfoot, midfoot, and forefoot relatively well preserved and without significant pain.  Sensation intact to light touch throughout the foot.    2+ dorsalis pedis pulse.    Capillary refill less than 2 seconds.    IMAGING:   Radiographs of the bilateral feet from July 2022 were personally reviewed with the patient today.  They are significant for midfoot arthritis in the 2nd and 3rd TMT joints bilaterally.  No significant degenerative changes elsewhere.    ASSESSMENT/PLAN:  Jayla was seen today for pain and pain.    Diagnoses and all orders for this visit:    Osteoarthritis of midfoot, left  -     Ambulatory referral/consult  to [facility] Interventional RAD; Future  -     IR Aspiration Injection Small Joint W FL; Future    Pain of foot, unspecified laterality  -     Ambulatory referral/consult to Orthopedics  -     Ambulatory referral/consult  to [facility] Interventional RAD; Future  -     IR Aspiration Injection Small Joint W FL; Future    Jayla Loyd is a 72 y.o. female with bilateral midfoot arthritis - the left side is currently symptomatic.  The patient had a corticosteroid injection by IR to the left 2nd TMT  joint approximally 4 months ago.  She had good relief from this injection and would like to pursue a repeat injection.  I reiterated the nonoperative management of arthritis including Tylenol, NSAIDs, shoes with good arch support, and corticosteroid injections.  Also discussed that any surgical intervention would be midfoot fusions.  The patient would like to continue nonoperative management, so I have placed a referral to Interventional Radiology for a repeat left midfoot injection.  The patient can follow up on an as-needed basis for repeat evaluation and may pursue injections approximally every 3 months if needed.    I have personally taken the history and examined this patient and agree with the residents note as stated above.

## 2023-10-24 ENCOUNTER — TELEPHONE (OUTPATIENT)
Dept: INTERVENTIONAL RADIOLOGY/VASCULAR | Facility: CLINIC | Age: 73
End: 2023-10-24
Payer: MEDICARE

## 2023-10-24 NOTE — TELEPHONE ENCOUNTER
Spoke to pt on phone, Pt is scheduled on 11/7/2023 for IR procedure at  location. Pt aware and confirmed, Thanks

## 2023-10-30 ENCOUNTER — PATIENT MESSAGE (OUTPATIENT)
Dept: PRIMARY CARE CLINIC | Facility: CLINIC | Age: 73
End: 2023-10-30
Payer: MEDICARE

## 2023-10-31 ENCOUNTER — PATIENT MESSAGE (OUTPATIENT)
Dept: PRIMARY CARE CLINIC | Facility: CLINIC | Age: 73
End: 2023-10-31
Payer: MEDICARE

## 2023-10-31 DIAGNOSIS — G44.89 CHRONIC MIXED HEADACHE SYNDROME: ICD-10-CM

## 2023-10-31 NOTE — TELEPHONE ENCOUNTER
Care Due:                  Date            Visit Type   Department     Provider  --------------------------------------------------------------------------------                                ESTABLISHED                              PATIENT -    [facility] PRIMARY  Last Visit: 08-      [facility]      CARE           Geena Barnard  Next Visit: None Scheduled  None         None Found                                                            Last  Test          Frequency    Reason                     Performed    Due Date  --------------------------------------------------------------------------------    HBA1C.......  6 months...  metFORMIN................  08- 01-    [facility] Embedded Care Due Messages. Reference number: [id number].   10/31/2023 3:45:46 PM CDT

## 2023-11-01 ENCOUNTER — PATIENT MESSAGE (OUTPATIENT)
Dept: PRIMARY CARE CLINIC | Facility: CLINIC | Age: 73
End: 2023-11-01
Payer: MEDICARE

## 2023-11-01 RX ORDER — BUTALBITAL, ASPIRIN, AND CAFFEINE 325; 50; 40 MG/1; MG/1; MG/1
1 CAPSULE ORAL EVERY 6 HOURS PRN
Qty: 60 CAPSULE | Refills: 0 | Status: SHIPPED | OUTPATIENT
Start: 2023-11-01 | End: 2024-01-01 | Stop reason: SDUPTHER

## 2023-11-06 ENCOUNTER — PATIENT MESSAGE (OUTPATIENT)
Dept: INTERVENTIONAL RADIOLOGY/VASCULAR | Facility: HOSPITAL | Age: 73
End: 2023-11-06
Payer: MEDICARE

## 2023-11-07 ENCOUNTER — PATIENT MESSAGE (OUTPATIENT)
Dept: ORTHOPEDICS | Facility: CLINIC | Age: 73
End: 2023-11-07
Payer: MEDICARE

## 2023-11-07 ENCOUNTER — OFFICE VISIT (OUTPATIENT)
Dept: URGENT CARE | Facility: CLINIC | Age: 73
End: 2023-11-07
Payer: MEDICARE

## 2023-11-07 VITALS
OXYGEN SATURATION: 95 % | SYSTOLIC BLOOD PRESSURE: 131 MMHG | HEART RATE: 77 BPM | TEMPERATURE: 98 F | BODY MASS INDEX: 31.13 KG/M2 | DIASTOLIC BLOOD PRESSURE: 81 MMHG | WEIGHT: 159.38 LBS | RESPIRATION RATE: 17 BRPM

## 2023-11-07 DIAGNOSIS — J01.90 ACUTE SINUSITIS, RECURRENCE NOT SPECIFIED, UNSPECIFIED LOCATION: Primary | ICD-10-CM

## 2023-11-07 LAB
CTP QC/QA: YES
CTP QC/QA: YES
MOLECULAR STREP A: NEGATIVE
SARS-COV-2 AG RESP QL IA.RAPID: NEGATIVE

## 2023-11-07 PROCEDURE — 87651 POCT STREP A MOLECULAR: ICD-10-PCS | Mod: QW,S$GLB,, | Performed by: FAMILY MEDICINE

## 2023-11-07 PROCEDURE — 99213 OFFICE O/P EST LOW 20 MIN: CPT | Mod: S$GLB,,, | Performed by: FAMILY MEDICINE

## 2023-11-07 PROCEDURE — 87811 SARS-COV-2 COVID19 W/OPTIC: CPT | Mod: QW,S$GLB,, | Performed by: FAMILY MEDICINE

## 2023-11-07 PROCEDURE — 99213 PR OFFICE/OUTPT VISIT, EST, LEVL III, 20-29 MIN: ICD-10-PCS | Mod: S$GLB,,, | Performed by: FAMILY MEDICINE

## 2023-11-07 PROCEDURE — 87811 SARS CORONAVIRUS 2 ANTIGEN POCT, MANUAL READ: ICD-10-PCS | Mod: QW,S$GLB,, | Performed by: FAMILY MEDICINE

## 2023-11-07 PROCEDURE — 87651 STREP A DNA AMP PROBE: CPT | Mod: QW,S$GLB,, | Performed by: FAMILY MEDICINE

## 2023-11-07 RX ORDER — FLUTICASONE PROPIONATE 50 MCG
1 SPRAY, SUSPENSION (ML) NASAL DAILY
Qty: 9.9 ML | Refills: 0 | Status: SHIPPED | OUTPATIENT
Start: 2023-11-07 | End: 2024-02-27

## 2023-11-07 RX ORDER — PROMETHAZINE HYDROCHLORIDE AND DEXTROMETHORPHAN HYDROBROMIDE 6.25; 15 MG/5ML; MG/5ML
5 SYRUP ORAL NIGHTLY PRN
Qty: 118 ML | Refills: 0 | Status: SHIPPED | OUTPATIENT
Start: 2023-11-07 | End: 2023-11-17

## 2023-11-07 RX ORDER — AMOXICILLIN AND CLAVULANATE POTASSIUM 875; 125 MG/1; MG/1
1 TABLET, FILM COATED ORAL 2 TIMES DAILY
Qty: 20 TABLET | Refills: 0 | Status: SHIPPED | OUTPATIENT
Start: 2023-11-07 | End: 2023-11-17

## 2023-11-07 NOTE — PROGRESS NOTES
Subjective:      Patient ID: Jayla Loyd is a 73 y.o. female.    Vitals:  weight is 72.3 kg (159 lb 6.3 oz). Her oral temperature is 98.3 °F (36.8 °C). Her blood pressure is 131/81 and her pulse is 77. Her respiration is 17 and oxygen saturation is 95%.     Chief Complaint: Sore Throat    Pt. States symptoms of cough, headache, sore throat, ear pressure started a week and a half ago. Pt. States she has chest pain due to coughing.    Sore Throat   The current episode started 1 to 4 weeks ago. The problem has been gradually worsening. Neither side of throat is experiencing more pain than the other. There has been no fever. The pain is at a severity of 6/10. The pain is mild. Associated symptoms include coughing, headaches and trouble swallowing. She has had no exposure to strep or mono. She has tried nothing for the symptoms. The treatment provided mild relief.       HENT:  Positive for sore throat and trouble swallowing.    Respiratory:  Positive for cough.    Neurological:  Positive for headaches.      Objective:     Physical Exam   Constitutional: She does not appear ill. No distress. obesity  HENT:   Head: Normocephalic and atraumatic.   Nose: Rhinorrhea (purulent) and congestion present.   Mouth/Throat: Posterior oropharyngeal erythema present.   Eyes: Pupils are equal, round, and reactive to light. Extraocular movement intact   Neck: Neck supple.   Cardiovascular: Normal rate, regular rhythm, normal heart sounds and normal pulses.   Pulmonary/Chest: Effort normal and breath sounds normal.   Abdominal: Normal appearance. Soft.   Neurological: She is alert.   Nursing note and vitals reviewed.    Results for orders placed or performed in visit on 11/07/23   SARS Coronavirus 2 Antigen, POCT Manual Read   Result Value Ref Range    SARS Coronavirus 2 Antigen Negative Negative     Acceptable Yes    POCT Strep A, Molecular   Result Value Ref Range    Molecular Strep A, POC Negative Negative      Acceptable Yes         Assessment:     1. Acute sinusitis, recurrence not specified, unspecified location        Plan:       Acute sinusitis, recurrence not specified, unspecified location  -     SARS Coronavirus 2 Antigen, POCT Manual Read  -     POCT Strep A, Molecular  -     amoxicillin-clavulanate 875-125mg (AUGMENTIN) 875-125 mg per tablet; Take 1 tablet by mouth 2 (two) times daily. for 10 days  Dispense: 20 tablet; Refill: 0  -     fluticasone propionate (FLONASE) 50 mcg/actuation nasal spray; 1 spray (50 mcg total) by Each Nostril route once daily.  Dispense: 9.9 mL; Refill: 0  -     promethazine-dextromethorphan (PROMETHAZINE-DM) 6.25-15 mg/5 mL Syrp; Take 5 mLs by mouth nightly as needed (cough).  Dispense: 118 mL; Refill: 0

## 2023-11-08 ENCOUNTER — PATIENT MESSAGE (OUTPATIENT)
Dept: PRIMARY CARE CLINIC | Facility: CLINIC | Age: 73
End: 2023-11-08
Payer: MEDICARE

## 2023-11-09 ENCOUNTER — PATIENT MESSAGE (OUTPATIENT)
Dept: PRIMARY CARE CLINIC | Facility: CLINIC | Age: 73
End: 2023-11-09
Payer: MEDICARE

## 2023-11-09 ENCOUNTER — TELEPHONE (OUTPATIENT)
Dept: INTERVENTIONAL RADIOLOGY/VASCULAR | Facility: CLINIC | Age: 73
End: 2023-11-09
Payer: MEDICARE

## 2023-11-09 DIAGNOSIS — G44.89 CHRONIC MIXED HEADACHE SYNDROME: ICD-10-CM

## 2023-11-09 NOTE — TELEPHONE ENCOUNTER
Spoke to pt on phone, Pt is rescheduled on 11/16/2023 for IR procedure at  location. Pt aware and confirmed, Thanks

## 2023-11-09 NOTE — TELEPHONE ENCOUNTER
No care due was identified.  [facility] Embedded Care Due Messages. Reference number: [id number].   11/09/2023 2:47:48 PM CST

## 2023-11-10 RX ORDER — BUTALBITAL, ASPIRIN, AND CAFFEINE 325; 50; 40 MG/1; MG/1; MG/1
1 CAPSULE ORAL EVERY 6 HOURS PRN
Qty: 60 CAPSULE | Refills: 0 | OUTPATIENT
Start: 2023-11-10

## 2023-11-15 ENCOUNTER — PATIENT MESSAGE (OUTPATIENT)
Dept: ORTHOPEDICS | Facility: CLINIC | Age: 73
End: 2023-11-15
Payer: MEDICARE

## 2023-11-15 RX ORDER — MECLIZINE HYDROCHLORIDE 25 MG/1
25 TABLET ORAL 2 TIMES DAILY PRN
Qty: 60 TABLET | Refills: 0 | Status: SHIPPED | OUTPATIENT
Start: 2023-11-15 | End: 2023-11-17 | Stop reason: SDUPTHER

## 2023-11-16 ENCOUNTER — PATIENT MESSAGE (OUTPATIENT)
Dept: PRIMARY CARE CLINIC | Facility: CLINIC | Age: 73
End: 2023-11-16
Payer: MEDICARE

## 2023-11-16 DIAGNOSIS — R11.0 NAUSEA: Primary | ICD-10-CM

## 2023-11-16 RX ORDER — ONDANSETRON HYDROCHLORIDE 8 MG/1
8 TABLET, FILM COATED ORAL EVERY 8 HOURS PRN
Qty: 20 TABLET | Refills: 1 | Status: SHIPPED | OUTPATIENT
Start: 2023-11-16

## 2023-11-17 ENCOUNTER — PATIENT OUTREACH (OUTPATIENT)
Dept: ADMINISTRATIVE | Facility: HOSPITAL | Age: 73
End: 2023-11-17
Payer: MEDICARE

## 2023-11-17 ENCOUNTER — PATIENT MESSAGE (OUTPATIENT)
Dept: PRIMARY CARE CLINIC | Facility: CLINIC | Age: 73
End: 2023-11-17
Payer: MEDICARE

## 2023-11-17 RX ORDER — METFORMIN HYDROCHLORIDE 500 MG/1
1000 TABLET, EXTENDED RELEASE ORAL NIGHTLY
Qty: 180 TABLET | Refills: 1 | Status: SHIPPED | OUTPATIENT
Start: 2023-11-17 | End: 2024-02-06

## 2023-11-17 RX ORDER — MECLIZINE HYDROCHLORIDE 25 MG/1
25 TABLET ORAL 2 TIMES DAILY PRN
Qty: 60 TABLET | Refills: 0 | Status: SHIPPED | OUTPATIENT
Start: 2023-11-17 | End: 2024-01-24 | Stop reason: SDUPTHER

## 2023-11-27 ENCOUNTER — TELEPHONE (OUTPATIENT)
Dept: INTERVENTIONAL RADIOLOGY/VASCULAR | Facility: CLINIC | Age: 73
End: 2023-11-27
Payer: MEDICARE

## 2023-11-27 NOTE — TELEPHONE ENCOUNTER
Spoke to pt on phone, Pt is rescheduled on 12/8/2023 for IR procedure at  location. Pt aware and confirmed, Thanks

## 2023-12-08 ENCOUNTER — HOSPITAL ENCOUNTER (OUTPATIENT)
Dept: INTERVENTIONAL RADIOLOGY/VASCULAR | Facility: HOSPITAL | Age: 73
Discharge: HOME | End: 2023-12-08
Attending: ORTHOPAEDIC SURGERY
Payer: MEDICARE

## 2023-12-08 VITALS — SYSTOLIC BLOOD PRESSURE: 130 MMHG | HEART RATE: 76 BPM | DIASTOLIC BLOOD PRESSURE: 70 MMHG | OXYGEN SATURATION: 100 %

## 2023-12-08 DIAGNOSIS — M79.673 PAIN OF FOOT, UNSPECIFIED LATERALITY: ICD-10-CM

## 2023-12-08 DIAGNOSIS — M19.072 OSTEOARTHRITIS OF MIDFOOT, LEFT: ICD-10-CM

## 2023-12-08 PROCEDURE — 63600175 PHARM REV CODE 636 W HCPCS: Mod: HCNC | Performed by: RADIOLOGY

## 2023-12-08 PROCEDURE — 25500020 PHARM REV CODE 255: Mod: HCNC | Performed by: ORTHOPAEDIC SURGERY

## 2023-12-08 PROCEDURE — 77002 NEEDLE LOCALIZATION BY XRAY: CPT | Performed by: RADIOLOGY

## 2023-12-08 PROCEDURE — 20600 DRAIN/INJ JOINT/BURSA W/O US: CPT | Mod: 59,LT | Performed by: RADIOLOGY

## 2023-12-08 PROCEDURE — 20600 IR ASPIRATION INJECTION SMALL JOINT W FLUORO: ICD-10-PCS | Mod: 59,LT,, | Performed by: RADIOLOGY

## 2023-12-08 PROCEDURE — 27200940 IR ASPIRATION INJECTION SMALL JOINT W FLUORO: Mod: HCNC

## 2023-12-08 PROCEDURE — 77002 IR ASPIRATION INJECTION SMALL JOINT W FLUORO: ICD-10-PCS | Mod: 26,,, | Performed by: RADIOLOGY

## 2023-12-08 PROCEDURE — 77002 NEEDLE LOCALIZATION BY XRAY: CPT | Mod: 26,,, | Performed by: RADIOLOGY

## 2023-12-08 RX ORDER — BUPIVACAINE HYDROCHLORIDE 2.5 MG/ML
INJECTION, SOLUTION EPIDURAL; INFILTRATION; INTRACAUDAL
Status: COMPLETED | OUTPATIENT
Start: 2023-12-08 | End: 2023-12-08

## 2023-12-08 RX ORDER — METHYLPREDNISOLONE ACETATE 40 MG/ML
INJECTION, SUSPENSION INTRA-ARTICULAR; INTRALESIONAL; INTRAMUSCULAR; SOFT TISSUE
Status: COMPLETED | OUTPATIENT
Start: 2023-12-08 | End: 2023-12-08

## 2023-12-08 RX ADMIN — IOHEXOL 5 ML: 180 INJECTION INTRAVENOUS at 03:12

## 2023-12-08 RX ADMIN — METHYLPREDNISOLONE ACETATE 40 MG: 40 INJECTION, SUSPENSION INTRA-ARTICULAR; INTRALESIONAL; INTRAMUSCULAR; SOFT TISSUE at 02:12

## 2023-12-08 RX ADMIN — BUPIVACAINE HYDROCHLORIDE 1 MG: 2.5 INJECTION, SOLUTION EPIDURAL; INFILTRATION; INTRACAUDAL; PERINEURAL at 02:12

## 2023-12-08 NOTE — NURSING
Pt arrived to 190 for left foot steroid injection. Pt oriented to unit and staff. Plan of care reviewed with patient, patient verbalizes understanding. Comfort measures utilized. Pt safely transferred from stretcher to procedural table. Fall risk reviewed with patient, fall risk interventions maintained.  Blankets applied. Pt prepped and draped utilizing standard sterile technique. Patient placed on continuous monitoring, as required by sedation policy. Timeouts completed utilizing standard universal time-out, per department and facility policy. RN to remain at bedside, continuous monitoring maintained. Pt resting comfortably. Denies pain/discomfort. Will continue to monitor. See flow sheets for monitoring, medication administration, and updates.

## 2023-12-08 NOTE — NURSING
Left foot steroid injection complete. Pt tolerated well. VSS. No signs or symptoms of distress noted. Verbal discharge/care instructions given and voices understanding, written in [employer].  Ambulated with RN to waiting area to meet family.

## 2023-12-08 NOTE — DISCHARGE INSTRUCTIONS
For scheduling: Call Marisol at [phone]    For questions or concerns call: PORTIA MON-FRI 8 AM- 5PM [phone]. Radiology resident on call [phone].    For immediate concerns that are not emergent, you may call our radiology clinic at: [phone]    May remove dressing in 24 hours and shower.   Do Not sit in bath water, hot tub, or swim for 7 days

## 2023-12-08 NOTE — H&P
Radiology History & Physical      SUBJECTIVE:     Chief Complaint: foot pain.    History of Present Illness:  Jayla Loyd is a 73 y.o. female who presents for left TMT injection.    Past Medical History:   Diagnosis Date    Carpal tunnel syndrome, right upper limb 06/23/2022    Diabetes mellitus type I     GERD (gastroesophageal reflux disease)     Hx of multiple pulmonary nodules 06/15/2022    Migraines     Proteinuria     Trigger finger, right ring finger 06/23/2022     Past Surgical History:   Procedure Laterality Date    CATARACT EXTRACTION      COSMETIC SURGERY  1971    rhinoplasty    EYE SURGERY  3 years ago    cataracts    JOINT REPLACEMENT  Twice in last 24 months    SHOULDER SURGERY Left 09/2020    TUBAL LIGATION  1992       Home Meds:   Prior to Admission medications    Medication Sig Start Date End Date Taking? Authorizing Provider   albuterol (PROVENTIL/VENTOLIN HFA) 90 mcg/actuation inhaler INHALE 2 PUFFS INTO THE LUNGS EVERY 6 (SIX) HOURS AS NEEDED FOR WHEEZING. RESCUE  Patient not taking: Reported on 11/7/2023 10/28/22   Geena Barnard MD   ALPRAZolam (XANAX) 0.5 MG tablet Take 1 tablet (0.5 mg total) by mouth 2 (two) times daily as needed for Anxiety. 8/28/23   Geena Barnard MD   amitriptyline (ELAVIL) 10 MG tablet Take 10 mg by mouth every evening. 4/1/22   Provider, Historical   azelastine (ASTELIN) 137 mcg (0.1 %) nasal spray 1 spray by Nasal route. 10/26/22 10/26/23  Provider, Historical   busPIRone (BUSPAR) 10 MG tablet Take 1 tablet (10 mg total) by mouth 2 (two) times daily. 8/31/23 8/30/24  Geena Barnard MD   butalbital-aspirin-caffeine -40 mg (FIORINAL) -40 mg Cap Take 1 capsule by mouth every 6 (six) hours as needed. 11/1/23   Geena Barnard MD   coenzyme Q10 100 mg capsule Take 100 mg by mouth once daily.    Provider, Historical   esomeprazole (NEXIUM) 40 MG capsule Take 1 capsule (40 mg total) by mouth 2 (two) times daily before meals. 4/4/23    Geena Barnard MD   FLUoxetine 40 MG capsule Take 1 capsule (40 mg total) by mouth once daily. 12/30/22 12/30/23  Geena Barnard MD   fluticasone propionate (FLONASE) 50 mcg/actuation nasal spray 1 spray (50 mcg total) by Each Nostril route once daily. 11/7/23   Sav Do MD   Lactobac no.41/Bifidobact no.7 (PROBIOTIC-10 ORAL) Take by mouth once daily.    Provider, Historical   meclizine (ANTIVERT) 25 mg tablet Take 1 tablet (25 mg total) by mouth 2 (two) times daily as needed. 11/17/23   Geena Barnard MD   metFORMIN (GLUCOPHAGE-XR) 500 MG ER 24hr tablet Take 2 tablets (1,000 mg total) by mouth every evening. 11/17/23   Geena Barnard MD   methocarbamoL (ROBAXIN) 500 MG Tab TAKE 1 TABLET (500 MG TOTAL) BY MOUTH 3 (THREE) TIMES DAILY AS NEEDED (PAIN). 12/5/22   Dwaine Yadav MD   ondansetron (ZOFRAN) 8 MG tablet Take 1 tablet (8 mg total) by mouth every 8 (eight) hours as needed for Nausea. 11/16/23   Geena Barnard MD   rosuvastatin (CRESTOR) 40 MG Tab Take 1 tablet (40 mg total) by mouth once daily. 12/14/22   Geena Barnard MD   telmisartan (MICARDIS) 20 MG Tab TAKE 1 TABLET BY MOUTH EVERY DAY 6/27/23   Geena Barnard MD   traMADoL (ULTRAM) 50 mg tablet Take 1 tablet (50 mg total) by mouth every 6 (six) hours as needed for Pain. 8/28/23   Geena Barnard MD     Anticoagulants/Antiplatelets: no anticoagulation    Allergies:   Review of patient's allergies indicates:   Allergen Reactions    Gabapentin Hallucinations    Lyrica [pregabalin] Hallucinations    Mobic [meloxicam] Itching     Sedation History:  no adverse reactions    Review of Systems:   Hematological: no known coagulopathies  Respiratory: no shortness of breath  Cardiovascular: no chest pain  Gastrointestinal: no abdominal pain  Genito-Urinary: no dysuria  Musculoskeletal: negative  Neurological: no TIA or stroke symptoms         OBJECTIVE:     Vital Signs (Most Recent)       Physical Exam:  ASA:  "2  Mallampati: 2    General: no acute distress  Mental Status: alert and oriented to person, place and time  HEENT: normocephalic, atraumatic  Chest: unlabored breathing  Abdomen: nondistended  Extremity: moves all extremities    Laboratory  No results found for: "INR", "PT", "PTT"    Lab Results   Component Value Date    WBC 7.43 08/01/2023    HGB 10.2 (L) 08/01/2023    HCT 33.7 (L) 08/01/2023    MCV 88 08/01/2023     08/01/2023      Lab Results   Component Value Date     (H) 08/01/2023     08/01/2023    K 4.8 08/01/2023     08/01/2023    CO2 27 08/01/2023    BUN 14 08/01/2023    CREATININE 0.8 08/01/2023    CALCIUM 9.4 08/01/2023    MG 1.8 05/13/2021    ALT 16 08/01/2023    AST 19 08/01/2023    ALBUMIN 3.3 (L) 08/01/2023    BILITOT 0.2 08/01/2023       ASSESSMENT/PLAN:     Sedation Plan: local only.  Patient will undergo L foot injection.    Bennett Kulkarni MD  Department of Radiology, PGY-2    "

## 2023-12-11 ENCOUNTER — HOSPITAL ENCOUNTER (OUTPATIENT)
Dept: RADIOLOGY | Facility: HOSPITAL | Age: 73
Discharge: HOME | End: 2023-12-11
Attending: INTERNAL MEDICINE
Payer: MEDICARE

## 2023-12-11 VITALS — WEIGHT: 159 LBS | HEIGHT: 60 IN | BODY MASS INDEX: 31.22 KG/M2

## 2023-12-11 DIAGNOSIS — Z12.31 ENCOUNTER FOR SCREENING MAMMOGRAM FOR MALIGNANT NEOPLASM OF BREAST: ICD-10-CM

## 2023-12-11 PROCEDURE — 77063 MAMMO DIGITAL SCREENING BILAT WITH TOMO: ICD-10-PCS | Mod: 26,,, | Performed by: RADIOLOGY

## 2023-12-11 PROCEDURE — 77067 SCR MAMMO BI INCL CAD: CPT | Mod: TC

## 2023-12-11 PROCEDURE — 77067 MAMMO DIGITAL SCREENING BILAT WITH TOMO: ICD-10-PCS | Mod: 26,,, | Performed by: RADIOLOGY

## 2023-12-11 PROCEDURE — 77063 BREAST TOMOSYNTHESIS BI: CPT | Mod: 26,,, | Performed by: RADIOLOGY

## 2023-12-11 PROCEDURE — 77067 SCR MAMMO BI INCL CAD: CPT | Mod: 26,,, | Performed by: RADIOLOGY

## 2023-12-12 NOTE — TELEPHONE ENCOUNTER
No care due was identified.  [facility] Embedded Care Due Messages. Reference number: [id number].   12/11/2023 9:23:18 PM CST

## 2023-12-13 RX ORDER — ROSUVASTATIN CALCIUM 40 MG/1
TABLET, COATED ORAL
Qty: 90 TABLET | Refills: 0 | Status: SHIPPED | OUTPATIENT
Start: 2023-12-13 | End: 2024-03-19

## 2023-12-28 ENCOUNTER — PATIENT MESSAGE (OUTPATIENT)
Dept: PRIMARY CARE CLINIC | Facility: CLINIC | Age: 73
End: 2023-12-28
Payer: MEDICARE

## 2024-01-01 ENCOUNTER — PATIENT MESSAGE (OUTPATIENT)
Dept: PRIMARY CARE CLINIC | Facility: CLINIC | Age: 74
End: 2024-01-01
Payer: MEDICARE

## 2024-01-01 DIAGNOSIS — G44.89 CHRONIC MIXED HEADACHE SYNDROME: ICD-10-CM

## 2024-01-01 NOTE — TELEPHONE ENCOUNTER
Care Due:                  Date            Visit Type   Department     Provider  --------------------------------------------------------------------------------                                ESTABLISHED                              PATIENT -    [facility] PR[facility]  Last Visit: 08-      [facility]      CARE           Geena Barnard  Next Visit: None Scheduled  None         None Found                                                            Last  Test          Frequency    Reason                     Performed    Due Date  --------------------------------------------------------------------------------    HBA1C.......  6 months...  metFORMIN................  08- 01-    [facility] Embedded Care Due Messages. Reference number: [id number].   1/01/2024 5:29:41 PM CST

## 2024-01-02 RX ORDER — BUTALBITAL, ASPIRIN, AND CAFFEINE 325; 50; 40 MG/1; MG/1; MG/1
1 CAPSULE ORAL EVERY 6 HOURS PRN
Qty: 60 CAPSULE | Refills: 0 | Status: SHIPPED | OUTPATIENT
Start: 2024-01-02

## 2024-01-03 ENCOUNTER — PATIENT MESSAGE (OUTPATIENT)
Dept: PRIMARY CARE CLINIC | Facility: CLINIC | Age: 74
End: 2024-01-03
Payer: MEDICARE

## 2024-01-04 ENCOUNTER — PATIENT MESSAGE (OUTPATIENT)
Dept: PRIMARY CARE CLINIC | Facility: CLINIC | Age: 74
End: 2024-01-04
Payer: MEDICARE

## 2024-01-04 DIAGNOSIS — F41.9 ANXIETY: ICD-10-CM

## 2024-01-05 RX ORDER — ALPRAZOLAM 0.5 MG/1
0.5 TABLET ORAL 2 TIMES DAILY PRN
Qty: 60 TABLET | Refills: 0 | Status: SHIPPED | OUTPATIENT
Start: 2024-01-05

## 2024-01-05 NOTE — TELEPHONE ENCOUNTER
No care due was identified.  [facility] Embedded Care Due Messages. Reference number: [id number].   1/04/2024 6:59:54 PM CST

## 2024-01-09 ENCOUNTER — OFFICE VISIT (OUTPATIENT)
Dept: URGENT CARE | Facility: CLINIC | Age: 74
End: 2024-01-09
Payer: MEDICARE

## 2024-01-09 ENCOUNTER — PATIENT MESSAGE (OUTPATIENT)
Dept: PRIMARY CARE CLINIC | Facility: CLINIC | Age: 74
End: 2024-01-09

## 2024-01-09 ENCOUNTER — E-VISIT (OUTPATIENT)
Dept: PRIMARY CARE CLINIC | Facility: CLINIC | Age: 74
End: 2024-01-09
Payer: MEDICARE

## 2024-01-09 VITALS
HEIGHT: 60 IN | TEMPERATURE: 101 F | DIASTOLIC BLOOD PRESSURE: 83 MMHG | WEIGHT: 158 LBS | HEART RATE: 89 BPM | RESPIRATION RATE: 18 BRPM | SYSTOLIC BLOOD PRESSURE: 132 MMHG | OXYGEN SATURATION: 98 % | BODY MASS INDEX: 31.02 KG/M2

## 2024-01-09 DIAGNOSIS — U07.1 COVID-19 VIRUS DETECTED: ICD-10-CM

## 2024-01-09 DIAGNOSIS — U07.1 COVID: Primary | ICD-10-CM

## 2024-01-09 DIAGNOSIS — R05.9 COUGH, UNSPECIFIED TYPE: Primary | ICD-10-CM

## 2024-01-09 LAB
CTP QC/QA: YES
CTP QC/QA: YES
POC MOLECULAR INFLUENZA A AGN: NEGATIVE
POC MOLECULAR INFLUENZA B AGN: NEGATIVE
SARS-COV-2 AG RESP QL IA.RAPID: POSITIVE

## 2024-01-09 PROCEDURE — 99214 OFFICE O/P EST MOD 30 MIN: CPT | Mod: S$GLB,,, | Performed by: NURSE PRACTITIONER

## 2024-01-09 PROCEDURE — 87502 INFLUENZA DNA AMP PROBE: CPT | Mod: QW,S$GLB,, | Performed by: NURSE PRACTITIONER

## 2024-01-09 PROCEDURE — 99499 UNLISTED E&M SERVICE: CPT | Mod: ,,, | Performed by: INTERNAL MEDICINE

## 2024-01-09 PROCEDURE — 87811 SARS-COV-2 COVID19 W/OPTIC: CPT | Mod: QW,S$GLB,, | Performed by: NURSE PRACTITIONER

## 2024-01-09 RX ORDER — CODEINE PHOSPHATE AND GUAIFENESIN 10; 100 MG/5ML; MG/5ML
5 SOLUTION ORAL EVERY 8 HOURS PRN
Qty: 105 ML | Refills: 0 | Status: ON HOLD | OUTPATIENT
Start: 2024-01-09 | End: 2024-02-02 | Stop reason: HOSPADM

## 2024-01-09 RX ORDER — PROMETHAZINE HYDROCHLORIDE AND DEXTROMETHORPHAN HYDROBROMIDE 6.25; 15 MG/5ML; MG/5ML
10 SYRUP ORAL EVERY 8 HOURS PRN
Qty: 150 ML | Refills: 1 | Status: SHIPPED | OUTPATIENT
Start: 2024-01-09 | End: 2024-01-16

## 2024-01-09 NOTE — PROGRESS NOTES
Patient ID: Jayla Loyd is a 73 y.o. female.    Chief Complaint: URI (Entered automatically based on patient selection in Patient Portal.)    The patient initiated a request through [employer] on 1/9/2024 for evaluation and management with a chief complaint of URI (Entered automatically based on patient selection in Patient Portal.)     I evaluated the questionnaire submission on 01/09/2024  Promethazine dm sent  Chart reviewed no recent abx and steroids .    [facility] Peq Evisit Upper Respitatory/Cough Questionnaire    1/9/2024 10:46 AM CST - Filed by Patient   Do you agree to participate in an E-Visit? Yes   If you have any of the following symptoms, please present to your local ER or call 911:  I acknowledge   What is the main issue that you would like for your doctor to address today? Cough headache malaise achy   Are you able to take your vital signs? Yes   Systolic Blood Pressure: 122   Diastolic Blood Pressure: 72   Weight: 158   Height: 5   Pulse: 92   Temperature:    Respiration rate:    Pulse Oxygen:    What symptoms do you currently have?  Chills;  Cough;  Fatigue;  Headache;  Nasal Congestion;  Muscle or body aches;  Sore throat   Describe your cough: Dry   Have you had any of the following? None of the above   Have you ever smoked? I have never smoked   Have you had a fever? No   When did your symptoms first appear? 1/6/2024   In the last two weeks, have you been in close contact with someone who has COVID-19 or the Flu? No   In the last two weeks, have you worked or volunteered in a healthcare facility or as a ? Healthcare facilities include a hospital, medical or dental clinic, long-term care facility, or nursing home No   Do you live in a long-term care facility, nursing home, group home, or homeless shelter? No   List what you have done or taken to help your symptoms. Otc cough, vitamin c, fiorecet   How severe are your symptoms? Severe   Have your symptoms improved since they first  appeared? Worse   Have you taken an at home Covid test? Yes   What were the results? Negative   Have you taken a Flu test? No   Have you been fully vaccinated for COVID? (2 Pfizer, 2 Moderna or 1 Anselmo & Anselmo vaccine injections) Yes   Have you received a booster? No   Have you recieved a Flu shot? No   Do you have transportation to get tested for COVID if it is indicated and ordered for you at an [facility] location? Yes   Provide any information you feel is important to your history not asked above I cannot sleep/due to the severity of cough   Please attach any relevant images or files          Recent Labs Obtained:  No visits with results within 7 Day(s) from this visit.   Latest known visit with results is:   Office Visit on 11/07/2023   Component Date Value Ref Range Status    SARS Coronavirus 2 Antigen 11/07/2023 Negative  Negative Final     Acceptable 11/07/2023 Yes   Final    Molecular Strep A, POC 11/07/2023 Negative  Negative Final     Acceptable 11/07/2023 Yes   Final       Encounter Diagnosis   Name Primary?    Cough, unspecified type Yes        No orders of the defined types were placed in this encounter.     Medications Ordered This Encounter   Medications    promethazine-dextromethorphan (PROMETHAZINE-DM) 6.25-15 mg/5 mL Syrp     Sig: Take 10 mLs by mouth every 8 (eight) hours as needed (cough).     Dispense:  150 mL     Refill:  1        No follow-ups on file.      E-Visit Time Tracking:

## 2024-01-09 NOTE — PROGRESS NOTES
Subjective:      Patient ID: Jayla Loyd is a 73 y.o. female.    Vitals:  height is 5' (1.524 m) and weight is 71.7 kg (158 lb). Her tympanic temperature is 101.2 °F (38.4 °C) (abnormal). Her blood pressure is 132/83 and her pulse is 89. Her respiration is 18 and oxygen saturation is 98%.     Chief Complaint: Cough    This is a 73 y.o female who presents today with chief complaint of body aches, chills, severe headaches, nasal congestion, runny nose and dry cough x3 days.   Patient denies SOB and denies chest pain.   Patient has been taking Fioricet for headaches and reports no improvement.     Cough  This is a new problem. The current episode started in the past 7 days. The cough is Non-productive. Associated symptoms include chills, headaches, nasal congestion, a sore throat and sweats. Pertinent negatives include no chest pain, ear congestion, ear pain, fever, heartburn, hemoptysis, myalgias, postnasal drip, rash, rhinorrhea, shortness of breath, weight loss or wheezing. Treatments tried: Fioricet. The treatment provided no relief. There is no history of asthma, bronchiectasis, bronchitis, COPD, emphysema, environmental allergies or pneumonia.       Constitution: Positive for chills. Negative for fever.   HENT:  Positive for sore throat. Negative for ear pain and postnasal drip.    Cardiovascular:  Negative for chest pain.   Respiratory:  Positive for cough. Negative for bloody sputum, shortness of breath and wheezing.    Gastrointestinal:  Negative for heartburn.   Musculoskeletal:  Negative for muscle ache.   Skin:  Negative for rash.   Allergic/Immunologic: Negative for environmental allergies.   Neurological:  Positive for headaches.      Objective:     Physical Exam   Constitutional: She is oriented to person, place, and time. She appears well-developed. She is cooperative.  Non-toxic appearance. She does not appear ill. No distress.   HENT:   Head: Normocephalic.   Ears:   Right Ear: Hearing,  tympanic membrane, external ear and ear canal normal.   Left Ear: Hearing, tympanic membrane, external ear and ear canal normal.   Nose: Congestion present. No mucosal edema, rhinorrhea or nasal deformity. No epistaxis. Right sinus exhibits no maxillary sinus tenderness and no frontal sinus tenderness. Left sinus exhibits no maxillary sinus tenderness and no frontal sinus tenderness.   Mouth/Throat: Uvula is midline and mucous membranes are normal. Mucous membranes are moist. No trismus in the jaw. Normal dentition. No uvula swelling. Posterior oropharyngeal erythema present. No oropharyngeal exudate or posterior oropharyngeal edema. Oropharynx is clear.   Eyes: Conjunctivae and lids are normal. No scleral icterus.   Neck: Trachea normal and phonation normal. Neck supple. No edema present. No erythema present. No neck rigidity present.   Cardiovascular: Normal rate and regular rhythm.   Pulmonary/Chest: Effort normal and breath sounds normal. No respiratory distress. She has no decreased breath sounds. She has no wheezes. She has no rhonchi.   Abdominal: Normal appearance.   Musculoskeletal: Normal range of motion.         General: No deformity. Normal range of motion.   Neurological: She is alert and oriented to person, place, and time. She exhibits normal muscle tone. Coordination normal.   Skin: Skin is warm, dry, intact, not diaphoretic and not pale.   Psychiatric: Her speech is normal and behavior is normal. Judgment and thought content normal.   Nursing note and vitals reviewed.    Results for orders placed or performed in visit on 01/09/24   SARS Coronavirus 2 Antigen, POCT Manual Read   Result Value Ref Range    SARS Coronavirus 2 Antigen Positive (A) Negative     Acceptable Yes    POCT Influenza A/B MOLECULAR   Result Value Ref Range    POC Molecular Influenza A Ag Negative Negative, Not Reported    POC Molecular Influenza B Ag Negative Negative, Not Reported     Acceptable Yes        Assessment:     1. COVID        Plan:       COVID  -     SARS Coronavirus 2 Antigen, POCT Manual Read  -     POCT Influenza A/B MOLECULAR  -     guaiFENesin-codeine 100-10 mg/5 ml (TUSSI-ORGANIDIN NR)  mg/5 mL syrup; Take 5 mLs by mouth every 8 (eight) hours as needed for Cough.  Dispense: 105 mL; Refill: 0  -     nirmatrelvir-ritonavir 300 mg (150 mg x 2)-100 mg copackaged tablets (EUA); Take 3 tablets by mouth 2 (two) times daily for 5 days. Each dose contains 2 nirmatrelvir (pink tablets) and 1 ritonavir (white tablet). Take all 3 tablets together  Dispense: 30 tablet; Refill: 0      Patient Instructions   pt will hold off on rosuvastatin for 10 days after starting paxlovid  Avoid xanax and tramadol while taking covid     Oral fluids  Rest  Steam (hot showers, hot tea)  Blow nose often  Droplet and contact precautions  Self quarantine x 5 days-ok to come out of quarantine as long as symptoms are improving on day 6  Continue to wear mask for 10 days  OTC ibuprofen or tylenol for aches and/or fever   Follow up with worsening symptoms  Seek ER care with symptoms such as wheeze, respiratory distress, lethargy, dehydration

## 2024-01-09 NOTE — PATIENT INSTRUCTIONS
pt will hold off on rosuvastatin for 10 days after starting paxlovid  Avoid xanax and tramadol while taking covid     Oral fluids  Rest  Steam (hot showers, hot tea)  Blow nose often  Droplet and contact precautions  Self quarantine x 5 days-ok to come out of quarantine as long as symptoms are improving on day 6  Continue to wear mask for 10 days  OTC ibuprofen or tylenol for aches and/or fever   Follow up with worsening symptoms  Seek ER care with symptoms such as wheeze, respiratory distress, lethargy, dehydration

## 2024-01-10 ENCOUNTER — PATIENT MESSAGE (OUTPATIENT)
Dept: PRIMARY CARE CLINIC | Facility: CLINIC | Age: 74
End: 2024-01-10
Payer: MEDICARE

## 2024-01-10 ENCOUNTER — NURSE TRIAGE (OUTPATIENT)
Dept: ADMINISTRATIVE | Facility: CLINIC | Age: 74
End: 2024-01-10
Payer: MEDICARE

## 2024-01-10 NOTE — TELEPHONE ENCOUNTER
Pt calling and c/o headache and since she has had it since Sunday. Pt was dx yesterday with covid +. Pt was given paxlovid and told not to take furicet with that medication. Pt triaged and care advice to ED or office with PCP triage and told she would need to go to the ED since covid +. Pt told to call back once she is back home if any other questions or concerns.             Reason for Disposition   SEVERE headache, states 'worst headache' of life    Additional Information   Negative: Difficult to awaken or acting confused (e.g., disoriented, slurred speech)   Negative: Weakness of the face, arm or leg on one side of the body and new-onset   Negative: Numbness of the face, arm or leg on one side of the body and new-onset   Negative: Loss of speech or garbled speech and new-onset   Negative: Passed out (i.e., fainted, collapsed and was not responding)   Negative: Sounds like a life-threatening emergency to the triager   Negative: Unable to walk without falling   Negative: Stiff neck (can't touch chin to chest)   Negative: Possibility of carbon monoxide exposure    Protocols used: Headache-A-OH

## 2024-01-11 ENCOUNTER — PATIENT MESSAGE (OUTPATIENT)
Dept: PRIMARY CARE CLINIC | Facility: CLINIC | Age: 74
End: 2024-01-11

## 2024-01-11 ENCOUNTER — TELEPHONE (OUTPATIENT)
Dept: PRIMARY CARE CLINIC | Facility: CLINIC | Age: 74
End: 2024-01-11

## 2024-01-11 ENCOUNTER — OFFICE VISIT (OUTPATIENT)
Dept: PRIMARY CARE CLINIC | Facility: CLINIC | Age: 74
End: 2024-01-11
Payer: MEDICARE

## 2024-01-11 DIAGNOSIS — E11.9 TYPE 2 DIABETES MELLITUS WITHOUT COMPLICATION, WITHOUT LONG-TERM CURRENT USE OF INSULIN: Primary | ICD-10-CM

## 2024-01-11 DIAGNOSIS — E11.59 HYPERTENSION ASSOCIATED WITH DIABETES: ICD-10-CM

## 2024-01-11 DIAGNOSIS — I70.0 AORTIC ARCH ATHEROSCLEROSIS: ICD-10-CM

## 2024-01-11 DIAGNOSIS — R51.9 NONINTRACTABLE HEADACHE, UNSPECIFIED CHRONICITY PATTERN, UNSPECIFIED HEADACHE TYPE: ICD-10-CM

## 2024-01-11 DIAGNOSIS — U07.1 COVID: ICD-10-CM

## 2024-01-11 DIAGNOSIS — I15.2 HYPERTENSION ASSOCIATED WITH DIABETES: ICD-10-CM

## 2024-01-11 DIAGNOSIS — F41.9 ANXIETY: Primary | ICD-10-CM

## 2024-01-11 PROCEDURE — 99214 OFFICE O/P EST MOD 30 MIN: CPT | Mod: 95,,, | Performed by: INTERNAL MEDICINE

## 2024-01-11 RX ORDER — SEMAGLUTIDE 0.68 MG/ML
0.25 INJECTION, SOLUTION SUBCUTANEOUS
Qty: 12 EACH | Refills: 1 | Status: SHIPPED | OUTPATIENT
Start: 2024-01-11

## 2024-01-11 NOTE — PROGRESS NOTES
[facility] Note  The patient location is: [place]  The chief complaint leading to consultation is: covid, DM    Visit type: audiovisual    Face to Face time with patient: 10  10 minutes of total time spent on the encounter, which includes face to face time and non-face to face time preparing to see the patient (eg, review of tests), Obtaining and/or reviewing separately obtained history, Documenting clinical information in the electronic or other health record, Independently interpreting results (not separately reported) and communicating results to the patient/family/caregiver, or Care coordination (not separately reported).         Each patient to whom he or she provides medical services by telemedicine is:  (1) informed of the relationship between the physician and patient and the respective role of any other health care provider with respect to management of the patient; and (2) notified that he or she may decline to receive medical services by telemedicine and may withdraw from such care at any time.    Notes:     Chief Complaint    No chief complaint on file.    History of Present Illness      Jayla Loyd is a 73 y.o. female who presents today for chief complaint DM, covid . Patient previously seen by me.    PCP: Geena Barnard MD  Patient comes to appointment tlemed.     Diabetes  She has type 2 diabetes mellitus. No MedicAlert identification noted. Hypoglycemia symptoms include dizziness, headaches, hunger and sweats. Pertinent negatives for hypoglycemia include no confusion, mood changes, nervousness/anxiousness, pallor, seizures, sleepiness, speech difficulty or tremors. Associated symptoms include fatigue and weakness. Pertinent negatives for diabetes include no blurred vision, no chest pain, no foot paresthesias, no foot ulcerations, no polydipsia, no polyphagia, no polyuria, no visual change and no weight loss. Pertinent negatives for hypoglycemia complications include no  blackouts, no hospitalization, no nocturnal hypoglycemia, no required assistance and no required glucagon injection. Symptoms are stable. Pertinent negatives for diabetic complications include no autonomic neuropathy, CVA, heart disease, nephropathy, peripheral neuropathy, PVD or retinopathy. Risk factors for coronary artery disease include dyslipidemia, family history, obesity and stress. Current diabetic treatment includes diet and oral agent (monotherapy). She is compliant with treatment some of the time. Her weight is fluctuating minimally. She is following a generally healthy diet. When asked about meal planning, she reported none. She has not had a previous visit with a dietitian. She never participates in exercise. She monitors blood glucose at home 1-2 x per week. She monitors urine at home <1 x per month. Blood glucose monitoring compliance is inadequate. There is no change in her home blood glucose trend. She sees a podiatrist.Eye exam is current.     Covid pos and has started paxlovid, cough improved but sliggtly short of breath, she has a headache and was concerned she could not take fioricet in combo- meds interactions reviewed   BP elevbated 152/88- will send amb monitor follow up  DM  personal or family history of medullary thyroid cancer or Multiple Endocrine Neoplasia. Discussed titration, nausea, vomiting     Active Problem List with Overview Notes    Diagnosis Date Noted    Abnormal CT of the chest 03/29/2023    Lung nodules 03/29/2023    Aortic arch atherosclerosis 03/29/2023     Mild calcification of aortic arch and cusps seen on CT         Overweight (BMI 25.0-29.9) 03/29/2023    Type 2 diabetes mellitus without complication, without long-term current use of insulin 03/29/2023    DM type 2 without retinopathy 03/29/2023     Per pt      Osteoarthritis of both shoulders 02/03/2023    Peripheral vascular disease 08/25/2022    Acute ankle pain 05/31/2022    Night sweats 05/25/2022    Cox's  esophagus 02/16/2022    Chronic cough 04/13/2021    Tremor 02/15/2021    Sleep disturbance 02/15/2021    Venous insufficiency 02/15/2021    Dizziness 01/19/2021    Subclinical hyperthyroidism 12/17/2020    Hyperlipidemia associated with type 2 diabetes mellitus 07/22/2020     On crestor       Arthritis of left shoulder region 07/16/2020     seeing [place] at [facility] pending shoulder MRI       Anxiety 07/16/2020    Type 2 diabetes mellitus with other specified complication, without long-term current use of insulin      Sees dr butt had recent a1c, he also does foot exam      Allergic rhinitis due to pollen 07/08/2020    Hematuria, unspecified 07/08/2020    Iron deficiency anemia, unspecified 06/30/2019    Hyperphosphatemia 08/19/2018    Hypertension associated with diabetes 08/19/2018     At goal on lasix and micardis, no chest pain or shortness of breath       Proteinuria, unspecified 08/19/2018     Seeing dr hanson       Gastroesophageal reflux disease 07/16/2014    Adenomatous polyp of colon 07/16/2014    Chronic mixed headache syndrome 07/16/2014     Has chronic migraines, uses fioricet   Tried amovig, has not tried triptan - not interested  Sees [facility]      Type 2 diabetes mellitus with hypercholesterolemia 08/01/2012     Health Maintenance   Topic Date Due    TETANUS VACCINE  02/01/2008    Foot Exam  09/03/2021    DEXA Scan  08/11/2023    Hemoglobin A1c  02/01/2024    Eye Exam  03/09/2024    Lipid Panel  08/01/2024    Mammogram  12/11/2024    High Dose Statin  01/10/2025    Colorectal Cancer Screening  02/08/2027    Hepatitis C Screening  Completed    Shingles Vaccine  Discontinued       Past Medical History:   Diagnosis Date    Carpal tunnel syndrome, right upper limb 06/23/2022    Diabetes mellitus type I     GERD (gastroesophageal reflux disease)     Hx of multiple pulmonary nodules 06/15/2022    Migraines     Proteinuria     Trigger finger, right ring finger 06/23/2022       Past Surgical  History:   Procedure Laterality Date    CATARACT EXTRACTION      COSMETIC SURGERY  1971    rhinoplasty    EYE SURGERY  3 years ago    cataracts    JOINT REPLACEMENT  Twice in last 24 months    SHOULDER SURGERY Left 09/2020    TUBAL LIGATION  1992       family history includes Bladder Cancer in her mother; Breast cancer in her maternal aunt and maternal grandmother; Cancer in her maternal aunt, maternal grandmother, mother, paternal aunt, and paternal grandmother; Dementia in her paternal grandmother; Diabetes in her paternal aunt; Heart disease in her father, maternal grandfather, mother, and paternal grandfather; Heart failure in her father; Hypertension in her father and mother; No Known Problems in her son; Stroke in her brother.     Social History     Tobacco Use    Smoking status: Never     Passive exposure: Never    Smokeless tobacco: Never   Substance Use Topics    Alcohol use: Yes     Comment: rarely    Drug use: Not Currently     Types: Other-see comments     Comment: None       Review of Systems   Constitutional:  Positive for fatigue and malaise/fatigue. Negative for weight loss.   Eyes:  Negative for blurred vision.   Respiratory:  Positive for cough and shortness of breath.    Cardiovascular:  Negative for chest pain.   Skin:  Negative for pallor.   Neurological:  Positive for dizziness, weakness and headaches. Negative for tremors, seizures and speech difficulty.   Endo/Heme/Allergies:  Negative for polydipsia and polyphagia.   Psychiatric/Behavioral:  Negative for confusion. The patient is not nervous/anxious.         Outpatient Encounter Medications as of 1/11/2024   Medication Sig Note Dispense Refill    ALPRAZolam (XANAX) 0.5 MG tablet Take 1 tablet (0.5 mg total) by mouth 2 (two) times daily as needed for Anxiety.  60 tablet 0    amitriptyline (ELAVIL) 10 MG tablet Take 10 mg by mouth every evening.       azelastine (ASTELIN) 137 mcg (0.1 %) nasal spray 1 spray by Nasal route.        butalbital-aspirin-caffeine -40 mg (FIORINAL) -40 mg Cap Take 1 capsule by mouth every 6 (six) hours as needed.  60 capsule 0    coenzyme Q10 100 mg capsule Take 100 mg by mouth once daily. 3/11/2021: As needed      esomeprazole (NEXIUM) 40 MG capsule Take 1 capsule (40 mg total) by mouth 2 (two) times daily before meals.  180 capsule 3    FLUoxetine 40 MG capsule Take 1 capsule (40 mg total) by mouth once daily.  90 capsule 3    fluticasone propionate (FLONASE) 50 mcg/actuation nasal spray 1 spray (50 mcg total) by Each Nostril route once daily.  9.9 mL 0    guaiFENesin-codeine 100-10 mg/5 ml (TUSSI-ORGANIDIN NR)  mg/5 mL syrup Take 5 mLs by mouth every 8 (eight) hours as needed for Cough.  105 mL 0    Lactobac no.41/Bifidobact no.7 (PROBIOTIC-10 ORAL) Take by mouth once daily.       meclizine (ANTIVERT) 25 mg tablet Take 1 tablet (25 mg total) by mouth 2 (two) times daily as needed.  60 tablet 0    metFORMIN (GLUCOPHAGE-XR) 500 MG ER 24hr tablet Take 2 tablets (1,000 mg total) by mouth every evening.  180 tablet 1    methocarbamoL (ROBAXIN) 500 MG Tab TAKE 1 TABLET (500 MG TOTAL) BY MOUTH 3 (THREE) TIMES DAILY AS NEEDED (PAIN).  60 tablet 1    nirmatrelvir-ritonavir 300 mg (150 mg x 2)-100 mg copackaged tablets (EUA) Take 3 tablets by mouth 2 (two) times daily for 5 days. Each dose contains 2 nirmatrelvir (pink tablets) and 1 ritonavir (white tablet). Take all 3 tablets together  30 tablet 0    ondansetron (ZOFRAN) 8 MG tablet Take 1 tablet (8 mg total) by mouth every 8 (eight) hours as needed for Nausea.  20 tablet 1    promethazine-dextromethorphan (PROMETHAZINE-DM) 6.25-15 mg/5 mL Syrp Take 10 mLs by mouth every 8 (eight) hours as needed (cough). (Patient not taking: Reported on 1/9/2024)  150 mL 1    rosuvastatin (CRESTOR) 40 MG Tab Take 1 tablet (40 mg total) by mouth once daily.  90 tablet 0    semaglutide (OZEMPIC) 0.25 mg or 0.5 mg (2 mg/3 mL) pen injector Inject 0.25 mg into the skin  "every 7 days.  12 each 1    telmisartan (MICARDIS) 20 MG Tab TAKE 1 TABLET BY MOUTH EVERY DAY  90 tablet 1    traMADoL (ULTRAM) 50 mg tablet Take 1 tablet (50 mg total) by mouth every 6 (six) hours as needed for Pain.  21 tablet 0     No facility-administered encounter medications on file as of 1/11/2024.       Review of patient's allergies indicates:   Allergen Reactions    Gabapentin Hallucinations    Lyrica [pregabalin] Hallucinations    Mobic [meloxicam] Itching         Physical Exam       ]    Physical Exam  Constitutional:       General: She is not in acute distress.     Appearance: She is well-developed. She is not diaphoretic.   HENT:      Head: Normocephalic and atraumatic.      Right Ear: External ear normal.      Left Ear: External ear normal.      Nose: Nose normal.   Eyes:      General:         Right eye: No discharge.         Left eye: No discharge.      Conjunctiva/sclera: Conjunctivae normal.   Pulmonary:      Effort: Pulmonary effort is normal. No respiratory distress.   Musculoskeletal:         General: Normal range of motion.      Cervical back: Normal range of motion.   Skin:     Coloration: Skin is not pale.      Findings: No rash.   Neurological:      Mental Status: She is alert and oriented to person, place, and time.   Psychiatric:         Behavior: Behavior normal.         Thought Content: Thought content normal.          Laboratory:  CBC:  No results for input(s): "WBC", "RBC", "HGB", "HCT", "PLT", "MCV", "MCH", "MCHC" in the last 2160 hours.  CMP:  No results for input(s): "GLU", "CALCIUM", "ALBUMIN", "PROT", "NA", "K", "CO2", "CL", "BUN", "ALKPHOS", "ALT", "AST", "BILITOT" in the last 2160 hours.    Invalid input(s): "CREATININ"  URINALYSIS:  No results for input(s): "COLORU", "CLARITYU", "SPECGRAV", "PHUR", "PROTEINUA", "GLUCOSEU", "BILIRUBINCON", "BLOODU", "WBCU", "RBCU", "BACTERIA", "MUCUS", "NITRITE", "LEUKOCYTESUR", "UROBILINOGEN", "HYALINECASTS" in the last 2160 hours.   LIPIDS:  No " "results for input(s): "TSH", "HDL", "CHOL", "TRIG", "LDLCALC", "CHOLHDL", "NONHDLCHOL", "TOTALCHOLEST" in the last 2160 hours.  TSH:  No results for input(s): "TSH" in the last 2160 hours.  A1C:  No results for input(s): "HGBA1C" in the last 2160 hours.    Radiology:      Assessment/Plan     Jayla Loyd is a 73 y.o.female with:    1. Type 2 diabetes mellitus without complication, without long-term current use of insulin  -     semaglutide (OZEMPIC) 0.25 mg or 0.5 mg (2 mg/3 mL) pen injector; Inject 0.25 mg into the skin every 7 days.  Dispense: 12 each; Refill: 1    2. COVID    3. Nonintractable headache, unspecified chronicity pattern, unspecified headache type    4. Hypertension associated with diabetes  Overview:  At goal on lasix and micardis, no chest pain or shortness of breath     Assessment & Plan:  2 week follow up message sent for amb reading           Use of the [facility] Patient Portal discussed and encouraged during today's visit  -Continue current medications and maintain follow up with specialists.  Return to clinic in due for 6 mo folow up in 2.2024 .  No future appointments.    Geena Barnard MD  1/11/2024 7:27 AM    Primary Care Internal Medicine                       "

## 2024-01-11 NOTE — TELEPHONE ENCOUNTER
----- Message from Geena Barnard MD sent at 1/11/2024  7:34 AM CST -----  Due for in office follow up 6 months from 8/2023

## 2024-01-15 DIAGNOSIS — R05.9 COUGH, UNSPECIFIED TYPE: ICD-10-CM

## 2024-01-16 RX ORDER — PROMETHAZINE HYDROCHLORIDE AND DEXTROMETHORPHAN HYDROBROMIDE 6.25; 15 MG/5ML; MG/5ML
SYRUP ORAL
Qty: 150 ML | Refills: 1 | Status: SHIPPED | OUTPATIENT
Start: 2024-01-16 | End: 2024-02-23

## 2024-01-24 ENCOUNTER — PATIENT MESSAGE (OUTPATIENT)
Dept: PRIMARY CARE CLINIC | Facility: CLINIC | Age: 74
End: 2024-01-24
Payer: MEDICARE

## 2024-01-24 DIAGNOSIS — F41.9 ANXIETY: ICD-10-CM

## 2024-01-24 RX ORDER — FLUOXETINE HYDROCHLORIDE 40 MG/1
40 CAPSULE ORAL
Qty: 90 CAPSULE | Refills: 0 | OUTPATIENT
Start: 2024-01-24

## 2024-01-24 RX ORDER — FLUOXETINE HYDROCHLORIDE 40 MG/1
40 CAPSULE ORAL DAILY
Qty: 90 CAPSULE | Refills: 3 | Status: SHIPPED | OUTPATIENT
Start: 2024-01-24 | End: 2024-01-25

## 2024-01-24 NOTE — TELEPHONE ENCOUNTER
No care due was identified.  Health [facility] Embedded Care Due Messages. Reference number: [id number].   1/24/2024 11:39:03 AM CST

## 2024-01-24 NOTE — TELEPHONE ENCOUNTER
No care due was identified.  Health [facility] Embedded Care Due Messages. Reference number: [id number].   1/24/2024 11:35:25 AM CST

## 2024-01-24 NOTE — TELEPHONE ENCOUNTER
No care due was identified.  Health [facility] Embedded Care Due Messages. Reference number: [id number].   1/24/2024 11:36:02 AM CST

## 2024-01-24 NOTE — TELEPHONE ENCOUNTER
Refill Decision Note   Jayla Rach  is requesting a refill authorization.  Brief Assessment and Rationale for Refill:  Approve     Medication Therapy Plan:         Comments:     Note composed:12:09 PM 01/24/2024

## 2024-01-24 NOTE — TELEPHONE ENCOUNTER
Refill Decision Note   Jayla Loyd  is requesting a refill authorization.  Brief Assessment and Rationale for Refill:  Quick Discontinue     Medication Therapy Plan:       Medication Reconciliation Completed: No   Comments:     No Care Gaps recommended.     Duplicate Pended Encounter(s)/ Last Prescribed Details:    Pharmacy    [facility] - [place] - [address]  [address] [place] [address]  Phone: [phone]  Fax: [phone]  KAYE #: --   LANI Reason: --     Outpatient Medication Detail     Disp Refills Start End LANI   FLUoxetine 40 MG capsule 90 capsule 3 1/24/2024 -- No   Sig - Route: Take 1 capsule (40 mg total) by mouth once daily. - Oral   Sent to pharmacy as: FLUoxetine 40 MG capsule   Class: Normal   Order: [id number]   Cosign for Ordering: Required by Geena Barnard MD   Date/Time Signed: 1/24/2024 12:09       E-Prescribing Status: Sent to pharmacy (1/24/2024 12:09 PM CST)              Note composed:12:10 PM 01/24/2024

## 2024-01-25 VITALS — DIASTOLIC BLOOD PRESSURE: 78 MMHG | SYSTOLIC BLOOD PRESSURE: 121 MMHG

## 2024-01-25 RX ORDER — MECLIZINE HYDROCHLORIDE 25 MG/1
25 TABLET ORAL 2 TIMES DAILY PRN
Qty: 60 TABLET | Refills: 0 | Status: SHIPPED | OUTPATIENT
Start: 2024-01-25 | End: 2024-03-25 | Stop reason: SDUPTHER

## 2024-01-25 RX ORDER — SERTRALINE HYDROCHLORIDE 25 MG/1
25 TABLET, FILM COATED ORAL DAILY
Qty: 90 TABLET | Refills: 3 | Status: SHIPPED | OUTPATIENT
Start: 2024-01-25 | End: 2024-05-21

## 2024-02-01 ENCOUNTER — HOSPITAL ENCOUNTER (OUTPATIENT)
Dept: RADIOLOGY | Facility: HOSPITAL | Age: 74
Discharge: HOME | End: 2024-02-01
Attending: ORTHOPAEDIC SURGERY
Payer: MEDICARE

## 2024-02-01 ENCOUNTER — OFFICE VISIT (OUTPATIENT)
Dept: SPORTS MEDICINE | Facility: CLINIC | Age: 74
End: 2024-02-01
Payer: MEDICARE

## 2024-02-01 ENCOUNTER — HOSPITAL ENCOUNTER (OUTPATIENT)
Dept: CARDIOLOGY | Facility: HOSPITAL | Age: 74
Discharge: HOME | End: 2024-02-01
Attending: ORTHOPAEDIC SURGERY
Payer: MEDICARE

## 2024-02-01 ENCOUNTER — PATIENT MESSAGE (OUTPATIENT)
Dept: PRIMARY CARE CLINIC | Facility: CLINIC | Age: 74
End: 2024-02-01
Payer: MEDICARE

## 2024-02-01 ENCOUNTER — HOSPITAL ENCOUNTER (OUTPATIENT)
Facility: HOSPITAL | Age: 74
Discharge: HOME | End: 2024-02-02
Attending: STUDENT IN AN ORGANIZED HEALTH CARE EDUCATION/TRAINING PROGRAM | Admitting: STUDENT IN AN ORGANIZED HEALTH CARE EDUCATION/TRAINING PROGRAM
Payer: MEDICARE

## 2024-02-01 VITALS — WEIGHT: 158.75 LBS | BODY MASS INDEX: 31.17 KG/M2 | HEIGHT: 60 IN

## 2024-02-01 DIAGNOSIS — R07.9 CHEST PAIN: ICD-10-CM

## 2024-02-01 DIAGNOSIS — M25.572 ACUTE LEFT ANKLE PAIN: ICD-10-CM

## 2024-02-01 DIAGNOSIS — M25.552 LEFT HIP PAIN: ICD-10-CM

## 2024-02-01 DIAGNOSIS — M79.605 ACUTE LEG PAIN, LEFT: ICD-10-CM

## 2024-02-01 DIAGNOSIS — M79.605 ACUTE LEG PAIN, LEFT: Primary | ICD-10-CM

## 2024-02-01 DIAGNOSIS — I82.4Y9 ACUTE DEEP VEIN THROMBOSIS (DVT) OF PROXIMAL VEIN OF LOWER EXTREMITY, UNSPECIFIED LATERALITY: Primary | ICD-10-CM

## 2024-02-01 DIAGNOSIS — I26.99 PULMONARY EMBOLISM: ICD-10-CM

## 2024-02-01 DIAGNOSIS — M79.606 LEG PAIN: ICD-10-CM

## 2024-02-01 LAB
ALBUMIN SERPL BCP-MCNC: 3.3 G/DL (ref 3.5–5.2)
ALP SERPL-CCNC: 148 U/L (ref 55–135)
ALT SERPL W/O P-5'-P-CCNC: 11 U/L (ref 10–44)
ANION GAP SERPL CALC-SCNC: 8 MMOL/L (ref 8–16)
APTT PPP: 25.7 SEC (ref 21–32)
AST SERPL-CCNC: 12 U/L (ref 10–40)
BASOPHILS # BLD AUTO: 0.05 K/UL (ref 0–0.2)
BASOPHILS NFR BLD: 0.4 % (ref 0–1.9)
BILIRUB SERPL-MCNC: 0.2 MG/DL (ref 0.1–1)
BNP SERPL-MCNC: 10 PG/ML (ref 0–99)
BUN SERPL-MCNC: 10 MG/DL (ref 6–30)
BUN SERPL-MCNC: 11 MG/DL (ref 8–23)
CALCIUM SERPL-MCNC: 9.5 MG/DL (ref 8.7–10.5)
CHLORIDE SERPL-SCNC: 102 MMOL/L (ref 95–110)
CHLORIDE SERPL-SCNC: 104 MMOL/L (ref 95–110)
CO2 SERPL-SCNC: 28 MMOL/L (ref 23–29)
CREAT SERPL-MCNC: 0.8 MG/DL (ref 0.5–1.4)
CREAT SERPL-MCNC: 0.8 MG/DL (ref 0.5–1.4)
DIFFERENTIAL METHOD BLD: ABNORMAL
EOSINOPHIL # BLD AUTO: 0.2 K/UL (ref 0–0.5)
EOSINOPHIL NFR BLD: 1.8 % (ref 0–8)
ERYTHROCYTE [DISTWIDTH] IN BLOOD BY AUTOMATED COUNT: 17 % (ref 11.5–14.5)
EST. GFR  (NO RACE VARIABLE): >60 ML/MIN/1.73 M^2
ESTIMATED AVG GLUCOSE: 154 MG/DL (ref 68–131)
GLUCOSE SERPL-MCNC: 109 MG/DL (ref 70–110)
GLUCOSE SERPL-MCNC: 113 MG/DL (ref 70–110)
HBA1C MFR BLD: 7 % (ref 4–5.6)
HCT VFR BLD AUTO: 32.8 % (ref 37–48.5)
HCT VFR BLD CALC: 30 %PCV (ref 36–54)
HGB BLD-MCNC: 10.1 G/DL (ref 12–16)
IMM GRANULOCYTES # BLD AUTO: 0.04 K/UL (ref 0–0.04)
IMM GRANULOCYTES NFR BLD AUTO: 0.3 % (ref 0–0.5)
INR PPP: 1 (ref 0.8–1.2)
LYMPHOCYTES # BLD AUTO: 2.8 K/UL (ref 1–4.8)
LYMPHOCYTES NFR BLD: 24.2 % (ref 18–48)
MCH RBC QN AUTO: 24.2 PG (ref 27–31)
MCHC RBC AUTO-ENTMCNC: 30.8 G/DL (ref 32–36)
MCV RBC AUTO: 79 FL (ref 82–98)
MONOCYTES # BLD AUTO: 0.7 K/UL (ref 0.3–1)
MONOCYTES NFR BLD: 6.4 % (ref 4–15)
NEUTROPHILS # BLD AUTO: 7.7 K/UL (ref 1.8–7.7)
NEUTROPHILS NFR BLD: 66.9 % (ref 38–73)
NRBC BLD-RTO: 0 /100 WBC
PLATELET # BLD AUTO: 353 K/UL (ref 150–450)
PMV BLD AUTO: 8.6 FL (ref 9.2–12.9)
POC IONIZED CALCIUM: 1.15 MMOL/L (ref 1.06–1.42)
POC TCO2 (MEASURED): 28 MMOL/L (ref 23–29)
POTASSIUM BLD-SCNC: 4 MMOL/L (ref 3.5–5.1)
POTASSIUM SERPL-SCNC: 4.2 MMOL/L (ref 3.5–5.1)
PROT SERPL-MCNC: 7.6 G/DL (ref 6–8.4)
PROTHROMBIN TIME: 10.4 SEC (ref 9–12.5)
RBC # BLD AUTO: 4.17 M/UL (ref 4–5.4)
SAMPLE: ABNORMAL
SODIUM BLD-SCNC: 138 MMOL/L (ref 136–145)
SODIUM SERPL-SCNC: 140 MMOL/L (ref 136–145)
TROPONIN I SERPL DL<=0.01 NG/ML-MCNC: 0.07 NG/ML (ref 0–0.03)
WBC # BLD AUTO: 11.55 K/UL (ref 3.9–12.7)

## 2024-02-01 PROCEDURE — 3008F BODY MASS INDEX DOCD: CPT | Mod: HCNC,CPTII,S$GLB, | Performed by: ORTHOPAEDIC SURGERY

## 2024-02-01 PROCEDURE — 84484 ASSAY OF TROPONIN QUANT: CPT | Mod: HCNC | Performed by: STUDENT IN AN ORGANIZED HEALTH CARE EDUCATION/TRAINING PROGRAM

## 2024-02-01 PROCEDURE — 85730 THROMBOPLASTIN TIME PARTIAL: CPT | Mod: HCNC | Performed by: STUDENT IN AN ORGANIZED HEALTH CARE EDUCATION/TRAINING PROGRAM

## 2024-02-01 PROCEDURE — 93971 EXTREMITY STUDY: CPT | Mod: 26,HCNC,LT, | Performed by: INTERNAL MEDICINE

## 2024-02-01 PROCEDURE — 73564 X-RAY EXAM KNEE 4 OR MORE: CPT | Mod: 26,50,HCNC, | Performed by: INTERNAL MEDICINE

## 2024-02-01 PROCEDURE — 96365 THER/PROPH/DIAG IV INF INIT: CPT | Mod: HCNC

## 2024-02-01 PROCEDURE — 93971 EXTREMITY STUDY: CPT | Mod: HCNC,LT

## 2024-02-01 PROCEDURE — 1101F PT FALLS ASSESS-DOCD LE1/YR: CPT | Mod: HCNC,CPTII,S$GLB, | Performed by: ORTHOPAEDIC SURGERY

## 2024-02-01 PROCEDURE — 83036 HEMOGLOBIN GLYCOSYLATED A1C: CPT | Mod: HCNC | Performed by: STUDENT IN AN ORGANIZED HEALTH CARE EDUCATION/TRAINING PROGRAM

## 2024-02-01 PROCEDURE — 99214 OFFICE O/P EST MOD 30 MIN: CPT | Mod: HCNC,S$GLB,, | Performed by: ORTHOPAEDIC SURGERY

## 2024-02-01 PROCEDURE — 1160F RVW MEDS BY RX/DR IN RCRD: CPT | Mod: HCNC,CPTII,S$GLB, | Performed by: ORTHOPAEDIC SURGERY

## 2024-02-01 PROCEDURE — 73610 X-RAY EXAM OF ANKLE: CPT | Mod: TC,HCNC,LT

## 2024-02-01 PROCEDURE — 73610 X-RAY EXAM OF ANKLE: CPT | Mod: 26,HCNC,LT, | Performed by: INTERNAL MEDICINE

## 2024-02-01 PROCEDURE — 1125F AMNT PAIN NOTED PAIN PRSNT: CPT | Mod: HCNC,CPTII,S$GLB, | Performed by: ORTHOPAEDIC SURGERY

## 2024-02-01 PROCEDURE — 85610 PROTHROMBIN TIME: CPT | Mod: HCNC | Performed by: STUDENT IN AN ORGANIZED HEALTH CARE EDUCATION/TRAINING PROGRAM

## 2024-02-01 PROCEDURE — 80053 COMPREHEN METABOLIC PANEL: CPT | Mod: HCNC | Performed by: STUDENT IN AN ORGANIZED HEALTH CARE EDUCATION/TRAINING PROGRAM

## 2024-02-01 PROCEDURE — 73564 X-RAY EXAM KNEE 4 OR MORE: CPT | Mod: TC,50,HCNC

## 2024-02-01 PROCEDURE — G0378 HOSPITAL OBSERVATION PER HR: HCPCS | Mod: HCNC

## 2024-02-01 PROCEDURE — 96366 THER/PROPH/DIAG IV INF ADDON: CPT | Mod: HCNC

## 2024-02-01 PROCEDURE — 25500020 PHARM REV CODE 255: Mod: HCNC | Performed by: STUDENT IN AN ORGANIZED HEALTH CARE EDUCATION/TRAINING PROGRAM

## 2024-02-01 PROCEDURE — 85025 COMPLETE CBC W/AUTO DIFF WBC: CPT | Mod: HCNC | Performed by: STUDENT IN AN ORGANIZED HEALTH CARE EDUCATION/TRAINING PROGRAM

## 2024-02-01 PROCEDURE — 93005 ELECTROCARDIOGRAM TRACING: CPT | Mod: HCNC

## 2024-02-01 PROCEDURE — 63600175 PHARM REV CODE 636 W HCPCS: Mod: HCNC | Performed by: STUDENT IN AN ORGANIZED HEALTH CARE EDUCATION/TRAINING PROGRAM

## 2024-02-01 PROCEDURE — 99999 PR PBB SHADOW E&M-EST. PATIENT-LVL IV: CPT | Mod: PBBFAC,HCNC,, | Performed by: ORTHOPAEDIC SURGERY

## 2024-02-01 PROCEDURE — 83880 ASSAY OF NATRIURETIC PEPTIDE: CPT | Mod: HCNC | Performed by: STUDENT IN AN ORGANIZED HEALTH CARE EDUCATION/TRAINING PROGRAM

## 2024-02-01 PROCEDURE — 73501 X-RAY EXAM HIP UNI 1 VIEW: CPT | Mod: 26,HCNC,LT, | Performed by: RADIOLOGY

## 2024-02-01 PROCEDURE — 99285 EMERGENCY DEPT VISIT HI MDM: CPT | Mod: 25,HCNC

## 2024-02-01 PROCEDURE — 93010 ELECTROCARDIOGRAM REPORT: CPT | Mod: HCNC,,, | Performed by: INTERNAL MEDICINE

## 2024-02-01 PROCEDURE — 3288F FALL RISK ASSESSMENT DOCD: CPT | Mod: HCNC,CPTII,S$GLB, | Performed by: ORTHOPAEDIC SURGERY

## 2024-02-01 PROCEDURE — 73501 X-RAY EXAM HIP UNI 1 VIEW: CPT | Mod: TC,HCNC,LT

## 2024-02-01 PROCEDURE — 1159F MED LIST DOCD IN RCRD: CPT | Mod: HCNC,CPTII,S$GLB, | Performed by: ORTHOPAEDIC SURGERY

## 2024-02-01 RX ORDER — PANTOPRAZOLE SODIUM 40 MG/1
40 TABLET, DELAYED RELEASE ORAL DAILY
Status: DISCONTINUED | OUTPATIENT
Start: 2024-02-02 | End: 2024-02-02

## 2024-02-01 RX ORDER — BUTALBITAL, ACETAMINOPHEN AND CAFFEINE 50; 325; 40 MG/1; MG/1; MG/1
1 TABLET ORAL EVERY 6 HOURS PRN
Status: DISCONTINUED | OUTPATIENT
Start: 2024-02-01 | End: 2024-02-02 | Stop reason: HOSPADM

## 2024-02-01 RX ORDER — INSULIN ASPART 100 [IU]/ML
0-5 INJECTION, SOLUTION INTRAVENOUS; SUBCUTANEOUS
Status: DISCONTINUED | OUTPATIENT
Start: 2024-02-01 | End: 2024-02-02 | Stop reason: HOSPADM

## 2024-02-01 RX ORDER — ALPRAZOLAM 0.5 MG/1
0.5 TABLET ORAL 2 TIMES DAILY PRN
Status: DISCONTINUED | OUTPATIENT
Start: 2024-02-01 | End: 2024-02-02 | Stop reason: HOSPADM

## 2024-02-01 RX ORDER — OXYCODONE HYDROCHLORIDE 5 MG/1
5 TABLET ORAL EVERY 4 HOURS PRN
Status: DISCONTINUED | OUTPATIENT
Start: 2024-02-01 | End: 2024-02-02

## 2024-02-01 RX ORDER — FLUTICASONE PROPIONATE 50 MCG
1 SPRAY, SUSPENSION (ML) NASAL DAILY
Status: DISCONTINUED | OUTPATIENT
Start: 2024-02-02 | End: 2024-02-02 | Stop reason: HOSPADM

## 2024-02-01 RX ORDER — METHOCARBAMOL 500 MG/1
500 TABLET, FILM COATED ORAL 4 TIMES DAILY
Status: DISCONTINUED | OUTPATIENT
Start: 2024-02-01 | End: 2024-02-02 | Stop reason: HOSPADM

## 2024-02-01 RX ORDER — IBUPROFEN 200 MG
24 TABLET ORAL
Status: DISCONTINUED | OUTPATIENT
Start: 2024-02-01 | End: 2024-02-02 | Stop reason: HOSPADM

## 2024-02-01 RX ORDER — NALOXONE HCL 0.4 MG/ML
0.02 VIAL (ML) INJECTION
Status: DISCONTINUED | OUTPATIENT
Start: 2024-02-01 | End: 2024-02-02 | Stop reason: HOSPADM

## 2024-02-01 RX ORDER — AMITRIPTYLINE HYDROCHLORIDE 10 MG/1
10 TABLET, FILM COATED ORAL NIGHTLY
Status: DISCONTINUED | OUTPATIENT
Start: 2024-02-01 | End: 2024-02-02 | Stop reason: HOSPADM

## 2024-02-01 RX ORDER — METHOCARBAMOL 500 MG/1
500 TABLET, FILM COATED ORAL 3 TIMES DAILY PRN
Qty: 30 TABLET | Refills: 0 | Status: SHIPPED | OUTPATIENT
Start: 2024-02-01 | End: 2024-02-06 | Stop reason: SDUPTHER

## 2024-02-01 RX ORDER — ACETAMINOPHEN 500 MG
1000 TABLET ORAL EVERY 8 HOURS
Status: DISCONTINUED | OUTPATIENT
Start: 2024-02-01 | End: 2024-02-02

## 2024-02-01 RX ORDER — HEPARIN SODIUM,PORCINE/D5W 25000/250
0-30 INTRAVENOUS SOLUTION INTRAVENOUS CONTINUOUS
Status: DISCONTINUED | OUTPATIENT
Start: 2024-02-01 | End: 2024-02-02 | Stop reason: HOSPADM

## 2024-02-01 RX ORDER — SODIUM CHLORIDE 0.9 % (FLUSH) 0.9 %
10 SYRINGE (ML) INJECTION EVERY 12 HOURS PRN
Status: DISCONTINUED | OUTPATIENT
Start: 2024-02-01 | End: 2024-02-02 | Stop reason: HOSPADM

## 2024-02-01 RX ORDER — SERTRALINE HYDROCHLORIDE 25 MG/1
25 TABLET, FILM COATED ORAL DAILY
Status: DISCONTINUED | OUTPATIENT
Start: 2024-02-02 | End: 2024-02-02 | Stop reason: HOSPADM

## 2024-02-01 RX ORDER — ONDANSETRON HYDROCHLORIDE 2 MG/ML
4 INJECTION, SOLUTION INTRAVENOUS EVERY 8 HOURS PRN
Status: DISCONTINUED | OUTPATIENT
Start: 2024-02-02 | End: 2024-02-02 | Stop reason: HOSPADM

## 2024-02-01 RX ORDER — ATORVASTATIN CALCIUM 40 MG/1
80 TABLET, FILM COATED ORAL DAILY
Status: DISCONTINUED | OUTPATIENT
Start: 2024-02-02 | End: 2024-02-02 | Stop reason: HOSPADM

## 2024-02-01 RX ORDER — LOSARTAN POTASSIUM 25 MG/1
25 TABLET ORAL DAILY
Status: DISCONTINUED | OUTPATIENT
Start: 2024-02-02 | End: 2024-02-02

## 2024-02-01 RX ORDER — IBUPROFEN 200 MG
16 TABLET ORAL
Status: DISCONTINUED | OUTPATIENT
Start: 2024-02-01 | End: 2024-02-02 | Stop reason: HOSPADM

## 2024-02-01 RX ORDER — GLUCAGON 1 MG
1 KIT INJECTION
Status: DISCONTINUED | OUTPATIENT
Start: 2024-02-01 | End: 2024-02-02 | Stop reason: HOSPADM

## 2024-02-01 RX ADMIN — IOHEXOL 75 ML: 350 INJECTION, SOLUTION INTRAVENOUS at 11:02

## 2024-02-01 RX ADMIN — HEPARIN SODIUM AND DEXTROSE 18 UNITS/KG/HR: 10000; 5 INJECTION INTRAVENOUS at 09:02

## 2024-02-02 VITALS
RESPIRATION RATE: 18 BRPM | HEART RATE: 84 BPM | WEIGHT: 166 LBS | BODY MASS INDEX: 32.59 KG/M2 | DIASTOLIC BLOOD PRESSURE: 59 MMHG | TEMPERATURE: 98 F | OXYGEN SATURATION: 97 % | SYSTOLIC BLOOD PRESSURE: 121 MMHG | HEIGHT: 60 IN

## 2024-02-02 PROBLEM — I26.99 ACUTE PULMONARY EMBOLISM: Status: ACTIVE | Noted: 2024-02-02

## 2024-02-02 LAB
ALBUMIN SERPL BCP-MCNC: 3 G/DL (ref 3.5–5.2)
ALP SERPL-CCNC: 134 U/L (ref 55–135)
ALT SERPL W/O P-5'-P-CCNC: 10 U/L (ref 10–44)
ANION GAP SERPL CALC-SCNC: 9 MMOL/L (ref 8–16)
APTT PPP: 25.5 SEC (ref 21–32)
APTT PPP: 53.9 SEC (ref 21–32)
ASCENDING AORTA: 3.31 CM
AST SERPL-CCNC: 14 U/L (ref 10–40)
AV INDEX (PROSTH): 0.86
AV MEAN GRADIENT: 4 MMHG
AV PEAK GRADIENT: 8 MMHG
AV VALVE AREA BY VELOCITY RATIO: 2.69 CM²
AV VALVE AREA: 2.7 CM²
AV VELOCITY RATIO: 0.86
BASOPHILS # BLD AUTO: 0.07 K/UL (ref 0–0.2)
BASOPHILS # BLD AUTO: 0.07 K/UL (ref 0–0.2)
BASOPHILS NFR BLD: 0.6 % (ref 0–1.9)
BASOPHILS NFR BLD: 0.6 % (ref 0–1.9)
BILIRUB SERPL-MCNC: 0.2 MG/DL (ref 0.1–1)
BSA FOR ECHO PROCEDURE: 1.79 M2
BUN SERPL-MCNC: 10 MG/DL (ref 8–23)
CALCIUM SERPL-MCNC: 9.7 MG/DL (ref 8.7–10.5)
CHLORIDE SERPL-SCNC: 103 MMOL/L (ref 95–110)
CO2 SERPL-SCNC: 26 MMOL/L (ref 23–29)
CREAT SERPL-MCNC: 0.8 MG/DL (ref 0.5–1.4)
CV ECHO LV RWT: 0.38 CM
DIFFERENTIAL METHOD BLD: ABNORMAL
DIFFERENTIAL METHOD BLD: ABNORMAL
DOP CALC AO PEAK VEL: 1.4 M/S
DOP CALC AO VTI: 29.29 CM
DOP CALC LVOT AREA: 3.1 CM2
DOP CALC LVOT DIAMETER: 2 CM
DOP CALC LVOT PEAK VEL: 1.2 M/S
DOP CALC LVOT STROKE VOLUME: 79.1 CM3
DOP CALCLVOT PEAK VEL VTI: 25.19 CM
E WAVE DECELERATION TIME: 241.7 MSEC
E/A RATIO: 0.86
E/E' RATIO: 10.46 M/S
ECHO LV POSTERIOR WALL: 0.77 CM (ref 0.6–1.1)
EJECTION FRACTION: 65 %
EOSINOPHIL # BLD AUTO: 0.3 K/UL (ref 0–0.5)
EOSINOPHIL # BLD AUTO: 0.3 K/UL (ref 0–0.5)
EOSINOPHIL NFR BLD: 2.6 % (ref 0–8)
EOSINOPHIL NFR BLD: 2.6 % (ref 0–8)
ERYTHROCYTE [DISTWIDTH] IN BLOOD BY AUTOMATED COUNT: 17.2 % (ref 11.5–14.5)
ERYTHROCYTE [DISTWIDTH] IN BLOOD BY AUTOMATED COUNT: 17.2 % (ref 11.5–14.5)
EST. GFR  (NO RACE VARIABLE): >60 ML/MIN/1.73 M^2
FRACTIONAL SHORTENING: 33 % (ref 28–44)
GLUCOSE SERPL-MCNC: 144 MG/DL (ref 70–110)
HCT VFR BLD AUTO: 30.4 % (ref 37–48.5)
HCT VFR BLD AUTO: 30.4 % (ref 37–48.5)
HGB BLD-MCNC: 9.3 G/DL (ref 12–16)
HGB BLD-MCNC: 9.3 G/DL (ref 12–16)
IMM GRANULOCYTES # BLD AUTO: 0.03 K/UL (ref 0–0.04)
IMM GRANULOCYTES # BLD AUTO: 0.03 K/UL (ref 0–0.04)
IMM GRANULOCYTES NFR BLD AUTO: 0.3 % (ref 0–0.5)
IMM GRANULOCYTES NFR BLD AUTO: 0.3 % (ref 0–0.5)
INTERVENTRICULAR SEPTUM: 0.79 CM (ref 0.6–1.1)
LA MAJOR: 4.33 CM
LA MINOR: 4.62 CM
LA WIDTH: 3.31 CM
LEFT ATRIUM SIZE: 3.27 CM
LEFT ATRIUM VOLUME INDEX MOD: 24.4 ML/M2
LEFT ATRIUM VOLUME INDEX: 23.9 ML/M2
LEFT ATRIUM VOLUME MOD: 41.95 CM3
LEFT ATRIUM VOLUME: 41.13 CM3
LEFT INTERNAL DIMENSION IN SYSTOLE: 2.76 CM (ref 2.1–4)
LEFT VENTRICLE DIASTOLIC VOLUME INDEX: 42.99 ML/M2
LEFT VENTRICLE DIASTOLIC VOLUME: 73.95 ML
LEFT VENTRICLE MASS INDEX: 54 G/M2
LEFT VENTRICLE SYSTOLIC VOLUME INDEX: 16.6 ML/M2
LEFT VENTRICLE SYSTOLIC VOLUME: 28.47 ML
LEFT VENTRICULAR INTERNAL DIMENSION IN DIASTOLE: 4.09 CM (ref 3.5–6)
LEFT VENTRICULAR MASS: 93.74 G
LV LATERAL E/E' RATIO: 8.5 M/S
LV SEPTAL E/E' RATIO: 13.6 M/S
LYMPHOCYTES # BLD AUTO: 3.5 K/UL (ref 1–4.8)
LYMPHOCYTES # BLD AUTO: 3.5 K/UL (ref 1–4.8)
LYMPHOCYTES NFR BLD: 32.4 % (ref 18–48)
LYMPHOCYTES NFR BLD: 32.4 % (ref 18–48)
MAGNESIUM SERPL-MCNC: 2 MG/DL (ref 1.6–2.6)
MCH RBC QN AUTO: 23.7 PG (ref 27–31)
MCH RBC QN AUTO: 23.7 PG (ref 27–31)
MCHC RBC AUTO-ENTMCNC: 30.6 G/DL (ref 32–36)
MCHC RBC AUTO-ENTMCNC: 30.6 G/DL (ref 32–36)
MCV RBC AUTO: 78 FL (ref 82–98)
MCV RBC AUTO: 78 FL (ref 82–98)
MONOCYTES # BLD AUTO: 0.9 K/UL (ref 0.3–1)
MONOCYTES # BLD AUTO: 0.9 K/UL (ref 0.3–1)
MONOCYTES NFR BLD: 8 % (ref 4–15)
MONOCYTES NFR BLD: 8 % (ref 4–15)
MV PEAK A VEL: 0.79 M/S
MV PEAK E VEL: 0.68 M/S
NEUTROPHILS # BLD AUTO: 6.1 K/UL (ref 1.8–7.7)
NEUTROPHILS # BLD AUTO: 6.1 K/UL (ref 1.8–7.7)
NEUTROPHILS NFR BLD: 56.1 % (ref 38–73)
NEUTROPHILS NFR BLD: 56.1 % (ref 38–73)
NRBC BLD-RTO: 0 /100 WBC
NRBC BLD-RTO: 0 /100 WBC
PISA TR MAX VEL: 2.64 M/S
PLATELET # BLD AUTO: 315 K/UL (ref 150–450)
PLATELET # BLD AUTO: 315 K/UL (ref 150–450)
PMV BLD AUTO: 8.9 FL (ref 9.2–12.9)
PMV BLD AUTO: 8.9 FL (ref 9.2–12.9)
POCT GLUCOSE: 128 MG/DL (ref 70–110)
POCT GLUCOSE: 166 MG/DL (ref 70–110)
POCT GLUCOSE: 226 MG/DL (ref 70–110)
POTASSIUM SERPL-SCNC: 4.2 MMOL/L (ref 3.5–5.1)
PROT SERPL-MCNC: 6.8 G/DL (ref 6–8.4)
RA MAJOR: 4.14 CM
RA PRESSURE ESTIMATED: 3 MMHG
RA WIDTH: 2.35 CM
RBC # BLD AUTO: 3.92 M/UL (ref 4–5.4)
RBC # BLD AUTO: 3.92 M/UL (ref 4–5.4)
RIGHT VENTRICULAR END-DIASTOLIC DIMENSION: 2.63 CM
RV TB RVSP: 6 MMHG
SINUS: 2.82 CM
SODIUM SERPL-SCNC: 138 MMOL/L (ref 136–145)
STJ: 2.32 CM
TDI LATERAL: 0.08 M/S
TDI SEPTAL: 0.05 M/S
TDI: 0.07 M/S
TR MAX PG: 28 MMHG
TRICUSPID ANNULAR PLANE SYSTOLIC EXCURSION: 1.42 CM
TROPONIN I SERPL DL<=0.01 NG/ML-MCNC: 0.06 NG/ML (ref 0–0.03)
TV REST PULMONARY ARTERY PRESSURE: 31 MMHG
WBC # BLD AUTO: 10.91 K/UL (ref 3.9–12.7)
WBC # BLD AUTO: 10.91 K/UL (ref 3.9–12.7)
Z-SCORE OF LEFT VENTRICULAR DIMENSION IN END DIASTOLE: -1.54
Z-SCORE OF LEFT VENTRICULAR DIMENSION IN END SYSTOLE: -0.54

## 2024-02-02 PROCEDURE — 36415 COLL VENOUS BLD VENIPUNCTURE: CPT | Mod: HCNC,XB | Performed by: STUDENT IN AN ORGANIZED HEALTH CARE EDUCATION/TRAINING PROGRAM

## 2024-02-02 PROCEDURE — 36415 COLL VENOUS BLD VENIPUNCTURE: CPT | Mod: HCNC | Performed by: NURSE PRACTITIONER

## 2024-02-02 PROCEDURE — 85730 THROMBOPLASTIN TIME PARTIAL: CPT | Mod: HCNC | Performed by: STUDENT IN AN ORGANIZED HEALTH CARE EDUCATION/TRAINING PROGRAM

## 2024-02-02 PROCEDURE — G0378 HOSPITAL OBSERVATION PER HR: HCPCS | Mod: HCNC

## 2024-02-02 PROCEDURE — 25000242 PHARM REV CODE 250 ALT 637 W/ HCPCS: Mod: HCNC | Performed by: NURSE PRACTITIONER

## 2024-02-02 PROCEDURE — 84484 ASSAY OF TROPONIN QUANT: CPT | Mod: HCNC | Performed by: NURSE PRACTITIONER

## 2024-02-02 PROCEDURE — 63600175 PHARM REV CODE 636 W HCPCS: Mod: HCNC | Performed by: STUDENT IN AN ORGANIZED HEALTH CARE EDUCATION/TRAINING PROGRAM

## 2024-02-02 PROCEDURE — 25000003 PHARM REV CODE 250: Mod: HCNC | Performed by: NURSE PRACTITIONER

## 2024-02-02 PROCEDURE — 96374 THER/PROPH/DIAG INJ IV PUSH: CPT | Mod: 59

## 2024-02-02 PROCEDURE — 83735 ASSAY OF MAGNESIUM: CPT | Mod: HCNC | Performed by: NURSE PRACTITIONER

## 2024-02-02 PROCEDURE — 25000003 PHARM REV CODE 250: Mod: HCNC

## 2024-02-02 PROCEDURE — 85025 COMPLETE CBC W/AUTO DIFF WBC: CPT | Mod: HCNC | Performed by: STUDENT IN AN ORGANIZED HEALTH CARE EDUCATION/TRAINING PROGRAM

## 2024-02-02 PROCEDURE — 96366 THER/PROPH/DIAG IV INF ADDON: CPT

## 2024-02-02 PROCEDURE — 80053 COMPREHEN METABOLIC PANEL: CPT | Mod: HCNC | Performed by: NURSE PRACTITIONER

## 2024-02-02 RX ORDER — PANTOPRAZOLE SODIUM 40 MG/1
40 TABLET, DELAYED RELEASE ORAL
Status: DISCONTINUED | OUTPATIENT
Start: 2024-02-02 | End: 2024-02-02 | Stop reason: HOSPADM

## 2024-02-02 RX ORDER — ACETAMINOPHEN AND CODEINE PHOSPHATE 300; 30 MG/1; MG/1
1 TABLET ORAL EVERY 4 HOURS PRN
Status: DISCONTINUED | OUTPATIENT
Start: 2024-02-02 | End: 2024-02-02 | Stop reason: HOSPADM

## 2024-02-02 RX ORDER — OXYCODONE HYDROCHLORIDE 5 MG/1
5 TABLET ORAL EVERY 4 HOURS PRN
Status: DISCONTINUED | OUTPATIENT
Start: 2024-02-02 | End: 2024-02-02 | Stop reason: HOSPADM

## 2024-02-02 RX ORDER — DIPHENHYDRAMINE HCL 25 MG
25 CAPSULE ORAL EVERY 6 HOURS PRN
Status: DISCONTINUED | OUTPATIENT
Start: 2024-02-02 | End: 2024-02-02 | Stop reason: HOSPADM

## 2024-02-02 RX ORDER — GUAIFENESIN AND DEXTROMETHORPHAN HYDROBROMIDE 10; 100 MG/5ML; MG/5ML
10 SYRUP ORAL ONCE
Status: COMPLETED | OUTPATIENT
Start: 2024-02-02 | End: 2024-02-02

## 2024-02-02 RX ORDER — ACETAMINOPHEN 325 MG/1
650 TABLET ORAL EVERY 6 HOURS PRN
Status: DISCONTINUED | OUTPATIENT
Start: 2024-02-02 | End: 2024-02-02 | Stop reason: HOSPADM

## 2024-02-02 RX ORDER — LOSARTAN POTASSIUM 25 MG/1
25 TABLET ORAL NIGHTLY
Status: DISCONTINUED | OUTPATIENT
Start: 2024-02-02 | End: 2024-02-02 | Stop reason: HOSPADM

## 2024-02-02 RX ORDER — PROMETHAZINE HYDROCHLORIDE 6.25 MG/5ML
12.5 SYRUP ORAL ONCE
Status: COMPLETED | OUTPATIENT
Start: 2024-02-02 | End: 2024-02-02

## 2024-02-02 RX ORDER — ACETAMINOPHEN AND CODEINE PHOSPHATE 300; 30 MG/1; MG/1
1 TABLET ORAL EVERY 6 HOURS PRN
Qty: 20 TABLET | Refills: 0 | Status: SHIPPED | OUTPATIENT
Start: 2024-02-02 | End: 2024-02-08 | Stop reason: SDUPTHER

## 2024-02-02 RX ADMIN — PROMETHAZINE HYDROCHLORIDE 12.5 MG: 6.25 SOLUTION ORAL at 01:02

## 2024-02-02 RX ADMIN — LOSARTAN POTASSIUM 25 MG: 25 TABLET, FILM COATED ORAL at 12:02

## 2024-02-02 RX ADMIN — HEPARIN SODIUM AND DEXTROSE 21.07 UNITS/KG/HR: 10000; 5 INJECTION INTRAVENOUS at 12:02

## 2024-02-02 RX ADMIN — GUAIFENESIN AND DEXTROMETHORPHAN 10 ML: 100; 10 SYRUP ORAL at 12:02

## 2024-02-02 RX ADMIN — FLUTICASONE PROPIONATE 50 MCG: 50 SPRAY, METERED NASAL at 09:02

## 2024-02-02 RX ADMIN — BUTALBITAL, ACETAMINOPHEN, AND CAFFEINE 1 TABLET: 50; 325; 40 TABLET ORAL at 04:02

## 2024-02-02 RX ADMIN — PANTOPRAZOLE SODIUM 40 MG: 40 TABLET, DELAYED RELEASE ORAL at 03:02

## 2024-02-02 RX ADMIN — SERTRALINE HYDROCHLORIDE 25 MG: 25 TABLET ORAL at 09:02

## 2024-02-02 RX ADMIN — METHOCARBAMOL 500 MG: 500 TABLET ORAL at 09:02

## 2024-02-02 RX ADMIN — ACETAMINOPHEN AND CODEINE PHOSPHATE 1 TABLET: 300; 30 TABLET ORAL at 01:02

## 2024-02-02 RX ADMIN — ALPRAZOLAM 0.5 MG: 0.5 TABLET ORAL at 12:02

## 2024-02-02 RX ADMIN — DIPHENHYDRAMINE HYDROCHLORIDE 25 MG: 25 CAPSULE ORAL at 03:02

## 2024-02-02 RX ADMIN — PANTOPRAZOLE SODIUM 40 MG: 40 TABLET, DELAYED RELEASE ORAL at 12:02

## 2024-02-02 RX ADMIN — OXYCODONE 5 MG: 5 TABLET ORAL at 09:02

## 2024-02-02 RX ADMIN — ATORVASTATIN CALCIUM 80 MG: 40 TABLET, FILM COATED ORAL at 09:02

## 2024-02-02 NOTE — SUBJECTIVE & OBJECTIVE
Past Medical History:   Diagnosis Date    Carpal tunnel syndrome, right upper limb 06/23/2022    Diabetes mellitus type I     GERD (gastroesophageal reflux disease)     Hx of multiple pulmonary nodules 06/15/2022    Migraines     Proteinuria     Trigger finger, right ring finger 06/23/2022       Past Surgical History:   Procedure Laterality Date    CATARACT EXTRACTION      COSMETIC SURGERY  1971    rhinoplasty    EYE SURGERY  3 years ago    cataracts    JOINT REPLACEMENT  Twice in last 24 months    SHOULDER SURGERY Left 09/2020    TUBAL LIGATION  1992       Review of patient's allergies indicates:   Allergen Reactions    Gabapentin Hallucinations    Lyrica [pregabalin] Hallucinations    Mobic [meloxicam] Itching       No current facility-administered medications on file prior to encounter.     Current Outpatient Medications on File Prior to Encounter   Medication Sig    ALPRAZolam (XANAX) 0.5 MG tablet Take 1 tablet (0.5 mg total) by mouth 2 (two) times daily as needed for Anxiety.    amitriptyline (ELAVIL) 10 MG tablet Take 10 mg by mouth every evening.    azelastine (ASTELIN) 137 mcg (0.1 %) nasal spray 1 spray by Nasal route.    butalbital-aspirin-caffeine -40 mg (FIORINAL) -40 mg Cap Take 1 capsule by mouth every 6 (six) hours as needed.    coenzyme Q10 100 mg capsule Take 100 mg by mouth once daily.    esomeprazole (NEXIUM) 40 MG capsule Take 1 capsule (40 mg total) by mouth 2 (two) times daily before meals.    fluticasone propionate (FLONASE) 50 mcg/actuation nasal spray 1 spray (50 mcg total) by Each Nostril route once daily.    guaiFENesin-codeine 100-10 mg/5 ml (TUSSI-ORGANIDIN NR)  mg/5 mL syrup Take 5 mLs by mouth every 8 (eight) hours as needed for Cough.    Lactobac no.41/Bifidobact no.7 (PROBIOTIC-10 ORAL) Take by mouth once daily.    meclizine (ANTIVERT) 25 mg tablet Take 1 tablet (25 mg total) by mouth 2 (two) times daily as needed.    metFORMIN (GLUCOPHAGE-XR) 500 MG ER 24hr tablet  Take 2 tablets (1,000 mg total) by mouth every evening.    methocarbamoL (ROBAXIN) 500 MG Tab TAKE 1 TABLET (500 MG TOTAL) BY MOUTH 3 (THREE) TIMES DAILY AS NEEDED (PAIN).    methocarbamoL (ROBAXIN) 500 MG Tab Take 1 tablet (500 mg total) by mouth 3 (three) times daily as needed (muscle spasms).    ondansetron (ZOFRAN) 8 MG tablet Take 1 tablet (8 mg total) by mouth every 8 (eight) hours as needed for Nausea.    promethazine-dextromethorphan (PROMETHAZINE-DM) 6.25-15 mg/5 mL Syrp TAKE 10 MLS BY MOUTH EVERY 8 HOURS AS NEEDED FOR COUGH    rosuvastatin (CRESTOR) 40 MG Tab Take 1 tablet (40 mg total) by mouth once daily.    semaglutide (OZEMPIC) 0.25 mg or 0.5 mg (2 mg/3 mL) pen injector Inject 0.25 mg into the skin every 7 days.    sertraline (ZOLOFT) 25 MG tablet Take 1 tablet (25 mg total) by mouth once daily.    telmisartan (MICARDIS) 20 MG Tab TAKE 1 TABLET BY MOUTH EVERY DAY    traMADoL (ULTRAM) 50 mg tablet Take 1 tablet (50 mg total) by mouth every 6 (six) hours as needed for Pain. (Patient not taking: Reported on 2/1/2024)     Family History       Problem Relation (Age of Onset)    Bladder Cancer Mother    Breast cancer Maternal Aunt, Maternal Grandmother    Cancer Mother, Maternal Aunt, Paternal Aunt, Maternal Grandmother, Paternal Grandmother    Dementia Paternal Grandmother    Diabetes Paternal Aunt    Heart disease Mother, Father, Maternal Grandfather, Paternal Grandfather    Heart failure Father    Hypertension Mother, Father    No Known Problems Son    Stroke Brother          Tobacco Use    Smoking status: Never     Passive exposure: Never    Smokeless tobacco: Never   Substance and Sexual Activity    Alcohol use: Yes     Comment: rarely    Drug use: Not Currently     Types: Other-see comments     Comment: None    Sexual activity: Yes     Partners: Male     Birth control/protection: Post-menopausal     Review of Systems   Constitutional:  Negative for appetite change, chills, diaphoresis, fatigue and  fever.   HENT:  Negative for congestion, rhinorrhea and sore throat.    Eyes:  Negative for photophobia and visual disturbance.   Respiratory:  Positive for cough (non productive). Negative for shortness of breath and wheezing.         +SCANLON   Cardiovascular:  Positive for chest pain and leg swelling (LLE). Negative for palpitations.        Intermittent R lateral chest wall pain   Gastrointestinal:  Negative for abdominal distention, abdominal pain, diarrhea, nausea and vomiting.   Genitourinary:  Negative for dysuria, frequency and hematuria.   Musculoskeletal:  Positive for gait problem and myalgias. Negative for back pain and neck pain.   Skin:  Negative for pallor, rash and wound.   Neurological:  Negative for dizziness, syncope, weakness and headaches.   Psychiatric/Behavioral:  Negative for confusion and hallucinations. The patient is nervous/anxious.      Objective:     Vital Signs (Most Recent):  Temp: 98.5 °F (36.9 °C) (02/01/24 1657)  Pulse: 74 (02/01/24 1657)  Resp: 15 (02/01/24 1657)  BP: (!) 120/56 (02/01/24 1657)  SpO2: 96 % (02/01/24 1658) Vital Signs (24h Range):  Temp:  [98.5 °F (36.9 °C)] 98.5 °F (36.9 °C)  Pulse:  [74] 74  Resp:  [15] 15  SpO2:  [96 %] 96 %  BP: (120)/(56) 120/56     Weight: 71.7 kg (158 lb)  Body mass index is 30.86 kg/m².     Physical Exam  Vitals and nursing note reviewed.   Constitutional:       General: She is not in acute distress.     Appearance: She is not toxic-appearing or diaphoretic.   HENT:      Head: Normocephalic and atraumatic.      Nose: Nose normal.      Mouth/Throat:      Mouth: Mucous membranes are moist.   Eyes:      Pupils: Pupils are equal, round, and reactive to light.   Cardiovascular:      Rate and Rhythm: Normal rate and regular rhythm.      Pulses: Normal pulses.   Pulmonary:      Effort: Pulmonary effort is normal. No respiratory distress.      Breath sounds: No wheezing, rhonchi or rales.      Comments: Currently on room air.  Abdominal:      General:  Bowel sounds are normal. There is no distension.      Palpations: Abdomen is soft.      Tenderness: There is no abdominal tenderness. There is no guarding.   Musculoskeletal:         General: No swelling or deformity. Normal range of motion.      Cervical back: Normal range of motion.      Right lower leg: No edema.      Left lower leg: Tenderness present. Edema (swelling and tenderness noted to LLE, DP and PT pulses intact) present.   Skin:     General: Skin is warm and dry.      Capillary Refill: Capillary refill takes less than 2 seconds.   Neurological:      General: No focal deficit present.      Mental Status: She is alert and oriented to person, place, and time.      Sensory: No sensory deficit.      Motor: No weakness.   Psychiatric:         Mood and Affect: Mood is anxious.         Behavior: Behavior normal.              CRANIAL NERVES     CN III, IV, VI   Pupils are equal, round, and reactive to light.       Significant Labs: All pertinent labs within the past 24 hours have been reviewed.  CBC:   Recent Labs   Lab 02/01/24 1923 02/01/24 1934   WBC 11.55  --    HGB 10.1*  --    HCT 32.8* 30*     --      CMP:   Recent Labs   Lab 02/01/24 1938      K 4.2      CO2 28      BUN 11   CREATININE 0.8   CALCIUM 9.5   PROT 7.6   ALBUMIN 3.3*   BILITOT 0.2   ALKPHOS 148*   AST 12   ALT 11   ANIONGAP 8     Coagulation:   Recent Labs   Lab 02/01/24 1938   INR 1.0   APTT 25.7       Significant Imaging: I have reviewed all pertinent imaging results/findings within the past 24 hours.  Imaging Results              X-Ray Chest AP Portable (Final result)  Result time 02/01/24 19:50:27      Final result by Fred Hua MD (02/01/24 19:50:27)                   Impression:      1. No acute cardiopulmonary process.      Electronically signed by: Fred Hua MD  Date:    02/01/2024  Time:    19:50               Narrative:    EXAMINATION:  XR CHEST AP PORTABLE    CLINICAL  HISTORY:  sob;    TECHNIQUE:  Single frontal view of the chest was performed.    COMPARISON:  01/04/2023    FINDINGS:  The cardiomediastinal silhouette not enlarged, magnified by technique..  There is no pleural effusion.  The trachea is midline.  The lungs are symmetrically expanded bilaterally without evidence of acute parenchymal process. No large focal consolidation seen.  There is no pneumothorax.  The osseous structures are remarkable for degenerative change.  There are bilateral shoulder arthroplasty..

## 2024-02-02 NOTE — DISCHARGE SUMMARY
[facility] - Observation [address] [place] [address] Medicine  Discharge Summary      Patient Name: Jayla Loyd  MRN: [id number]  CHERYL: [id number]  Patient Class: OP- Observation  Admission Date: 2/1/2024  Hospital Length of Stay: 0 days  Discharge Date and Time: No discharge date for patient encounter.  Attending Physician: Deja Alas MD   Discharging Provider: Emerald Rivera PA-C  Primary Care Provider: Geena Barnard MD  [facility] Medicine Team: [facility] Emerald Rivera PA-C  Primary Care Team: [facility]    HPI:   Jayla Loyd is a 73 y.o. female with a PMHx of GERD, DM2, HLD, HTN, anxiety, migraines, and anemia who presents to the ED from vascular lab s/p LLE ultrasound due to left leg and foot pain. The study revealed extensive occlusive DVT of the left distal SFA, popliteal, and posterior tibial veins and she was referred to the ED by her cardiologist, Dr. Sifuentes. The patient reports she first noted pain to her left posterior thigh about 1 week ago that progressively worsened and spread to her calf. She endorses associated swelling and warmth. The patient endorses SCANLON and non productive cough x3-4 weeks noting that her symptoms started when she was diagnosed with COVID at the beginning of January. She has been taking cough syrup at home for this with no improvement. She denies fever/chills, SOB at rest, hemoptysis, abdominal pain, or dysuria. She initially denied chest pain but then noted intermittent right lateral chest wall pain under her right breast. Denies having this pain currently.     In the ED, VSSAF. CBC with stable anemia. PT/INR and PTT WNL. Alk phos 148. Albumin 3.3. BNP 10. Troponin 0.065. EKG with   CXR negative for acute process. CTA chest in process. The patient was started on a heparin drip.    * No surgery found *      Hospital Course:   Jayla Loyd is a 73 y.o.F who was placed in observation for further evaluation of DVT of LLE. CTA chest with RLL pulmonary artery  emboli. Patient started on heparin gtt overnight. Echo with EF 60-65%, normal diastolic function. Patient medically ready for discharge. Plan to transition to OAC with eliquis - take 10mg BID x7d then proceed with 5mg BID. Plan to follow up with PCP, already has appointment scheduled for 2/6 with Dr. Sifuentes with vascular. Return precautions provided. Plan of care discussed with patient, patient agreeable with plan, and all questions answered.       Goals of Care Treatment Preferences:  Code Status: Full Code      Consults:     No new Assessment & Plan notes have been filed under this hospital service since the last note was generated.  Service: Hospital Medicine    Final Active Diagnoses:    Diagnosis Date Noted POA    PRINCIPAL PROBLEM:  Acute deep vein thrombosis (DVT) of proximal vein of lower extremity [I82.4Y9] 02/01/2024 Yes    Acute pulmonary embolism [I26.99] 02/02/2024 Yes    Type 2 diabetes mellitus without complication, without long-term current use of insulin [E11.9] 03/29/2023 Yes    HLD (hyperlipidemia) [E78.5] 07/22/2020 Yes    Anxiety [F41.9] 07/16/2020 Yes    Iron deficiency anemia, unspecified [D50.9] 06/30/2019 Yes    HTN (hypertension) [I10] 08/19/2018 Yes    Gastroesophageal reflux disease [K21.9] 07/16/2014 Yes    Chronic mixed headache syndrome [G44.89] 07/16/2014 Yes      Problems Resolved During this Admission:       Discharged Condition: good    Disposition: Home or Self Care    Follow Up:   Follow-up Information       Geena Barnard MD. Schedule an appointment as soon as possible for a visit in 1 week(s).    Specialty: Internal Medicine  Contact information:  [id number] [place] [id number] [phone]                           Patient Instructions:      Notify your health care provider if you experience any of the following:  severe uncontrolled pain     Notify your health care provider if you experience any of the following:  redness, tenderness, or signs of infection (pain,  swelling, redness, odor or green/yellow discharge around incision site)     Notify your health care provider if you experience any of the following:  difficulty breathing or increased cough     Notify your health care provider if you experience any of the following:  persistent dizziness, light-headedness, or visual disturbances     Activity as tolerated       Significant Diagnostic Studies: N/A    Pending Diagnostic Studies:       Procedure Component Value Units Date/Time    APTT [[id number]]     Order Status: Sent Lab Status: No result     Specimen: Blood            Medications:  Reconciled Home Medications:      Medication List        START taking these medications      acetaminophen-codeine 300-30mg 300-30 mg Tab  Commonly known as: TYLENOL #3  Take 1 tablet by mouth every 6 (six) hours as needed (pain).     apixaban 5 mg Tab  Commonly known as: ELIQUIS  Take 2 tablets (10 mg total) by mouth 2 (two) times daily. for 7 days. THEN Take 1 tablet (5 mg total) by mouth 2 (two) times daily.  Start taking on: February 3, 2024            CONTINUE taking these medications      ALPRAZolam 0.5 MG tablet  Commonly known as: XANAX  Take 1 tablet (0.5 mg total) by mouth 2 (two) times daily as needed for Anxiety.     amitriptyline 10 MG tablet  Commonly known as: ELAVIL  Take 10 mg by mouth every evening.     azelastine 137 mcg (0.1 %) nasal spray  Commonly known as: ASTELIN  1 spray by Nasal route.     butalbital-aspirin-caffeine -40 mg -40 mg Cap  Commonly known as: FIORINAL  Take 1 capsule by mouth every 6 (six) hours as needed.     coenzyme Q10 100 mg capsule  Take 100 mg by mouth once daily.     esomeprazole 40 MG capsule  Commonly known as: NEXIUM  Take 1 capsule (40 mg total) by mouth 2 (two) times daily before meals.     fluticasone propionate 50 mcg/actuation nasal spray  Commonly known as: FLONASE  1 spray (50 mcg total) by Each Nostril route once daily.     meclizine 25 mg tablet  Commonly known as:  ANTIVERT  Take 1 tablet (25 mg total) by mouth 2 (two) times daily as needed.     metFORMIN 500 MG ER 24hr tablet  Commonly known as: GLUCOPHAGE-XR  Take 2 tablets (1,000 mg total) by mouth every evening.     * methocarbamoL 500 MG Tab  Commonly known as: ROBAXIN  TAKE 1 TABLET (500 MG TOTAL) BY MOUTH 3 (THREE) TIMES DAILY AS NEEDED (PAIN).     * methocarbamoL 500 MG Tab  Commonly known as: ROBAXIN  Take 1 tablet (500 mg total) by mouth 3 (three) times daily as needed (muscle spasms).     ondansetron 8 MG tablet  Commonly known as: ZOFRAN  Take 1 tablet (8 mg total) by mouth every 8 (eight) hours as needed for Nausea.     OZEMPIC 0.25 mg or 0.5 mg (2 mg/3 mL) pen injector  Generic drug: semaglutide  Inject 0.25 mg into the skin every 7 days.     PROBIOTIC-10 ORAL  Take by mouth once daily.     promethazine-dextromethorphan 6.25-15 mg/5 mL Syrp  Commonly known as: PROMETHAZINE-DM  TAKE 10 MLS BY MOUTH EVERY 8 HOURS AS NEEDED FOR COUGH     rosuvastatin 40 MG Tab  Commonly known as: CRESTOR  Take 1 tablet (40 mg total) by mouth once daily.     sertraline 25 MG tablet  Commonly known as: ZOLOFT  Take 1 tablet (25 mg total) by mouth once daily.     telmisartan 20 MG Tab  Commonly known as: MICARDIS  TAKE 1 TABLET BY MOUTH EVERY DAY           * This list has 2 medication(s) that are the same as other medications prescribed for you. Read the directions carefully, and ask your doctor or other care provider to review them with you.                STOP taking these medications      guaiFENesin-codeine 100-10 mg/5 ml  mg/5 mL syrup  Commonly known as: TUSSI-ORGANIDIN NR            ASK your doctor about these medications      traMADoL 50 mg tablet  Commonly known as: ULTRAM  Take 1 tablet (50 mg total) by mouth every 6 (six) hours as needed for Pain.              Indwelling Lines/Drains at time of discharge:   Lines/Drains/Airways       None                   Time spent on the discharge of patient: 36 minutes          Emerald Rivera PA-C  Department of [facility] Medicine  [facility] - Observation 11H

## 2024-02-02 NOTE — ASSESSMENT & PLAN NOTE
Mrs. Loyd presented from vascular lab s/p LLE ultrasound due to left leg and foot pain. The study revealed extensive occlusive DVT of the left distal SFA, popliteal, and posterior tibial veins and she was referred to the ED by her cardiologist, Dr. Sifuentes.    -Labs unremarkable.  -HDS.  -Patient started on heparin drip by ED provider, will need to transition to OAC prior to discharge.  -Elevate LLE.  -PRN pain control.

## 2024-02-02 NOTE — ASSESSMENT & PLAN NOTE
-Chronic, controlled.  -Continue losartan as telmisartan is non formulary.  -Continue to monitor BP closely.

## 2024-02-02 NOTE — PLAN OF CARE
[facility] 11H  Discharge Final Note    Primary Care Provider: Geena Barnard MD    Expected Discharge Date: 2/2/2024    Future Appointments   Date Time Provider Department Center   2/6/2024  9:00 AM Edmond Sifuentes MD [facility] [place]   2/28/2024 11:20 AM Geena Barnard MD [facility] [place]     Pt discharged home with no services. Coupon for 30 day supply of Eliquis given to Pt.   Kieran Gonsales, RN,BSN        Final Discharge Note (most recent)       Final Note - 02/02/24 1553          Final Note    Assessment Type Final Discharge Note     Anticipated Discharge Disposition Home or Self Care     Hospital Resources/Appts/Education Provided Provided patient/caregiver with written discharge plan information;Appointments scheduled and added to AVS;Post-Acute resouces added to AVS        Post-Acute Status    Discharge Delays None known at this time                     Important Message from Medicare             Contact Info       Geena Barnard MD   Specialty: Internal Medicine   Relationship: PCP - General    [address] [place] [id number]   Phone: [phone]       Next Steps: Schedule an appointment as soon as possible for a visit in 1 week(s)

## 2024-02-02 NOTE — HPI
Jayla Loyd is a 73 y.o. female with a PMHx of GERD, DM2, HLD, HTN, anxiety, migraines, and anemia who presents to the ED from vascular lab s/p LLE ultrasound due to left leg and foot pain. The study revealed extensive occlusive DVT of the left distal SFA, popliteal, and posterior tibial veins and she was referred to the ED by her cardiologist, Dr. Sifuentes. The patient reports she first noted pain to her left posterior thigh about 1 week ago that progressively worsened and spread to her calf. She endorses associated swelling and warmth. The patient endorses SCANLON and non productive cough x3-4 weeks noting that her symptoms started when she was diagnosed with COVID at the beginning of January. She has been taking cough syrup at home for this with no improvement. She denies fever/chills, SOB at rest, hemoptysis, abdominal pain, or dysuria. She initially denied chest pain but then noted intermittent right lateral chest wall pain under her right breast. Denies having this pain currently.     In the ED, VSSAF. CBC with stable anemia. PT/INR and PTT WNL. Alk phos 148. Albumin 3.3. BNP 10. Troponin 0.065. EKG with   CXR negative for acute process. CTA chest in process. The patient was started on a heparin drip.

## 2024-02-02 NOTE — PLAN OF CARE
Problem: Adult Inpatient Plan of Care  Goal: Plan of Care Review  Outcome: Ongoing, Progressing  Flowsheets (Taken 2/2/2024 0899)  Plan of Care Reviewed With:   patient   spouse  Goal: Patient-Specific Goal (Individualized)  Outcome: Ongoing, Progressing  Goal: Absence of Hospital-Acquired Illness or Injury  Outcome: Ongoing, Progressing  Goal: Optimal Comfort and Wellbeing  Outcome: Ongoing, Progressing  Goal: Readiness for Transition of Care  Outcome: Ongoing, Progressing     Problem: Diabetes Comorbidity  Goal: Blood Glucose Level Within Targeted Range  Outcome: Ongoing, Progressing     Problem: Fall Injury Risk  Goal: Absence of Fall and Fall-Related Injury  Outcome: Ongoing, Progressing   Discharged home. Clarification  Scheduled Meds: for home Elilquis 10mg for 7 days starting 2/3/2024 til 2/10  then 5 mg every day on 2/11, awaiting change to print d/c notes

## 2024-02-02 NOTE — ASSESSMENT & PLAN NOTE
Patient's anemia is currently controlled. Has not received any PRBCs to date. Etiology likely d/t Iron deficiency  Current CBC reviewed-   Lab Results   Component Value Date    HGB 10.1 (L) 02/01/2024    HCT 30 (L) 02/01/2024     Monitor serial CBC and transfuse if patient becomes hemodynamically unstable, symptomatic or H/H drops below 7/21.

## 2024-02-02 NOTE — ASSESSMENT & PLAN NOTE
"Patient's FSGs are controlled on current medication regimen.  Last A1c reviewed-   Lab Results   Component Value Date    HGBA1C 6.7 (H) 08/01/2023     Most recent fingerstick glucose reviewed- No results for input(s): "POCTGLUCOSE" in the last 24 hours.  Current correctional scale  Low  Maintain anti-hyperglycemic dose as follows-   Antihyperglycemics (From admission, onward)      Start     Stop Route Frequency Ordered    02/01/24 2148  insulin aspart U-100 pen 0-5 Units         -- SubQ Before meals & nightly PRN 02/01/24 2048          Hold Oral hypoglycemics while patient is in the hospital.  -Accuchecks AC/HS.  -Diabetic/cardiac diet.  "

## 2024-02-02 NOTE — HOSPITAL COURSE
Jayla Loyd is a 73 y.o.F who was placed in observation for further evaluation of DVT of LLE. CTA chest with RLL pulmonary artery emboli. Patient started on heparin gtt overnight. Echo with EF 60-65%, normal diastolic function. Patient medically ready for discharge. Plan to transition to OAC with eliquis - take 10mg BID x7d then proceed with 5mg BID. Plan to follow up with PCP, already has appointment scheduled for 2/6 with Dr. Sifuentes with vascular. Return precautions provided. Plan of care discussed with patient, patient agreeable with plan, and all questions answered.

## 2024-02-02 NOTE — H&P
[facility] - Emergency Dept  [facility] Medicine  History & Physical    Patient Name: Jayla Loyd  MRN: [id number]  Patient Class: OP- Observation  Admission Date: 2/1/2024  Attending Physician: Deja Alas MD   Primary Care Provider: Geena Barnard MD         Patient information was obtained from patient, past medical records, and ER records.     Subjective:     Principal Problem:Acute deep vein thrombosis (DVT) of proximal vein of lower extremity    Chief Complaint:   Chief Complaint   Patient presents with    Leg Pain     Pt arrives from the vascular clinic for evaluation of a possible left lower leg DVT. Pt having pain and swelling.         HPI: Jayla Loyd is a 73 y.o. female with a PMHx of GERD, DM2, HLD, HTN, anxiety, migraines, and anemia who presents to the ED from vascular lab s/p LLE ultrasound due to left leg and foot pain. The study revealed extensive occlusive DVT of the left distal SFA, popliteal, and posterior tibial veins and she was referred to the ED by her cardiologist, Dr. Sifuentes. The patient reports she first noted pain to her left posterior thigh about 1 week ago that progressively worsened and spread to her calf. She endorses associated swelling and warmth. The patient endorses SCANLON and non productive cough x3-4 weeks noting that her symptoms started when she was diagnosed with COVID at the beginning of January. She has been taking cough syrup at home for this with no improvement. She denies fever/chills, SOB at rest, hemoptysis, abdominal pain, or dysuria. She initially denied chest pain but then noted intermittent right lateral chest wall pain under her right breast. Denies having this pain currently.     In the ED, VSSAF. CBC with stable anemia. PT/INR and PTT WNL. Alk phos 148. Albumin 3.3. BNP 10. Troponin 0.065. EKG with   CXR negative for acute process. CTA chest in process. The patient was started on a heparin drip.    Past Medical History:   Diagnosis Date    Carpal  tunnel syndrome, right upper limb 06/23/2022    Diabetes mellitus type I     GERD (gastroesophageal reflux disease)     Hx of multiple pulmonary nodules 06/15/2022    Migraines     Proteinuria     Trigger finger, right ring finger 06/23/2022       Past Surgical History:   Procedure Laterality Date    CATARACT EXTRACTION      COSMETIC SURGERY  1971    rhinoplasty    EYE SURGERY  3 years ago    cataracts    JOINT REPLACEMENT  Twice in last 24 months    SHOULDER SURGERY Left 09/2020    TUBAL LIGATION  1992       Review of patient's allergies indicates:   Allergen Reactions    Gabapentin Hallucinations    Lyrica [pregabalin] Hallucinations    Mobic [meloxicam] Itching       No current facility-administered medications on file prior to encounter.     Current Outpatient Medications on File Prior to Encounter   Medication Sig    ALPRAZolam (XANAX) 0.5 MG tablet Take 1 tablet (0.5 mg total) by mouth 2 (two) times daily as needed for Anxiety.    amitriptyline (ELAVIL) 10 MG tablet Take 10 mg by mouth every evening.    azelastine (ASTELIN) 137 mcg (0.1 %) nasal spray 1 spray by Nasal route.    butalbital-aspirin-caffeine -40 mg (FIORINAL) -40 mg Cap Take 1 capsule by mouth every 6 (six) hours as needed.    coenzyme Q10 100 mg capsule Take 100 mg by mouth once daily.    esomeprazole (NEXIUM) 40 MG capsule Take 1 capsule (40 mg total) by mouth 2 (two) times daily before meals.    fluticasone propionate (FLONASE) 50 mcg/actuation nasal spray 1 spray (50 mcg total) by Each Nostril route once daily.    guaiFENesin-codeine 100-10 mg/5 ml (TUSSI-ORGANIDIN NR)  mg/5 mL syrup Take 5 mLs by mouth every 8 (eight) hours as needed for Cough.    Lactobac no.41/Bifidobact no.7 (PROBIOTIC-10 ORAL) Take by mouth once daily.    meclizine (ANTIVERT) 25 mg tablet Take 1 tablet (25 mg total) by mouth 2 (two) times daily as needed.    metFORMIN (GLUCOPHAGE-XR) 500 MG ER 24hr tablet Take 2 tablets (1,000 mg total) by mouth every  evening.    methocarbamoL (ROBAXIN) 500 MG Tab TAKE 1 TABLET (500 MG TOTAL) BY MOUTH 3 (THREE) TIMES DAILY AS NEEDED (PAIN).    methocarbamoL (ROBAXIN) 500 MG Tab Take 1 tablet (500 mg total) by mouth 3 (three) times daily as needed (muscle spasms).    ondansetron (ZOFRAN) 8 MG tablet Take 1 tablet (8 mg total) by mouth every 8 (eight) hours as needed for Nausea.    promethazine-dextromethorphan (PROMETHAZINE-DM) 6.25-15 mg/5 mL Syrp TAKE 10 MLS BY MOUTH EVERY 8 HOURS AS NEEDED FOR COUGH    rosuvastatin (CRESTOR) 40 MG Tab Take 1 tablet (40 mg total) by mouth once daily.    semaglutide (OZEMPIC) 0.25 mg or 0.5 mg (2 mg/3 mL) pen injector Inject 0.25 mg into the skin every 7 days.    sertraline (ZOLOFT) 25 MG tablet Take 1 tablet (25 mg total) by mouth once daily.    telmisartan (MICARDIS) 20 MG Tab TAKE 1 TABLET BY MOUTH EVERY DAY    traMADoL (ULTRAM) 50 mg tablet Take 1 tablet (50 mg total) by mouth every 6 (six) hours as needed for Pain. (Patient not taking: Reported on 2/1/2024)     Family History       Problem Relation (Age of Onset)    Bladder Cancer Mother    Breast cancer Maternal Aunt, Maternal Grandmother    Cancer Mother, Maternal Aunt, Paternal Aunt, Maternal Grandmother, Paternal Grandmother    Dementia Paternal Grandmother    Diabetes Paternal Aunt    Heart disease Mother, Father, Maternal Grandfather, Paternal Grandfather    Heart failure Father    Hypertension Mother, Father    No Known Problems Son    Stroke Brother          Tobacco Use    Smoking status: Never     Passive exposure: Never    Smokeless tobacco: Never   Substance and Sexual Activity    Alcohol use: Yes     Comment: rarely    Drug use: Not Currently     Types: Other-see comments     Comment: None    Sexual activity: Yes     Partners: Male     Birth control/protection: Post-menopausal     Review of Systems   Constitutional:  Negative for appetite change, chills, diaphoresis, fatigue and fever.   HENT:  Negative for congestion, rhinorrhea  and sore throat.    Eyes:  Negative for photophobia and visual disturbance.   Respiratory:  Positive for cough (non productive). Negative for shortness of breath and wheezing.         +SCANLON   Cardiovascular:  Positive for chest pain and leg swelling (LLE). Negative for palpitations.        Intermittent R lateral chest wall pain   Gastrointestinal:  Negative for abdominal distention, abdominal pain, diarrhea, nausea and vomiting.   Genitourinary:  Negative for dysuria, frequency and hematuria.   Musculoskeletal:  Positive for gait problem and myalgias. Negative for back pain and neck pain.   Skin:  Negative for pallor, rash and wound.   Neurological:  Negative for dizziness, syncope, weakness and headaches.   Psychiatric/Behavioral:  Negative for confusion and hallucinations. The patient is nervous/anxious.      Objective:     Vital Signs (Most Recent):  Temp: 98.5 °F (36.9 °C) (02/01/24 1657)  Pulse: 74 (02/01/24 1657)  Resp: 15 (02/01/24 1657)  BP: (!) 120/56 (02/01/24 1657)  SpO2: 96 % (02/01/24 1658) Vital Signs (24h Range):  Temp:  [98.5 °F (36.9 °C)] 98.5 °F (36.9 °C)  Pulse:  [74] 74  Resp:  [15] 15  SpO2:  [96 %] 96 %  BP: (120)/(56) 120/56     Weight: 71.7 kg (158 lb)  Body mass index is 30.86 kg/m².     Physical Exam  Vitals and nursing note reviewed.   Constitutional:       General: She is not in acute distress.     Appearance: She is not toxic-appearing or diaphoretic.   HENT:      Head: Normocephalic and atraumatic.      Nose: Nose normal.      Mouth/Throat:      Mouth: Mucous membranes are moist.   Eyes:      Pupils: Pupils are equal, round, and reactive to light.   Cardiovascular:      Rate and Rhythm: Normal rate and regular rhythm.      Pulses: Normal pulses.   Pulmonary:      Effort: Pulmonary effort is normal. No respiratory distress.      Breath sounds: No wheezing, rhonchi or rales.      Comments: Currently on room air.  Abdominal:      General: Bowel sounds are normal. There is no distension.       Palpations: Abdomen is soft.      Tenderness: There is no abdominal tenderness. There is no guarding.   Musculoskeletal:         General: No swelling or deformity. Normal range of motion.      Cervical back: Normal range of motion.      Right lower leg: No edema.      Left lower leg: Tenderness present. Edema (swelling and tenderness noted to LLE, DP and PT pulses intact) present.   Skin:     General: Skin is warm and dry.      Capillary Refill: Capillary refill takes less than 2 seconds.   Neurological:      General: No focal deficit present.      Mental Status: She is alert and oriented to person, place, and time.      Sensory: No sensory deficit.      Motor: No weakness.   Psychiatric:         Mood and Affect: Mood is anxious.         Behavior: Behavior normal.              CRANIAL NERVES     CN III, IV, VI   Pupils are equal, round, and reactive to light.       Significant Labs: All pertinent labs within the past 24 hours have been reviewed.  CBC:   Recent Labs   Lab 02/01/24 1923 02/01/24 1934   WBC 11.55  --    HGB 10.1*  --    HCT 32.8* 30*     --      CMP:   Recent Labs   Lab 02/01/24 1938      K 4.2      CO2 28      BUN 11   CREATININE 0.8   CALCIUM 9.5   PROT 7.6   ALBUMIN 3.3*   BILITOT 0.2   ALKPHOS 148*   AST 12   ALT 11   ANIONGAP 8     Coagulation:   Recent Labs   Lab 02/01/24 1938   INR 1.0   APTT 25.7       Significant Imaging: I have reviewed all pertinent imaging results/findings within the past 24 hours.  Imaging Results              X-Ray Chest AP Portable (Final result)  Result time 02/01/24 19:50:27      Final result by Fred Hua MD (02/01/24 19:50:27)                   Impression:      1. No acute cardiopulmonary process.      Electronically signed by: Fred Hua MD  Date:    02/01/2024  Time:    19:50               Narrative:    EXAMINATION:  XR CHEST AP PORTABLE    CLINICAL HISTORY:  sob;    TECHNIQUE:  Single frontal view of the chest was  "performed.    COMPARISON:  01/04/2023    FINDINGS:  The cardiomediastinal silhouette not enlarged, magnified by technique..  There is no pleural effusion.  The trachea is midline.  The lungs are symmetrically expanded bilaterally without evidence of acute parenchymal process. No large focal consolidation seen.  There is no pneumothorax.  The osseous structures are remarkable for degenerative change.  There are bilateral shoulder arthroplasty..                                      Assessment/Plan:     * Acute deep vein thrombosis (DVT) of proximal vein of lower extremity  Mrs. Loyd presented from vascular lab s/p LLE ultrasound due to left leg and foot pain. The study revealed extensive occlusive DVT of the left distal SFA, popliteal, and posterior tibial veins and she was referred to the ED by her cardiologist, Dr. Sifuentes.    -Labs unremarkable.  -HDS.  -Patient started on heparin drip by ED provider, will need to transition to OAC prior to discharge.  -Elevate LLE.  -PRN pain control.    Acute pulmonary embolism  -No hypoxia or tachycardia noted.  -CTA chest with right lower lobe pulmonary arterial emboli.  -Started on heparin drip in the ED, transition to OAC prior to discharge.  -TTE pending.  -Monitor respiratory status.    Type 2 diabetes mellitus without complication, without long-term current use of insulin  Patient's FSGs are controlled on current medication regimen.  Last A1c reviewed-   Lab Results   Component Value Date    HGBA1C 6.7 (H) 08/01/2023     Most recent fingerstick glucose reviewed- No results for input(s): "POCTGLUCOSE" in the last 24 hours.  Current correctional scale  Low  Maintain anti-hyperglycemic dose as follows-   Antihyperglycemics (From admission, onward)      Start     Stop Route Frequency Ordered    02/01/24 2148  insulin aspart U-100 pen 0-5 Units         -- SubQ Before meals & nightly PRN 02/01/24 2048          Hold Oral hypoglycemics while patient is in the " [facility].  -[facility].  -Diabetic/cardiac diet.    Hyperlipidemia associated with type 2 diabetes mellitus   Patient is chronically on statin.will continue for now. Monitor clinically. Last LDL was   Lab Results   Component Value Date    LDLCALC 77.4 08/01/2023        Anxiety  -Chronic, controlled.  -Continue sertraline.  - reviewed, continue PRN xanax.    Chronic mixed headache syndrome  -Chronic issue.  -Continue amitriptyline qhs and PRN fiorcet.    Iron deficiency anemia, unspecified  Patient's anemia is currently controlled. Has not received any PRBCs to date. Etiology likely d/t Iron deficiency  Current CBC reviewed-   Lab Results   Component Value Date    HGB 10.1 (L) 02/01/2024    HCT 30 (L) 02/01/2024     Monitor serial CBC and transfuse if patient becomes hemodynamically unstable, symptomatic or H/H drops below 7/21.    Gastroesophageal reflux disease  -Chronic, controlled.  -Continue PPI.    Hypertension associated with diabetes  -Chronic, controlled.  -Continue losartan as telmisartan is non formulary.  -Continue to monitor BP closely.      VTE Risk Mitigation (From admission, onward)           Ordered     IP VTE HIGH RISK PATIENT  Once         02/01/24 2048     Place sequential compression device  Until discontinued         02/01/24 2048     heparin 25,000 units in dextrose 5% (100 units/ml) IV bolus from bag - ADDITIONAL PRN BOLUS - 30 units/kg  As needed (PRN)        Question:  Heparin Infusion Adjustment (DO NOT MODIFY ANSWER)  Answer:  [email or web address]\Images\Pharmacy\HeparinInfusions\heparin HIGH INTENSITY nomogram for [facility] [id number]    02/01/24 1857     heparin 25,000 units in dextrose 5% (100 units/ml) IV bolus from bag - ADDITIONAL PRN BOLUS - 60 units/kg  As needed (PRN)        Question:  Heparin Infusion Adjustment (DO NOT MODIFY ANSWER)  Answer:  [email or web address]\Images\Pharmacy\HeparinInfusions\heparin HIGH INTENSITY nomogram for [facility] [id number]    02/01/24 1857     heparin  25,000 units in dextrose 5% (100 units/ml) IV bolus from bag INITIAL BOLUS  Once        Question:  Heparin Infusion Adjustment (DO NOT MODIFY ANSWER)  Answer:  [email or web address]\Images\Pharmacy\HeparinInfusions\heparin HIGH INTENSITY nomogram for [facility] [id number]    02/01/24 1857     heparin 25,000 units in dextrose 5% 250 mL (100 units/mL) infusion HIGH INTENSITY nomogram - [facility]  Continuous        Question:  Begin at (units/kg/hr)  Answer:  18    02/01/24 1857                         On 02/01/2024, patient should be placed in hospital observation services under my care in collaboration with Bin Larsen MD.           Sudha Nunes NP  Department of Hospital Medicine  [facility] - Emergency Dept

## 2024-02-02 NOTE — ED NOTES
Jayla Loyd, an 73 y.o. female presents to the ED sent from clinic for evaluation of LLE swelling and pain. US in vascular clinic revealed DVT     Review of patient's allergies indicates:   Allergen Reactions    Gabapentin Hallucinations    Lyrica [pregabalin] Hallucinations    Mobic [meloxicam] Itching     Chief Complaint   Patient presents with    Leg Pain     Pt arrives from the vascular clinic for evaluation of a possible left lower leg DVT. Pt having pain and swelling.      Past Medical History:   Diagnosis Date    Carpal tunnel syndrome, right upper limb 06/23/2022    Diabetes mellitus type I     GERD (gastroesophageal reflux disease)     Hx of multiple pulmonary nodules 06/15/2022    Migraines     Proteinuria     Trigger finger, right ring finger 06/23/2022

## 2024-02-02 NOTE — ED NOTES
I-STAT Chem-8+ Results:   Value Reference Range   Sodium 138 136-145 mmol/L   Potassium  4.0 3.5-5.1 mmol/L   Chloride 102  mmol/L   Ionized Calcium 1.15 1.06-1.42 mmol/L   CO2 (measured) 28 23-29 mmol/L   Glucose 113  mg/dL   BUN 10 6-30 mg/dL   Creatinine 0.8 0.5-1.4 mg/dL   Hematocrit 30 36-54%

## 2024-02-02 NOTE — ASSESSMENT & PLAN NOTE
Patient is chronically on statin.will continue for now. Monitor clinically. Last LDL was   Lab Results   Component Value Date    LDLCALC 77.4 08/01/2023

## 2024-02-02 NOTE — PLAN OF CARE
[facility] - Observation 11H  Discharge Assessment    Primary Care Provider: Geena Barnard MD     Discharge Assessment (most recent)       BRIEF DISCHARGE ASSESSMENT - 02/02/24 1250          Discharge Planning    Assessment Type Discharge Planning Brief Assessment     Resource/Environmental Concerns none     Support Systems Spouse/significant other;Family members     Equipment Currently Used at Home none     Current Living Arrangements home     Patient/Family Anticipates Transition to home     Patient/Family Anticipated Services at Transition none     DME Needed Upon Discharge  none     Discharge Plan A Home     Discharge Plan B Home with family        Physical Activity    On average, how many days per week do you engage in moderate to strenuous exercise (like a brisk walk)? 0 days     On average, how many minutes do you engage in exercise at this level? 0 min        Financial Resource Strain    How hard is it for you to pay for the very basics like food, housing, medical care, and heating? Not very hard        Housing Stability    In the last 12 months, was there a time when you were not able to pay the mortgage or rent on time? No     In the last 12 months, how many places have you lived? 1     In the last 12 months, was there a time when you did not have a steady place to sleep or slept in a shelter (including now)? No        Transportation Needs    In the past 12 months, has lack of transportation kept you from medical appointments or from getting medications? No     In the past 12 months, has lack of transportation kept you from meetings, work, or from getting things needed for daily living? No        Food Insecurity    Within the past 12 months, you worried that your food would run out before you got the money to buy more. Never true     Within the past 12 months, the food you bought just didn't last and you didn't have money to get more. Never true        Stress    Do you feel stress - tense, restless,  nervous, or anxious, or unable to sleep at night because your mind is troubled all the time - these days? To some extent        Social Connections    In a typical week, how many times do you talk on the phone with family, friends, or neighbors? More than three times a week     How often do you get together with friends or relatives? More than three times a week     Do you belong to any clubs or organizations such as Religion groups, unions, fraternal or athletic groups, or school groups? No     How often do you attend meetings of the clubs or organizations you belong to? Never     Are you , , , , never , or living with a partner?         Alcohol Use    Q1: How often do you have a drink containing alcohol? Monthly or less                     Pt is a 73 y.o. female admitted with DVT and has a PMH of DM2 and HTN. She lives with her  and son in a single story house with no steps to enter. She has not required DME in the past and is independent with her ADLs and iADLs and drives. She will have transportation home at discharge. [facility] Discharge Packet given to patient and/or family with understanding verbalized.   name and number and estimated discharge date written on white board in patient's room with request to call for any questions or concerns.  Will continue to follow for needs.  Kieran Gonsales RN,BSN

## 2024-02-02 NOTE — ED PROVIDER NOTES
Encounter Date: 2/1/2024       History     Chief Complaint   Patient presents with    Leg Pain     Pt arrives from the vascular clinic for evaluation of a possible left lower leg DVT. Pt having pain and swelling.      73 year old female presenting to the ED for leg pain. Patient reports left leg pain accompanied by swelling starting 1 week ago. The pain is from her thigh down to her foot. She reports her left leg is warm to the touch. She has had a cough for 1 month after having COVID. She also has had SOB since having COVID, but reports increased SOB in the past week. Today, she went to a sports medicine clinic for her leg pain and an ultrasound was ordered that showed a left DVT. No fevers, chest pain, hemoptysis, N/V, abdominal pain.    The history is provided by the patient and medical records.     Review of patient's allergies indicates:   Allergen Reactions    Gabapentin Hallucinations    Lyrica [pregabalin] Hallucinations    Mobic [meloxicam] Itching     Past Medical History:   Diagnosis Date    Carpal tunnel syndrome, right upper limb 06/23/2022    Diabetes mellitus type I     GERD (gastroesophageal reflux disease)     Hx of multiple pulmonary nodules 06/15/2022    Migraines     Proteinuria     Trigger finger, right ring finger 06/23/2022     Past Surgical History:   Procedure Laterality Date    CATARACT EXTRACTION      COSMETIC SURGERY  1971    rhinoplasty    EYE SURGERY  3 years ago    cataracts    JOINT REPLACEMENT  Twice in last 24 months    SHOULDER SURGERY Left 09/2020    TUBAL LIGATION  1992     Family History   Problem Relation Age of Onset    Bladder Cancer Mother     Hypertension Mother         CABG    Cancer Mother     Heart disease Mother     Heart failure Father         CABG    Hypertension Father     Heart disease Father     Stroke Brother     No Known Problems Son     Cancer Maternal Aunt     Breast cancer Maternal Aunt     Cancer Paternal Aunt     Diabetes Paternal Aunt     Cancer Maternal  Grandmother     Breast cancer Maternal Grandmother     Heart disease Maternal Grandfather     Cancer Paternal Grandmother     Dementia Paternal Grandmother     Heart disease Paternal Grandfather      Social History     Tobacco Use    Smoking status: Never     Passive exposure: Never    Smokeless tobacco: Never   Substance Use Topics    Alcohol use: Yes     Comment: rarely    Drug use: Not Currently     Types: Other-see comments     Comment: None     Review of Systems    Physical Exam     Initial Vitals   BP Pulse Resp Temp SpO2   02/01/24 1657 02/01/24 1657 02/01/24 1657 02/01/24 1657 02/01/24 1658   (!) 120/56 74 15 98.5 °F (36.9 °C) 96 %      MAP       --                Physical Exam    Nursing note and vitals reviewed.  Constitutional: She appears well-developed and well-nourished. She is not diaphoretic. No distress.   HENT:   Head: Normocephalic and atraumatic.   Eyes: Conjunctivae and EOM are normal. Pupils are equal, round, and reactive to light.   Neck: Neck supple.   Normal range of motion.  Cardiovascular:  Normal rate, regular rhythm, normal heart sounds and intact distal pulses.           No murmur heard.  Pulmonary/Chest: Breath sounds normal. No respiratory distress. She has no wheezes. She has no rhonchi. She has no rales.   Abdominal: Abdomen is soft. She exhibits no distension. There is no abdominal tenderness. There is no rebound and no guarding.   Musculoskeletal:      Cervical back: Normal range of motion and neck supple.      Comments: Swelling and tenderness to LLE     Neurological: She is alert and oriented to person, place, and time.   Skin: Skin is warm and dry. Capillary refill takes less than 2 seconds.         ED Course   Procedures  Labs Reviewed   CBC W/ AUTO DIFFERENTIAL - Abnormal; Notable for the following components:       Result Value    Hemoglobin 10.1 (*)     Hematocrit 32.8 (*)     MCV 79 (*)     MCH 24.2 (*)     MCHC 30.8 (*)     RDW 17.0 (*)     MPV 8.6 (*)     All other  components within normal limits   COMPREHENSIVE METABOLIC PANEL - Abnormal; Notable for the following components:    Albumin 3.3 (*)     Alkaline Phosphatase 148 (*)     All other components within normal limits   ISTAT PROCEDURE - Abnormal; Notable for the following components:    POC Glucose 113 (*)     POC Hematocrit 30 (*)     All other components within normal limits   PROTIME-INR   APTT   TROPONIN I   B-TYPE NATRIURETIC PEPTIDE   ISTAT CHEM8          Imaging Results              X-Ray Chest AP Portable (Final result)  Result time 02/01/24 19:50:27      Final result by Fred Hua MD (02/01/24 19:50:27)                   Impression:      1. No acute cardiopulmonary process.      Electronically signed by: Fred Hua MD  Date:    02/01/2024  Time:    19:50               Narrative:    EXAMINATION:  XR CHEST AP PORTABLE    CLINICAL HISTORY:  sob;    TECHNIQUE:  Single frontal view of the chest was performed.    COMPARISON:  01/04/2023    FINDINGS:  The cardiomediastinal silhouette not enlarged, magnified by technique..  There is no pleural effusion.  The trachea is midline.  The lungs are symmetrically expanded bilaterally without evidence of acute parenchymal process. No large focal consolidation seen.  There is no pneumothorax.  The osseous structures are remarkable for degenerative change.  There are bilateral shoulder arthroplasty..                                       Medications   heparin 25,000 units in dextrose 5% (100 units/ml) IV bolus from bag INITIAL BOLUS (has no administration in time range)   heparin 25,000 units in dextrose 5% 250 mL (100 units/mL) infusion HIGH INTENSITY nomogram - [facility] (has no administration in time range)   heparin 25,000 units in dextrose 5% (100 units/ml) IV bolus from bag - ADDITIONAL PRN BOLUS - 60 units/kg (has no administration in time range)   heparin 25,000 units in dextrose 5% (100 units/ml) IV bolus from bag - ADDITIONAL PRN BOLUS - 30 units/kg (has no  administration in time range)     Medical Decision Making  Differential diagnoses considered includes DVT, ACS, PE, CHF, viral syndrome, covid, flu    Heparin drip ordered, per cardiology recommendations EN chart given extensive DVT.  See ED course for additional information    Amount and/or Complexity of Data Reviewed  External Data Reviewed: notes.     Details: Note from cardiologist in vascular lab recommends heparin drip and admission to the hospital  Labs: ordered. Decision-making details documented in ED Course.  Radiology: ordered and independent interpretation performed. Decision-making details documented in ED Course.  ECG/medicine tests: ordered and independent interpretation performed. Decision-making details documented in ED Course.    Risk  Prescription drug management.  Decision regarding hospitalization.               ED Course as of 02/01/24 2038   Thu Feb 01, 2024 2008 EKG 12-lead  EKG independently interpreted by me shows normal sinus rhythm, rate 69, S1Q3T3 present, no STEMI, which patient also appears to have had 04/08/2021 [BD]   2037 CBC auto differential(!)  Baseline CBC with moderate anemia of hemoglobin 10.1 without leukocytosis [BD]   2037 Comprehensive metabolic panel(!)  CMP unremarkable without significant electrolyte derangement, impaired renal function, or elevated LFTs   [BD]   2037 INR: 1.0  Normal [BD]   2037 I discussed the case with Hospital Medicine who will place the patient observation to their service for acute DVT requiring heparin drip.  CTA still pending as is troponin BNP, though I have low suspicion for ACS or PE.  [BD]   2038 Procedure: Critical Care  Please put in 30 minutes of critical care due to patient having a high risk of circulatory failure.        [BD]      ED Course User Index  [BD] Jimi Alatorre MD                             Clinical Impression:  Final diagnoses:  [M79.606] Leg pain  [I82.4Y9] Acute deep vein thrombosis (DVT) of proximal vein of lower  extremity, unspecified laterality (Primary)          ED Disposition Condition    Observation                 Jimi Alatorre MD  02/01/24 2031

## 2024-02-02 NOTE — ASSESSMENT & PLAN NOTE
-No hypoxia or tachycardia noted.  -CTA chest with right lower lobe pulmonary arterial emboli.  -Started on heparin drip in the ED, transition to OAC prior to discharge.  -TTE pending.  -Monitor respiratory status.

## 2024-02-02 NOTE — NURSING
Nurses Note -- 4 Eyes      2/2/2024   0500      Skin assessed during: Admit      [x] No Altered Skin Integrity Present    []Prevention Measures Documented      [] Yes- Altered Skin Integrity Present or Discovered   [] LDA Added if Not in Epic (Describe Wound)   [] New Altered Skin Integrity was Present on Admit and Documented in LDA   [] Wound Image Taken    Wound Care Consulted? No    Attending Nurse:  Trduy Thompson RN/Staff Member:  Renay

## 2024-02-05 ENCOUNTER — PATIENT OUTREACH (OUTPATIENT)
Dept: ADMINISTRATIVE | Facility: CLINIC | Age: 74
End: 2024-02-05
Payer: MEDICARE

## 2024-02-05 NOTE — PROGRESS NOTES
C3 nurse spoke with Jayla Loyd for a TCC post hospital discharge follow up call. The patient does not have a scheduled HOSFU appointment with Geena Barnard MD within 5-7 days post hospital discharge date 02/02/24. C3 nurse was unable to schedule HOSFU appointment in [facility].    Message sent to PCP staff requesting they contact patient and schedule follow up appointment.

## 2024-02-05 NOTE — PROGRESS NOTES
C3 nurse attempted to contact Jayla Loyd  for a TCC post hospital discharge follow up call. No answer. No voicemail available.The patient does not have a scheduled HOSFU appointment. Message sent to PCP staff for assistance with scheduling visit with patient.

## 2024-02-06 ENCOUNTER — OFFICE VISIT (OUTPATIENT)
Dept: CARDIOLOGY | Facility: CLINIC | Age: 74
End: 2024-02-06
Payer: MEDICARE

## 2024-02-06 VITALS
HEART RATE: 72 BPM | SYSTOLIC BLOOD PRESSURE: 120 MMHG | WEIGHT: 158.06 LBS | BODY MASS INDEX: 31.03 KG/M2 | HEIGHT: 60 IN | DIASTOLIC BLOOD PRESSURE: 84 MMHG

## 2024-02-06 DIAGNOSIS — E78.00 TYPE 2 DIABETES MELLITUS WITH HYPERCHOLESTEROLEMIA: ICD-10-CM

## 2024-02-06 DIAGNOSIS — E11.69 TYPE 2 DIABETES MELLITUS WITH OTHER SPECIFIED COMPLICATION, WITHOUT LONG-TERM CURRENT USE OF INSULIN: ICD-10-CM

## 2024-02-06 DIAGNOSIS — I82.412 ACUTE DEEP VEIN THROMBOSIS (DVT) OF FEMORAL VEIN OF LEFT LOWER EXTREMITY: Primary | ICD-10-CM

## 2024-02-06 DIAGNOSIS — I26.09 OTHER ACUTE PULMONARY EMBOLISM WITH ACUTE COR PULMONALE: ICD-10-CM

## 2024-02-06 DIAGNOSIS — E11.69 TYPE 2 DIABETES MELLITUS WITH HYPERCHOLESTEROLEMIA: ICD-10-CM

## 2024-02-06 DIAGNOSIS — I73.9 PERIPHERAL VASCULAR DISEASE: ICD-10-CM

## 2024-02-06 PROBLEM — I26.93 SINGLE SUBSEGMENTAL PULMONARY EMBOLISM WITHOUT ACUTE COR PULMONALE: Status: ACTIVE | Noted: 2024-02-02

## 2024-02-06 PROCEDURE — 99999 PR PBB SHADOW E&M-EST. PATIENT-LVL V: CPT | Mod: PBBFAC,HCNC,, | Performed by: INTERNAL MEDICINE

## 2024-02-06 PROCEDURE — 3074F SYST BP LT 130 MM HG: CPT | Mod: HCNC,CPTII,S$GLB, | Performed by: INTERNAL MEDICINE

## 2024-02-06 PROCEDURE — 1125F AMNT PAIN NOTED PAIN PRSNT: CPT | Mod: HCNC,CPTII,S$GLB, | Performed by: INTERNAL MEDICINE

## 2024-02-06 PROCEDURE — 1101F PT FALLS ASSESS-DOCD LE1/YR: CPT | Mod: HCNC,CPTII,S$GLB, | Performed by: INTERNAL MEDICINE

## 2024-02-06 PROCEDURE — 99205 OFFICE O/P NEW HI 60 MIN: CPT | Mod: HCNC,S$GLB,, | Performed by: INTERNAL MEDICINE

## 2024-02-06 PROCEDURE — 3288F FALL RISK ASSESSMENT DOCD: CPT | Mod: HCNC,CPTII,S$GLB, | Performed by: INTERNAL MEDICINE

## 2024-02-06 PROCEDURE — 1159F MED LIST DOCD IN RCRD: CPT | Mod: HCNC,CPTII,S$GLB, | Performed by: INTERNAL MEDICINE

## 2024-02-06 PROCEDURE — 3051F HG A1C>EQUAL 7.0%<8.0%: CPT | Mod: HCNC,CPTII,S$GLB, | Performed by: INTERNAL MEDICINE

## 2024-02-06 PROCEDURE — 3079F DIAST BP 80-89 MM HG: CPT | Mod: HCNC,CPTII,S$GLB, | Performed by: INTERNAL MEDICINE

## 2024-02-06 PROCEDURE — 3008F BODY MASS INDEX DOCD: CPT | Mod: HCNC,CPTII,S$GLB, | Performed by: INTERNAL MEDICINE

## 2024-02-06 RX ORDER — PROMETHAZINE HYDROCHLORIDE 12.5 MG/1
TABLET ORAL
COMMUNITY
Start: 2023-09-20

## 2024-02-06 NOTE — PATIENT INSTRUCTIONS
Assessment/Plan:   Jayla Loyd is a 73 y.o. female with LLE DVT/PE (on Eliquis), DM2, GERD, obesity, who presents for an initial appointment.    LLE DVT/PE- Likely related to recent COVID infection.  Refer to Heme/Onc for hypercoagulable workup and age appropriate cancer screening.  Continue Eliquis 5 mg bid.    2. Obesity- Encourage diet, exercise, and weight loss.    Follow up in 3 months with CTA chest and BLE venous ultrasound prior

## 2024-02-06 NOTE — PROGRESS NOTES
[facility]      Chief Complaint   Patient presents with    Deep Vein Thrombosis       Patient ID: Jayla Loyd is a 73 y.o. female with LLE DVT/PE (on Eliquis), DM2, GERD, obesity, who presents for an initial appointment.  Pertinent history/events are as follows:     -Pt kindly referred by Viktor Summers PA-C for evaluation of DVT/PE.    -On 2/1/2024, Mrs. Loyd presented to the vascular lab for left lower extremity venous ultrasound due to left leg and foot pain.  The study revealed extensive occlusive DVT of the left distal SFA, popliteal, and posterior tibial veins.  Mrs. Loyd reports ongoing SOB.  Given these issues, I recommend the following:  -admit for initiation of full dose anticoagulation with heparin drip, and CTA chest to evaluate for pulmonary embolism.  -transition to Eliquis prior to discharge.   -results and plan discussed in detail with Mrs. Loyd, who voiced understanding    HPI:  Mrs. Molina reports SOB which started before being diagnosed with COVID in early 1/2024.  States SOB worsened since that time.  Reports left leg pain starting in 1/2024.  LLE venous ultrasound on 2/1/2024 revealed extensive occlusive DVT of the left distal SFA, popliteal, and posterior tibial veins.  CTA Chest on 2/1/2024 revealed right lower lobe pulmonary arterial emboli. She was admitted for heparin drip and discharged on Eliquis.  Echo on 2/2/2024 with no evidence of right heart strain.  She is tolerating Eliquis with no bleeding issues.  Leg pain and swelling has improved since hospital disharge.  She continues to have SOB.      Past Medical History:   Diagnosis Date    Carpal tunnel syndrome, right upper limb 06/23/2022    Diabetes mellitus type I     GERD (gastroesophageal reflux disease)     Hx of multiple pulmonary nodules 06/15/2022    Migraines     Proteinuria     Trigger finger, right ring finger 06/23/2022     Past Surgical History:   Procedure Laterality Date    CATARACT EXTRACTION       COSMETIC SURGERY  1971    rhinoplasty    EYE SURGERY  3 years ago    cataracts    JOINT REPLACEMENT  Twice in last 24 months    SHOULDER SURGERY Left 09/2020    TUBAL LIGATION  1992     Social History     Socioeconomic History    Marital status:    Tobacco Use    Smoking status: Never     Passive exposure: Never    Smokeless tobacco: Never   Substance and Sexual Activity    Alcohol use: Yes     Comment: rarely    Drug use: Not Currently     Types: Other-see comments     Comment: None    Sexual activity: Yes     Partners: Male     Birth control/protection: Post-menopausal     Social Determinants of Health     Financial Resource Strain: Low Risk  (2/4/2024)    Overall Financial Resource Strain (CARDIA)     Difficulty of Paying Living Expenses: Not hard at all   Food Insecurity: No Food Insecurity (2/4/2024)    Hunger Vital Sign     Worried About Running Out of Food in the Last Year: Never true     Ran Out of Food in the Last Year: Never true   Transportation Needs: No Transportation Needs (2/4/2024)    PRAPARE - Transportation     Lack of Transportation (Medical): No     Lack of Transportation (Non-Medical): No   Physical Activity: Inactive (2/4/2024)    Exercise Vital Sign     Days of Exercise per Week: 0 days     Minutes of Exercise per Session: 0 min   Stress: Stress Concern Present (2/4/2024)    [employer] - Occupational Stress Questionnaire     Feeling of Stress : Rather much   Social Connections: Unknown (2/4/2024)    Social Connection and Isolation Panel [NHANES]     Frequency of Communication with Friends and Family: More than three times a week     Frequency of Social Gatherings with Friends and Family: Once a week     Active Member of Clubs or Organizations: Yes     Attends Club or Organization Meetings: 1 to 4 times per year     Marital Status:    Housing Stability: Low Risk  (2/4/2024)    Housing Stability Vital Sign     Unable to Pay for Housing in the Last  Year: No     Number of Places Lived in the Last Year: 1     Unstable Housing in the Last Year: No     Family History   Problem Relation Age of Onset    Bladder Cancer Mother     Hypertension Mother         CABG    Cancer Mother     Heart disease Mother     Heart failure Father         CABG    Hypertension Father     Heart disease Father     Stroke Brother     No Known Problems Son     Cancer Maternal Aunt     Breast cancer Maternal Aunt     Cancer Paternal Aunt     Diabetes Paternal Aunt     Cancer Maternal Grandmother     Breast cancer Maternal Grandmother     Heart disease Maternal Grandfather     Cancer Paternal Grandmother     Dementia Paternal Grandmother     Heart disease Paternal Grandfather        Review of patient's allergies indicates:   Allergen Reactions    Gabapentin Hallucinations    Lyrica [pregabalin] Hallucinations    Mobic [meloxicam] Itching       Medication List with Changes/Refills   Current Medications    ACETAMINOPHEN-CODEINE 300-30MG (TYLENOL #3) 300-30 MG TAB    Take 1 tablet by mouth every 6 (six) hours as needed (pain).    ALPRAZOLAM (XANAX) 0.5 MG TABLET    Take 1 tablet (0.5 mg total) by mouth 2 (two) times daily as needed for Anxiety.    AMITRIPTYLINE (ELAVIL) 10 MG TABLET    Take 10 mg by mouth every evening.    APIXABAN (ELIQUIS) 5 MG TAB    Take 2 tablets (10 mg total) by mouth 2 (two) times daily. for 7 days. THEN Take 1 tablet (5 mg total) by mouth 2 (two) times daily.    BUTALBITAL-ASPIRIN-CAFFEINE -40 MG (FIORINAL) -40 MG CAP    Take 1 capsule by mouth every 6 (six) hours as needed.    COENZYME Q10 100 MG CAPSULE    Take 100 mg by mouth once daily.    ESOMEPRAZOLE (NEXIUM) 40 MG CAPSULE    Take 1 capsule (40 mg total) by mouth 2 (two) times daily before meals.    FLUTICASONE PROPIONATE (FLONASE) 50 MCG/ACTUATION NASAL SPRAY    1 spray (50 mcg total) by Each Nostril route once daily.    LACTOBAC NO.41/BIFIDOBACT NO.7 (PROBIOTIC-10 ORAL)    Take by mouth once daily.     MECLIZINE (ANTIVERT) 25 MG TABLET    Take 1 tablet (25 mg total) by mouth 2 (two) times daily as needed.    METHOCARBAMOL (ROBAXIN) 500 MG TAB    TAKE 1 TABLET (500 MG TOTAL) BY MOUTH 3 (THREE) TIMES DAILY AS NEEDED (PAIN).    ONDANSETRON (ZOFRAN) 8 MG TABLET    Take 1 tablet (8 mg total) by mouth every 8 (eight) hours as needed for Nausea.    PROMETHAZINE (PHENERGAN) 12.5 MG TAB        PROMETHAZINE-DEXTROMETHORPHAN (PROMETHAZINE-DM) 6.25-15 MG/5 ML SYRP    TAKE 10 MLS BY MOUTH EVERY 8 HOURS AS NEEDED FOR COUGH    ROSUVASTATIN (CRESTOR) 40 MG TAB    Take 1 tablet (40 mg total) by mouth once daily.    SEMAGLUTIDE (OZEMPIC) 0.25 MG OR 0.5 MG (2 MG/3 ML) PEN INJECTOR    Inject 0.25 mg into the skin every 7 days.    SERTRALINE (ZOLOFT) 25 MG TABLET    Take 1 tablet (25 mg total) by mouth once daily.    TELMISARTAN (MICARDIS) 20 MG TAB    TAKE 1 TABLET BY MOUTH EVERY DAY    TRAMADOL (ULTRAM) 50 MG TABLET    Take 1 tablet (50 mg total) by mouth every 6 (six) hours as needed for Pain.   Discontinued Medications    AZELASTINE (ASTELIN) 137 MCG (0.1 %) NASAL SPRAY    1 spray by Nasal route.    METFORMIN (GLUCOPHAGE-XR) 500 MG ER 24HR TABLET    Take 2 tablets (1,000 mg total) by mouth every evening.    METHOCARBAMOL (ROBAXIN) 500 MG TAB    Take 1 tablet (500 mg total) by mouth 3 (three) times daily as needed (muscle spasms).       Review of Systems  Constitution: Denies chills, fever, and sweats.  HENT: Denies headaches or blurry vision.  Cardiovascular: Denies chest pain or irregular heart beat.  Respiratory: Positive for cough and shortness of breath.  Gastrointestinal: Denies abdominal pain, nausea, or vomiting.  Musculoskeletal: Denies muscle cramps.  Neurological: Denies dizziness or focal weakness.  Psychiatric/Behavioral: Normal mental status.  Hematologic/Lymphatic: Denies bleeding problem or easy bruising/bleeding.  Skin: Denies rash or suspicious lesions    Physical Examination  /84   Pulse 72   Ht 5' (1.524  m)   Wt 71.7 kg (158 lb 1.1 oz)   LMP 05/25/1998 (Approximate)   BMI 30.87 kg/m²     Constitutional: No acute distress, conversant  HEENT: Sclera anicteric, Pupils equal, round and reactive to light, extraocular motions intact, Oropharynx clear  Neck: No JVD, no carotid bruits  Cardiovascular: regular rate and rhythm, no murmur, rubs or gallops, normal S1/S2  Pulmonary: Clear to auscultation bilaterally  Abdominal: Abdomen soft, nontender, nondistended, positive bowel sounds  Extremities: No lower extremity edema   Pulses:  Carotid pulses are 2+ on the right side, and 2+ on the left side.  Radial pulses are 2+ on the right side, and 2+ on the left side.   Femoral pulses are 2+ on the right side, and 2+ on the left side.  Popliteal pulses are 2+ on the right side, and 2+ on the left side.   Dorsalis pedis pulses are 2+ on the right side, and 2+ on the left side.   Posterior tibial pulses are 2+ on the right side, and 2+ on the left side.    Skin: No ecchymosis, erythema, or ulcers  Psych: Alert and oriented x 3, appropriate affect  Neuro: CNII-XII intact, no focal deficits    Labs:  Most Recent Data  CBC:   Lab Results   Component Value Date    WBC 10.91 02/02/2024    WBC 10.91 02/02/2024    HGB 9.3 (L) 02/02/2024    HGB 9.3 (L) 02/02/2024    HCT 30.4 (L) 02/02/2024    HCT 30.4 (L) 02/02/2024     02/02/2024     02/02/2024    MCV 78 (L) 02/02/2024    MCV 78 (L) 02/02/2024    RDW 17.2 (H) 02/02/2024    RDW 17.2 (H) 02/02/2024     BMP:   Lab Results   Component Value Date     02/02/2024    K 4.2 02/02/2024     02/02/2024    CO2 26 02/02/2024    BUN 10 02/02/2024    CREATININE 0.8 02/02/2024     (H) 02/02/2024    CALCIUM 9.7 02/02/2024    MG 2.0 02/02/2024    PHOS 4.2 05/13/2021     LFTS;   Lab Results   Component Value Date    PROT 6.8 02/02/2024    ALBUMIN 3.0 (L) 02/02/2024    BILITOT 0.2 02/02/2024    AST 14 02/02/2024    ALKPHOS 134 02/02/2024    ALT 10 02/02/2024     COAGS:   Lab  Results   Component Value Date    INR 1.0 02/01/2024     FLP:   Lab Results   Component Value Date    CHOL 166 08/01/2023    HDL 71 08/01/2023    LDLCALC 77.4 08/01/2023    TRIG 88 08/01/2023    CHOLHDL 42.8 08/01/2023     CARDIAC:   Lab Results   Component Value Date    TROPONINI 0.063 (H) 02/02/2024    BNP 10 02/01/2024       Imaging:    EKG 2/1/2024:  Normal sinus rhythm   Nonspecific T wave abnormality   Low voltage, precordial leads     Echo 2/2/2024:    Left Ventricle: The left ventricle is normal in size. Ventricular mass is normal. Normal wall thickness. Normal wall motion. There is normal systolic function with a visually estimated ejection fraction of 60 - 65%. Ejection fraction by visual approximation is 65%. There is normal diastolic function.    Right Ventricle: Normal right ventricular cavity size. Wall thickness is normal. Right ventricle wall motion  is normal. Systolic function is normal.    Pulmonary Artery: The estimated pulmonary artery systolic pressure is 31 mmHg.    IVC/SVC: Normal venous pressure at 3 mmHg.    LLE Venous Ultrasound 2/1/2024:    The left superficial femoral distal vein is noncompressible with thrombus present, consistent with acute DVT.    The left popliteal vein is noncompressible with thrombus present, consistent with acute DVT.    The left posterior tibial vein is noncompressible with thrombus present, consistent with acute DVT.    The contralateral (right) common femoral vein is patent.    Patient transferred to the emergency department for admission for initiation of full-dose anticoagulation, and CTA chest to evaluate for pulmonary embolism.    CTA Chest 2/1/2024:  Right lower lobe pulmonary arterial emboli.   Small hiatal hernia.    Assessment/Plan:   Jayla Loyd is a 73 y.o. female with LLE DVT/PE (on Eliquis), DM2, GERD, obesity, who presents for an initial appointment.    LLE DVT/PE- Likely related to recent COVID infection.  Refer to Heme/Onc for  hypercoagulable workup and age appropriate cancer screening.  Continue Eliquis 5 mg bid.    2. Obesity- Encourage diet, exercise, and weight loss.    Follow up in 3 months with CTA chest and BLE venous ultrasound prior    Total duration of face to face visit time 45 minutes.  Total time spent counseling greater than fifty percent of total visit time.  Counseling included discussion regarding imaging findings, diagnosis, possibilities, treatment options, risks and benefits.  The patient had many questions regarding the options and long-term effects.    Edmond Sifuentes MD, PhD  Interventional Cardiology

## 2024-02-07 ENCOUNTER — PATIENT MESSAGE (OUTPATIENT)
Dept: CARDIOLOGY | Facility: CLINIC | Age: 74
End: 2024-02-07
Payer: MEDICARE

## 2024-02-08 ENCOUNTER — OFFICE VISIT (OUTPATIENT)
Dept: PRIMARY CARE CLINIC | Facility: CLINIC | Age: 74
End: 2024-02-08
Payer: MEDICARE

## 2024-02-08 VITALS
WEIGHT: 159.19 LBS | SYSTOLIC BLOOD PRESSURE: 122 MMHG | BODY MASS INDEX: 31.09 KG/M2 | HEART RATE: 102 BPM | DIASTOLIC BLOOD PRESSURE: 70 MMHG | OXYGEN SATURATION: 96 %

## 2024-02-08 DIAGNOSIS — I82.4Y9 ACUTE DEEP VEIN THROMBOSIS (DVT) OF PROXIMAL VEIN OF LOWER EXTREMITY, UNSPECIFIED LATERALITY: Primary | ICD-10-CM

## 2024-02-08 DIAGNOSIS — H81.10 BENIGN PAROXYSMAL POSITIONAL VERTIGO, UNSPECIFIED LATERALITY: ICD-10-CM

## 2024-02-08 DIAGNOSIS — M79.605 PAIN OF LEFT LOWER EXTREMITY: ICD-10-CM

## 2024-02-08 PROCEDURE — 3078F DIAST BP <80 MM HG: CPT | Mod: CPTII,S$GLB,, | Performed by: INTERNAL MEDICINE

## 2024-02-08 PROCEDURE — 99999 PR PBB SHADOW E&M-EST. PATIENT-LVL IV: CPT | Mod: PBBFAC,,, | Performed by: INTERNAL MEDICINE

## 2024-02-08 PROCEDURE — 1125F AMNT PAIN NOTED PAIN PRSNT: CPT | Mod: CPTII,S$GLB,, | Performed by: INTERNAL MEDICINE

## 2024-02-08 PROCEDURE — 3074F SYST BP LT 130 MM HG: CPT | Mod: CPTII,S$GLB,, | Performed by: INTERNAL MEDICINE

## 2024-02-08 PROCEDURE — 99214 OFFICE O/P EST MOD 30 MIN: CPT | Mod: S$GLB,,, | Performed by: INTERNAL MEDICINE

## 2024-02-08 PROCEDURE — 3051F HG A1C>EQUAL 7.0%<8.0%: CPT | Mod: CPTII,S$GLB,, | Performed by: INTERNAL MEDICINE

## 2024-02-08 PROCEDURE — 3008F BODY MASS INDEX DOCD: CPT | Mod: CPTII,S$GLB,, | Performed by: INTERNAL MEDICINE

## 2024-02-08 RX ORDER — ACETAMINOPHEN AND CODEINE PHOSPHATE 300; 30 MG/1; MG/1
1 TABLET ORAL EVERY 6 HOURS PRN
Qty: 15 TABLET | Refills: 0 | Status: SHIPPED | OUTPATIENT
Start: 2024-02-08 | End: 2024-02-13

## 2024-02-08 NOTE — PATIENT INSTRUCTIONS
Resources for talk therapy:  Tonya Chris   [facility] [phone]  Guided growth counseling [phone]    [email or web address]

## 2024-02-08 NOTE — PROGRESS NOTES
[facility] Note    Chief Complaint      Chief Complaint   Patient presents with    Follow-up     History of Present Illness      Jayla Loyd is a 73 y.o. female who presents today for chief complaint follow up hospital/DVT.      HPI     LLE venous ultrasound on 2/1/2024 revealed extensive occlusive DVT of the left distal SFA, popliteal, and posterior tibial veins.  CTA Chest on 2/1/2024 revealed right lower lobe pulmonary arterial emboli   She had echo during admission which was negative   Started on heparin drip and is now on eliquis   Will see Dr Sifuentes on 3 mo follow up, she was ref;'d to [facility] for proag work up has appt 2.15    Psychiatry referral placed, given phone number [phone] to call and schedule       Active Problem List with Overview Notes    Diagnosis Date Noted    Acute deep vein thrombosis (DVT) of femoral vein of left lower extremity 02/06/2024    Acute pulmonary embolism with acute cor pulmonale 02/06/2024    Single subsegmental pulmonary embolism without acute cor pulmonale 02/02/2024    Acute deep vein thrombosis (DVT) of proximal vein of lower extremity 02/01/2024    Abnormal CT of the chest 03/29/2023    Lung nodules 03/29/2023    Aortic arch atherosclerosis 03/29/2023     Mild calcification of aortic arch and cusps seen on CT         Overweight (BMI 25.0-29.9) 03/29/2023    Type 2 diabetes mellitus without complication, without long-term current use of insulin 03/29/2023    DM type 2 without retinopathy 03/29/2023     Per pt      Osteoarthritis of both shoulders 02/03/2023    Peripheral vascular disease 08/25/2022    Acute ankle pain 05/31/2022    Night sweats 05/25/2022    Cox's esophagus 02/16/2022    Chronic cough 04/13/2021    Tremor 02/15/2021    Sleep disturbance 02/15/2021    Venous insufficiency 02/15/2021    Dizziness 01/19/2021    Subclinical hyperthyroidism 12/17/2020    HLD (hyperlipidemia) 07/22/2020     On crestor       Arthritis of left  shoulder region 07/16/2020     seeing [place] at [facility] pending shoulder MRI       Anxiety 07/16/2020    Type 2 diabetes mellitus with other specified complication, without long-term current use of insulin      Sees dr butt had recent a1c, he also does foot exam      Allergic rhinitis due to pollen 07/08/2020    Hematuria, unspecified 07/08/2020    Iron deficiency anemia, unspecified 06/30/2019    Hyperphosphatemia 08/19/2018    HTN (hypertension) 08/19/2018     At goal on lasix and micardis, no chest pain or shortness of breath       Proteinuria, unspecified 08/19/2018     Seeing dr hanson       Gastroesophageal reflux disease 07/16/2014    Adenomatous polyp of colon 07/16/2014    Chronic mixed headache syndrome 07/16/2014     Has chronic migraines, uses fioricet   Tried amovig, has not tried triptan - not interested  Sees [facility]- [facility]      Type 2 diabetes mellitus with hypercholesterolemia 08/01/2012       Health Maintenance   Topic Date Due    TETANUS VACCINE  02/01/2008    Foot Exam  09/03/2021    DEXA Scan  08/11/2023    Lipid Panel  08/01/2024    Hemoglobin A1c  08/01/2024    Mammogram  12/11/2024    Eye Exam  01/04/2025    High Dose Statin  03/19/2025    Colorectal Cancer Screening  02/08/2027    Hepatitis C Screening  Completed    Shingles Vaccine  Discontinued       Past Medical History:   Diagnosis Date    Carpal tunnel syndrome, right upper limb 06/23/2022    Diabetes mellitus type II     GERD (gastroesophageal reflux disease)     Hx of multiple pulmonary nodules 06/15/2022    Migraines     Proteinuria     Pulmonary embolus 2024    Right lung with concurrent DVT left leg    Trigger finger, right ring finger 06/23/2022       Past Surgical History:   Procedure Laterality Date    CATARACT EXTRACTION      COSMETIC SURGERY  1971    rhinoplasty    EYE SURGERY  3 years ago    cataracts    JOINT REPLACEMENT  Twice in last 24 months    SHOULDER SURGERY Left 09/2020    TUBAL LIGATION  1992       family  history includes Arthritis in her father and paternal grandmother; Bladder Cancer in her mother; Breast cancer in her maternal aunt and maternal grandmother; Cancer in her maternal aunt, maternal grandmother, mother, paternal aunt, and paternal grandmother; Dementia in her paternal grandmother; Diabetes in her paternal aunt; Heart disease in her father, maternal grandfather, mother, and paternal grandfather; Heart failure in her father; Hypertension in her father and mother; No Known Problems in her son; Stroke in her brother.    Social History     Tobacco Use    Smoking status: Never     Passive exposure: Never    Smokeless tobacco: Never   Substance Use Topics    Alcohol use: Yes     Comment: None    Drug use: Not Currently     Types: Other-see comments     Comment: None       Review of Systems   Constitutional:  Negative for chills, fever, malaise/fatigue and weight loss.   Respiratory:  Negative for cough, sputum production, shortness of breath and wheezing.    Cardiovascular:  Positive for leg swelling. Negative for chest pain, palpitations and orthopnea.   Gastrointestinal:  Negative for constipation, diarrhea, nausea and vomiting.   Genitourinary:  Negative for dysuria, frequency, hematuria and urgency.   Psychiatric/Behavioral:  Positive for depression. The patient is nervous/anxious and has insomnia.         Outpatient Encounter Medications as of 2024   Medication Sig Note Dispense Refill    [] acetaminophen-codeine 300-30mg (TYLENOL #3) 300-30 mg Tab Take 1 tablet by mouth every 6 (six) hours as needed (pain).  15 tablet 0    apixaban (ELIQUIS) 5 mg Tab Take 1 tablet (5 mg total) by mouth 2 (two) times daily.  60 tablet 11    coenzyme Q10 100 mg capsule Take 100 mg by mouth once daily. 2024: PRN      esomeprazole (NEXIUM) 40 MG capsule Take 1 capsule (40 mg total) by mouth 2 (two) times daily before meals.  180 capsule 3    Lactobac no.41/Bifidobact no.7 (PROBIOTIC-10 ORAL) Take by mouth  once daily. 2/5/2024: Every other day      meclizine (ANTIVERT) 25 mg tablet Take 1 tablet (25 mg total) by mouth 2 (two) times daily as needed.  60 tablet 0    methocarbamoL (ROBAXIN) 500 MG Tab TAKE 1 TABLET (500 MG TOTAL) BY MOUTH 3 (THREE) TIMES DAILY AS NEEDED (PAIN).  60 tablet 1    ondansetron (ZOFRAN) 8 MG tablet Take 1 tablet (8 mg total) by mouth every 8 (eight) hours as needed for Nausea.  20 tablet 1    promethazine (PHENERGAN) 12.5 MG Tab        semaglutide (OZEMPIC) 0.25 mg or 0.5 mg (2 mg/3 mL) pen injector Inject 0.25 mg into the skin every 7 days.  12 each 1    sertraline (ZOLOFT) 25 MG tablet Take 1 tablet (25 mg total) by mouth once daily.  90 tablet 3    telmisartan (MICARDIS) 20 MG Tab TAKE 1 TABLET BY MOUTH EVERY DAY 2/5/2024: 0.5 tablet daily 90 tablet 1    traMADoL (ULTRAM) 50 mg tablet Take 1 tablet (50 mg total) by mouth every 6 (six) hours as needed for Pain.  21 tablet 0    [DISCONTINUED] acetaminophen-codeine 300-30mg (TYLENOL #3) 300-30 mg Tab Take 1 tablet by mouth every 6 (six) hours as needed (pain).  20 tablet 0    [DISCONTINUED] ALPRAZolam (XANAX) 0.5 MG tablet Take 1 tablet (0.5 mg total) by mouth 2 (two) times daily as needed for Anxiety.  60 tablet 0    [DISCONTINUED] amitriptyline (ELAVIL) 10 MG tablet Take 10 mg by mouth every evening. 2/5/2024: PRN      [DISCONTINUED] butalbital-aspirin-caffeine -40 mg (FIORINAL) -40 mg Cap Take 1 capsule by mouth every 6 (six) hours as needed.  60 capsule 0    [DISCONTINUED] fluticasone propionate (FLONASE) 50 mcg/actuation nasal spray 1 spray (50 mcg total) by Each Nostril route once daily. 2/5/2024: PRN 9.9 mL 0    [DISCONTINUED] promethazine-dextromethorphan (PROMETHAZINE-DM) 6.25-15 mg/5 mL Syrp TAKE 10 MLS BY MOUTH EVERY 8 HOURS AS NEEDED FOR COUGH  150 mL 1    [DISCONTINUED] rosuvastatin (CRESTOR) 40 MG Tab Take 1 tablet (40 mg total) by mouth once daily.  90 tablet 0     No facility-administered encounter medications on  file as of 2/8/2024.        Review of patient's allergies indicates:   Allergen Reactions    Gabapentin Hallucinations    Lyrica [pregabalin] Hallucinations    Mobic [meloxicam] Itching           Physical Exam      Vital Signs  Pulse: 102  SpO2: 96 %  BP: 122/70  BP Location: Right arm  Pain Score:   8  Height and Weight  Weight: 72.2 kg (159 lb 2.8 oz)]    Physical Exam  Vitals reviewed.   Constitutional:       General: She is not in acute distress.     Appearance: She is well-developed. She is not diaphoretic.   HENT:      Head: Normocephalic and atraumatic.      Right Ear: External ear normal.      Left Ear: External ear normal.      Nose: Nose normal.   Eyes:      General:         Right eye: No discharge.         Left eye: No discharge.      Conjunctiva/sclera: Conjunctivae normal.   Cardiovascular:      Rate and Rhythm: Normal rate and regular rhythm.      Heart sounds: Normal heart sounds.   Pulmonary:      Effort: Pulmonary effort is normal. No respiratory distress.      Breath sounds: Normal breath sounds.   Musculoskeletal:         General: Normal range of motion.      Cervical back: Normal range of motion.      Comments: LE tenderness and edema    Skin:     Coloration: Skin is not pale.      Findings: No rash.   Neurological:      Mental Status: She is alert and oriented to person, place, and time.   Psychiatric:         Behavior: Behavior normal.         Thought Content: Thought content normal.          Laboratory:  CBC:  Recent Labs   Lab Result Units 02/01/24  1923 02/01/24  1934 02/02/24  0307   WBC K/uL 11.55  --  10.91  10.91   RBC M/uL 4.17  --  3.92*  3.92*   Hemoglobin g/dL 10.1*  --  9.3*  9.3*   POC Hematocrit   --    < >  --    Hematocrit % 32.8*  --  30.4*  30.4*   Platelets K/uL 353  --  315  315   MCV fL 79*  --  78*  78*   MCH pg 24.2*  --  23.7*  23.7*   MCHC g/dL 30.8*  --  30.6*  30.6*    < > = values in this interval not displayed.     CMP:  Recent Labs   Lab Result Units  "02/01/24  1938 02/02/24  0307   Glucose mg/dL 109 144*   Calcium mg/dL 9.5 9.7   Albumin g/dL 3.3* 3.0*   Total Protein g/dL 7.6 6.8   Sodium mmol/L 140 138   Potassium mmol/L 4.2 4.2   CO2 mmol/L 28 26   Chloride mmol/L 104 103   BUN mg/dL 11 10   Alkaline Phosphatase U/L 148* 134   ALT U/L 11 10   AST U/L 12 14   Total Bilirubin mg/dL 0.2 0.2     URINALYSIS:  No results for input(s): "COLORU", "CLARITYU", "SPECGRAV", "PHUR", "PROTEINUA", "GLUCOSEU", "BILIRUBINCON", "BLOODU", "WBCU", "RBCU", "BACTERIA", "MUCUS", "NITRITE", "LEUKOCYTESUR", "UROBILINOGEN", "HYALINECASTS" in the last 2160 hours.   LIPIDS:  No results for input(s): "TSH", "HDL", "CHOL", "TRIG", "LDLCALC", "CHOLHDL", "NONHDLCHOL", "TOTALCHOLEST" in the last 2160 hours.  TSH:  No results for input(s): "TSH" in the last 2160 hours.  A1C:  Recent Labs   Lab Result Units 02/01/24  2137   Hemoglobin A1C % 7.0*       Assessment/Plan     Jayla Loyd is a 73 y.o.female with:    1. Acute deep vein thrombosis (DVT) of proximal vein of lower extremity, unspecified laterality  Assessment & Plan:  Ct anticoagulation       2. Pain of left lower extremity  -     acetaminophen-codeine 300-30mg (TYLENOL #3) 300-30 mg Tab; Take 1 tablet by mouth every 6 (six) hours as needed (pain).  Dispense: 15 tablet; Refill: 0    3. Benign paroxysmal positional vertigo, unspecified laterality  -     Ambulatory referral/consult to Physical/Occupational Therapy; Future; Expected date: 02/15/2024         Use of the [facility] Patient Portal discussed and encouraged during today's visit  -Continue current medications and maintain follow up with specialists.  Return to clinic in as sched.  Future Appointments   Date Time Provider Department Center   3/27/2024  1:00 PM CHAIR 05, [facility] [place]   4/2/2024  1:45 PM Siobhan Flores, PT [facility]   4/4/2024  1:00 PM [facility]   4/9/2024  1:45 PM Siobhan Flores, " PT [facility]   4/11/2024  1:00 PM Siobhan Flores, PT [facility]   4/16/2024  1:00 PM Siobhan Flores, PT [facility]   4/18/2024  1:00 PM Siobhan Flores, PT [facility]   4/23/2024  1:00 PM Siobhan Flores, PT [facility]   4/25/2024  1:00 PM Siobhan Flores, PT [facility]   5/6/2024  2:30 PM [facility] OIC-CT1 500 LB LIMIT [facility] CTS [facility]   5/8/2024  2:00 PM VASCULAR, [place] [facility] [place]   5/15/2024  2:00 PM Edmond Sifuentes MD PhD [facility] CARDIO [place]   5/20/2024 11:20 AM [facility]       Geena Barnard MD  3/23/2024 11:45 AM    Primary Care Internal Medicine

## 2024-02-13 NOTE — TELEPHONE ENCOUNTER
No care due was identified.  [facility] Embedded Care Due Messages. Reference number: [id number].   2/13/2024 8:01:54 AM CST

## 2024-02-14 ENCOUNTER — TELEPHONE (OUTPATIENT)
Dept: OBSTETRICS AND GYNECOLOGY | Facility: CLINIC | Age: 74
End: 2024-02-14
Payer: MEDICARE

## 2024-02-14 RX ORDER — METFORMIN HYDROCHLORIDE 500 MG/1
1000 TABLET, EXTENDED RELEASE ORAL NIGHTLY
Qty: 180 TABLET | Refills: 1 | OUTPATIENT
Start: 2024-02-14

## 2024-02-14 NOTE — TELEPHONE ENCOUNTER
----- Message from Maan Pulido MD sent at 2/10/2024  4:56 AM CST -----  Can she please be scheduled with first available MD for New Patient GYN visit. PCP would like her to get updated on all GYN testing as had a new unexplained blood clot. Thanks!  ----- Message -----  From: Geena Barnard MD  Sent: 2/8/2024  12:14 PM CST  To: Mana Pulido MD    Needs to be seen in office for pelvic exam. New clot needs to rule out any  cancer

## 2024-02-15 ENCOUNTER — LAB VISIT (OUTPATIENT)
Dept: LAB | Facility: HOSPITAL | Age: 74
End: 2024-02-15
Attending: INTERNAL MEDICINE
Payer: MEDICARE

## 2024-02-15 ENCOUNTER — PATIENT MESSAGE (OUTPATIENT)
Dept: PRIMARY CARE CLINIC | Facility: CLINIC | Age: 74
End: 2024-02-15
Payer: MEDICARE

## 2024-02-15 ENCOUNTER — OFFICE VISIT (OUTPATIENT)
Dept: HEMATOLOGY/ONCOLOGY | Facility: CLINIC | Age: 74
End: 2024-02-15
Payer: MEDICARE

## 2024-02-15 VITALS
SYSTOLIC BLOOD PRESSURE: 112 MMHG | DIASTOLIC BLOOD PRESSURE: 72 MMHG | OXYGEN SATURATION: 97 % | BODY MASS INDEX: 30.38 KG/M2 | HEART RATE: 83 BPM | HEIGHT: 60 IN | WEIGHT: 154.75 LBS

## 2024-02-15 DIAGNOSIS — I26.09 OTHER ACUTE PULMONARY EMBOLISM WITH ACUTE COR PULMONALE: ICD-10-CM

## 2024-02-15 DIAGNOSIS — I82.402 DEEP VEIN THROMBOSIS (DVT) OF LEFT LOWER EXTREMITY, UNSPECIFIED CHRONICITY, UNSPECIFIED VEIN: ICD-10-CM

## 2024-02-15 DIAGNOSIS — D64.9 ANEMIA, UNSPECIFIED TYPE: ICD-10-CM

## 2024-02-15 DIAGNOSIS — I82.402 DEEP VEIN THROMBOSIS (DVT) OF LEFT LOWER EXTREMITY, UNSPECIFIED CHRONICITY, UNSPECIFIED VEIN: Primary | ICD-10-CM

## 2024-02-15 LAB
FERRITIN SERPL-MCNC: 15 NG/ML (ref 20–300)
IRON SERPL-MCNC: 50 UG/DL (ref 30–160)
SATURATED IRON: 11 % (ref 20–50)
TOTAL IRON BINDING CAPACITY: 450 UG/DL (ref 250–450)
TRANSFERRIN SERPL-MCNC: 304 MG/DL (ref 200–375)

## 2024-02-15 PROCEDURE — 82728 ASSAY OF FERRITIN: CPT | Mod: HCNC | Performed by: INTERNAL MEDICINE

## 2024-02-15 PROCEDURE — 3078F DIAST BP <80 MM HG: CPT | Mod: HCNC,CPTII,S$GLB, | Performed by: INTERNAL MEDICINE

## 2024-02-15 PROCEDURE — 3051F HG A1C>EQUAL 7.0%<8.0%: CPT | Mod: HCNC,CPTII,S$GLB, | Performed by: INTERNAL MEDICINE

## 2024-02-15 PROCEDURE — 3074F SYST BP LT 130 MM HG: CPT | Mod: HCNC,CPTII,S$GLB, | Performed by: INTERNAL MEDICINE

## 2024-02-15 PROCEDURE — 1126F AMNT PAIN NOTED NONE PRSNT: CPT | Mod: HCNC,CPTII,S$GLB, | Performed by: INTERNAL MEDICINE

## 2024-02-15 PROCEDURE — 36415 COLL VENOUS BLD VENIPUNCTURE: CPT | Mod: HCNC | Performed by: INTERNAL MEDICINE

## 2024-02-15 PROCEDURE — 86147 CARDIOLIPIN ANTIBODY EA IG: CPT | Mod: HCNC | Performed by: INTERNAL MEDICINE

## 2024-02-15 PROCEDURE — 86146 BETA-2 GLYCOPROTEIN ANTIBODY: CPT | Mod: HCNC | Performed by: INTERNAL MEDICINE

## 2024-02-15 PROCEDURE — 81240 F2 GENE: CPT | Mod: HCNC | Performed by: INTERNAL MEDICINE

## 2024-02-15 PROCEDURE — 99999 PR PBB SHADOW E&M-EST. PATIENT-LVL IV: CPT | Mod: PBBFAC,HCNC,, | Performed by: INTERNAL MEDICINE

## 2024-02-15 PROCEDURE — 81241 F5 GENE: CPT | Mod: HCNC | Performed by: INTERNAL MEDICINE

## 2024-02-15 PROCEDURE — 3008F BODY MASS INDEX DOCD: CPT | Mod: HCNC,CPTII,S$GLB, | Performed by: INTERNAL MEDICINE

## 2024-02-15 PROCEDURE — 99204 OFFICE O/P NEW MOD 45 MIN: CPT | Mod: HCNC,S$GLB,, | Performed by: INTERNAL MEDICINE

## 2024-02-15 PROCEDURE — 1101F PT FALLS ASSESS-DOCD LE1/YR: CPT | Mod: HCNC,CPTII,S$GLB, | Performed by: INTERNAL MEDICINE

## 2024-02-15 PROCEDURE — 83540 ASSAY OF IRON: CPT | Mod: HCNC | Performed by: INTERNAL MEDICINE

## 2024-02-15 PROCEDURE — 3288F FALL RISK ASSESSMENT DOCD: CPT | Mod: HCNC,CPTII,S$GLB, | Performed by: INTERNAL MEDICINE

## 2024-02-15 NOTE — TELEPHONE ENCOUNTER
Refill Decision Note   Jayla Loyd  is requesting a refill authorization.  Brief Assessment and Rationale for Refill:  Quick Discontinue     Medication Therapy Plan:  Med d/c by Yani Garzon MA on 2/6/2024; [facility]      Comments:     Note composed:7:25 PM 02/14/2024             Ambulance

## 2024-02-15 NOTE — PROGRESS NOTES
Subjective     Patient ID: Jayla Loyd is a 73 y.o. female.    Chief Complaint: No chief complaint on file.  Reason For Consultation: DVT/PE  HPI  Jayla Loyd is a 73 y.o. female with a PMHx of GERD, DM2, HLD, HTN, anxiety, migraines, and anemia who presented to the ED from vascular lab s/p LLE ultrasound due to left leg and foot pain. The study revealed extensive occlusive DVT of the left distal SFA, popliteal, and posterior tibial veins and she was referred to the ED by her cardiologist, Dr. Sifuentes. The patient reports she first noted pain to her left posterior thigh about 1 week ago that progressively worsened and spread to her calf. She endorses associated swelling and warmth. The patient endorses SCANLON and non productive cough x3-4 weeks noting that her symptoms started when she was diagnosed with COVID at the beginning of January. She has been taking cough syrup at home for this with no improvement. Reports left leg pain starting in 1/2024.  LLE venous ultrasound on 2/1/2024 revealed extensive occlusive DVT of the left distal SFA, popliteal, and posterior tibial veins.  CTA Chest on 2/1/2024 revealed right lower lobe pulmonary arterial emboli. She was admitted for heparin drip and discharged on Eliquis.  Echo on 2/2/2024 with no evidence of right heart strain.  She is tolerating Eliquis . No fevers, chest pain, hemoptysis, N/V, abdominal pain. No prior history of clots  No fam hx of clotsCough improved once started on anticoagulation.No leg pain . Mild swelling LE w /ambulation. No fevers, night sweats  Accompanied by . She is here for further evaluation.         FamHx: Mother bld ca  Maternal aunt breast ca  Paternal aunt lung ca?      SocHx; Lifelong nonsmoker. 2nd smoke during childhool     .  Past Medical History:   Diagnosis Date    Carpal tunnel syndrome, right upper limb 06/23/2022    Diabetes mellitus type I     GERD (gastroesophageal reflux disease)     Hx of multiple pulmonary  nodules 06/15/2022    Migraines     Proteinuria     Trigger finger, right ring finger 06/23/2022       Past Surgical History:   Procedure Laterality Date    CATARACT EXTRACTION      COSMETIC SURGERY  1971    rhinoplasty    EYE SURGERY  3 years ago    cataracts    JOINT REPLACEMENT  Twice in last 24 months    SHOULDER SURGERY Left 09/2020    TUBAL LIGATION  1992        Review of Systems   Constitutional:  Negative for appetite change, fatigue, fever and unexpected weight change.   HENT:  Negative for mouth sores.    Eyes:  Negative for visual disturbance.   Respiratory:  Positive for shortness of breath (mild SCANLON). Negative for cough.    Cardiovascular:  Negative for chest pain.   Gastrointestinal:  Negative for abdominal pain and diarrhea.   Genitourinary:  Negative for frequency.   Musculoskeletal:  Negative for back pain.   Integumentary:  Negative for rash.   Neurological:  Negative for headaches.   Hematological:  Negative for adenopathy.   Psychiatric/Behavioral:  The patient is not nervous/anxious.           Objective   Vitals:    02/15/24 1347   BP: 112/72   Pulse: 83   SpO2: 97%   Weight: 70.2 kg (154 lb 12.2 oz)   Height: 5' (1.524 m)       Physical Exam  Constitutional:       Appearance: She is well-developed.   HENT:      Head: Normocephalic.      Right Ear: External ear normal.      Left Ear: External ear normal.      Mouth/Throat:      Pharynx: No oropharyngeal exudate.   Eyes:      General: No scleral icterus.        Right eye: No discharge.         Left eye: No discharge.      Conjunctiva/sclera: Conjunctivae normal.   Cardiovascular:      Rate and Rhythm: Normal rate and regular rhythm.      Heart sounds: Normal heart sounds. No murmur heard.  Pulmonary:      Effort: Pulmonary effort is normal.      Breath sounds: Normal breath sounds. No wheezing or rales.   Abdominal:      General: Bowel sounds are normal.      Palpations: Abdomen is soft.      Tenderness: There is no abdominal tenderness. There is  no guarding or rebound.   Musculoskeletal:         General: Normal range of motion.      Cervical back: Normal range of motion and neck supple.      Right lower leg: No edema.      Left lower leg: No edema.   Skin:     Coloration: Skin is not jaundiced.      Findings: No rash.   Neurological:      General: No focal deficit present.      Mental Status: She is alert and oriented to person, place, and time.   Psychiatric:         Mood and Affect: Mood normal.         Behavior: Behavior normal.                  LLE Venous Ultrasound 2/1/2024:    The left superficial femoral distal vein is noncompressible with thrombus present, consistent with acute DVT.    The left popliteal vein is noncompressible with thrombus present, consistent with acute DVT.    The left posterior tibial vein is noncompressible with thrombus present, consistent with acute DVT.    The contralateral (right) common femoral vein is patent.    Patient transferred to the emergency department for admission for initiation of full-dose anticoagulation, and CTA chest to evaluate for pulmonary embolism.     CTA Chest 2/1/2024:  Right lower lobe pulmonary arterial emboli.   Small hiatal hernia.       Assessment and Plan     1. Deep vein thrombosis (DVT) of left lower extremity, unspecified chronicity, unspecified vein    72 y/o female diagnosed with LLE DVT/PE  2/1/24 , likely provoked 2/2 COVID infection  Plan hypercoagulable workup.   -     Beta-2 Glycoprotein Abs (IgA, IgG, IgM); Future; Expected date: 02/15/2024  -     Cardiolipin antibody; Future; Expected date: 02/15/2024  -     Factor 5 leiden; Future; Expected date: 02/15/2024  -     Prothrombin [id number] Mutation; Future; Expected date: 02/15/2024    2. Other acute pulmonary embolism with acute cor pulmonale  -     Ambulatory referral/consult to Hematology / Oncology    3. Anemia, unspecified type  -     Comprehensive Metabolic Panel; Future; Expected date: 02/15/2024  -     CBC Auto Differential;  Future; Expected date: 02/15/2024  -     Ferritin; Future; Expected date: 02/15/2024  -     Iron and TIBC; Future; Expected date: 02/15/2024              ALL LABS TODAY EXCEPT CBC,CMP    FOLLOW UP IN 1MONTH WITH CBC,CMP PRIOR TO F/U

## 2024-02-16 ENCOUNTER — PATIENT MESSAGE (OUTPATIENT)
Dept: HEMATOLOGY/ONCOLOGY | Facility: CLINIC | Age: 74
End: 2024-02-16
Payer: MEDICARE

## 2024-02-19 ENCOUNTER — PATIENT MESSAGE (OUTPATIENT)
Dept: HEMATOLOGY/ONCOLOGY | Facility: CLINIC | Age: 74
End: 2024-02-19
Payer: MEDICARE

## 2024-02-19 DIAGNOSIS — D50.9 IRON DEFICIENCY ANEMIA, UNSPECIFIED IRON DEFICIENCY ANEMIA TYPE: Primary | ICD-10-CM

## 2024-02-19 LAB
B2 GLYCOPROT1 IGA SER QL: 3.5 U/ML
B2 GLYCOPROT1 IGG SER QL: 1.9 U/ML
B2 GLYCOPROT1 IGM SER QL: 4.2 U/ML
CARDIOLIPIN IGG SER IA-ACNC: <9.4 GPL (ref 0–14.99)
CARDIOLIPIN IGM SER IA-ACNC: <9.4 MPL (ref 0–12.49)
F2 C.20210G>A GENO BLD/T: NEGATIVE
F5 P.R506Q BLD/T QL: NEGATIVE

## 2024-02-19 RX ORDER — EPINEPHRINE 0.3 MG/.3ML
0.3 INJECTION SUBCUTANEOUS ONCE AS NEEDED
Status: CANCELLED | OUTPATIENT
Start: 2024-02-29

## 2024-02-19 RX ORDER — HEPARIN 100 UNIT/ML
500 SYRINGE INTRAVENOUS
Status: CANCELLED | OUTPATIENT
Start: 2024-02-29

## 2024-02-19 RX ORDER — SODIUM CHLORIDE 0.9 % (FLUSH) 0.9 %
10 SYRINGE (ML) INJECTION
Status: CANCELLED | OUTPATIENT
Start: 2024-02-29

## 2024-02-19 RX ORDER — SODIUM CHLORIDE 0.9 % (FLUSH) 0.9 %
10 SYRINGE (ML) INJECTION
Status: CANCELLED | OUTPATIENT
Start: 2024-02-22

## 2024-02-19 RX ORDER — DIPHENHYDRAMINE HYDROCHLORIDE 50 MG/ML
50 INJECTION INTRAMUSCULAR; INTRAVENOUS ONCE AS NEEDED
Status: CANCELLED | OUTPATIENT
Start: 2024-02-22

## 2024-02-19 RX ORDER — HEPARIN 100 UNIT/ML
500 SYRINGE INTRAVENOUS
Status: CANCELLED | OUTPATIENT
Start: 2024-02-22

## 2024-02-19 RX ORDER — DIPHENHYDRAMINE HYDROCHLORIDE 50 MG/ML
50 INJECTION INTRAMUSCULAR; INTRAVENOUS ONCE AS NEEDED
Status: CANCELLED | OUTPATIENT
Start: 2024-02-29

## 2024-02-19 RX ORDER — EPINEPHRINE 0.3 MG/.3ML
0.3 INJECTION SUBCUTANEOUS ONCE AS NEEDED
Status: CANCELLED | OUTPATIENT
Start: 2024-02-22

## 2024-02-20 DIAGNOSIS — R05.9 COUGH, UNSPECIFIED TYPE: ICD-10-CM

## 2024-02-22 ENCOUNTER — PATIENT MESSAGE (OUTPATIENT)
Dept: HEMATOLOGY/ONCOLOGY | Facility: CLINIC | Age: 74
End: 2024-02-22
Payer: MEDICARE

## 2024-02-23 RX ORDER — SODIUM CHLORIDE 0.9 % (FLUSH) 0.9 %
10 SYRINGE (ML) INJECTION
Status: CANCELLED | OUTPATIENT
Start: 2024-03-05

## 2024-02-23 RX ORDER — DIPHENHYDRAMINE HYDROCHLORIDE 50 MG/ML
50 INJECTION INTRAMUSCULAR; INTRAVENOUS ONCE AS NEEDED
Status: CANCELLED | OUTPATIENT
Start: 2024-03-12

## 2024-02-23 RX ORDER — EPINEPHRINE 0.3 MG/.3ML
0.3 INJECTION SUBCUTANEOUS ONCE AS NEEDED
Status: CANCELLED | OUTPATIENT
Start: 2024-03-19

## 2024-02-23 RX ORDER — DIPHENHYDRAMINE HYDROCHLORIDE 50 MG/ML
50 INJECTION INTRAMUSCULAR; INTRAVENOUS ONCE AS NEEDED
Status: CANCELLED | OUTPATIENT
Start: 2024-02-27

## 2024-02-23 RX ORDER — DIPHENHYDRAMINE HYDROCHLORIDE 50 MG/ML
50 INJECTION INTRAMUSCULAR; INTRAVENOUS ONCE AS NEEDED
Status: CANCELLED | OUTPATIENT
Start: 2024-03-05

## 2024-02-23 RX ORDER — HEPARIN 100 UNIT/ML
500 SYRINGE INTRAVENOUS
Status: CANCELLED | OUTPATIENT
Start: 2024-03-26

## 2024-02-23 RX ORDER — SODIUM CHLORIDE 0.9 % (FLUSH) 0.9 %
10 SYRINGE (ML) INJECTION
Status: CANCELLED | OUTPATIENT
Start: 2024-03-19

## 2024-02-23 RX ORDER — SODIUM CHLORIDE 0.9 % (FLUSH) 0.9 %
10 SYRINGE (ML) INJECTION
Status: CANCELLED | OUTPATIENT
Start: 2024-03-26

## 2024-02-23 RX ORDER — HEPARIN 100 UNIT/ML
500 SYRINGE INTRAVENOUS
Status: CANCELLED | OUTPATIENT
Start: 2024-02-27

## 2024-02-23 RX ORDER — HEPARIN 100 UNIT/ML
500 SYRINGE INTRAVENOUS
Status: CANCELLED | OUTPATIENT
Start: 2024-03-19

## 2024-02-23 RX ORDER — HEPARIN 100 UNIT/ML
500 SYRINGE INTRAVENOUS
Status: CANCELLED | OUTPATIENT
Start: 2024-03-12

## 2024-02-23 RX ORDER — EPINEPHRINE 0.3 MG/.3ML
0.3 INJECTION SUBCUTANEOUS ONCE AS NEEDED
Status: CANCELLED | OUTPATIENT
Start: 2024-03-26

## 2024-02-23 RX ORDER — SODIUM CHLORIDE 0.9 % (FLUSH) 0.9 %
10 SYRINGE (ML) INJECTION
Status: CANCELLED | OUTPATIENT
Start: 2024-03-12

## 2024-02-23 RX ORDER — DIPHENHYDRAMINE HYDROCHLORIDE 50 MG/ML
50 INJECTION INTRAMUSCULAR; INTRAVENOUS ONCE AS NEEDED
Status: CANCELLED | OUTPATIENT
Start: 2024-03-19

## 2024-02-23 RX ORDER — PROMETHAZINE HYDROCHLORIDE AND DEXTROMETHORPHAN HYDROBROMIDE 6.25; 15 MG/5ML; MG/5ML
SYRUP ORAL
Qty: 150 ML | Refills: 1 | Status: SHIPPED | OUTPATIENT
Start: 2024-02-23 | End: 2024-02-27

## 2024-02-23 RX ORDER — HEPARIN 100 UNIT/ML
500 SYRINGE INTRAVENOUS
Status: CANCELLED | OUTPATIENT
Start: 2024-03-05

## 2024-02-23 RX ORDER — EPINEPHRINE 0.3 MG/.3ML
0.3 INJECTION SUBCUTANEOUS ONCE AS NEEDED
Status: CANCELLED | OUTPATIENT
Start: 2024-03-05

## 2024-02-23 RX ORDER — EPINEPHRINE 0.3 MG/.3ML
0.3 INJECTION SUBCUTANEOUS ONCE AS NEEDED
Status: CANCELLED | OUTPATIENT
Start: 2024-02-27

## 2024-02-23 RX ORDER — DIPHENHYDRAMINE HYDROCHLORIDE 50 MG/ML
50 INJECTION INTRAMUSCULAR; INTRAVENOUS ONCE AS NEEDED
Status: CANCELLED | OUTPATIENT
Start: 2024-03-26

## 2024-02-23 RX ORDER — SODIUM CHLORIDE 0.9 % (FLUSH) 0.9 %
10 SYRINGE (ML) INJECTION
Status: CANCELLED | OUTPATIENT
Start: 2024-02-27

## 2024-02-23 RX ORDER — EPINEPHRINE 0.3 MG/.3ML
0.3 INJECTION SUBCUTANEOUS ONCE AS NEEDED
Status: CANCELLED | OUTPATIENT
Start: 2024-03-12

## 2024-02-26 ENCOUNTER — PATIENT MESSAGE (OUTPATIENT)
Dept: PRIMARY CARE CLINIC | Facility: CLINIC | Age: 74
End: 2024-02-26
Payer: MEDICARE

## 2024-02-26 DIAGNOSIS — G44.89 CHRONIC MIXED HEADACHE SYNDROME: ICD-10-CM

## 2024-02-26 DIAGNOSIS — F41.9 ANXIETY: ICD-10-CM

## 2024-02-26 NOTE — TELEPHONE ENCOUNTER
No care due was identified.  Health [facility] Embedded Care Due Messages. Reference number: [id number].   2/26/2024 4:17:53 PM CST

## 2024-02-27 ENCOUNTER — OFFICE VISIT (OUTPATIENT)
Dept: OBSTETRICS AND GYNECOLOGY | Facility: CLINIC | Age: 74
End: 2024-02-27
Payer: MEDICARE

## 2024-02-27 VITALS
BODY MASS INDEX: 30.21 KG/M2 | DIASTOLIC BLOOD PRESSURE: 77 MMHG | SYSTOLIC BLOOD PRESSURE: 114 MMHG | WEIGHT: 153.88 LBS | HEIGHT: 60 IN

## 2024-02-27 DIAGNOSIS — Z12.4 SCREENING FOR CERVICAL CANCER: ICD-10-CM

## 2024-02-27 DIAGNOSIS — Z78.0 ASYMPTOMATIC MENOPAUSE: ICD-10-CM

## 2024-02-27 DIAGNOSIS — Z11.51 SCREENING FOR HPV (HUMAN PAPILLOMAVIRUS): ICD-10-CM

## 2024-02-27 DIAGNOSIS — B37.2 SKIN YEAST INFECTION: ICD-10-CM

## 2024-02-27 DIAGNOSIS — Z01.419 ENCOUNTER FOR ANNUAL ROUTINE GYNECOLOGICAL EXAMINATION: Primary | ICD-10-CM

## 2024-02-27 DIAGNOSIS — Z12.31 SCREENING MAMMOGRAM FOR BREAST CANCER: ICD-10-CM

## 2024-02-27 DIAGNOSIS — G44.89 CHRONIC MIXED HEADACHE SYNDROME: ICD-10-CM

## 2024-02-27 PROCEDURE — G0101 CA SCREEN;PELVIC/BREAST EXAM: HCPCS | Mod: GZ,S$GLB,, | Performed by: OBSTETRICS & GYNECOLOGY

## 2024-02-27 PROCEDURE — 3078F DIAST BP <80 MM HG: CPT | Mod: CPTII,S$GLB,, | Performed by: OBSTETRICS & GYNECOLOGY

## 2024-02-27 PROCEDURE — 1159F MED LIST DOCD IN RCRD: CPT | Mod: CPTII,S$GLB,, | Performed by: OBSTETRICS & GYNECOLOGY

## 2024-02-27 PROCEDURE — 3288F FALL RISK ASSESSMENT DOCD: CPT | Mod: CPTII,S$GLB,, | Performed by: OBSTETRICS & GYNECOLOGY

## 2024-02-27 PROCEDURE — 99999 PR PBB SHADOW E&M-EST. PATIENT-LVL IV: CPT | Mod: PBBFAC,,, | Performed by: OBSTETRICS & GYNECOLOGY

## 2024-02-27 PROCEDURE — 88175 CYTOPATH C/V AUTO FLUID REDO: CPT | Mod: HCNC | Performed by: OBSTETRICS & GYNECOLOGY

## 2024-02-27 PROCEDURE — 3051F HG A1C>EQUAL 7.0%<8.0%: CPT | Mod: CPTII,S$GLB,, | Performed by: OBSTETRICS & GYNECOLOGY

## 2024-02-27 PROCEDURE — 3074F SYST BP LT 130 MM HG: CPT | Mod: CPTII,S$GLB,, | Performed by: OBSTETRICS & GYNECOLOGY

## 2024-02-27 PROCEDURE — 1160F RVW MEDS BY RX/DR IN RCRD: CPT | Mod: CPTII,S$GLB,, | Performed by: OBSTETRICS & GYNECOLOGY

## 2024-02-27 PROCEDURE — 87624 HPV HI-RISK TYP POOLED RSLT: CPT | Mod: HCNC | Performed by: OBSTETRICS & GYNECOLOGY

## 2024-02-27 PROCEDURE — 1101F PT FALLS ASSESS-DOCD LE1/YR: CPT | Mod: CPTII,S$GLB,, | Performed by: OBSTETRICS & GYNECOLOGY

## 2024-02-27 RX ORDER — BUTALBITAL, ASPIRIN, AND CAFFEINE 325; 50; 40 MG/1; MG/1; MG/1
1 CAPSULE ORAL EVERY 6 HOURS PRN
Qty: 60 CAPSULE | Refills: 0 | Status: SHIPPED | OUTPATIENT
Start: 2024-02-27 | End: 2024-02-28

## 2024-02-27 RX ORDER — NYSTATIN 100000 [USP'U]/G
POWDER TOPICAL 4 TIMES DAILY
Qty: 60 G | Refills: 0 | Status: SHIPPED | OUTPATIENT
Start: 2024-02-27 | End: 2024-02-28 | Stop reason: SDUPTHER

## 2024-02-27 RX ORDER — ALPRAZOLAM 0.5 MG/1
0.5 TABLET ORAL 2 TIMES DAILY PRN
Qty: 60 TABLET | Refills: 0 | Status: SHIPPED | OUTPATIENT
Start: 2024-02-27 | End: 2024-04-30 | Stop reason: SDUPTHER

## 2024-02-27 RX ORDER — BUTALBITAL, ASPIRIN, AND CAFFEINE 325; 50; 40 MG/1; MG/1; MG/1
1 CAPSULE ORAL EVERY 6 HOURS PRN
Qty: 60 CAPSULE | Refills: 0 | Status: SHIPPED | OUTPATIENT
Start: 2024-02-27 | End: 2024-02-27 | Stop reason: SDUPTHER

## 2024-02-27 NOTE — PROGRESS NOTES
Chief Complaint: Well Woman Exam     HPI:      Jayla Loyd is a 73 y.o.  who presents today for well woman exam.  LMP: Patient's last menstrual period was 1998 (approximate).  No issues, problems, or complaints. Specifically, patient denies abnormal vaginal bleeding, discharge, pelvic pain, urinary problems, or changes in appetite. Ms. Loyd is currently sexually active with a single male partner. She declines STD screening today. Patient does not have regular monthly menses. Patient's last menstrual period was 1998 (approximate). Menopausal complaints: none.    Recent diagnosis of DVT in left leg and PE in right lung on blood thinner. Requesting nystatin powder for occasional yeast rash under breast and pannus.    Previous Pap: no abnormalities  (3/1/2024)  Previous Mammogram: BiRads: 1 T-C Score: 3.62% Results for orders placed during the hospital encounter of 23    Mammo Digital Screening Bilat w/ Raul    Narrative  Result:  Mammo Digital Screening Bilat w/ Raul    History:  Patient is 73 y.o. and is seen for a screening mammogram.    Films Compared:  Compared to: 2022 Mammo Digital Screening Bilat w/ Raul and 07/10/2020 Mammo Digital Diagnostic Bilat w/ Raul    Findings:  This procedure was performed using tomosynthesis. Computer-aided detection was utilized in the interpretation of this examination.  The breasts have scattered areas of fibroglandular density. There is no evidence of suspicious masses, calcifications, or other abnormal findings.    Impression  Bilateral  There is no mammographic evidence of malignancy.    BI-RADS Category:  Overall: 1 - Negative      Recommendation:  Routine screening mammogram in 1 year is recommended.      Your estimated lifetime risk of breast cancer (to age 85) based on Tyrer-Cuzick risk assessment model is Tyrer-Cuzick: 3.62 %. According to the American Cancer Society, patients with a lifetime breast cancer risk of 20% or higher might  benefit from supplemental screening tests.     Most Recent Dexa: 18  Colonoscopy: 22    Gardasil: Has never had     OB History          1    Para   1    Term   1            AB        Living   1         SAB        IAB        Ectopic        Multiple        Live Births   1           Obstetric Comments   Menarche at 15               GYN History  Age of Menarche:16  Age at first pregnancy:    Age at first live birth:24  Number of months breastfeeding:    Age at Menopause:44   No abnormal pap or STDs      ROS:     GENERAL: Denies unintentional weight gain or weight loss. Feeling well overall.   SKIN: Denies rash or lesions.   HEENT: Denies headaches, or vision changes.   CARDIOVASCULAR: Denies palpitations or chest pain.   RESPIRATORY: Denies shortness of breath or dyspnea on exertion.  BREASTS: Denies pain, lumps, or nipple discharge.   ABDOMEN: Denies abdominal pain, constipation, diarrhea, nausea, vomiting, change in appetite.  URINARY: Denies frequency, dysuria, hematuria.  NEUROLOGIC: Denies syncope or weakness.   PSYCHIATRIC: Denies depression, anxiety or mood swings.    Physical Exam:      PHYSICAL EXAM:  /77 (BP Location: Right arm, Patient Position: Sitting)   Ht 5' (1.524 m)   Wt 69.8 kg (153 lb 14.1 oz)   LMP 1998 (Approximate)   BMI 30.05 kg/m²   Body mass index is 30.05 kg/m².     APPEARANCE: Well nourished, well developed, in no acute distress.  PSYCH: Appropriate mood and affect.  SKIN: No acne or hirsutism  NECK: Neck symmetric without masses or thyromegaly  NODES: No inguinal, axillary, or supraclavicular lymph node enlargement  ABDOMEN: Soft.  No tenderness or masses.    CARDIOVASCULAR: No edema of peripheral extremities  BREASTS: Symmetrical, no skin changes or visible lesions.  No palpable masses or nipple discharge bilaterally.  PELVIC: Normal external genitalia without lesions.  Normal hair distribution.  Adequate perineal body, normal urethral meatus.  Vagina  atrophic without lesions or discharge.  Cervix pink, without lesions, discharge or tenderness.  No significant cystocele or rectocele.  Bimanual exam shows uterus to be normal size, regular, mobile and nontender.  Adnexa without masses or tenderness.      Assessment/Plan:     Encounter for annual routine gynecological examination    Screening mammogram for breast cancer  -     Mammo Digital Screening Bilat w/ Raul; Future; Expected date: 12/12/2024    Asymptomatic menopause  -     DXA Bone Density Axial Skeleton 1 or more sites; Future; Expected date: 02/27/2024    Skin yeast infection  -     nystatin (MYCOSTATIN) powder; Apply topically 4 (four) times daily.  Dispense: 60 g; Refill: 0    Screening for cervical cancer  -     Liquid-Based Pap Smear, Screening    Screening for HPV (human papillomavirus)  -     HPV High Risk Genotypes, PCR    RTC 1 year    Counseling:     Patient was counseled today on current ASCCP pap guidelines, the recommendation for yearly pelvic exams, healthy diet and exercise routines, breast self awareness and annual mammograms.She is to see her PCP for other health maintenance.     Use of the [facility] Patient Portal discussed and encouraged during today's visit.       Mana Pulido MD      As of April 1, 2021, the Cures Act has been passed nationally. This new law requires that all doctors progress notes, lab results, pathology reports and radiology reports be released IMMEDIATELY to the patient in the patient portal. That means that the results are released to you at the EXACT same time they are released to me. Therefore, with all of the patients that I have I am not able to reply to each patient exactly when the results come in. So there will be a delay from when you see the results to when I see them and have time to come up with a response to send you. Also I only see these results when I am on the computer at work. So if the results come in over the weekend or after 5 pm of a work day, I  will not see them until the next business day. As you can tell, this is a challenge as a physician to give every patient the quick response they hope for and deserve. So please be patient! Thanks for understanding, Dr. Pulido

## 2024-02-27 NOTE — TELEPHONE ENCOUNTER
No care due was identified.  Health [facility] Embedded Care Due Messages. Reference number: [id number].   2/27/2024 12:13:36 PM CST

## 2024-02-28 ENCOUNTER — TELEPHONE (OUTPATIENT)
Dept: OBSTETRICS AND GYNECOLOGY | Facility: CLINIC | Age: 74
End: 2024-02-28
Payer: MEDICARE

## 2024-02-28 ENCOUNTER — OFFICE VISIT (OUTPATIENT)
Dept: PRIMARY CARE CLINIC | Facility: CLINIC | Age: 74
End: 2024-02-28
Payer: MEDICARE

## 2024-02-28 VITALS
WEIGHT: 156.06 LBS | SYSTOLIC BLOOD PRESSURE: 118 MMHG | OXYGEN SATURATION: 96 % | DIASTOLIC BLOOD PRESSURE: 72 MMHG | HEART RATE: 92 BPM | BODY MASS INDEX: 30.64 KG/M2 | HEIGHT: 60 IN

## 2024-02-28 DIAGNOSIS — G44.89 CHRONIC MIXED HEADACHE SYNDROME: ICD-10-CM

## 2024-02-28 DIAGNOSIS — B37.2 SKIN YEAST INFECTION: ICD-10-CM

## 2024-02-28 DIAGNOSIS — D50.9 IRON DEFICIENCY ANEMIA, UNSPECIFIED IRON DEFICIENCY ANEMIA TYPE: ICD-10-CM

## 2024-02-28 DIAGNOSIS — I82.4Y9 ACUTE DEEP VEIN THROMBOSIS (DVT) OF PROXIMAL VEIN OF LOWER EXTREMITY, UNSPECIFIED LATERALITY: Primary | ICD-10-CM

## 2024-02-28 PROCEDURE — 99999 PR PBB SHADOW E&M-EST. PATIENT-LVL III: CPT | Mod: PBBFAC,,, | Performed by: INTERNAL MEDICINE

## 2024-02-28 PROCEDURE — 3008F BODY MASS INDEX DOCD: CPT | Mod: CPTII,S$GLB,, | Performed by: INTERNAL MEDICINE

## 2024-02-28 PROCEDURE — 3078F DIAST BP <80 MM HG: CPT | Mod: CPTII,S$GLB,, | Performed by: INTERNAL MEDICINE

## 2024-02-28 PROCEDURE — 3051F HG A1C>EQUAL 7.0%<8.0%: CPT | Mod: CPTII,S$GLB,, | Performed by: INTERNAL MEDICINE

## 2024-02-28 PROCEDURE — 3074F SYST BP LT 130 MM HG: CPT | Mod: CPTII,S$GLB,, | Performed by: INTERNAL MEDICINE

## 2024-02-28 PROCEDURE — 1159F MED LIST DOCD IN RCRD: CPT | Mod: CPTII,S$GLB,, | Performed by: INTERNAL MEDICINE

## 2024-02-28 PROCEDURE — 99214 OFFICE O/P EST MOD 30 MIN: CPT | Mod: S$GLB,,, | Performed by: INTERNAL MEDICINE

## 2024-02-28 RX ORDER — NYSTATIN 100000 [USP'U]/G
POWDER TOPICAL 4 TIMES DAILY
Qty: 60 G | Refills: 0 | Status: SHIPPED | OUTPATIENT
Start: 2024-02-28

## 2024-02-28 RX ORDER — AMITRIPTYLINE HYDROCHLORIDE 25 MG/1
25 TABLET, FILM COATED ORAL NIGHTLY PRN
Qty: 90 TABLET | Refills: 1 | Status: SHIPPED | OUTPATIENT
Start: 2024-02-28 | End: 2024-05-21

## 2024-02-28 RX ORDER — BUTALBITAL, ACETAMINOPHEN AND CAFFEINE 50; 325; 40 MG/1; MG/1; MG/1
1 TABLET ORAL EVERY 4 HOURS PRN
Qty: 60 TABLET | Refills: 1 | Status: SHIPPED | OUTPATIENT
Start: 2024-02-28 | End: 2024-05-07 | Stop reason: SDUPTHER

## 2024-02-28 RX ORDER — BUTALBITAL, ACETAMINOPHEN AND CAFFEINE 50; 325; 40 MG/1; MG/1; MG/1
1 TABLET ORAL EVERY 4 HOURS PRN
COMMUNITY
End: 2024-02-28 | Stop reason: SDUPTHER

## 2024-02-28 NOTE — ASSESSMENT & PLAN NOTE
Ll speak with GEORGES Ceballos regarding drop in HH, she is chronically iron deficinet  Cancer screenings utd

## 2024-02-28 NOTE — TELEPHONE ENCOUNTER
Needs PA for nystatin powder. Patient has recurrent skin yeast below breast and abdominal pannus and not clearing with nystatin cream. Used powder in past and more effective for her.

## 2024-02-28 NOTE — PROGRESS NOTES
[facility] Note    Chief Complaint      Chief Complaint   Patient presents with    Follow-up     6 MTH    Diabetes     History of Present Illness      Jayla Loyd is a 73 y.o. female who presents today for chief complaint follow up DVT .      HPI   DVT had hypercoag work up with HO  As far a malignancy work up- pap and pelvic exam utd, colonoscopy UTD, recent lung imaging     She sees emily EVANS Neuro  regarding leg spasms/tremors, is not RLS per pt, he calls it involuntary movements   Lab Results   Component Value Date    HGBA1C 7.0 (H) 02/01/2024     Migraine medication was changed, will resent fioricet     Using antihistamine for sleep , she has tried trazodone, she is on elavil 10  Active Problem List with Overview Notes    Diagnosis Date Noted    Acute deep vein thrombosis (DVT) of femoral vein of left lower extremity 02/06/2024    Acute pulmonary embolism with acute cor pulmonale 02/06/2024    Single subsegmental pulmonary embolism without acute cor pulmonale 02/02/2024    Acute deep vein thrombosis (DVT) of proximal vein of lower extremity 02/01/2024    Abnormal CT of the chest 03/29/2023    Lung nodules 03/29/2023    Aortic arch atherosclerosis 03/29/2023     Mild calcification of aortic arch and cusps seen on CT         Overweight (BMI 25.0-29.9) 03/29/2023    Type 2 diabetes mellitus without complication, without long-term current use of insulin 03/29/2023    DM type 2 without retinopathy 03/29/2023     Per pt      Osteoarthritis of both shoulders 02/03/2023    Peripheral vascular disease 08/25/2022    Acute ankle pain 05/31/2022    Night sweats 05/25/2022    Cox's esophagus 02/16/2022    Chronic cough 04/13/2021    Tremor 02/15/2021    Sleep disturbance 02/15/2021    Venous insufficiency 02/15/2021    Dizziness 01/19/2021    Subclinical hyperthyroidism 12/17/2020    HLD (hyperlipidemia) 07/22/2020     On crestor       Arthritis of left shoulder region 07/16/2020     seeing  [place] at [facility] pending shoulder MRI       Anxiety 07/16/2020    Type 2 diabetes mellitus with other specified complication, without long-term current use of insulin      Sees dr butt had recent a1c, he also does foot exam      Allergic rhinitis due to pollen 07/08/2020    Hematuria, unspecified 07/08/2020    Iron deficiency anemia, unspecified 06/30/2019    Hyperphosphatemia 08/19/2018    HTN (hypertension) 08/19/2018     At goal on lasix and micardis, no chest pain or shortness of breath       Proteinuria, unspecified 08/19/2018     Seeing dr hanson       Gastroesophageal reflux disease 07/16/2014    Adenomatous polyp of colon 07/16/2014    Chronic mixed headache syndrome 07/16/2014     Has chronic migraines, uses fioricet   Tried amovig, has not tried triptan - not interested  Sees Charly Do- Neuro      Type 2 diabetes mellitus with hypercholesterolemia 08/01/2012       Health Maintenance   Topic Date Due    TETANUS VACCINE  02/01/2008    Foot Exam  09/03/2021    DEXA Scan  08/11/2023    Lipid Panel  08/01/2024    Hemoglobin A1c  08/01/2024    Mammogram  12/11/2024    Eye Exam  01/04/2025    High Dose Statin  02/27/2025    Colorectal Cancer Screening  02/08/2027    Hepatitis C Screening  Completed    Shingles Vaccine  Discontinued       Past Medical History:   Diagnosis Date    Carpal tunnel syndrome, right upper limb 06/23/2022    Diabetes mellitus type II     GERD (gastroesophageal reflux disease)     Hx of multiple pulmonary nodules 06/15/2022    Migraines     Proteinuria     Trigger finger, right ring finger 06/23/2022       Past Surgical History:   Procedure Laterality Date    CATARACT EXTRACTION      COSMETIC SURGERY  1971    rhinoplasty    EYE SURGERY  3 years ago    cataracts    JOINT REPLACEMENT  Twice in last 24 months    SHOULDER SURGERY Left 09/2020    TUBAL LIGATION  1992       family history includes Arthritis in her father and paternal grandmother; Bladder Cancer in her mother; Breast  cancer in her maternal aunt and maternal grandmother; Cancer in her maternal aunt, maternal grandmother, mother, paternal aunt, and paternal grandmother; Dementia in her paternal grandmother; Diabetes in her paternal aunt; Heart disease in her father, maternal grandfather, mother, and paternal grandfather; Heart failure in her father; Hypertension in her father and mother; No Known Problems in her son; Stroke in her brother.    Social History     Tobacco Use    Smoking status: Never     Passive exposure: Never    Smokeless tobacco: Never   Substance Use Topics    Alcohol use: Yes     Comment: None    Drug use: Not Currently     Types: Other-see comments     Comment: None       Review of Systems   Constitutional:  Negative for chills, fever, malaise/fatigue and weight loss.   Respiratory:  Negative for cough, sputum production, shortness of breath and wheezing.    Cardiovascular:  Negative for chest pain, palpitations, orthopnea and leg swelling.   Gastrointestinal:  Negative for constipation, diarrhea, nausea and vomiting.   Genitourinary:  Negative for dysuria, frequency, hematuria and urgency.        Outpatient Encounter Medications as of 2/28/2024   Medication Sig Note Dispense Refill    ALPRAZolam (XANAX) 0.5 MG tablet Take 1 tablet (0.5 mg total) by mouth 2 (two) times daily as needed for Anxiety.  60 tablet 0    apixaban (ELIQUIS) 5 mg Tab Take 1 tablet (5 mg total) by mouth 2 (two) times daily.  60 tablet 11    coenzyme Q10 100 mg capsule Take 100 mg by mouth once daily. 2/5/2024: PRN      esomeprazole (NEXIUM) 40 MG capsule Take 1 capsule (40 mg total) by mouth 2 (two) times daily before meals.  180 capsule 3    Lactobac no.41/Bifidobact no.7 (PROBIOTIC-10 ORAL) Take by mouth once daily. 2/5/2024: Every other day      meclizine (ANTIVERT) 25 mg tablet Take 1 tablet (25 mg total) by mouth 2 (two) times daily as needed.  60 tablet 0    methocarbamoL (ROBAXIN) 500 MG Tab TAKE 1 TABLET (500 MG TOTAL) BY MOUTH 3  (THREE) TIMES DAILY AS NEEDED (PAIN).  60 tablet 1    nystatin (MYCOSTATIN) powder Apply topically 4 (four) times daily.  60 g 0    ondansetron (ZOFRAN) 8 MG tablet Take 1 tablet (8 mg total) by mouth every 8 (eight) hours as needed for Nausea.  20 tablet 1    promethazine (PHENERGAN) 12.5 MG Tab        rosuvastatin (CRESTOR) 40 MG Tab Take 1 tablet (40 mg total) by mouth once daily.  90 tablet 0    semaglutide (OZEMPIC) 0.25 mg or 0.5 mg (2 mg/3 mL) pen injector Inject 0.25 mg into the skin every 7 days.  12 each 1    sertraline (ZOLOFT) 25 MG tablet Take 1 tablet (25 mg total) by mouth once daily.  90 tablet 3    telmisartan (MICARDIS) 20 MG Tab TAKE 1 TABLET BY MOUTH EVERY DAY 2024: 0.5 tablet daily 90 tablet 1    traMADoL (ULTRAM) 50 mg tablet Take 1 tablet (50 mg total) by mouth every 6 (six) hours as needed for Pain.  21 tablet 0    [DISCONTINUED] amitriptyline (ELAVIL) 10 MG tablet Take 10 mg by mouth every evening. 2024: PRN      [DISCONTINUED] butalbital-acetaminophen-caffeine -40 mg (FIORICET, ESGIC) -40 mg per tablet Take 1 tablet by mouth every 4 (four) hours as needed for Pain.       [] acetaminophen-codeine 300-30mg (TYLENOL #3) 300-30 mg Tab Take 1 tablet by mouth every 6 (six) hours as needed (pain).  15 tablet 0    amitriptyline (ELAVIL) 25 MG tablet Take 1 tablet (25 mg total) by mouth nightly as needed for Insomnia.  90 tablet 1    butalbital-acetaminophen-caffeine -40 mg (FIORICET, ESGIC) -40 mg per tablet Take 1 tablet by mouth every 4 (four) hours as needed for Pain.  60 tablet 1    [DISCONTINUED] acetaminophen-codeine 300-30mg (TYLENOL #3) 300-30 mg Tab Take 1 tablet by mouth every 6 (six) hours as needed (pain).  20 tablet 0    [DISCONTINUED] ALPRAZolam (XANAX) 0.5 MG tablet Take 1 tablet (0.5 mg total) by mouth 2 (two) times daily as needed for Anxiety.  60 tablet 0    [DISCONTINUED] apixaban (ELIQUIS) 5 mg Tab Take 2 tablets (10 mg total) by mouth 2  (two) times daily. for 7 days. THEN Take 1 tablet (5 mg total) by mouth 2 (two) times daily.  74 tablet 5    [DISCONTINUED] azelastine (ASTELIN) 137 mcg (0.1 %) nasal spray 1 spray by Nasal route.       [DISCONTINUED] butalbital-aspirin-caffeine -40 mg (FIORINAL) -40 mg Cap Take 1 capsule by mouth every 6 (six) hours as needed.  60 capsule 0    [DISCONTINUED] butalbital-aspirin-caffeine -40 mg (FIORINAL) -40 mg Cap Take 1 capsule by mouth every 6 (six) hours as needed.  60 capsule 0    [DISCONTINUED] butalbital-aspirin-caffeine -40 mg (FIORINAL) -40 mg Cap Take 1 capsule by mouth every 6 (six) hours as needed.  60 capsule 0    [DISCONTINUED] fluticasone propionate (FLONASE) 50 mcg/actuation nasal spray 1 spray (50 mcg total) by Each Nostril route once daily. 2024: PRN 9.9 mL 0    [DISCONTINUED] guaiFENesin-codeine 100-10 mg/5 ml (TUSSI-ORGANIDIN NR)  mg/5 mL syrup Take 5 mLs by mouth every 8 (eight) hours as needed for Cough.  105 mL 0    [DISCONTINUED] metFORMIN (GLUCOPHAGE-XR) 500 MG ER 24hr tablet Take 2 tablets (1,000 mg total) by mouth every evening. (Patient not taking: Reported on 2024)  180 tablet 1    [DISCONTINUED] methocarbamoL (ROBAXIN) 500 MG Tab Take 1 tablet (500 mg total) by mouth 3 (three) times daily as needed (muscle spasms).  30 tablet 0    [DISCONTINUED] nystatin (MYCOSTATIN) powder Apply topically 4 (four) times daily.  60 g 0    [DISCONTINUED] promethazine-dextromethorphan (PROMETHAZINE-DM) 6.25-15 mg/5 mL Syrp TAKE 10 MLS BY MOUTH EVERY 8 HOURS AS NEEDED FOR COUGH  150 mL 1    [DISCONTINUED] promethazine-dextromethorphan (PROMETHAZINE-DM) 6.25-15 mg/5 mL Syrp TAKE 10 MLS BY MOUTH EVERY 8 HOURS AS NEEDED FOR COUGH (Patient not taking: Reported on 2024)  150 mL 1    [] dextromethorphan-guaiFENesin  mg/5 ml liquid 10 mL        [] promethazine 6.25 mg/5 mL syrup 12.5 mg        [DISCONTINUED] acetaminophen tablet 1,000 mg         [DISCONTINUED] acetaminophen tablet 650 mg        [DISCONTINUED] acetaminophen-codeine 300-30mg per tablet 1 tablet        [DISCONTINUED] ALPRAZolam tablet 0.5 mg        [DISCONTINUED] amitriptyline tablet 10 mg        [DISCONTINUED] atorvastatin tablet 80 mg        [DISCONTINUED] butalbital-acetaminophen-caffeine -40 mg per tablet 1 tablet        [DISCONTINUED] dextrose 10% bolus 125 mL 125 mL        [DISCONTINUED] dextrose 10% bolus 250 mL 250 mL        [DISCONTINUED] fluticasone propionate 50 mcg/actuation nasal spray 50 mcg        [DISCONTINUED] glucagon (human recombinant) injection 1 mg        [DISCONTINUED] glucose chewable tablet 16 g        [DISCONTINUED] glucose chewable tablet 24 g        [DISCONTINUED] heparin 25,000 units in dextrose 5% (100 units/ml) IV bolus from bag - ADDITIONAL PRN BOLUS - 30 units/kg        [DISCONTINUED] heparin 25,000 units in dextrose 5% (100 units/ml) IV bolus from bag - ADDITIONAL PRN BOLUS - 60 units/kg        [DISCONTINUED] heparin 25,000 units in dextrose 5% 250 mL (100 units/mL) infusion HIGH INTENSITY nomogram - [facility]        [DISCONTINUED] insulin aspart U-100 pen 0-5 Units        [DISCONTINUED] losartan tablet 25 mg        [DISCONTINUED] losartan tablet 25 mg        [DISCONTINUED] methocarbamoL tablet 500 mg        [DISCONTINUED] naloxone 0.4 mg/mL injection 0.02 mg        [DISCONTINUED] ondansetron injection 4 mg        [DISCONTINUED] oxyCODONE immediate release tablet 5 mg        [DISCONTINUED] oxyCODONE immediate release tablet 5 mg        [DISCONTINUED] pantoprazole EC tablet 40 mg        [DISCONTINUED] pantoprazole EC tablet 40 mg        [DISCONTINUED] sertraline tablet 25 mg        [DISCONTINUED] sodium chloride 0.9% flush 10 mL         No facility-administered encounter medications on file as of 2/28/2024.        Review of patient's allergies indicates:   Allergen Reactions    Gabapentin Hallucinations    Lyrica [pregabalin] Hallucinations    Mobic  [meloxicam] Itching           Physical Exam      Vital Signs  Pulse: 92  SpO2: 96 %  BP: 118/72  BP Location: Left arm  Patient Position: Sitting  Height and Weight  Height: 5' (152.4 cm)  Weight: 70.8 kg (156 lb 1.4 oz)  BSA (Calculated - sq m): 1.73 sq meters  BMI (Calculated): 30.5  Weight in (lb) to have BMI = 25: 127.7]    Physical Exam  Vitals reviewed.   Constitutional:       General: She is not in acute distress.     Appearance: She is well-developed. She is not diaphoretic.   HENT:      Head: Normocephalic and atraumatic.      Right Ear: External ear normal.      Left Ear: External ear normal.      Nose: Nose normal.   Eyes:      General:         Right eye: No discharge.         Left eye: No discharge.      Conjunctiva/sclera: Conjunctivae normal.   Pulmonary:      Effort: Pulmonary effort is normal. No respiratory distress.   Musculoskeletal:         General: Normal range of motion.      Cervical back: Normal range of motion.   Skin:     Coloration: Skin is not pale.      Findings: No rash.   Neurological:      Mental Status: She is alert and oriented to person, place, and time.   Psychiatric:         Behavior: Behavior normal.         Thought Content: Thought content normal.          Laboratory:  CBC:  Recent Labs   Lab Result Units 02/01/24 1923 02/01/24 1934 02/02/24  0307   WBC K/uL 11.55  --  10.91  10.91   RBC M/uL 4.17  --  3.92*  3.92*   Hemoglobin g/dL 10.1*  --  9.3*  9.3*   POC Hematocrit   --    < >  --    Hematocrit % 32.8*  --  30.4*  30.4*   Platelets K/uL 353  --  315  315   MCV fL 79*  --  78*  78*   MCH pg 24.2*  --  23.7*  23.7*   MCHC g/dL 30.8*  --  30.6*  30.6*    < > = values in this interval not displayed.     CMP:  Recent Labs   Lab Result Units 02/01/24 1938 02/02/24  0307   Glucose mg/dL 109 144*   Calcium mg/dL 9.5 9.7   Albumin g/dL 3.3* 3.0*   Total Protein g/dL 7.6 6.8   Sodium mmol/L 140 138   Potassium mmol/L 4.2 4.2   CO2 mmol/L 28 26   Chloride mmol/L 104 103  "  BUN mg/dL 11 10   Alkaline Phosphatase U/L 148* 134   ALT U/L 11 10   AST U/L 12 14   Total Bilirubin mg/dL 0.2 0.2     URINALYSIS:  No results for input(s): "COLORU", "CLARITYU", "SPECGRAV", "PHUR", "PROTEINUA", "GLUCOSEU", "BILIRUBINCON", "BLOODU", "WBCU", "RBCU", "BACTERIA", "MUCUS", "NITRITE", "LEUKOCYTESUR", "UROBILINOGEN", "HYALINECASTS" in the last 2160 hours.   LIPIDS:  No results for input(s): "TSH", "HDL", "CHOL", "TRIG", "LDLCALC", "CHOLHDL", "NONHDLCHOL", "TOTALCHOLEST" in the last 2160 hours.  TSH:  No results for input(s): "TSH" in the last 2160 hours.  A1C:  Recent Labs   Lab Result Units 02/01/24 2137   Hemoglobin A1C % 7.0*       Assessment/Plan     Jayla Loyd is a 73 y.o.female with:    1. Chronic mixed headache syndrome  Overview:  Has chronic migraines, uses fioricet   Tried amovig, has not tried triptan - not interested  Sees Charly Do- Neuro    Orders:  -     butalbital-acetaminophen-caffeine -40 mg (FIORICET, ESGIC) -40 mg per tablet; Take 1 tablet by mouth every 4 (four) hours as needed for Pain.  Dispense: 60 tablet; Refill: 1  -     amitriptyline (ELAVIL) 25 MG tablet; Take 1 tablet (25 mg total) by mouth nightly as needed for Insomnia.  Dispense: 90 tablet; Refill: 1    2. Iron deficiency anemia, unspecified iron deficiency anemia type  Assessment & Plan:  Ll speak with GEORGES Ceballos regarding drop in HH, she is chronically iron deficinet  Cancer screenings utd       3. Acute deep vein thrombosis (DVT) of proximal vein of lower extremity, unspecified laterality  Assessment & Plan:  Continue plan per vasc cards and HO            Use of the [facility] Patient Portal discussed and encouraged during today's visit  -Continue current medications and maintain follow up with specialists.  Return to clinic in .  Future Appointments   Date Time Provider Department Center   3/4/2024  1:00 PM Siobhan Flores, PT VETH [facility]2 [facility] PT   3/15/2024 11:30 AM LAB, APPOINTMENT NEW " [facility] LAB VNP [facility]   3/18/2024  2:20 PM Deedee Ceballos MD [facility] HEM ONC [facility]   3/20/2024  1:00 PM Viry Newsome, FNP-C DESC [facility]CT[facility] [place]   5/6/2024  2:30 PM [facility] OI-CT1 500 LB LIMIT [facility]   5/8/2024  2:00 PM VASCULAR, [place] [facility]LAB [place]   5/15/2024  2:00 PM Edmond Sifuentes MD PhD [facility] CARDIO [place]       Geena Barnard MD  2/28/2024 11:53 AM    Primary Care Internal Medicine

## 2024-02-28 NOTE — Clinical Note
She was asking about the IV iron. Do you attribute the drop in Hh to anticoagulation. I was thinking about an occulr blood? Thanks, Geena

## 2024-03-01 LAB
FINAL PATHOLOGIC DIAGNOSIS: NORMAL
Lab: NORMAL

## 2024-03-04 ENCOUNTER — PATIENT MESSAGE (OUTPATIENT)
Dept: HEMATOLOGY/ONCOLOGY | Facility: CLINIC | Age: 74
End: 2024-03-04
Payer: MEDICARE

## 2024-03-04 ENCOUNTER — PATIENT MESSAGE (OUTPATIENT)
Dept: CARDIOLOGY | Facility: CLINIC | Age: 74
End: 2024-03-04
Payer: MEDICARE

## 2024-03-04 ENCOUNTER — PATIENT MESSAGE (OUTPATIENT)
Dept: PRIMARY CARE CLINIC | Facility: CLINIC | Age: 74
End: 2024-03-04
Payer: MEDICARE

## 2024-03-04 ENCOUNTER — CLINICAL SUPPORT (OUTPATIENT)
Dept: REHABILITATION | Facility: HOSPITAL | Age: 74
End: 2024-03-04
Attending: INTERNAL MEDICINE
Payer: MEDICARE

## 2024-03-04 DIAGNOSIS — H81.10 BENIGN PAROXYSMAL POSITIONAL VERTIGO, UNSPECIFIED LATERALITY: ICD-10-CM

## 2024-03-04 DIAGNOSIS — D64.9 ANEMIA, UNSPECIFIED TYPE: Primary | ICD-10-CM

## 2024-03-04 PROCEDURE — 97162 PT EVAL MOD COMPLEX 30 MIN: CPT | Mod: HCNC,PO

## 2024-03-04 NOTE — PROGRESS NOTES
See Evaluation in POC.   Complex Repair And Graft Additional Text (Will Appearing After The Standard Complex Repair Text): The complex repair was not sufficient to completely close the primary defect. The remaining additional defect was repaired with the graft mentioned below.

## 2024-03-04 NOTE — PLAN OF CARE
[facility]  Physical Therapy Neurological Rehabilitation Initial Evaluation    Name: Jayla Loyd  Clinic Number: [id number]    Therapy Diagnosis:   Encounter Diagnosis   Name Primary?    Benign paroxysmal positional vertigo, unspecified laterality      Physician: Geena Barnard MD    Physician Orders: PT Eval and Treat Vestibular Therapy  Medical Diagnosis from Referral: H81.10 (ICD-10-CM) - Benign paroxysmal positional vertigo, unspecified laterality  Evaluation Date: 3/4/2024  Insurance Authorization Period: 02/08/2024 - 02/07/2025  Plan of Care Expiration: 04/30/2024  Visit # / Visits authorized: 01/ 01 pending additional authorization     Time In: 1302  Time Out: 1345  Total Billable Time: 43 minutes    Precautions: Standard, Recent DVT    Subjective   Date of onset: Beginning of January   History of current condition - Jayla reports: that she woke up one morning with severe vertigo that lasted 2-3 days. She still has residual dizziness when she moves her head. She had COVID prior to onset of vertigo. She also had a difficult death in her family in December.  Triggers: head movements, occasional bed mobility     Description of symptoms: initially spinning vertigo; currently, disorientation   Duration of symptoms: seconds    Visual changes: focusing on targets   Hearing Changes (hearing loss or tinnitus): None   Currently taking Meclizine: Meclizine every morning and carries one around in case she needs it     Pts goals: Decrease or eliminate dizziness so I can drive.      Systems screening  Cardiovascular indicators: None    Is patient currently taking medication for stated indicators: N/A  Neurological:  None    Medical History:   Past Medical History:   Diagnosis Date    Carpal tunnel syndrome, right upper limb 06/23/2022    Diabetes mellitus type II     GERD (gastroesophageal reflux disease)     Hx of multiple pulmonary nodules 06/15/2022    Migraines     Proteinuria      Trigger finger, right ring finger 06/23/2022       Surgical History:   Jayla Loyd  has a past surgical history that includes Cataract extraction; Eye surgery (3 years ago); Tubal ligation (1992); Cosmetic surgery (1971); Shoulder surgery (Left, 09/2020); and Joint replacement (Twice in last 24 months).    Medications:   Jayla has a current medication list which includes the following prescription(s): alprazolam, amitriptyline, apixaban, butalbital-acetaminophen-caffeine -40 mg, coenzyme q10, esomeprazole, lactobac no.41/bifidobact no.7, meclizine, methocarbamol, nystatin, ondansetron, promethazine, rosuvastatin, ozempic, sertraline, telmisartan, and tramadol.    Allergies:   Review of patient's allergies indicates:   Allergen Reactions    Gabapentin Hallucinations    Lyrica [pregabalin] Hallucinations    Mobic [meloxicam] Itching        Imaging: See electronic medial record. None recent    Vestibular Function Testing/Audiogram: None at this time     Prior Therapy: No prior vestibular PT  Social History: Lives with family   Falls: None   DME: None    Home Environment: Single story house   Exercise Routine / History: None currently   Family Present at time of Eval: None   Occupation: Retired   Prior Level of Function: Independent with ADLs, functional mobility, and recreational activities   Current Level of Function: Independent with ADLs and functional mobility. Not currently driving    Pain:  Current Mild discomfort   Location:  R hip pain     Objective   Outcome Measure:   Dizziness Handicap Inventory: 48 - Moderate    SYSTEMS SCREEN    - Follows commands: 100% of time   - Speech: no deficits     Mental status: normal mood, behavior, speech, dress, motor activity, and thought processes  Appearance: Casually dressed  Behavior:  calm and cooperative  Attention Span and Concentration:  Normal    Posture Alignment: WFL     ROM:   CERVICAL SPINE  Flexion: WFL, mild dizziness   Extension: WFL, mild  dizziness    L side bend: WFL  R side bend: WFL   L rotation: WFL, mild dizziness   R rotation: WFL, mild dizziness  Are concurrent symptoms present with any of these movements: As noted above     Modified VAS (Vertebral Artery Screen), in sitting (rotation, then extension):  R: Negative  L: Negative    VESTIBULAR EXAMINATION    Oculomotor Screen in room light (fixation present):   Known eye dysfunction: None - hx of cataract surgery  Ocular ROM: WNL    Tracking/Smooth Pursuits: Intact  Saccades: Impaired - overshooting in vertical direction  Convergence: DNT   Spontaneous Nystagmus: Absent   Gaze Holding Nystagmus: Absent     Slow VOR Screen: Intact  VOR Cancellation: DNT  Head Thrust Test: Inconclusive due to cervical guarding    Dynamic Visual Acuity: 5 line loss    POSITIONAL CANAL TESTING: Not tested this date    Functional Gait Assessment:   1. Gait on level surface =  3   (3) Normal: less than 5.5 sec, no A.D., no imbalance, normal gait pattern, deviates< 6in   (2) Mild impairment: 7-5.6 sec, uses A.D., mild gait deviations, or deviates 6-10 in   (1) Moderate impairment: > 7 sec, slow speed, imbalance, deviates 10-15 in.   (0) Severe impairment: needs assist, deviates >15 in, reach/touch wall  2. Change in Gait Speed = 3   (3) Normal: smooth change w/o loss of balance or gait deviation, deviates < 6 in, significant difference between speeds   (2) Mild impairment: changes speed, but demonstrates mild gait deviations, deviates 6-10 in, OR no deviations but unable to significantly speed, OR uses A.D.   (1) Moderate impairment: minor changes to speed, OR changes speed w/ significant deviations, deviates 10-15 in, OR  Changes speed , but loses balance & recovers   (0) Severe impairment: cannot change speed, deviates >15 in, or loses balance & needs assist  3. Gait with horizontal head turns  = 2   (3) Normal: no change in gait, deviates <6 in   (2) Mild impairment: slight change in speed, deviates 6-10 in, OR uses  A.D.   (1) Moderate impairment: moderate change in speed, deviates 10-15 in   (0) Severe impairment: severe disruption of gait, deviates >15in  4. Gait with vertical head turns = 2   (3) Normal: no change in gait, deviates <6 in   (2) Mild impairment: slight change in speed, deviates 6-10 in OR uses A.D.   (1) Moderate impairment: moderate change in speed, deviates 10-15 in   (0) Severe impairment: severe disruption of gait, deviates >15 in  5. Gait with pivot turns = 3   (3) Normal: performs safely in 3 sec, no LOB   (2) Mild impairment: performs in >3 sec & no LOB, OR turns safely & requires several steps to regain LOB   (1) Moderate impairment: turns slow, OR requires several small steps for balance following turn & stop   (0) Severe impairment: cannot turn safely, needs assist  6. Step over obstacle = 2   (3) Normal: steps over 2 stacked boxes w/o change in speed or LOB   (2) Mild impairment: able to step over 1 box w/o change in speed or LOB   (1) Moderate impairment: steps over 1 box but must slow down, may require VC   (0) Severe impairment: cannot perform w/o assist  7. Gait with Narrow AMOL = 0   (3) Normal: 10 steps no staggering   (2) Mild impairment: 7-9 steps   (1) Moderate impairment: 4-7 steps   (0) Severe impairment: < 4 steps or cannot perform w/o assist  8. Gait with eyes closed = 1   (3) Normal: < 7 sec, no A.D., no LOB, normal gait pattern, deviates <6 in   (2) Mild impairment: 7.1-9 sec, mild gait deviations, deviates 6-10 in   (1) Moderate impairment: > 9 sec, abnormal pattern, LOB, deviates 10-15 in   (0) Severe impairment: cannot perform w/o assist, LOB, deviates >15in  9. Ambulating Backwards = 2   (3) Normal: no A.D., no LOB, normal gait pattern, deviates <6in   (2) Mild impairment: uses A.D., slower speed, mild gait deviations, deviates 6-10 in   (1) Moderate impairment: slow speed, abnormal gait pattern, LOB, deviates 10-15 in   (0) Severe impairment: severe gait deviations or LOB,  deviates >15in  10. Steps = 2   (3) Normal: alternating feet, no rail   (2) Mild Impairment: alternating feet, uses rail   (1) Moderate impairment: step-to, uses rail   (0) Severe impairment: cannot perform safely    Score 20/30     Score:   <22/30 fall risk   <20/30 fall risk in older adults   <18/30 fall risk in Parkinsons        Modified Motion Sensitivity Test: (All conditions performed standing facing a non-busy environment)  Movement Intensity (0-10) Duration  <5s=0  5-10s=1  11-20s = 2  21-30s = 3  >30s = 4 Score   5x Horizontal head turns 0 0 0   2. 5x Vertical Head turns 3 0 3   3. 5x Right diagonal head turns (upper left down to right) 5 1 6   4. 5x Left diagonal head turns (upper right down to left) 4 0 4   5. 5x Trunk bends (bending knees reaching to floor) 2 0 2   6. 5x Right quarter body turns (Look over right shoulder with trunk rotation, feet planted) 5 2 7   7. 5x Left quarter body turns (Look over left shoulder with trunk rotation, feet planted) 6 2 8   8. 1x 360 degree turn to the right  3 0 3   9. 1x 360 degree turn to the left 4 1 5   10. 5x VOR cancellation (follow thumbs horizontally with head/trunk rotation x45 degrees each way) 5 0 5   Total score        mMSQ = Total score x (# of positions) / 14.00 = 43 x 9/14 = 27.6    Interpretation:   Mild: 0 - 10  Moderate: 11 - 30  Severe: 31 - 100    Postural control: MCTSIB: Evaluation 03/04/2024 (Average of 3 trials)   1. Eyes Open/feet together/Firm: 30 seconds   2. Eyes Closed/feet together/Firm:  30 seconds   3. Eyes Open/feet together/Foam:  30 seconds   4. Eyes Closed/feet together/Foam:  30 seconds   TOTAL 120/120       CMS Impairment/Limitation/Restriction for FOTO Vestibular Survey    Therapist reviewed FOTO scores for Jayla Loyd on 3/4/2024.   FOTO documents entered into [employer] - see Media section.    Functional Score: 52%  Category: Mobility       TREATMENT   No treatment provided today. All time spent on evaluation.     Home  Exercises and Patient Education Provided    Education provided: Examination findings, POC, scheduling, goals of therapy    Written Home Exercises Provided: No. To be established at initial follow up session.     Assessment   Jayla is a 73 y.o. female referred to outpatient Physical Therapy with a medical diagnosis of Benign paroxysmal positional vertigo, unspecified laterality. Patient presents with chief complaint(s) of: dizziness with head motion. Patient was screened for impairments of cervical range, oculomotor function, gaze stabilization, balance, and motion tolerance. Cervical range was normal, but did elicit some dizziness throughout. Oculomotor screen was abnormal; overshooting with vertical saccades is suggestive of central pathology.  Gaze stabilization screen was abnormal; Dynamic Visual Acuity line loss 5 lines indicates impaired gaze stabilization. She performed static balance testing well; just mild-moderate sway noted with mCTSIB. Her Functional Gait Assessment score of 20/30 indicates impaired dynamic balance and places her within the elevated fall risk range. Motion Sensitivity Quotient indicates Moderate range for motion sensitivity. Based on today's findings, Jayla is appropriate for vestibular PT to decrease dizziness for return to prior level of function.     Pt prognosis is Good.   Pt will benefit from skilled outpatient Physical Therapy to address the deficits stated above and in the chart below, provide pt/family education, and to maximize pt's level of independence.     Plan of care discussed with patient: Yes   Pt's spiritual, cultural and educational needs considered and patient is agreeable to the plan of care and goals as stated below:     Anticipated Barriers for therapy: Not currently driving    Medical Necessity is demonstrated by the following  History  Co-morbidities and personal factors that may impact the plan of care [] LOW: no personal factors / comorbidities  [] MODERATE:  1-2 personal factors / comorbidities  [x] HIGH: 3+ personal factors / comorbidities    Moderate / High Support Documentation:   Co-morbidities:   Allergies, Anxiety or Panic Disorders, Arthritis, BMI over 30, Depression, Diabetes Type I or II, Gastrointestinal Disease, Headaches        Examination  Body Structures and Functions, activity limitations and participation restrictions that may impact the plan of care [] LOW: addressing 1-2 elements  [x] MODERATE: 3+ elements  [] HIGH: 4+ elements (please support below)    Moderate / High Support Documentation:   Body Systems:    Vestibular    Participation Restrictions:   Driving    Activity limitations:     Mobility  walking     Clinical Presentation [] LOW: stable  [x] MODERATE: Evolving  [] HIGH: Unstable     Decision Making/ Complexity Score: moderate       Goals:  Short term goals (STG): 4 weeks  Long Term Goals (LTG), 8 weeks.   Pt agrees to goals set. Eval date: 2024     Status   LTG: Patient will be independent with HEP emphasizing gaze stabilization.  Education required Ongoing   STG: Patient will exhibit improved Dizziness Handicap Inventory to 38 indicating decreased self-perceived disability related to dizziness.  LT  48 - Moderate Ongoing   STG: Patient will exhibit </= 3 line loss with Dynamic Visual Acuity testing, indicating improved gaze stabilization  LT 5 line loss Ongoing   STG: Patient will exhibit improved Functional Gait Assessment score to >/= 22 /30 indicating improved dynamic balance for increased safety ambulatory tasks  LT/30 20/30 Ongoing   LTG: Patient will exhibit Motion Sensitivity Test within the Mild range, indicating improved motion tolerance. 27.6 - Moderate Ongoing       Plan   Plan of care Certification: 3/4/2024 to 2024.    Outpatient Physical Therapy 2 times weekly for 8 weeks to include the following interventions: Neuromuscular Re-ed, Patient Education, Self Care, Therapeutic Activities, Therapeutic  Exercise, Sensory Integration, and Canalith Repositioning Maneuvers.     Siobhan Flores, PT

## 2024-03-05 ENCOUNTER — PATIENT MESSAGE (OUTPATIENT)
Dept: HEMATOLOGY/ONCOLOGY | Facility: CLINIC | Age: 74
End: 2024-03-05
Payer: MEDICARE

## 2024-03-05 ENCOUNTER — TELEPHONE (OUTPATIENT)
Dept: HEMATOLOGY/ONCOLOGY | Facility: CLINIC | Age: 74
End: 2024-03-05
Payer: MEDICARE

## 2024-03-05 ENCOUNTER — PATIENT MESSAGE (OUTPATIENT)
Dept: PRIMARY CARE CLINIC | Facility: CLINIC | Age: 74
End: 2024-03-05
Payer: MEDICARE

## 2024-03-05 RX ORDER — SODIUM CHLORIDE 0.9 % (FLUSH) 0.9 %
10 SYRINGE (ML) INJECTION
Status: CANCELLED | OUTPATIENT
Start: 2024-04-05

## 2024-03-05 RX ORDER — EPINEPHRINE 0.3 MG/.3ML
0.3 INJECTION SUBCUTANEOUS ONCE AS NEEDED
Status: CANCELLED | OUTPATIENT
Start: 2024-03-22

## 2024-03-05 RX ORDER — HEPARIN 100 UNIT/ML
500 SYRINGE INTRAVENOUS
Status: CANCELLED | OUTPATIENT
Start: 2024-04-05

## 2024-03-05 RX ORDER — DIPHENHYDRAMINE HYDROCHLORIDE 50 MG/ML
50 INJECTION INTRAMUSCULAR; INTRAVENOUS ONCE AS NEEDED
Status: CANCELLED | OUTPATIENT
Start: 2024-04-05

## 2024-03-05 RX ORDER — DIPHENHYDRAMINE HYDROCHLORIDE 50 MG/ML
50 INJECTION INTRAMUSCULAR; INTRAVENOUS ONCE AS NEEDED
Status: CANCELLED | OUTPATIENT
Start: 2024-04-13

## 2024-03-05 RX ORDER — DIPHENHYDRAMINE HYDROCHLORIDE 50 MG/ML
50 INJECTION INTRAMUSCULAR; INTRAVENOUS ONCE AS NEEDED
Status: CANCELLED | OUTPATIENT
Start: 2024-03-22

## 2024-03-05 RX ORDER — SODIUM CHLORIDE 0.9 % (FLUSH) 0.9 %
10 SYRINGE (ML) INJECTION
Status: CANCELLED | OUTPATIENT
Start: 2024-03-07

## 2024-03-05 RX ORDER — HEPARIN 100 UNIT/ML
500 SYRINGE INTRAVENOUS
Status: CANCELLED | OUTPATIENT
Start: 2024-03-29

## 2024-03-05 RX ORDER — EPINEPHRINE 0.3 MG/.3ML
0.3 INJECTION SUBCUTANEOUS ONCE AS NEEDED
Status: CANCELLED | OUTPATIENT
Start: 2024-03-29

## 2024-03-05 RX ORDER — HEPARIN 100 UNIT/ML
500 SYRINGE INTRAVENOUS
Status: CANCELLED | OUTPATIENT
Start: 2024-03-07

## 2024-03-05 RX ORDER — SODIUM CHLORIDE 0.9 % (FLUSH) 0.9 %
10 SYRINGE (ML) INJECTION
Status: CANCELLED | OUTPATIENT
Start: 2024-03-22

## 2024-03-05 RX ORDER — DIPHENHYDRAMINE HYDROCHLORIDE 50 MG/ML
50 INJECTION INTRAMUSCULAR; INTRAVENOUS ONCE AS NEEDED
Status: CANCELLED | OUTPATIENT
Start: 2024-03-29

## 2024-03-05 RX ORDER — EPINEPHRINE 0.3 MG/.3ML
0.3 INJECTION SUBCUTANEOUS ONCE AS NEEDED
Status: CANCELLED | OUTPATIENT
Start: 2024-04-05

## 2024-03-05 RX ORDER — HEPARIN 100 UNIT/ML
500 SYRINGE INTRAVENOUS
Status: CANCELLED | OUTPATIENT
Start: 2024-03-22

## 2024-03-05 RX ORDER — EPINEPHRINE 0.3 MG/.3ML
0.3 INJECTION SUBCUTANEOUS ONCE AS NEEDED
Status: CANCELLED | OUTPATIENT
Start: 2024-03-07

## 2024-03-05 RX ORDER — SODIUM CHLORIDE 0.9 % (FLUSH) 0.9 %
10 SYRINGE (ML) INJECTION
Status: CANCELLED | OUTPATIENT
Start: 2024-04-13

## 2024-03-05 RX ORDER — SODIUM CHLORIDE 0.9 % (FLUSH) 0.9 %
10 SYRINGE (ML) INJECTION
Status: CANCELLED | OUTPATIENT
Start: 2024-03-29

## 2024-03-05 RX ORDER — EPINEPHRINE 0.3 MG/.3ML
0.3 INJECTION SUBCUTANEOUS ONCE AS NEEDED
Status: CANCELLED | OUTPATIENT
Start: 2024-04-13

## 2024-03-05 RX ORDER — HEPARIN 100 UNIT/ML
500 SYRINGE INTRAVENOUS
Status: CANCELLED | OUTPATIENT
Start: 2024-04-13

## 2024-03-05 RX ORDER — DIPHENHYDRAMINE HYDROCHLORIDE 50 MG/ML
50 INJECTION INTRAMUSCULAR; INTRAVENOUS ONCE AS NEEDED
Status: CANCELLED | OUTPATIENT
Start: 2024-03-07

## 2024-03-05 NOTE — TELEPHONE ENCOUNTER
Tc to pt to advise her that her orders were placed for [facility] [place] [place] She will contact them directly       Good afternoon  The above patient was seen By Dr Ceballos but she lives in [place]. She would like her iron infusions at [facility] [place] [place] please. I appreciate you assistance in scheduling her at your facility for her convenience  Thank you Janice Shaw RN

## 2024-03-06 ENCOUNTER — TELEPHONE (OUTPATIENT)
Dept: INFECTIOUS DISEASES | Facility: HOSPITAL | Age: 74
End: 2024-03-06
Payer: MEDICARE

## 2024-03-06 ENCOUNTER — PATIENT MESSAGE (OUTPATIENT)
Dept: HEMATOLOGY/ONCOLOGY | Facility: CLINIC | Age: 74
End: 2024-03-06
Payer: MEDICARE

## 2024-03-06 ENCOUNTER — PATIENT MESSAGE (OUTPATIENT)
Dept: CARDIOLOGY | Facility: CLINIC | Age: 74
End: 2024-03-06
Payer: MEDICARE

## 2024-03-06 ENCOUNTER — HOSPITAL ENCOUNTER (OUTPATIENT)
Dept: CARDIOLOGY | Facility: HOSPITAL | Age: 74
Discharge: HOME | End: 2024-03-06
Attending: INTERNAL MEDICINE
Payer: MEDICARE

## 2024-03-06 ENCOUNTER — PATIENT MESSAGE (OUTPATIENT)
Dept: PRIMARY CARE CLINIC | Facility: CLINIC | Age: 74
End: 2024-03-06
Payer: MEDICARE

## 2024-03-06 DIAGNOSIS — M79.604 RIGHT LEG PAIN: Primary | ICD-10-CM

## 2024-03-06 DIAGNOSIS — M79.604 RIGHT LEG PAIN: ICD-10-CM

## 2024-03-06 PROCEDURE — 93970 EXTREMITY STUDY: CPT | Mod: HCNC

## 2024-03-06 PROCEDURE — 93970 EXTREMITY STUDY: CPT | Mod: 26,HCNC,, | Performed by: INTERNAL MEDICINE

## 2024-03-06 NOTE — TELEPHONE ENCOUNTER
Patient has an oncology support plan written by Dr. Ceballos of [facility] that was referred to [facility] for administration.     I notified Dr. Ceballos that we would need the order changed to a therapy/treatment plan (as we are unable to use Oncology Support Plans). In the meantime, I changed the referral location to [facility] for administration as they can use the current oncology support plan.    Patient wants the infusion at [place], so the referral was changed to [facility] by the [facility].     If this patient needs/wants the infusion at [facility] ([facility]), please submit a therapy/treatment plan and refer to us.

## 2024-03-06 NOTE — TELEPHONE ENCOUNTER
Dr. Sifuentes spoke with Mrs. Loyd. An ultrasound was scheduled for today at [facility].

## 2024-03-08 LAB
HPV HR 12 DNA SPEC QL NAA+PROBE: NEGATIVE
HPV16 AG SPEC QL: NEGATIVE
HPV18 DNA SPEC QL NAA+PROBE: NEGATIVE

## 2024-03-11 ENCOUNTER — PATIENT MESSAGE (OUTPATIENT)
Dept: OBSTETRICS AND GYNECOLOGY | Facility: CLINIC | Age: 74
End: 2024-03-11
Payer: MEDICARE

## 2024-03-12 ENCOUNTER — PATIENT MESSAGE (OUTPATIENT)
Dept: HEMATOLOGY/ONCOLOGY | Facility: CLINIC | Age: 74
End: 2024-03-12
Payer: MEDICARE

## 2024-03-14 ENCOUNTER — PATIENT MESSAGE (OUTPATIENT)
Dept: PRIMARY CARE CLINIC | Facility: CLINIC | Age: 74
End: 2024-03-14
Payer: MEDICARE

## 2024-03-14 ENCOUNTER — PATIENT MESSAGE (OUTPATIENT)
Dept: HEMATOLOGY/ONCOLOGY | Facility: CLINIC | Age: 74
End: 2024-03-14
Payer: MEDICARE

## 2024-03-15 ENCOUNTER — TELEPHONE (OUTPATIENT)
Dept: HEMATOLOGY/ONCOLOGY | Facility: CLINIC | Age: 74
End: 2024-03-15
Payer: MEDICARE

## 2024-03-15 NOTE — TELEPHONE ENCOUNTER
Tc  from patient stating [facility] does not have the treatment and no auth  Advised patient that the auth is obtained by the center that is giving the treatment. Nurse checked w/ Zoila and care plan was transferred to [facility] on March 5 th  Site has been changed per patient multiple times  She stated [employer] stated they have never rec'd a request for an auth  Informed her that when infusion was scheduled on [facility] an auth was obtained but if she choose another location they will have to get auth She acknowledged understanding

## 2024-03-19 RX ORDER — ROSUVASTATIN CALCIUM 40 MG/1
TABLET, COATED ORAL
Qty: 90 TABLET | Refills: 1 | Status: SHIPPED | OUTPATIENT
Start: 2024-03-19

## 2024-03-19 NOTE — TELEPHONE ENCOUNTER
No care due was identified.  [facility] Embedded Care Due Messages. Reference number: [id number].   3/19/2024 4:35:20 PM CDT

## 2024-03-19 NOTE — TELEPHONE ENCOUNTER
Refill Decision Note   Jayla Rach  is requesting a refill authorization.  Brief Assessment and Rationale for Refill:  Approve     Medication Therapy Plan:         Comments:     Note composed:6:02 PM 03/19/2024

## 2024-03-20 ENCOUNTER — TELEPHONE (OUTPATIENT)
Dept: ADMINISTRATIVE | Facility: CLINIC | Age: 74
End: 2024-03-20
Payer: MEDICARE

## 2024-03-21 ENCOUNTER — PATIENT MESSAGE (OUTPATIENT)
Dept: PRIMARY CARE CLINIC | Facility: CLINIC | Age: 74
End: 2024-03-21
Payer: MEDICARE

## 2024-03-25 NOTE — TELEPHONE ENCOUNTER
No care due was identified.  [facility] Embedded Care Due Messages. Reference number: [id number].   3/25/2024 2:36:19 PM CDT

## 2024-03-26 ENCOUNTER — TELEPHONE (OUTPATIENT)
Dept: CARDIOLOGY | Facility: CLINIC | Age: 74
End: 2024-03-26
Payer: MEDICARE

## 2024-03-26 ENCOUNTER — PATIENT MESSAGE (OUTPATIENT)
Dept: PRIMARY CARE CLINIC | Facility: CLINIC | Age: 74
End: 2024-03-26
Payer: MEDICARE

## 2024-03-26 ENCOUNTER — PATIENT MESSAGE (OUTPATIENT)
Dept: CARDIOLOGY | Facility: CLINIC | Age: 74
End: 2024-03-26
Payer: MEDICARE

## 2024-03-26 RX ORDER — MECLIZINE HYDROCHLORIDE 25 MG/1
25 TABLET ORAL 2 TIMES DAILY PRN
Qty: 60 TABLET | Refills: 0 | Status: SHIPPED | OUTPATIENT
Start: 2024-03-26 | End: 2024-05-21 | Stop reason: SDUPTHER

## 2024-03-26 NOTE — TELEPHONE ENCOUNTER
----- Message from Everardo Garzon sent at 3/26/2024 11:12 AM CDT -----  Type: General Call Back     Name of Caller:pt   Reason shortness of breath, coughing   Would the patient rather a call back or a response via [facility]? Call   Best Call Back Number: [phone]  Additional Information:

## 2024-03-27 ENCOUNTER — OFFICE VISIT (OUTPATIENT)
Dept: PRIMARY CARE CLINIC | Facility: CLINIC | Age: 74
End: 2024-03-27
Payer: MEDICARE

## 2024-03-27 ENCOUNTER — TELEPHONE (OUTPATIENT)
Dept: CARDIAC REHAB | Facility: CLINIC | Age: 74
End: 2024-03-27
Payer: MEDICARE

## 2024-03-27 ENCOUNTER — PATIENT MESSAGE (OUTPATIENT)
Dept: PRIMARY CARE CLINIC | Facility: CLINIC | Age: 74
End: 2024-03-27

## 2024-03-27 VITALS
DIASTOLIC BLOOD PRESSURE: 72 MMHG | OXYGEN SATURATION: 97 % | SYSTOLIC BLOOD PRESSURE: 124 MMHG | HEIGHT: 60 IN | WEIGHT: 154.75 LBS | HEART RATE: 92 BPM | BODY MASS INDEX: 30.38 KG/M2

## 2024-03-27 DIAGNOSIS — R06.02 SHORTNESS OF BREATH: ICD-10-CM

## 2024-03-27 DIAGNOSIS — D50.9 IRON DEFICIENCY ANEMIA, UNSPECIFIED IRON DEFICIENCY ANEMIA TYPE: ICD-10-CM

## 2024-03-27 DIAGNOSIS — E11.69 TYPE 2 DIABETES MELLITUS WITH OTHER SPECIFIED COMPLICATION, WITHOUT LONG-TERM CURRENT USE OF INSULIN: ICD-10-CM

## 2024-03-27 DIAGNOSIS — I26.93 SINGLE SUBSEGMENTAL PULMONARY EMBOLISM WITHOUT ACUTE COR PULMONALE: Primary | ICD-10-CM

## 2024-03-27 PROCEDURE — 99999 PR PBB SHADOW E&M-EST. PATIENT-LVL IV: CPT | Mod: PBBFAC,,, | Performed by: INTERNAL MEDICINE

## 2024-03-27 PROCEDURE — 3008F BODY MASS INDEX DOCD: CPT | Mod: CPTII,S$GLB,, | Performed by: INTERNAL MEDICINE

## 2024-03-27 PROCEDURE — 99214 OFFICE O/P EST MOD 30 MIN: CPT | Mod: S$GLB,,, | Performed by: INTERNAL MEDICINE

## 2024-03-27 PROCEDURE — 3074F SYST BP LT 130 MM HG: CPT | Mod: CPTII,S$GLB,, | Performed by: INTERNAL MEDICINE

## 2024-03-27 PROCEDURE — 3078F DIAST BP <80 MM HG: CPT | Mod: CPTII,S$GLB,, | Performed by: INTERNAL MEDICINE

## 2024-03-27 PROCEDURE — 3051F HG A1C>EQUAL 7.0%<8.0%: CPT | Mod: CPTII,S$GLB,, | Performed by: INTERNAL MEDICINE

## 2024-03-27 PROCEDURE — 1159F MED LIST DOCD IN RCRD: CPT | Mod: CPTII,S$GLB,, | Performed by: INTERNAL MEDICINE

## 2024-03-27 RX ORDER — TIRZEPATIDE 2.5 MG/.5ML
2.5 INJECTION, SOLUTION SUBCUTANEOUS
Qty: 4 PEN | Refills: 1 | Status: SHIPPED | OUTPATIENT
Start: 2024-03-27 | End: 2024-05-14 | Stop reason: SDUPTHER

## 2024-03-27 NOTE — PROGRESS NOTES
[facility] Note    Chief Complaint      Chief Complaint   Patient presents with    Breathing Problem     History of Present Illness      Jayla Loyd is a 73 y.o. female who presents today for chief complaint shortness of breath.     HPI   Woke up yesterday morning with dry cough and feeling of trying to take a deep breath to catch her breath, this was at rest. She did not have exertional dyspnea  She did walk on the treadmill Monday without effort  Recent new dx PE/DVT post covid:  LLE venous ultrasound on 2/1/2024 revealed extensive occlusive DVT of the left distal SFA, popliteal, and posterior tibial veins.  CTA Chest on 2/1/2024 revealed right lower lobe pulmonary arterial emboli   She had echo during admission which was negative. Started eliquis. Had negative hypercoagulable work up but did have anemia - occult blood was ordered   She is undergoing iron infusions for DEE, she had a flu like reaction last week   Active Problem List with Overview Notes    Diagnosis Date Noted    Acute deep vein thrombosis (DVT) of femoral vein of left lower extremity 02/06/2024    Acute pulmonary embolism with acute cor pulmonale 02/06/2024    Single subsegmental pulmonary embolism without acute cor pulmonale 02/02/2024    Acute deep vein thrombosis (DVT) of proximal vein of lower extremity 02/01/2024    Abnormal CT of the chest 03/29/2023    Lung nodules 03/29/2023    Aortic arch atherosclerosis 03/29/2023     Mild calcification of aortic arch and cusps seen on CT         Overweight (BMI 25.0-29.9) 03/29/2023    Type 2 diabetes mellitus without complication, without long-term current use of insulin 03/29/2023    DM type 2 without retinopathy 03/29/2023     Per pt      Osteoarthritis of both shoulders 02/03/2023    Peripheral vascular disease 08/25/2022    Acute ankle pain 05/31/2022    Night sweats 05/25/2022    Cox's esophagus 02/16/2022    Chronic cough 04/13/2021    Tremor 02/15/2021    Sleep  disturbance 02/15/2021    Venous insufficiency 02/15/2021    Dizziness 01/19/2021    Subclinical hyperthyroidism 12/17/2020    HLD (hyperlipidemia) 07/22/2020     On crestor       Arthritis of left shoulder region 07/16/2020     seeing [place] at [facility] pending shoulder MRI       Anxiety 07/16/2020    Type 2 diabetes mellitus with other specified complication, without long-term current use of insulin      Sees dr butt had recent a1c, he also does foot exam      Allergic rhinitis due to pollen 07/08/2020    Hematuria, unspecified 07/08/2020    Iron deficiency anemia, unspecified 06/30/2019    Hyperphosphatemia 08/19/2018    HTN (hypertension) 08/19/2018     At goal on lasix and micardis, no chest pain or shortness of breath       Proteinuria, unspecified 08/19/2018     Seeing dr hanson       Gastroesophageal reflux disease 07/16/2014    Adenomatous polyp of colon 07/16/2014    Chronic mixed headache syndrome 07/16/2014     Has chronic migraines, uses fioricet   Tried amovig, has not tried triptan - not interested  Sees [facility]      Type 2 diabetes mellitus with hypercholesterolemia 08/01/2012       Health Maintenance   Topic Date Due    TETANUS VACCINE  02/01/2008    Foot Exam  09/03/2021    DEXA Scan  08/11/2023    Lipid Panel  08/01/2024    Hemoglobin A1c  08/01/2024    Mammogram  12/11/2024    Eye Exam  01/04/2025    High Dose Statin  03/19/2025    Colorectal Cancer Screening  02/08/2027    Hepatitis C Screening  Completed    Shingles Vaccine  Discontinued       Past Medical History:   Diagnosis Date    Carpal tunnel syndrome, right upper limb 06/23/2022    Diabetes mellitus type II     GERD (gastroesophageal reflux disease)     Hx of multiple pulmonary nodules 06/15/2022    Migraines     Proteinuria     Pulmonary embolus 2024    Right lung with concurrent DVT left leg    Trigger finger, right ring finger 06/23/2022       Past Surgical History:   Procedure Laterality Date    CATARACT EXTRACTION       COSMETIC SURGERY  1971    rhinoplasty    EYE SURGERY  3 years ago    cataracts    JOINT REPLACEMENT  Twice in last 24 months    SHOULDER SURGERY Left 09/2020    TUBAL LIGATION  1992       family history includes Arthritis in her father and paternal grandmother; Bladder Cancer in her mother; Breast cancer in her maternal aunt and maternal grandmother; Cancer in her maternal aunt, maternal grandmother, mother, paternal aunt, and paternal grandmother; Dementia in her paternal grandmother; Diabetes in her paternal aunt; Heart disease in her father, maternal grandfather, mother, and paternal grandfather; Heart failure in her father; Hypertension in her father and mother; No Known Problems in her son; Stroke in her brother.    Social History     Tobacco Use    Smoking status: Never     Passive exposure: Never    Smokeless tobacco: Never   Substance Use Topics    Alcohol use: Yes     Comment: None    Drug use: Not Currently     Types: Other-see comments     Comment: None       Review of Systems   Constitutional:  Negative for chills, fever, malaise/fatigue and weight loss.   Respiratory:  Negative for cough, sputum production, shortness of breath and wheezing.    Cardiovascular:  Negative for chest pain, palpitations, orthopnea and leg swelling.   Gastrointestinal:  Negative for constipation, diarrhea, nausea and vomiting.   Genitourinary:  Negative for dysuria, frequency, hematuria and urgency.        Outpatient Encounter Medications as of 3/27/2024   Medication Sig Note Dispense Refill    ALPRAZolam (XANAX) 0.5 MG tablet Take 1 tablet (0.5 mg total) by mouth 2 (two) times daily as needed for Anxiety.  60 tablet 0    amitriptyline (ELAVIL) 25 MG tablet Take 1 tablet (25 mg total) by mouth nightly as needed for Insomnia.  90 tablet 1    apixaban (ELIQUIS) 5 mg Tab Take 1 tablet (5 mg total) by mouth 2 (two) times daily.  60 tablet 11    butalbital-acetaminophen-caffeine -40 mg (FIORICET, ESGIC) -40 mg per  tablet Take 1 tablet by mouth every 4 (four) hours as needed for Pain.  60 tablet 1    coenzyme Q10 100 mg capsule Take 100 mg by mouth once daily. 2/5/2024: PRN      esomeprazole (NEXIUM) 40 MG capsule Take 1 capsule (40 mg total) by mouth 2 (two) times daily before meals.  180 capsule 3    Lactobac no.41/Bifidobact no.7 (PROBIOTIC-10 ORAL) Take by mouth once daily. 2/5/2024: Every other day      meclizine (ANTIVERT) 25 mg tablet Take 1 tablet (25 mg total) by mouth 2 (two) times daily as needed.  60 tablet 0    methocarbamoL (ROBAXIN) 500 MG Tab TAKE 1 TABLET (500 MG TOTAL) BY MOUTH 3 (THREE) TIMES DAILY AS NEEDED (PAIN).  60 tablet 1    nystatin (MYCOSTATIN) powder Apply topically 4 (four) times daily.  60 g 0    ondansetron (ZOFRAN) 8 MG tablet Take 1 tablet (8 mg total) by mouth every 8 (eight) hours as needed for Nausea.  20 tablet 1    promethazine (PHENERGAN) 12.5 MG Tab        rosuvastatin (CRESTOR) 40 MG Tab TAKE 1 TABLET BY MOUTH ONCE DAILY  90 tablet 1    sertraline (ZOLOFT) 25 MG tablet Take 1 tablet (25 mg total) by mouth once daily.  90 tablet 3    telmisartan (MICARDIS) 20 MG Tab TAKE 1 TABLET BY MOUTH EVERY DAY 2/5/2024: 0.5 tablet daily 90 tablet 1    traMADoL (ULTRAM) 50 mg tablet Take 1 tablet (50 mg total) by mouth every 6 (six) hours as needed for Pain.  21 tablet 0    [DISCONTINUED] semaglutide (OZEMPIC) 0.25 mg or 0.5 mg (2 mg/3 mL) pen injector Inject 0.25 mg into the skin every 7 days.  12 each 1    tirzepatide (MOUNJARO) 2.5 mg/0.5 mL PnIj Inject 2.5 mg into the skin every 7 days.  4 Pen 1     No facility-administered encounter medications on file as of 3/27/2024.        Review of patient's allergies indicates:   Allergen Reactions    Gabapentin Hallucinations    Lyrica [pregabalin] Hallucinations    Mobic [meloxicam] Itching           Physical Exam      Vital Signs  Pulse: 92  SpO2: 97 %  BP: 124/72  BP Location: Left arm  Patient Position: Sitting  Height and Weight  Height: 5' (152.4  cm)  Weight: 70.2 kg (154 lb 12.2 oz)  BSA (Calculated - sq m): 1.72 sq meters  BMI (Calculated): 30.2  Weight in (lb) to have BMI = 25: 127.7]    Physical Exam  Vitals reviewed.   Constitutional:       General: She is not in acute distress.     Appearance: She is well-developed. She is not diaphoretic.   HENT:      Head: Normocephalic and atraumatic.      Right Ear: External ear normal.      Left Ear: External ear normal.      Nose: Nose normal.   Eyes:      General:         Right eye: No discharge.         Left eye: No discharge.      Conjunctiva/sclera: Conjunctivae normal.   Cardiovascular:      Rate and Rhythm: Normal rate and regular rhythm.      Heart sounds: Normal heart sounds.   Pulmonary:      Effort: Pulmonary effort is normal. No respiratory distress.   Musculoskeletal:         General: Normal range of motion.      Cervical back: Normal range of motion.   Skin:     Coloration: Skin is not pale.      Findings: No rash.   Neurological:      Mental Status: She is alert and oriented to person, place, and time.   Psychiatric:         Behavior: Behavior normal.         Thought Content: Thought content normal.          Laboratory:  CBC:  Recent Labs   Lab Result Units 02/01/24 1923 02/01/24 1934 02/02/24  0307   WBC K/uL 11.55  --  10.91  10.91   RBC M/uL 4.17  --  3.92*  3.92*   Hemoglobin g/dL 10.1*  --  9.3*  9.3*   POC Hematocrit   --    < >  --    Hematocrit % 32.8*  --  30.4*  30.4*   Platelets K/uL 353  --  315  315   MCV fL 79*  --  78*  78*   MCH pg 24.2*  --  23.7*  23.7*   MCHC g/dL 30.8*  --  30.6*  30.6*    < > = values in this interval not displayed.     CMP:  Recent Labs   Lab Result Units 02/01/24 1938 02/02/24  0307   Glucose mg/dL 109 144*   Calcium mg/dL 9.5 9.7   Albumin g/dL 3.3* 3.0*   Total Protein g/dL 7.6 6.8   Sodium mmol/L 140 138   Potassium mmol/L 4.2 4.2   CO2 mmol/L 28 26   Chloride mmol/L 104 103   BUN mg/dL 11 10   Alkaline Phosphatase U/L 148* 134   ALT U/L 11 10  "  AST U/L 12 14   Total Bilirubin mg/dL 0.2 0.2     URINALYSIS:  No results for input(s): "COLORU", "CLARITYU", "SPECGRAV", "PHUR", "PROTEINUA", "GLUCOSEU", "BILIRUBINCON", "BLOODU", "WBCU", "RBCU", "BACTERIA", "MUCUS", "NITRITE", "LEUKOCYTESUR", "UROBILINOGEN", "HYALINECASTS" in the last 2160 hours.   LIPIDS:  No results for input(s): "TSH", "HDL", "CHOL", "TRIG", "LDLCALC", "CHOLHDL", "NONHDLCHOL", "TOTALCHOLEST" in the last 2160 hours.  TSH:  No results for input(s): "TSH" in the last 2160 hours.  A1C:  Recent Labs   Lab Result Units 02/01/24 2137   Hemoglobin A1C % 7.0*         Assessment/Plan     Jayla Loyd is a 73 y.o.female with:    1. Single subsegmental pulmonary embolism without acute cor pulmonale  -     Ambulatory referral/consult to Cardiac Rehab; Future; Expected date: 04/03/2024    2. Shortness of breath  Reassurance    3. Iron deficiency anemia, unspecified iron deficiency anemia type  Assessment & Plan:  Occult blood supplies given today continue IV Fe      4. Type 2 diabetes mellitus with other specified complication, without long-term current use of insulin  Overview:  Sees dr butt had recent a1c, he also does foot exam    Orders:  -     tirzepatide (MOUNJARO) 2.5 mg/0.5 mL PnIj; Inject 2.5 mg into the skin every 7 days.  Dispense: 4 Pen; Refill: 1            Future Appointments   Date Time Provider Department Center   3/27/2024  1:00 PM CHAIR 05, [facility] [place]   4/2/2024  1:45 PM Siobhan Flores, PT [facility]   4/4/2024  1:00 PM Siobhan Flores, PT [facility]   4/9/2024  1:45 PM Siobhan Flores, PT [facility]   4/11/2024  1:00 PM Siobhan Flores, PT [facility]   4/16/2024  1:00 PM Siobhan Flores, PT [facility]   4/18/2024  1:00 PM Siobhan Flores, PT [facility]   4/23/2024  1:00 PM Siobhan Flores, PT [facility]   4/25/2024  1:00 PM Siobhan Flores, " PT [facility]   5/6/2024  2:30 PM [facility] OI-CT1 500 LB LIMIT [facility]   5/8/2024  2:00 PM VASCULAR, [place] [facility]   5/15/2024  2:00 PM Edmond Sifuentes MD PhD [facility] CARDIO [facility]   5/20/2024 11:20 AM Geena Barnard MD [facility]       Geena Barnard MD  3/27/2024 10:08 AM    Primary Care Internal Medicine

## 2024-03-27 NOTE — TELEPHONE ENCOUNTER
Order received for phase II cardiac rehab. Patient does not meet criteria. Message send to ordering provider.    LU Moser RN  Cardiac Rehab Nurse

## 2024-04-04 ENCOUNTER — PATIENT MESSAGE (OUTPATIENT)
Dept: PRIMARY CARE CLINIC | Facility: CLINIC | Age: 74
End: 2024-04-04
Payer: MEDICARE

## 2024-04-09 ENCOUNTER — PATIENT MESSAGE (OUTPATIENT)
Dept: PRIMARY CARE CLINIC | Facility: CLINIC | Age: 74
End: 2024-04-09
Payer: MEDICARE

## 2024-04-09 DIAGNOSIS — K21.9 GASTROESOPHAGEAL REFLUX DISEASE: ICD-10-CM

## 2024-04-09 DIAGNOSIS — M79.673 PAIN OF FOOT, UNSPECIFIED LATERALITY: Primary | ICD-10-CM

## 2024-04-09 NOTE — TELEPHONE ENCOUNTER
No care due was identified.  [facility] Embedded Care Due Messages. Reference number: [id number].   4/09/2024 3:39:30 PM CDT

## 2024-04-10 RX ORDER — ESOMEPRAZOLE MAGNESIUM 40 MG/1
40 CAPSULE, DELAYED RELEASE ORAL
Qty: 180 CAPSULE | Refills: 2 | Status: SHIPPED | OUTPATIENT
Start: 2024-04-10

## 2024-04-10 NOTE — TELEPHONE ENCOUNTER
Refill Routing Note   Medication(s) are not appropriate for processing by [facility] for the following reason(s):        Outside of protocol    [facility] action(s):  Route        Medication Therapy Plan: Total daily dose outside of [facility] protocol      Appointments  past 12m or future 3m with PCP    Date Provider   Last Visit   3/27/2024 Geena Barnard MD   Next Visit   5/20/2024 Geena Barnard MD   ED visits in past 90 days: 0        Note composed:12:28 PM 04/10/2024

## 2024-04-15 ENCOUNTER — OFFICE VISIT (OUTPATIENT)
Dept: ORTHOPEDICS | Facility: CLINIC | Age: 74
End: 2024-04-15
Payer: MEDICARE

## 2024-04-15 ENCOUNTER — HOSPITAL ENCOUNTER (OUTPATIENT)
Dept: RADIOLOGY | Facility: HOSPITAL | Age: 74
Discharge: HOME | End: 2024-04-15
Attending: ORTHOPAEDIC SURGERY
Payer: MEDICARE

## 2024-04-15 VITALS — WEIGHT: 156.06 LBS | BODY MASS INDEX: 30.64 KG/M2 | HEIGHT: 60 IN

## 2024-04-15 DIAGNOSIS — M19.072 OSTEOARTHRITIS OF MIDFOOT, LEFT: Primary | ICD-10-CM

## 2024-04-15 DIAGNOSIS — M19.212 OTHER SECONDARY OSTEOARTHRITIS OF BOTH SHOULDERS: ICD-10-CM

## 2024-04-15 DIAGNOSIS — M19.211 OTHER SECONDARY OSTEOARTHRITIS OF BOTH SHOULDERS: ICD-10-CM

## 2024-04-15 DIAGNOSIS — M76.71 PERONEAL TENDINITIS, RIGHT: ICD-10-CM

## 2024-04-15 DIAGNOSIS — M79.673 PAIN OF FOOT, UNSPECIFIED LATERALITY: ICD-10-CM

## 2024-04-15 PROCEDURE — 3008F BODY MASS INDEX DOCD: CPT | Mod: CPTII,S$GLB,, | Performed by: ORTHOPAEDIC SURGERY

## 2024-04-15 PROCEDURE — 73630 X-RAY EXAM OF FOOT: CPT | Mod: 26,50,, | Performed by: RADIOLOGY

## 2024-04-15 PROCEDURE — 1101F PT FALLS ASSESS-DOCD LE1/YR: CPT | Mod: CPTII,S$GLB,, | Performed by: ORTHOPAEDIC SURGERY

## 2024-04-15 PROCEDURE — 3051F HG A1C>EQUAL 7.0%<8.0%: CPT | Mod: CPTII,S$GLB,, | Performed by: ORTHOPAEDIC SURGERY

## 2024-04-15 PROCEDURE — 99999 PR PBB SHADOW E&M-EST. PATIENT-LVL V: CPT | Mod: PBBFAC,,, | Performed by: ORTHOPAEDIC SURGERY

## 2024-04-15 PROCEDURE — 1125F AMNT PAIN NOTED PAIN PRSNT: CPT | Mod: CPTII,S$GLB,, | Performed by: ORTHOPAEDIC SURGERY

## 2024-04-15 PROCEDURE — 1159F MED LIST DOCD IN RCRD: CPT | Mod: CPTII,S$GLB,, | Performed by: ORTHOPAEDIC SURGERY

## 2024-04-15 PROCEDURE — 73630 X-RAY EXAM OF FOOT: CPT | Mod: TC,50

## 2024-04-15 PROCEDURE — 99213 OFFICE O/P EST LOW 20 MIN: CPT | Mod: S$GLB,,, | Performed by: ORTHOPAEDIC SURGERY

## 2024-04-15 PROCEDURE — 3288F FALL RISK ASSESSMENT DOCD: CPT | Mod: CPTII,S$GLB,, | Performed by: ORTHOPAEDIC SURGERY

## 2024-04-15 PROCEDURE — 1160F RVW MEDS BY RX/DR IN RCRD: CPT | Mod: CPTII,S$GLB,, | Performed by: ORTHOPAEDIC SURGERY

## 2024-04-15 RX ORDER — TRAMADOL HYDROCHLORIDE 50 MG/1
50 TABLET ORAL EVERY 6 HOURS PRN
Qty: 21 TABLET | Refills: 0 | Status: SHIPPED | OUTPATIENT
Start: 2024-04-15 | End: 2024-05-02 | Stop reason: SDUPTHER

## 2024-04-15 RX ORDER — METHYLPREDNISOLONE 4 MG/1
TABLET ORAL
Qty: 1 EACH | Refills: 0 | Status: SHIPPED | OUTPATIENT
Start: 2024-04-15 | End: 2024-05-09 | Stop reason: ALTCHOICE

## 2024-04-15 NOTE — TELEPHONE ENCOUNTER
No care due was identified.  Health [facility] Embedded Care Due Messages. Reference number: [id number].   4/15/2024 11:49:04 AM CDT

## 2024-04-15 NOTE — PROGRESS NOTES
Jayla Loyd  Returns today for follow-up.  This is a 72-year-old female who I initially saw on 10/19/2023 for bilateral midfoot arthritis and has been getting some relief with IR guided injections of her left midfoot.  She comes in today because she developed some pain on the lateral aspect of her right foot.  She points to the area around the base of the 5th metatarsal with pain extending up around the back of her ankle.  She does not report any history of injury or change in activity.  She also reports that she feels like she is ready to have further injections of her left midfoot.    Examination: She walks in today with a slight antalgic gait with unsupportive shoes.  On standing inspection she has plantigrade alignment of her feet.  There is some prominences bilaterally in the midfoot of both feet.  There is no significant prominence on the lateral aspect of her right foot but she says it was prominent recently.  On sitting exam she has full motion of her ankle and subtalar joints bilaterally.  On the right side there is some tenderness over the base of the 5th metatarsal as well as over the distal peroneus brevis tendon.  She does have pain on resistance to eversion of her right hindfoot.  On the left side she has tenderness in the midfoot over the 2nd and 3rd tarsometatarsal joints as previous.    Imaging:  I ordered and reviewed standing x-rays of both feet today and compared them to x-rays from two years ago.  There has been no interval change over the last two years so her feet appear to be stable.  She is continued arthritic changes of the midfoot.  There are no abnormalities around the base of the right 5th metatarsal.    Impression:  1. 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 Interventional RAD    IR Aspiration Injection Small Joint W FL      2. Peroneal tendinitis, right  Ambulatory referral/consult to Orthopedics    X-Ray Foot Complete Bilateral     methylPREDNISolone (MEDROL, RACHAEL,) 4 mg tablet        Recommendation:  I am going to send her back to Interventional Radiology to have repeat injections of her left midfoot.  I suspect she has some peroneal tendinitis on the right side.  I am going to prescribe a Medrol Dosepak.  I believe this should resolve with time.  We discussed the possibility of therapy but she would prefer to hold off at this point.    Follow-up as needed

## 2024-04-16 ENCOUNTER — PATIENT MESSAGE (OUTPATIENT)
Dept: ORTHOPEDICS | Facility: CLINIC | Age: 74
End: 2024-04-16
Payer: MEDICARE

## 2024-04-17 DIAGNOSIS — M79.673 PAIN OF FOOT, UNSPECIFIED LATERALITY: ICD-10-CM

## 2024-04-17 DIAGNOSIS — M76.71 PERONEAL TENDINITIS, RIGHT: Primary | ICD-10-CM

## 2024-04-17 DIAGNOSIS — M19.072 OSTEOARTHRITIS OF MIDFOOT, LEFT: ICD-10-CM

## 2024-04-17 RX ORDER — TRAMADOL HYDROCHLORIDE 50 MG/1
50 TABLET ORAL EVERY 6 HOURS PRN
Qty: 28 TABLET | Refills: 0 | Status: SHIPPED | OUTPATIENT
Start: 2024-04-17 | End: 2024-04-27

## 2024-04-18 ENCOUNTER — PATIENT MESSAGE (OUTPATIENT)
Dept: CARDIOLOGY | Facility: CLINIC | Age: 74
End: 2024-04-18
Payer: MEDICARE

## 2024-04-19 ENCOUNTER — TELEPHONE (OUTPATIENT)
Dept: INTERVENTIONAL RADIOLOGY/VASCULAR | Facility: CLINIC | Age: 74
End: 2024-04-19
Payer: MEDICARE

## 2024-04-19 NOTE — TELEPHONE ENCOUNTER
Pt RTC, Spoke to pt on phone, Pt is scheduled on 4/30/2024 with arrival time of 130pm for IR procedure at  location. Pt aware and confirmed, Thanks

## 2024-04-19 NOTE — TELEPHONE ENCOUNTER
Unable to leave message for pt to return my call. Need to schedule IR procedure.  Please forward call to [phone]. Thanks

## 2024-04-21 ENCOUNTER — PATIENT MESSAGE (OUTPATIENT)
Dept: PRIMARY CARE CLINIC | Facility: CLINIC | Age: 74
End: 2024-04-21
Payer: MEDICARE

## 2024-04-21 ENCOUNTER — PATIENT MESSAGE (OUTPATIENT)
Dept: CARDIOLOGY | Facility: CLINIC | Age: 74
End: 2024-04-21
Payer: MEDICARE

## 2024-04-22 ENCOUNTER — PATIENT MESSAGE (OUTPATIENT)
Dept: PRIMARY CARE CLINIC | Facility: CLINIC | Age: 74
End: 2024-04-22
Payer: MEDICARE

## 2024-04-24 RX ORDER — DICYCLOMINE HYDROCHLORIDE 10 MG/1
10 CAPSULE ORAL 4 TIMES DAILY
COMMUNITY
End: 2024-04-25 | Stop reason: SDUPTHER

## 2024-04-24 RX ORDER — DICYCLOMINE HYDROCHLORIDE 10 MG/1
10 CAPSULE ORAL 4 TIMES DAILY
Qty: 120 CAPSULE | Refills: 1 | Status: CANCELLED | OUTPATIENT
Start: 2024-04-24

## 2024-04-24 NOTE — TELEPHONE ENCOUNTER
No care due was identified.  [facility] Embedded Care Due Messages. Reference number: [id number].   4/24/2024 8:42:16 AM CDT

## 2024-04-25 ENCOUNTER — PATIENT MESSAGE (OUTPATIENT)
Dept: PRIMARY CARE CLINIC | Facility: CLINIC | Age: 74
End: 2024-04-25
Payer: MEDICARE

## 2024-04-25 RX ORDER — DICYCLOMINE HYDROCHLORIDE 10 MG/1
10 CAPSULE ORAL 4 TIMES DAILY
Qty: 120 CAPSULE | Refills: 1 | Status: SHIPPED | OUTPATIENT
Start: 2024-04-25

## 2024-04-25 RX ORDER — DICYCLOMINE HYDROCHLORIDE 10 MG/1
CAPSULE ORAL
Qty: 120 CAPSULE | Refills: 0 | OUTPATIENT
Start: 2024-04-25

## 2024-04-25 RX ORDER — DICYCLOMINE HYDROCHLORIDE 10 MG/1
10 CAPSULE ORAL 4 TIMES DAILY
Qty: 60 CAPSULE | Refills: 1 | Status: SHIPPED | OUTPATIENT
Start: 2024-04-25 | End: 2024-04-25 | Stop reason: SDUPTHER

## 2024-04-25 NOTE — TELEPHONE ENCOUNTER
No care due was identified.  Health [facility] Embedded Care Due Messages. Reference number: [id number].   4/25/2024 1:31:09 PM CDT

## 2024-04-25 NOTE — TELEPHONE ENCOUNTER
No care due was identified.  Health [facility] Embedded Care Due Messages. Reference number: [id number].   4/25/2024 12:04:28 PM CDT

## 2024-04-29 ENCOUNTER — PATIENT MESSAGE (OUTPATIENT)
Dept: PRIMARY CARE CLINIC | Facility: CLINIC | Age: 74
End: 2024-04-29
Payer: MEDICARE

## 2024-04-30 ENCOUNTER — HOSPITAL ENCOUNTER (OUTPATIENT)
Dept: INTERVENTIONAL RADIOLOGY/VASCULAR | Facility: HOSPITAL | Age: 74
Discharge: HOME | End: 2024-04-30
Attending: ORTHOPAEDIC SURGERY
Payer: MEDICARE

## 2024-04-30 VITALS
HEART RATE: 68 BPM | DIASTOLIC BLOOD PRESSURE: 66 MMHG | OXYGEN SATURATION: 99 % | RESPIRATION RATE: 20 BRPM | SYSTOLIC BLOOD PRESSURE: 124 MMHG

## 2024-04-30 DIAGNOSIS — M19.072 OSTEOARTHRITIS OF MIDFOOT, LEFT: ICD-10-CM

## 2024-04-30 DIAGNOSIS — F41.9 ANXIETY: ICD-10-CM

## 2024-04-30 PROCEDURE — 63600175 PHARM REV CODE 636 W HCPCS: Mod: JZ,JG | Performed by: RADIOLOGY

## 2024-04-30 PROCEDURE — 20600 DRAIN/INJ JOINT/BURSA W/O US: CPT | Mod: HCNC,LT | Performed by: RADIOLOGY

## 2024-04-30 PROCEDURE — 77002 NEEDLE LOCALIZATION BY XRAY: CPT | Mod: 26,HCNC,, | Performed by: RADIOLOGY

## 2024-04-30 PROCEDURE — 77002 NEEDLE LOCALIZATION BY XRAY: CPT | Mod: TC,HCNC | Performed by: RADIOLOGY

## 2024-04-30 PROCEDURE — 25000003 PHARM REV CODE 250: Performed by: RADIOLOGY

## 2024-04-30 PROCEDURE — 27200940 IR ASPIRATION INJECTION SMALL JOINT W FLUORO

## 2024-04-30 RX ORDER — BUPIVACAINE HYDROCHLORIDE 2.5 MG/ML
INJECTION, SOLUTION EPIDURAL; INFILTRATION; INTRACAUDAL
Status: COMPLETED | OUTPATIENT
Start: 2024-04-30 | End: 2024-04-30

## 2024-04-30 RX ORDER — METHYLPREDNISOLONE ACETATE 40 MG/ML
INJECTION, SUSPENSION INTRA-ARTICULAR; INTRALESIONAL; INTRAMUSCULAR; SOFT TISSUE
Status: COMPLETED | OUTPATIENT
Start: 2024-04-30 | End: 2024-04-30

## 2024-04-30 RX ORDER — LIDOCAINE HYDROCHLORIDE 10 MG/ML
INJECTION INFILTRATION; PERINEURAL
Status: COMPLETED | OUTPATIENT
Start: 2024-04-30 | End: 2024-04-30

## 2024-04-30 RX ADMIN — BUPIVACAINE HYDROCHLORIDE 1.5 ML: 2.5 INJECTION, SOLUTION EPIDURAL; INFILTRATION; INTRACAUDAL; PERINEURAL at 03:04

## 2024-04-30 RX ADMIN — LIDOCAINE HYDROCHLORIDE 2 ML: 10 INJECTION, SOLUTION INFILTRATION; PERINEURAL at 03:04

## 2024-04-30 RX ADMIN — METHYLPREDNISOLONE ACETATE 40 MG: 40 INJECTION, SUSPENSION INTRA-ARTICULAR; INTRALESIONAL; INTRAMUSCULAR; SOFT TISSUE at 03:04

## 2024-04-30 NOTE — NURSING
Left foot steroid injection complete. Pt tolerated well. VSS. No signs or symptoms of distress noted. Pt escorted ambulatory to waiting area without c/o's. Gait steady. Written and verbal discharge/care instructions given and [facility]

## 2024-04-30 NOTE — DISCHARGE INSTRUCTIONS
For scheduling: Call [facility] [phone]    For questions or concerns call: PORTIA MON[facility]FRI 8 AM- 5PM [phone]. Radiology resident on call [phone].    For immediate concerns that are not emergent, you may call our radiology clinic at: [phone]    May remove dressing in 24 hours and shower.   Do Not sit in bath water, hot tub, or swim for 7 days

## 2024-04-30 NOTE — NURSING
Pt arrived to 188 for left foot steroid injection with local only. Pt oriented to unit and staff. Plan of care reviewed with patient, patient verbalizes understanding. Comfort measures utilized. Pt safely transferred to procedural table. Fall risk reviewed with patient, fall risk interventions maintained.  Blankets applied. Pt prepped and draped utilizing standard sterile technique. Patient placed on continuous monitoring, as required by sedation policy. Timeouts completed utilizing standard universal time-out, per department and facility policy. RN to remain at bedside, continuous monitoring maintained. Pt resting comfortably. Denies pain/discomfort. Will continue to monitor. See flow sheets for monitoring, medication administration, and updates.

## 2024-04-30 NOTE — TELEPHONE ENCOUNTER
Care Due:                  Date            Visit Type   Department     Provider  --------------------------------------------------------------------------------                                EP -                              PRIMARY      [facility] PRIMARY  Last Visit: 03-      CARE ([facility])   DONATO Barnard                              EP -                              PRIMARY      [facility] PRIMARY  Next Visit: 05-      CARE ([facility])   DONATO Barnard                                                            Last  Test          Frequency    Reason                     Performed    Due Date  --------------------------------------------------------------------------------    Lipid Panel.  12 months..  rosuvastatin.............  08- 07-    Health [facility] Embedded Care Due Messages. Reference number: [id number].   4/30/2024 1:59:30 PM CDT

## 2024-04-30 NOTE — H&P
Radiology History & Physical      SUBJECTIVE:     Chief Complaint: L foot pain    History of Present Illness:  Jayla Loyd is a 73 y.o. female who presents for L TMT joint injection.    Past Medical History:   Diagnosis Date    Carpal tunnel syndrome, right upper limb 06/23/2022    Diabetes mellitus type II     GERD (gastroesophageal reflux disease)     Hx of multiple pulmonary nodules 06/15/2022    Migraines     Proteinuria     Pulmonary embolus 2024    Right lung with concurrent DVT left leg    Trigger finger, right ring finger 06/23/2022     Past Surgical History:   Procedure Laterality Date    CATARACT EXTRACTION      COSMETIC SURGERY  1971    rhinoplasty    EYE SURGERY  3 years ago    cataracts    JOINT REPLACEMENT  Twice in last 24 months    SHOULDER SURGERY Left 09/2020    TUBAL LIGATION  1992       Home Meds:   Prior to Admission medications    Medication Sig Start Date End Date Taking? Authorizing Provider   ALPRAZolam (XANAX) 0.5 MG tablet Take 1 tablet (0.5 mg total) by mouth 2 (two) times daily as needed for Anxiety. 2/27/24   Geena Barnard MD   amitriptyline (ELAVIL) 25 MG tablet Take 1 tablet (25 mg total) by mouth nightly as needed for Insomnia. 2/28/24 2/27/25  Geena Barnard MD   apixaban (ELIQUIS) 5 mg Tab Take 1 tablet (5 mg total) by mouth 2 (two) times daily. 2/6/24   Edmond Sifuentes MD PhD   butalbital-acetaminophen-caffeine -40 mg (FIORICET, ESGIC) -40 mg per tablet Take 1 tablet by mouth every 4 (four) hours as needed for Pain. 2/28/24   Geena Barnard MD   coenzyme Q10 100 mg capsule Take 100 mg by mouth once daily.    Provider, Historical   dicyclomine (BENTYL) 10 MG capsule Take 1 capsule (10 mg total) by mouth 4 (four) times daily. 4/25/24   Geena Barnard MD   esomeprazole (NEXIUM) 40 MG capsule TAKE 1 CAPSULE BY MOUTH TWICE DAILY BEFORE MEALS 4/10/24   Geena Barnard MD   Lactobac no.41/Bifidobact no.7 (PROBIOTIC-10 ORAL) Take by mouth once  daily.    Provider, Historical   meclizine (ANTIVERT) 25 mg tablet Take 1 tablet (25 mg total) by mouth 2 (two) times daily as needed. 3/26/24   Geena Barnard MD   methocarbamoL (ROBAXIN) 500 MG Tab TAKE 1 TABLET (500 MG TOTAL) BY MOUTH 3 (THREE) TIMES DAILY AS NEEDED (PAIN). 12/5/22   Dwaine Yadav MD   methylPREDNISolone (MEDROL, RACHAEL,) 4 mg tablet Take as instructed on package 4/15/24   Ehsan Falk MD   nystatin (MYCOSTATIN) powder Apply topically 4 (four) times daily. 2/28/24   Mana Pulido MD   ondansetron (ZOFRAN) 8 MG tablet Take 1 tablet (8 mg total) by mouth every 8 (eight) hours as needed for Nausea. 11/16/23   Geena Barnard MD   promethazine (PHENERGAN) 12.5 MG Tab  9/20/23   Provider, Historical   rosuvastatin (CRESTOR) 40 MG Tab TAKE 1 TABLET BY MOUTH ONCE DAILY 3/19/24   Geena Barnard MD   sertraline (ZOLOFT) 25 MG tablet Take 1 tablet (25 mg total) by mouth once daily. 1/25/24 1/24/25  Geena Barnard MD   telmisartan (MICARDIS) 20 MG Tab TAKE 1 TABLET BY MOUTH EVERY DAY 6/27/23   Geena Barnard MD   tirzepatide (MOUNJARO) 2.5 mg/0.5 mL PnIj Inject 2.5 mg into the skin every 7 days. 3/27/24   Geena Barnard MD   traMADoL (ULTRAM) 50 mg tablet Take 1 tablet (50 mg total) by mouth every 6 (six) hours as needed for Pain. 4/15/24   Geena Barnard MD     Anticoagulants/Antiplatelets:  Eliquis    Allergies:   Review of patient's allergies indicates:   Allergen Reactions    Gabapentin Hallucinations    Lyrica [pregabalin] Hallucinations    Mobic [meloxicam] Itching     Sedation History:  no adverse reactions    Review of Systems:   Hematological: no known coagulopathies  Respiratory: no shortness of breath  Cardiovascular: no chest pain  Gastrointestinal: no abdominal pain  Genito-Urinary: no dysuria  Musculoskeletal: negative  Neurological: no TIA or stroke symptoms         OBJECTIVE:     Vital Signs (Most Recent)       Physical Exam:  ASA:  2  Mallampati: 2    General: no acute distress  Mental Status: alert and oriented to person, place and time  HEENT: normocephalic, atraumatic  Chest: unlabored breathing  Heart: regular heart rate  Abdomen: nondistended  Extremity: moves all extremities    Laboratory  Lab Results   Component Value Date    INR 1.0 02/01/2024       Lab Results   Component Value Date    WBC 10.91 02/02/2024    WBC 10.91 02/02/2024    HGB 9.3 (L) 02/02/2024    HGB 9.3 (L) 02/02/2024    HCT 30.4 (L) 02/02/2024    HCT 30.4 (L) 02/02/2024    MCV 78 (L) 02/02/2024    MCV 78 (L) 02/02/2024     02/02/2024     02/02/2024      Lab Results   Component Value Date     (H) 02/02/2024     02/02/2024    K 4.2 02/02/2024     02/02/2024    CO2 26 02/02/2024    BUN 10 02/02/2024    CREATININE 0.8 02/02/2024    CALCIUM 9.7 02/02/2024    MG 2.0 02/02/2024    ALT 10 02/02/2024    AST 14 02/02/2024    ALBUMIN 3.0 (L) 02/02/2024    BILITOT 0.2 02/02/2024       ASSESSMENT/PLAN:     Sedation Plan: local  Patient will undergo L TMT joint injection.    Bernarda Isbell MD MSCR  PGY-5 Radiology Resident

## 2024-05-01 ENCOUNTER — PATIENT MESSAGE (OUTPATIENT)
Dept: ORTHOPEDICS | Facility: CLINIC | Age: 74
End: 2024-05-01
Payer: MEDICARE

## 2024-05-01 RX ORDER — ALPRAZOLAM 0.5 MG/1
0.5 TABLET ORAL 2 TIMES DAILY PRN
Qty: 60 TABLET | Refills: 0 | Status: SHIPPED | OUTPATIENT
Start: 2024-05-01

## 2024-05-02 DIAGNOSIS — M19.212 OTHER SECONDARY OSTEOARTHRITIS OF BOTH SHOULDERS: ICD-10-CM

## 2024-05-02 DIAGNOSIS — M19.211 OTHER SECONDARY OSTEOARTHRITIS OF BOTH SHOULDERS: ICD-10-CM

## 2024-05-02 RX ORDER — TRAMADOL HYDROCHLORIDE 50 MG/1
50 TABLET ORAL EVERY 8 HOURS PRN
Qty: 21 TABLET | Refills: 0 | Status: SHIPPED | OUTPATIENT
Start: 2024-05-02

## 2024-05-06 ENCOUNTER — HOSPITAL ENCOUNTER (OUTPATIENT)
Dept: RADIOLOGY | Facility: HOSPITAL | Age: 74
Discharge: HOME | End: 2024-05-06
Attending: INTERNAL MEDICINE
Payer: MEDICARE

## 2024-05-06 DIAGNOSIS — I26.09 OTHER ACUTE PULMONARY EMBOLISM WITH ACUTE COR PULMONALE: ICD-10-CM

## 2024-05-06 LAB
CREAT SERPL-MCNC: 0.9 MG/DL (ref 0.5–1.4)
SAMPLE: NORMAL

## 2024-05-06 PROCEDURE — 25500020 PHARM REV CODE 255: Performed by: INTERNAL MEDICINE

## 2024-05-06 PROCEDURE — 71275 CT ANGIOGRAPHY CHEST: CPT | Mod: TC

## 2024-05-06 PROCEDURE — 71275 CT ANGIOGRAPHY CHEST: CPT | Mod: 26,,, | Performed by: RADIOLOGY

## 2024-05-06 RX ADMIN — IOHEXOL 75 ML: 350 INJECTION, SOLUTION INTRAVENOUS at 02:05

## 2024-05-07 ENCOUNTER — LAB VISIT (OUTPATIENT)
Dept: LAB | Facility: HOSPITAL | Age: 74
End: 2024-05-07
Payer: MEDICARE

## 2024-05-07 ENCOUNTER — PATIENT MESSAGE (OUTPATIENT)
Dept: PRIMARY CARE CLINIC | Facility: CLINIC | Age: 74
End: 2024-05-07
Payer: MEDICARE

## 2024-05-07 ENCOUNTER — OFFICE VISIT (OUTPATIENT)
Dept: HEMATOLOGY/ONCOLOGY | Facility: CLINIC | Age: 74
End: 2024-05-07
Payer: MEDICARE

## 2024-05-07 VITALS
TEMPERATURE: 98 F | HEIGHT: 60 IN | WEIGHT: 149.81 LBS | HEART RATE: 70 BPM | SYSTOLIC BLOOD PRESSURE: 111 MMHG | DIASTOLIC BLOOD PRESSURE: 55 MMHG | BODY MASS INDEX: 29.41 KG/M2 | OXYGEN SATURATION: 96 % | RESPIRATION RATE: 18 BRPM

## 2024-05-07 DIAGNOSIS — D50.0 IRON DEFICIENCY ANEMIA DUE TO CHRONIC BLOOD LOSS: ICD-10-CM

## 2024-05-07 DIAGNOSIS — G44.89 CHRONIC MIXED HEADACHE SYNDROME: ICD-10-CM

## 2024-05-07 DIAGNOSIS — D50.0 IRON DEFICIENCY ANEMIA DUE TO CHRONIC BLOOD LOSS: Primary | ICD-10-CM

## 2024-05-07 DIAGNOSIS — I82.412 ACUTE DEEP VEIN THROMBOSIS (DVT) OF FEMORAL VEIN OF LEFT LOWER EXTREMITY: ICD-10-CM

## 2024-05-07 LAB
ALBUMIN SERPL BCP-MCNC: 3.7 G/DL (ref 3.5–5.2)
ALP SERPL-CCNC: 102 U/L (ref 55–135)
ALT SERPL W/O P-5'-P-CCNC: 18 U/L (ref 10–44)
ANION GAP SERPL CALC-SCNC: 8 MMOL/L (ref 8–16)
AST SERPL-CCNC: 16 U/L (ref 10–40)
BASOPHILS # BLD AUTO: 0.05 K/UL (ref 0–0.2)
BASOPHILS NFR BLD: 0.7 % (ref 0–1.9)
BILIRUB SERPL-MCNC: 0.3 MG/DL (ref 0.1–1)
BUN SERPL-MCNC: 12 MG/DL (ref 8–23)
CALCIUM SERPL-MCNC: 9.9 MG/DL (ref 8.7–10.5)
CHLORIDE SERPL-SCNC: 105 MMOL/L (ref 95–110)
CO2 SERPL-SCNC: 26 MMOL/L (ref 23–29)
CREAT SERPL-MCNC: 0.9 MG/DL (ref 0.5–1.4)
DIFFERENTIAL METHOD BLD: ABNORMAL
EOSINOPHIL # BLD AUTO: 0.1 K/UL (ref 0–0.5)
EOSINOPHIL NFR BLD: 1.1 % (ref 0–8)
ERYTHROCYTE [DISTWIDTH] IN BLOOD BY AUTOMATED COUNT: 15.7 % (ref 11.5–14.5)
EST. GFR  (NO RACE VARIABLE): >60 ML/MIN/1.73 M^2
FERRITIN SERPL-MCNC: 319 NG/ML (ref 20–300)
GLUCOSE SERPL-MCNC: 123 MG/DL (ref 70–110)
HCT VFR BLD AUTO: 40.4 % (ref 37–48.5)
HGB BLD-MCNC: 12.9 G/DL (ref 12–16)
IMM GRANULOCYTES # BLD AUTO: 0.02 K/UL (ref 0–0.04)
IMM GRANULOCYTES NFR BLD AUTO: 0.3 % (ref 0–0.5)
IRON SERPL-MCNC: 85 UG/DL (ref 30–160)
LYMPHOCYTES # BLD AUTO: 2.3 K/UL (ref 1–4.8)
LYMPHOCYTES NFR BLD: 33.1 % (ref 18–48)
MCH RBC QN AUTO: 27.6 PG (ref 27–31)
MCHC RBC AUTO-ENTMCNC: 31.9 G/DL (ref 32–36)
MCV RBC AUTO: 86 FL (ref 82–98)
MONOCYTES # BLD AUTO: 0.5 K/UL (ref 0.3–1)
MONOCYTES NFR BLD: 6.9 % (ref 4–15)
NEUTROPHILS # BLD AUTO: 4.1 K/UL (ref 1.8–7.7)
NEUTROPHILS NFR BLD: 57.9 % (ref 38–73)
NRBC BLD-RTO: 0 /100 WBC
PLATELET # BLD AUTO: 370 K/UL (ref 150–450)
PMV BLD AUTO: 9.1 FL (ref 9.2–12.9)
POTASSIUM SERPL-SCNC: 4.5 MMOL/L (ref 3.5–5.1)
PROT SERPL-MCNC: 7.3 G/DL (ref 6–8.4)
RBC # BLD AUTO: 4.68 M/UL (ref 4–5.4)
SATURATED IRON: 30 % (ref 20–50)
SODIUM SERPL-SCNC: 139 MMOL/L (ref 136–145)
TOTAL IRON BINDING CAPACITY: 283 UG/DL (ref 250–450)
TRANSFERRIN SERPL-MCNC: 191 MG/DL (ref 200–375)
WBC # BLD AUTO: 7.07 K/UL (ref 3.9–12.7)

## 2024-05-07 PROCEDURE — 85025 COMPLETE CBC W/AUTO DIFF WBC: CPT | Performed by: INTERNAL MEDICINE

## 2024-05-07 PROCEDURE — 3008F BODY MASS INDEX DOCD: CPT | Mod: HCNC,CPTII,S$GLB, | Performed by: INTERNAL MEDICINE

## 2024-05-07 PROCEDURE — 1125F AMNT PAIN NOTED PAIN PRSNT: CPT | Mod: HCNC,CPTII,S$GLB, | Performed by: INTERNAL MEDICINE

## 2024-05-07 PROCEDURE — 1101F PT FALLS ASSESS-DOCD LE1/YR: CPT | Mod: HCNC,CPTII,S$GLB, | Performed by: INTERNAL MEDICINE

## 2024-05-07 PROCEDURE — 3051F HG A1C>EQUAL 7.0%<8.0%: CPT | Mod: HCNC,CPTII,S$GLB, | Performed by: INTERNAL MEDICINE

## 2024-05-07 PROCEDURE — 83540 ASSAY OF IRON: CPT | Performed by: INTERNAL MEDICINE

## 2024-05-07 PROCEDURE — 99999 PR PBB SHADOW E&M-EST. PATIENT-LVL IV: CPT | Mod: PBBFAC,HCNC,, | Performed by: INTERNAL MEDICINE

## 2024-05-07 PROCEDURE — 80053 COMPREHEN METABOLIC PANEL: CPT | Performed by: INTERNAL MEDICINE

## 2024-05-07 PROCEDURE — 82728 ASSAY OF FERRITIN: CPT | Performed by: INTERNAL MEDICINE

## 2024-05-07 PROCEDURE — 3078F DIAST BP <80 MM HG: CPT | Mod: HCNC,CPTII,S$GLB, | Performed by: INTERNAL MEDICINE

## 2024-05-07 PROCEDURE — 99215 OFFICE O/P EST HI 40 MIN: CPT | Mod: HCNC,S$GLB,, | Performed by: INTERNAL MEDICINE

## 2024-05-07 PROCEDURE — 36415 COLL VENOUS BLD VENIPUNCTURE: CPT | Performed by: INTERNAL MEDICINE

## 2024-05-07 PROCEDURE — 3288F FALL RISK ASSESSMENT DOCD: CPT | Mod: HCNC,CPTII,S$GLB, | Performed by: INTERNAL MEDICINE

## 2024-05-07 PROCEDURE — 3074F SYST BP LT 130 MM HG: CPT | Mod: HCNC,CPTII,S$GLB, | Performed by: INTERNAL MEDICINE

## 2024-05-07 RX ORDER — IRON SUCROSE 20 MG/ML
INJECTION, SOLUTION INTRAVENOUS
COMMUNITY
Start: 2024-04-17 | End: 2024-05-09 | Stop reason: ALTCHOICE

## 2024-05-07 RX ORDER — PROPRANOLOL HYDROCHLORIDE 60 MG/1
60 CAPSULE, EXTENDED RELEASE ORAL
COMMUNITY
Start: 2024-01-31

## 2024-05-07 RX ORDER — TRAZODONE HYDROCHLORIDE 50 MG/1
50 TABLET ORAL NIGHTLY
Qty: 90 TABLET | Refills: 3 | Status: SHIPPED | OUTPATIENT
Start: 2024-05-07 | End: 2025-05-07

## 2024-05-07 NOTE — TELEPHONE ENCOUNTER
Care Due:                  Date            Visit Type   Department     Provider  --------------------------------------------------------------------------------                                EP -                              PRIMARY      [facility] PR[facility]  Last Visit: 03-      CARE ([facility])   DONATO Barnard                              EP -                              PRIMARY      [facility] PRIMARY  Next Visit: 05-      CARE ([facility])   DONATO Barnard                                                            Last  Test          Frequency    Reason                     Performed    Due Date  --------------------------------------------------------------------------------    HBA1C.......  6 months...  tirzepatide..............  02- 07-    [facility] Embedded Care Due Messages. Reference number: [id number].   5/07/2024 8:25:52 AM CDT

## 2024-05-07 NOTE — PROGRESS NOTES
Hematology and Medical Oncology   New Patient Consult     05/07/2024  Referred by:  Dr. Edmond Sifuentes    Primary Oncologic Diagnosis: Iron deficiency anemia due to chronic blood loss [D50.0]      History of Present Ilness:   Jayla Loyd (Jayla) is a pleasant 73 y.o.female with a recent DVT secondary to COVID and profound iron deficiency anemia.       The patient reports she first noted pain to her left posterior thigh in January 2024 that progressively worsened and spread to her calf. She endorses associated swelling and warmth. The patient endorses SCANLON and non productive cough x3-4 weeks noting that her symptoms started when she was diagnosed with COVID at the beginning of January. She has been taking cough syrup at home for this with no improvement.   --LLE venous ultrasound on 2/1/2024 revealed extensive occlusive DVT of the left distal SFA, popliteal, and posterior tibial veins.    --CTA Chest on 2/1/2024 revealed right lower lobe pulmonary arterial emboli. She was admitted for heparin drip and discharged on Eliquis. -- Echo on 2/2/2024 with no evidence of right heart strain.      She is tolerating Eliquis. Has close follow up with Dr. Sifuentes.    PAST MEDICAL HISTORY:   Past Medical History:   Diagnosis Date    Carpal tunnel syndrome, right upper limb 06/23/2022    Diabetes mellitus type II     GERD (gastroesophageal reflux disease)     Hx of multiple pulmonary nodules 06/15/2022    Migraines     Proteinuria     Pulmonary embolus 2024    Right lung with concurrent DVT left leg    Trigger finger, right ring finger 06/23/2022       PAST SURGICAL HISTORY:   Past Surgical History:   Procedure Laterality Date    CATARACT EXTRACTION      COSMETIC SURGERY  1971    rhinoplasty    EYE SURGERY  3 years ago    cataracts    JOINT REPLACEMENT  Twice in last 24 months    SHOULDER SURGERY Left 09/2020    TUBAL LIGATION  1992       PAST SOCIAL HISTORY:   reports that she has never smoked. She has never been  exposed to tobacco smoke. She has never used smokeless tobacco. She reports current alcohol use. She reports that she does not currently use drugs after having used the following drugs: Other-see comments.    FAMILY HISTORY:  Family History   Problem Relation Name Age of Onset    Bladder Cancer Mother jaxon     Hypertension Mother jaxon         CABG    Cancer Mother jaxon     Heart disease Mother jaxon     Heart failure Father Dadbrady         CABG    Hypertension Father Daddy     Heart disease Father Daddy     Arthritis Father Dadbrady     Stroke Brother Carmelo     No Known Problems Son      Cancer Maternal Aunt Gloria     Breast cancer Maternal Aunt Gloria     Cancer Paternal Aunt anabel     Diabetes Paternal Aunt anabel     Cancer Maternal Grandmother mitesh     Breast cancer Maternal Grandmother mitesh     Heart disease Maternal Grandfather Parents     Cancer Paternal Grandmother emigdio     Dementia Paternal Grandmother emigdio     Arthritis Paternal Grandmother emigdio     Heart disease Paternal Grandfather Family        CURRENT MEDICATIONS:   Current Outpatient Medications   Medication Sig    ALPRAZolam (XANAX) 0.5 MG tablet Take 1 tablet (0.5 mg total) by mouth 2 (two) times daily as needed for Anxiety.    amitriptyline (ELAVIL) 25 MG tablet Take 1 tablet (25 mg total) by mouth nightly as needed for Insomnia.    apixaban (ELIQUIS) 5 mg Tab Take 1 tablet (5 mg total) by mouth 2 (two) times daily.    dicyclomine (BENTYL) 10 MG capsule Take 1 capsule (10 mg total) by mouth 4 (four) times daily. (Patient taking differently: Take 10 mg by mouth as needed.)    esomeprazole (NEXIUM) 40 MG capsule TAKE 1 CAPSULE BY MOUTH TWICE DAILY BEFORE MEALS    Lactobac no.41/Bifidobact no.7 (PROBIOTIC-10 ORAL) Take by mouth once daily.    meclizine (ANTIVERT) 25 mg tablet Take 1 tablet (25 mg total) by mouth 2 (two) times daily as needed.    methocarbamoL (ROBAXIN) 500 MG Tab TAKE 1 TABLET (500 MG TOTAL) BY MOUTH 3 (THREE) TIMES DAILY AS NEEDED (PAIN).     nystatin (MYCOSTATIN) powder Apply topically 4 (four) times daily. (Patient taking differently: Apply topically as needed.)    ondansetron (ZOFRAN) 8 MG tablet Take 1 tablet (8 mg total) by mouth every 8 (eight) hours as needed for Nausea.    promethazine (PHENERGAN) 12.5 MG Tab as needed.    propranoloL (INDERAL LA) 60 MG 24 hr capsule Take 60 mg by mouth as needed.    rosuvastatin (CRESTOR) 40 MG Tab TAKE 1 TABLET BY MOUTH ONCE DAILY    sertraline (ZOLOFT) 25 MG tablet Take 1 tablet (25 mg total) by mouth once daily.    telmisartan (MICARDIS) 20 MG Tab TAKE 1 TABLET BY MOUTH EVERY DAY    tirzepatide (MOUNJARO) 2.5 mg/0.5 mL PnIj Inject 2.5 mg into the skin every 7 days.    traMADoL (ULTRAM) 50 mg tablet Take 1 tablet (50 mg total) by mouth every 8 (eight) hours as needed for Pain.    butalbital-acetaminophen-caffeine -40 mg (FIORICET, ESGIC) -40 mg per tablet Take 1 tablet by mouth every 4 (four) hours as needed for Pain.    traZODone (DESYREL) 50 MG tablet Take 1 tablet (50 mg total) by mouth every evening.     No current facility-administered medications for this visit.     ALLERGIES:   Review of patient's allergies indicates:   Allergen Reactions    Gabapentin Hallucinations    Lyrica [pregabalin] Hallucinations    Mobic [meloxicam] Itching         Review of Systems:     Review of Systems   Constitutional:  Negative for appetite change, chills, diaphoresis, fatigue, fever and unexpected weight change.   HENT:   Negative for hearing loss, mouth sores, nosebleeds, sore throat, trouble swallowing and voice change.    Eyes:  Negative for eye problems and icterus.   Respiratory:  Positive for cough and shortness of breath. Negative for chest tightness, hemoptysis and wheezing.    Cardiovascular:  Positive for leg swelling. Negative for chest pain and palpitations.   Gastrointestinal:  Negative for abdominal distention, abdominal pain, blood in stool, diarrhea, nausea and vomiting.   Endocrine: Negative  for hot flashes.   Genitourinary:  Negative for bladder incontinence, difficulty urinating, dysuria and hematuria.    Musculoskeletal:  Negative for arthralgias, back pain, flank pain, gait problem, myalgias, neck pain and neck stiffness.   Skin:  Negative for itching, rash and wound.   Neurological:  Negative for dizziness, extremity weakness, gait problem, headaches, numbness, seizures and speech difficulty.   Hematological:  Negative for adenopathy. Does not bruise/bleed easily.   Psychiatric/Behavioral:  Negative for confusion, depression and sleep disturbance. The patient is not nervous/anxious.           Physical Exam:     Vitals:    05/07/24 0937   BP: (!) 111/55   Pulse: 70   Resp: 18   Temp: 98.3 °F (36.8 °C)     Physical Exam  Constitutional:       General: She is not in acute distress.     Appearance: She is well-developed. She is not diaphoretic.   HENT:      Head: Normocephalic and atraumatic.      Mouth/Throat:      Pharynx: No oropharyngeal exudate.   Eyes:      Conjunctiva/sclera: Conjunctivae normal.      Pupils: Pupils are equal, round, and reactive to light.   Neck:      Thyroid: No thyromegaly.      Vascular: No JVD.      Trachea: No tracheal deviation.   Cardiovascular:      Rate and Rhythm: Normal rate and regular rhythm.      Heart sounds: Normal heart sounds. No murmur heard.     No friction rub.   Pulmonary:      Effort: Pulmonary effort is normal. No respiratory distress.      Breath sounds: Normal breath sounds. No stridor. No wheezing or rales.   Chest:      Chest wall: No tenderness.   Abdominal:      General: Bowel sounds are normal. There is no distension.      Palpations: Abdomen is soft.      Tenderness: There is no abdominal tenderness. There is no guarding or rebound.   Musculoskeletal:         General: No tenderness or deformity. Normal range of motion.      Cervical back: Normal range of motion and neck supple.   Skin:     General: Skin is warm and dry.      Capillary Refill:  Capillary refill takes less than 2 seconds.      Coloration: Skin is not pale.      Findings: No erythema or rash.   Neurological:      Mental Status: She is alert and oriented to person, place, and time.      Cranial Nerves: No cranial nerve deficit.      Sensory: No sensory deficit.      Motor: No abnormal muscle tone.      Coordination: Coordination normal.      Deep Tendon Reflexes: Reflexes normal.   Psychiatric:         Behavior: Behavior normal.         Thought Content: Thought content normal.         Judgment: Judgment normal.         ECOG Performance Status: (foot note - ECOG PS provided by Eastern Cooperative Oncology Group) 1 - Symptomatic but completely ambulatory    Karnofsky Performance Score:  90%- Able to Carry on Normal Activity: Minor Symptoms of Disease    Labs:   Lab Results   Component Value Date    WBC 7.07 05/07/2024    HGB 12.9 05/07/2024    HCT 40.4 05/07/2024     05/07/2024    CHOL 166 08/01/2023    TRIG 88 08/01/2023    HDL 71 08/01/2023    ALT 18 05/07/2024    AST 16 05/07/2024     05/07/2024    K 4.5 05/07/2024     05/07/2024    CREATININE 0.9 05/07/2024    BUN 12 05/07/2024    CO2 26 05/07/2024    TSH 1.567 08/25/2022    INR 1.0 02/01/2024    HGBA1C 7.0 (H) 02/01/2024    MICROALBUR 23.8 05/14/2018     Iron: 85  TIBC: 283  Ferritin: 319    Imaging: Imaging has been reviewed     Assessment and Plan:     Ms. Loyd is a pleasant 73 year old with iron deficiency anemia and DVT and PE.    Iron deficiency anemia due to chronic blood loss  --Responded very well to IV iron with a normalization in hemoglobin  --Will follow iron levels q 3 months  --Not currently iron deficient requiring replacement  --Verified age appropriate cancer screening has been completed    Pulmonary Embolism  --Seen on imaging in Feb 2024  --Doing well on eliquis  --Imaging shows a stable chronic clot in the left lower extremity  --Managed as per Dr. Sifuentes      75 minutes in total were spent on this  encounter of which 45 minutes were spent face to face with the patient and her  family to discuss the disease, natural history, treatment options and survival statistics. I have provided the patient with an opportunity to ask questions and have all questions answered to her satisfaction.       she will return to clinic in 3 months, but knows to call in the interim if symptoms change or should a problem arise.        Joy Tamez MD  Hematology and Medical Oncology  Bone Marrow Transplant  [facility]      BMT Chart Routing      Follow up with physician 3 months. 1. see me in 3 months with a cbc,cmp,iron, ferritin   Follow up with YVONNE    Provider visit type    Infusion scheduling note    Injection scheduling note    Labs    Imaging    Pharmacy appointment    Other referrals

## 2024-05-08 ENCOUNTER — LAB VISIT (OUTPATIENT)
Dept: LAB | Facility: HOSPITAL | Age: 74
End: 2024-05-08
Attending: INTERNAL MEDICINE
Payer: MEDICARE

## 2024-05-08 ENCOUNTER — HOSPITAL ENCOUNTER (OUTPATIENT)
Dept: CARDIOLOGY | Facility: HOSPITAL | Age: 74
Discharge: HOME | End: 2024-05-08
Attending: INTERNAL MEDICINE
Payer: MEDICARE

## 2024-05-08 DIAGNOSIS — D64.9 ANEMIA, UNSPECIFIED TYPE: ICD-10-CM

## 2024-05-08 DIAGNOSIS — I82.412 ACUTE DEEP VEIN THROMBOSIS (DVT) OF FEMORAL VEIN OF LEFT LOWER EXTREMITY: ICD-10-CM

## 2024-05-08 PROCEDURE — 93970 EXTREMITY STUDY: CPT | Mod: HCNC,PO

## 2024-05-08 PROCEDURE — 93970 EXTREMITY STUDY: CPT | Mod: 26,HCNC,, | Performed by: INTERNAL MEDICINE

## 2024-05-08 PROCEDURE — 82272 OCCULT BLD FECES 1-3 TESTS: CPT | Mod: HCNC | Performed by: INTERNAL MEDICINE

## 2024-05-08 RX ORDER — BUTALBITAL, ACETAMINOPHEN AND CAFFEINE 50; 325; 40 MG/1; MG/1; MG/1
1 TABLET ORAL EVERY 4 HOURS PRN
Qty: 60 TABLET | Refills: 1 | Status: SHIPPED | OUTPATIENT
Start: 2024-05-08 | End: 2024-06-10

## 2024-05-09 ENCOUNTER — OFFICE VISIT (OUTPATIENT)
Dept: CARDIOLOGY | Facility: CLINIC | Age: 74
End: 2024-05-09
Payer: MEDICARE

## 2024-05-09 ENCOUNTER — PATIENT MESSAGE (OUTPATIENT)
Dept: CARDIOLOGY | Facility: CLINIC | Age: 74
End: 2024-05-09

## 2024-05-09 VITALS
SYSTOLIC BLOOD PRESSURE: 127 MMHG | BODY MASS INDEX: 29.44 KG/M2 | WEIGHT: 149.94 LBS | HEIGHT: 60 IN | DIASTOLIC BLOOD PRESSURE: 79 MMHG | HEART RATE: 90 BPM

## 2024-05-09 DIAGNOSIS — I82.4Y2 ACUTE DEEP VEIN THROMBOSIS (DVT) OF PROXIMAL VEIN OF LEFT LOWER EXTREMITY: ICD-10-CM

## 2024-05-09 DIAGNOSIS — I73.9 PERIPHERAL VASCULAR DISEASE: ICD-10-CM

## 2024-05-09 DIAGNOSIS — E78.00 TYPE 2 DIABETES MELLITUS WITH HYPERCHOLESTEROLEMIA: ICD-10-CM

## 2024-05-09 DIAGNOSIS — I10 PRIMARY HYPERTENSION: ICD-10-CM

## 2024-05-09 DIAGNOSIS — I87.2 VENOUS INSUFFICIENCY: ICD-10-CM

## 2024-05-09 DIAGNOSIS — E78.2 MIXED HYPERLIPIDEMIA: ICD-10-CM

## 2024-05-09 DIAGNOSIS — I70.0 AORTIC ARCH ATHEROSCLEROSIS: ICD-10-CM

## 2024-05-09 DIAGNOSIS — R93.89 ABNORMAL CT OF THE CHEST: ICD-10-CM

## 2024-05-09 DIAGNOSIS — I82.412 ACUTE DEEP VEIN THROMBOSIS (DVT) OF FEMORAL VEIN OF LEFT LOWER EXTREMITY: ICD-10-CM

## 2024-05-09 DIAGNOSIS — E11.69 TYPE 2 DIABETES MELLITUS WITH HYPERCHOLESTEROLEMIA: ICD-10-CM

## 2024-05-09 DIAGNOSIS — R91.1 PULMONARY NODULE: ICD-10-CM

## 2024-05-09 DIAGNOSIS — I26.09 ACUTE PULMONARY EMBOLISM WITH ACUTE COR PULMONALE, UNSPECIFIED PULMONARY EMBOLISM TYPE: Primary | ICD-10-CM

## 2024-05-09 DIAGNOSIS — I26.93 SINGLE SUBSEGMENTAL PULMONARY EMBOLISM WITHOUT ACUTE COR PULMONALE: ICD-10-CM

## 2024-05-09 LAB — OB PNL STL: NEGATIVE

## 2024-05-09 PROCEDURE — 1159F MED LIST DOCD IN RCRD: CPT | Mod: HCNC,CPTII,S$GLB, | Performed by: INTERNAL MEDICINE

## 2024-05-09 PROCEDURE — 1126F AMNT PAIN NOTED NONE PRSNT: CPT | Mod: HCNC,CPTII,S$GLB, | Performed by: INTERNAL MEDICINE

## 2024-05-09 PROCEDURE — 1101F PT FALLS ASSESS-DOCD LE1/YR: CPT | Mod: HCNC,CPTII,S$GLB, | Performed by: INTERNAL MEDICINE

## 2024-05-09 PROCEDURE — 3008F BODY MASS INDEX DOCD: CPT | Mod: HCNC,CPTII,S$GLB, | Performed by: INTERNAL MEDICINE

## 2024-05-09 PROCEDURE — 3288F FALL RISK ASSESSMENT DOCD: CPT | Mod: HCNC,CPTII,S$GLB, | Performed by: INTERNAL MEDICINE

## 2024-05-09 PROCEDURE — 99999 PR PBB SHADOW E&M-EST. PATIENT-LVL V: CPT | Mod: PBBFAC,HCNC,, | Performed by: INTERNAL MEDICINE

## 2024-05-09 PROCEDURE — 3074F SYST BP LT 130 MM HG: CPT | Mod: HCNC,CPTII,S$GLB, | Performed by: INTERNAL MEDICINE

## 2024-05-09 PROCEDURE — 3078F DIAST BP <80 MM HG: CPT | Mod: HCNC,CPTII,S$GLB, | Performed by: INTERNAL MEDICINE

## 2024-05-09 PROCEDURE — 99215 OFFICE O/P EST HI 40 MIN: CPT | Mod: HCNC,S$GLB,, | Performed by: INTERNAL MEDICINE

## 2024-05-09 PROCEDURE — 3051F HG A1C>EQUAL 7.0%<8.0%: CPT | Mod: HCNC,CPTII,S$GLB, | Performed by: INTERNAL MEDICINE

## 2024-05-09 NOTE — PATIENT INSTRUCTIONS
Assessment/Plan:   Jayla Loyd is a 73 y.o. female with LLE DVT/PE (on Eliquis), DM2, GERD, obesity, who presents for a follow up appointment.    LLE DVT/PE- Likely related to recent COVID infection.  Mrs. Molina reports no chest pain, SOB, or significant LE edema.  BLE Venous Ultrasound on 5/8/2024 revealed left popliteal chronic DVT and no RLE DVT.  CTA Chest on 5/6/2024 showed resolution of right lower lobe pulmonary arterial emboli.  No new pulmonary thromboemboli.  Continue Eliquis 5 mg bid.  Repeat BLE venous ultrasound in 6 months.    2. Obesity- Encourage diet, exercise, and weight loss.    Follow up in 6 months with BLE venous ultrasound prior

## 2024-05-09 NOTE — PROGRESS NOTES
[facility]      Chief Complaint   Patient presents with    Acute deep vein thrombosis (DVT) of femoral vein of left lo       Patient ID: Jayla Loyd is a 73 y.o. female with LLE DVT/PE (on Eliquis), DM2, GERD, obesity, who presents for a follow up appointment.  Pertinent history/events are as follows:     -Pt kindly referred by Viktor Summers PA-C for evaluation of DVT/PE.    -On 2/1/2024, Mrs. Loyd presented to the vascular lab for left lower extremity venous ultrasound due to left leg and foot pain.  The study revealed extensive occlusive DVT of the left distal SFA, popliteal, and posterior tibial veins.  Mrs. Loyd reports ongoing SOB.  Given these issues, I recommend the following:  -admit for initiation of full dose anticoagulation with heparin drip, and CTA chest to evaluate for pulmonary embolism.  -transition to Eliquis prior to discharge.   -results and plan discussed in detail with Mrs. Loyd, who voiced understanding.    -At our initial clinic visit on 2/6/2024, Mrs. Molina reports SOB which started before being diagnosed with COVID in early 1/2024.  States SOB worsened since that time.  Reports left leg pain starting in 1/2024.  LLE venous ultrasound on 2/1/2024 revealed extensive occlusive DVT of the left distal SFA, popliteal, and posterior tibial veins.  CTA Chest on 2/1/2024 revealed right lower lobe pulmonary arterial emboli. She was admitted for heparin drip and discharged on Eliquis.  Echo on 2/2/2024 with no evidence of right heart strain.  She is tolerating Eliquis with no bleeding issues.  Leg pain and swelling has improved since hospital disharge.  She continues to have SOB.  Plan:   LLE DVT/PE- Likely related to recent COVID infection.  Refer to Heme/Onc for hypercoagulable workup and age appropriate cancer screening.  Continue Eliquis 5 mg bid.  Obesity- Encourage diet, exercise, and weight loss.    HPI:  Mrs. Molina reports no chest pain, SOB, or significant LE  edema.  BLE Venous Ultrasound on 5/8/2024 revealed left popliteal chronic DVT and no RLE DVT.  CTA Chest on 5/6/2024 showed resolution of right lower lobe pulmonary arterial emboli.  No new pulmonary thromboemboli.    Past Medical History:   Diagnosis Date    Carpal tunnel syndrome, right upper limb 06/23/2022    Diabetes mellitus type II     GERD (gastroesophageal reflux disease)     Hx of multiple pulmonary nodules 06/15/2022    Migraines     Proteinuria     Pulmonary embolus 2024    Right lung with concurrent DVT left leg    Trigger finger, right ring finger 06/23/2022     Past Surgical History:   Procedure Laterality Date    CATARACT EXTRACTION      COSMETIC SURGERY  1971    rhinoplasty    EYE SURGERY  3 years ago    cataracts    JOINT REPLACEMENT  Twice in last 24 months    SHOULDER SURGERY Left 09/2020    TUBAL LIGATION  1992     Social History     Socioeconomic History    Marital status:    Tobacco Use    Smoking status: Never     Passive exposure: Never    Smokeless tobacco: Never   Substance and Sexual Activity    Alcohol use: Yes     Comment: None    Drug use: Not Currently     Types: Other-see comments     Comment: None    Sexual activity: Not Currently     Partners: Male     Birth control/protection: Post-menopausal     Social Determinants of Health     Financial Resource Strain: Low Risk  (2/4/2024)    Overall Financial Resource Strain (CARDIA)     Difficulty of Paying Living Expenses: Not hard at all   Food Insecurity: No Food Insecurity (2/4/2024)    Hunger Vital Sign     Worried About Running Out of Food in the Last Year: Never true     Ran Out of Food in the Last Year: Never true   Transportation Needs: No Transportation Needs (2/4/2024)    PRAPARE - Transportation     Lack of Transportation (Medical): No     Lack of Transportation (Non-Medical): No   Physical Activity: Inactive (2/4/2024)    Exercise Vital Sign     Days of Exercise per Week: 0 days     Minutes of Exercise per Session: 0 min    Stress: Stress Concern Present (2/4/2024)    [employer] - Occupational Stress Questionnaire     Feeling of Stress : Rather much   Housing Stability: Low Risk  (2/4/2024)    Housing Stability Vital Sign     Unable to Pay for Housing in the Last Year: No     Number of Places Lived in the Last Year: 1     Unstable Housing in the Last Year: No     Family History   Problem Relation Name Age of Onset    Bladder Cancer Mother jaxon     Hypertension Mother jaxon         CABG    Cancer Mother jaxon     Heart disease Mother jaxon     Heart failure Father Dadbrady         CABG    Hypertension Father Daddy     Heart disease Father Daddy     Arthritis Father Lonny     Stroke Brother Carmelo     No Known Problems Son      Cancer Maternal Aunt Gloria     Breast cancer Maternal Aunt Gloria     Cancer Paternal Aunt anabel     Diabetes Paternal Aunt anabel     Cancer Maternal Grandmother mitesh     Breast cancer Maternal Grandmother mitesh     Heart disease Maternal Grandfather Parents     Cancer Paternal Grandmother emigdio     Dementia Paternal Grandmother emigdio     Arthritis Paternal Grandmother emigdio     Heart disease Paternal Grandfather Family        Review of patient's allergies indicates:   Allergen Reactions    Gabapentin Hallucinations    Lyrica [pregabalin] Hallucinations    Mobic [meloxicam] Itching       Medication List with Changes/Refills   Current Medications    ALPRAZOLAM (XANAX) 0.5 MG TABLET    Take 1 tablet (0.5 mg total) by mouth 2 (two) times daily as needed for Anxiety.    AMITRIPTYLINE (ELAVIL) 25 MG TABLET    Take 1 tablet (25 mg total) by mouth nightly as needed for Insomnia.    APIXABAN (ELIQUIS) 5 MG TAB    Take 1 tablet (5 mg total) by mouth 2 (two) times daily.    BUTALBITAL-ACETAMINOPHEN-CAFFEINE -40 MG (FIORICET, ESGIC) -40 MG PER TABLET    Take 1 tablet by mouth every 4 (four) hours as needed for Pain.    DICYCLOMINE (BENTYL) 10 MG CAPSULE    Take 1 capsule (10 mg total) by mouth  4 (four) times daily.    ESOMEPRAZOLE (NEXIUM) 40 MG CAPSULE    TAKE 1 CAPSULE BY MOUTH TWICE DAILY BEFORE MEALS    LACTOBAC NO.41/BIFIDOBACT NO.7 (PROBIOTIC-10 ORAL)    Take by mouth once daily.    MECLIZINE (ANTIVERT) 25 MG TABLET    Take 1 tablet (25 mg total) by mouth 2 (two) times daily as needed.    METHOCARBAMOL (ROBAXIN) 500 MG TAB    TAKE 1 TABLET (500 MG TOTAL) BY MOUTH 3 (THREE) TIMES DAILY AS NEEDED (PAIN).    NYSTATIN (MYCOSTATIN) POWDER    Apply topically 4 (four) times daily.    ONDANSETRON (ZOFRAN) 8 MG TABLET    Take 1 tablet (8 mg total) by mouth every 8 (eight) hours as needed for Nausea.    PROMETHAZINE (PHENERGAN) 12.5 MG TAB    as needed.    PROPRANOLOL (INDERAL LA) 60 MG 24 HR CAPSULE    Take 60 mg by mouth as needed.    ROSUVASTATIN (CRESTOR) 40 MG TAB    TAKE 1 TABLET BY MOUTH ONCE DAILY    SERTRALINE (ZOLOFT) 25 MG TABLET    Take 1 tablet (25 mg total) by mouth once daily.    TELMISARTAN (MICARDIS) 20 MG TAB    TAKE 1 TABLET BY MOUTH EVERY DAY    TIRZEPATIDE (MOUNJARO) 2.5 MG/0.5 ML PNIJ    Inject 2.5 mg into the skin every 7 days.    TRAMADOL (ULTRAM) 50 MG TABLET    Take 1 tablet (50 mg total) by mouth every 8 (eight) hours as needed for Pain.    TRAZODONE (DESYREL) 50 MG TABLET    Take 1 tablet (50 mg total) by mouth every evening.   Discontinued Medications    COENZYME Q10 100 MG CAPSULE    Take 100 mg by mouth once daily.    METHYLPREDNISOLONE (MEDROL, RACHAEL,) 4 MG TABLET    Take as instructed on package    VENOFER 200 MG IRON/10 ML SOLN INJECTION    Inject into the vein.       Review of Systems  Constitution: Denies chills, fever, and sweats.  HENT: Denies headaches or blurry vision.  Cardiovascular: Denies chest pain or irregular heart beat.  Respiratory: Positive for cough and shortness of breath.  Gastrointestinal: Denies abdominal pain, nausea, or vomiting.  Musculoskeletal: Denies muscle cramps.  Neurological: Denies dizziness or focal weakness.  Psychiatric/Behavioral: Normal  mental status.  Hematologic/Lymphatic: Denies bleeding problem or easy bruising/bleeding.  Skin: Denies rash or suspicious lesions    Physical Examination  /79   Pulse 90   Ht 5' (1.524 m)   Wt 68 kg (149 lb 14.6 oz)   LMP 05/25/1998 (Approximate)   BMI 29.28 kg/m²     Constitutional: No acute distress, conversant  HEENT: Sclera anicteric, Pupils equal, round and reactive to light, extraocular motions intact, Oropharynx clear  Neck: No JVD, no carotid bruits  Cardiovascular: regular rate and rhythm, no murmur, rubs or gallops, normal S1/S2  Pulmonary: Clear to auscultation bilaterally  Abdominal: Abdomen soft, nontender, nondistended, positive bowel sounds  Extremities: No lower extremity edema   Pulses:  Carotid pulses are 2+ on the right side, and 2+ on the left side.  Radial pulses are 2+ on the right side, and 2+ on the left side.   Femoral pulses are 2+ on the right side, and 2+ on the left side.  Popliteal pulses are 2+ on the right side, and 2+ on the left side.   Dorsalis pedis pulses are 2+ on the right side, and 2+ on the left side.   Posterior tibial pulses are 2+ on the right side, and 2+ on the left side.    Skin: No ecchymosis, erythema, or ulcers  Psych: Alert and oriented x 3, appropriate affect  Neuro: CNII-XII intact, no focal deficits    Labs:  Most Recent Data  CBC:   Lab Results   Component Value Date    WBC 7.07 05/07/2024    HGB 12.9 05/07/2024    HCT 40.4 05/07/2024     05/07/2024    MCV 86 05/07/2024    RDW 15.7 (H) 05/07/2024     BMP:   Lab Results   Component Value Date     05/07/2024    K 4.5 05/07/2024     05/07/2024    CO2 26 05/07/2024    BUN 12 05/07/2024    CREATININE 0.9 05/07/2024     (H) 05/07/2024    CALCIUM 9.9 05/07/2024    MG 2.0 02/02/2024    PHOS 4.2 05/13/2021     LFTS;   Lab Results   Component Value Date    PROT 7.3 05/07/2024    ALBUMIN 3.7 05/07/2024    BILITOT 0.3 05/07/2024    AST 16 05/07/2024    ALKPHOS 102 05/07/2024    ALT 18  05/07/2024     COAGS:   Lab Results   Component Value Date    INR 1.0 02/01/2024     FLP:   Lab Results   Component Value Date    CHOL 166 08/01/2023    HDL 71 08/01/2023    LDLCALC 77.4 08/01/2023    TRIG 88 08/01/2023    CHOLHDL 42.8 08/01/2023     CARDIAC:   Lab Results   Component Value Date    TROPONINI 0.063 (H) 02/02/2024    BNP 10 02/01/2024       Imaging:    EKG 2/1/2024:  Normal sinus rhythm   Nonspecific T wave abnormality   Low voltage, precordial leads     BLE Venous Ultrasound 5/8/2024:    There is no evidence of a right lower extremity DVT.    Left popliteal vein chronic DVT.    CTA Chest 5/6/2024:  1. Resolution of right lower lobe pulmonary arterial emboli.  No new pulmonary thromboemboli.  2. Unchanged pulmonary nodules    Echo 2/2/2024:    Left Ventricle: The left ventricle is normal in size. Ventricular mass is normal. Normal wall thickness. Normal wall motion. There is normal systolic function with a visually estimated ejection fraction of 60 - 65%. Ejection fraction by visual approximation is 65%. There is normal diastolic function.    Right Ventricle: Normal right ventricular cavity size. Wall thickness is normal. Right ventricle wall motion  is normal. Systolic function is normal.    Pulmonary Artery: The estimated pulmonary artery systolic pressure is 31 mmHg.    IVC/SVC: Normal venous pressure at 3 mmHg.    LLE Venous Ultrasound 2/1/2024:    The left superficial femoral distal vein is noncompressible with thrombus present, consistent with acute DVT.    The left popliteal vein is noncompressible with thrombus present, consistent with acute DVT.    The left posterior tibial vein is noncompressible with thrombus present, consistent with acute DVT.    The contralateral (right) common femoral vein is patent.    Patient transferred to the emergency department for admission for initiation of full-dose anticoagulation, and CTA chest to evaluate for pulmonary embolism.    CTA Chest 2/1/2024:  Right  lower lobe pulmonary arterial emboli.   Small hiatal hernia.    Assessment/Plan:  Jayla Loyd is a 73 y.o. female with LLE DVT/PE (on Eliquis), DM2, GERD, obesity, who presents for a follow up appointment.    LLE DVT/PE- Likely related to recent COVID infection.  Mrs. Molina reports no chest pain, SOB, or significant LE edema.  BLE Venous Ultrasound on 5/8/2024 revealed left popliteal chronic DVT and no RLE DVT.  CTA Chest on 5/6/2024 showed resolution of right lower lobe pulmonary arterial emboli.  No new pulmonary thromboemboli.  Continue Eliquis 5 mg bid.  Repeat BLE venous ultrasound in 6 months.    2. Obesity- Encourage diet, exercise, and weight loss.    Follow up in 6 months with BLE venous ultrasound prior    Total duration of face to face visit time 30 minutes.  Total time spent counseling greater than fifty percent of total visit time.  Counseling included discussion regarding imaging findings, diagnosis, possibilities, treatment options, risks and benefits.  The patient had many questions regarding the options and long-term effects.    Edmond Sifuentes MD, PhD  Interventional Cardiology

## 2024-05-13 ENCOUNTER — PATIENT MESSAGE (OUTPATIENT)
Dept: PRIMARY CARE CLINIC | Facility: CLINIC | Age: 74
End: 2024-05-13
Payer: MEDICARE

## 2024-05-13 PROBLEM — I26.93 SINGLE SUBSEGMENTAL PULMONARY EMBOLISM WITHOUT ACUTE COR PULMONALE: Status: RESOLVED | Noted: 2024-02-02 | Resolved: 2024-05-13

## 2024-05-13 PROBLEM — I26.09 ACUTE PULMONARY EMBOLISM WITH ACUTE COR PULMONALE: Status: RESOLVED | Noted: 2024-02-06 | Resolved: 2024-05-13

## 2024-05-14 DIAGNOSIS — E11.69 TYPE 2 DIABETES MELLITUS WITH OTHER SPECIFIED COMPLICATION, WITHOUT LONG-TERM CURRENT USE OF INSULIN: ICD-10-CM

## 2024-05-15 RX ORDER — TIRZEPATIDE 2.5 MG/.5ML
2.5 INJECTION, SOLUTION SUBCUTANEOUS
Qty: 12 PEN | Refills: 0 | Status: SHIPPED | OUTPATIENT
Start: 2024-05-15

## 2024-05-15 NOTE — TELEPHONE ENCOUNTER
No care due was identified.  [facility] Embedded Care Due Messages. Reference number: [id number].   5/14/2024 9:44:30 PM CDT

## 2024-05-15 NOTE — TELEPHONE ENCOUNTER
Refill Routing Note   Medication(s) are not appropriate for processing by [facility] for the following reason(s):        New or recently adjusted medication    [facility] action(s):  Defer             Appointments  past 12m or future 3m with PCP    Date Provider   Last Visit   3/27/2024 Geena Barnard MD   Next Visit   5/21/2024 Geena Barnard MD   ED visits in past 90 days: 0        Note composed:10:03 PM 05/14/2024

## 2024-05-21 ENCOUNTER — OFFICE VISIT (OUTPATIENT)
Dept: PRIMARY CARE CLINIC | Facility: CLINIC | Age: 74
End: 2024-05-21
Payer: MEDICARE

## 2024-05-21 VITALS
WEIGHT: 147.5 LBS | HEART RATE: 79 BPM | OXYGEN SATURATION: 97 % | HEIGHT: 60 IN | SYSTOLIC BLOOD PRESSURE: 132 MMHG | DIASTOLIC BLOOD PRESSURE: 70 MMHG | BODY MASS INDEX: 28.96 KG/M2

## 2024-05-21 DIAGNOSIS — E78.5 HYPERLIPIDEMIA, UNSPECIFIED HYPERLIPIDEMIA TYPE: ICD-10-CM

## 2024-05-21 DIAGNOSIS — E11.69 TYPE 2 DIABETES MELLITUS WITH OTHER SPECIFIED COMPLICATION, WITHOUT LONG-TERM CURRENT USE OF INSULIN: Primary | ICD-10-CM

## 2024-05-21 DIAGNOSIS — I10 HYPERTENSION, UNSPECIFIED TYPE: ICD-10-CM

## 2024-05-21 DIAGNOSIS — F41.9 ANXIETY: ICD-10-CM

## 2024-05-21 DIAGNOSIS — I82.412 ACUTE DEEP VEIN THROMBOSIS (DVT) OF FEMORAL VEIN OF LEFT LOWER EXTREMITY: ICD-10-CM

## 2024-05-21 PROCEDURE — 99999 PR PBB SHADOW E&M-EST. PATIENT-LVL III: CPT | Mod: PBBFAC,,, | Performed by: INTERNAL MEDICINE

## 2024-05-21 PROCEDURE — 99214 OFFICE O/P EST MOD 30 MIN: CPT | Mod: S$GLB,,, | Performed by: INTERNAL MEDICINE

## 2024-05-21 PROCEDURE — 3051F HG A1C>EQUAL 7.0%<8.0%: CPT | Mod: CPTII,S$GLB,, | Performed by: INTERNAL MEDICINE

## 2024-05-21 PROCEDURE — 3078F DIAST BP <80 MM HG: CPT | Mod: CPTII,S$GLB,, | Performed by: INTERNAL MEDICINE

## 2024-05-21 PROCEDURE — 3075F SYST BP GE 130 - 139MM HG: CPT | Mod: CPTII,S$GLB,, | Performed by: INTERNAL MEDICINE

## 2024-05-21 PROCEDURE — 1159F MED LIST DOCD IN RCRD: CPT | Mod: CPTII,S$GLB,, | Performed by: INTERNAL MEDICINE

## 2024-05-21 PROCEDURE — 3008F BODY MASS INDEX DOCD: CPT | Mod: CPTII,S$GLB,, | Performed by: INTERNAL MEDICINE

## 2024-05-21 RX ORDER — MECLIZINE HYDROCHLORIDE 25 MG/1
25 TABLET ORAL 2 TIMES DAILY PRN
Qty: 60 TABLET | Refills: 0 | Status: SHIPPED | OUTPATIENT
Start: 2024-05-21

## 2024-05-21 RX ORDER — SERTRALINE HYDROCHLORIDE 50 MG/1
50 TABLET, FILM COATED ORAL DAILY
Qty: 90 TABLET | Refills: 3 | Status: SHIPPED | OUTPATIENT
Start: 2024-05-21 | End: 2025-05-21

## 2024-05-21 NOTE — PROGRESS NOTES
[facility] Clinic Note    Chief Complaint      Chief Complaint   Patient presents with    Follow-up     3 mth     History of Present Illness      Jayla Loyd is a 73 y.o. female who presents today for chief complaint follow up DVT, DM .     HPI   Last annual 12.22, annual labs due 8.24  Last seen 3.27 on follow up DVT and dyspnea   Recent new dx PE/DVT post covid:  LLE venous ultrasound on 2/1/2024 revealed extensive occlusive DVT of the left distal SFA, popliteal, and posterior tibial veins.  CTA Chest on 2/1/2024 revealed right lower lobe pulmonary arterial emboli   She had echo during admission which was negative. Started eliquis. Had negative hypercoagulable work up. Dyspnea resolved    She saw Dr Sifuentes on5.9.24 - Venous Ultrasound on 5/8/2024 revealed left popliteal chronic DVT and no RLE DVT.  CTA Chest on 5/6/2024 showed resolution of right lower lobe pulmonary arterial emboli.  He rec'd to ct eliquis bid another 6 mos and follow up LE u/s  She is undergoing iron infusions for DEE,- she saw HO Statin on 5.7.24 who said good response to Fe and will follow q3mo. Occult blood for anemia     Foot pain has seen Dileep who rec injxn via IR of TMT    DM A1c at goal 7.0 mild microalb and nl RF, on metformin. She's on mounjaro has lost 15# in 5-6 months    HLD LDL 77 on crestor     HTN at goal on micardis     DOMINIQUE MDD she is on zoloft and xanax prn     Sleep disturbance she is on trazodone     4 mm subpleural nodule in the left lower lobe - unchanged per rad, never smoker, per Fleischemelia no need follow up  Active Problem List with Overview Notes    Diagnosis Date Noted    Acute deep vein thrombosis (DVT) of femoral vein of left lower extremity 02/06/2024    Acute deep vein thrombosis (DVT) of proximal vein of lower extremity 02/01/2024    Abnormal CT of the chest 03/29/2023    Lung nodules 03/29/2023    Aortic arch atherosclerosis 03/29/2023     Mild calcification of aortic arch and cusps seen  on CT         Overweight (BMI 25.0-29.9) 03/29/2023    Type 2 diabetes mellitus without complication, without long-term current use of insulin 03/29/2023    DM type 2 without retinopathy 03/29/2023     Per pt      Osteoarthritis of both shoulders 02/03/2023    Peripheral vascular disease 08/25/2022    Acute ankle pain 05/31/2022    Night sweats 05/25/2022    Cox's esophagus 02/16/2022    Chronic cough 04/13/2021    Tremor 02/15/2021    Sleep disturbance 02/15/2021    Venous insufficiency 02/15/2021    Dizziness 01/19/2021    Subclinical hyperthyroidism 12/17/2020    HLD (hyperlipidemia) 07/22/2020     On crestor       Arthritis of left shoulder region 07/16/2020     seeing [place] [facility] pending shoulder MRI       Anxiety 07/16/2020    Type 2 diabetes mellitus with other specified complication, without long-term current use of insulin      Sees dr butt had recent a1c, he also does foot exam      Allergic rhinitis due to pollen 07/08/2020    Hematuria, unspecified 07/08/2020    Iron deficiency anemia, unspecified 06/30/2019    Hyperphosphatemia 08/19/2018    HTN (hypertension) 08/19/2018     At goal on lasix and micardis, no chest pain or shortness of breath       Proteinuria, unspecified 08/19/2018     Seeing dr hanson       Gastroesophageal reflux disease 07/16/2014    Adenomatous polyp of colon 07/16/2014    Chronic mixed headache syndrome 07/16/2014     Has chronic migraines, uses fioricet   Tried amovig, has not tried triptan - not interested  Sees Charly Do- [facility]      Type 2 diabetes mellitus with hypercholesterolemia 08/01/2012       Health Maintenance   Topic Date Due    TETANUS VACCINE  02/01/2008    Foot Exam  09/03/2021    DEXA Scan  08/11/2023    Lipid Panel  08/01/2024    Hemoglobin A1c  08/01/2024    Mammogram  12/11/2024    Eye Exam  02/20/2025    High Dose Statin  05/09/2025    Colorectal Cancer Screening  02/08/2027    Hepatitis C Screening  Completed    Shingles Vaccine  Discontinued        Past Medical History:   Diagnosis Date    Carpal tunnel syndrome, right upper limb 06/23/2022    Diabetes mellitus type II     GERD (gastroesophageal reflux disease)     Hx of multiple pulmonary nodules 06/15/2022    Migraines     Proteinuria     Pulmonary embolus 2024    Right lung with concurrent DVT left leg    Trigger finger, right ring finger 06/23/2022       Past Surgical History:   Procedure Laterality Date    CATARACT EXTRACTION      COSMETIC SURGERY  1971    rhinoplasty    EYE SURGERY  3 years ago    cataracts    JOINT REPLACEMENT  Twice in last 24 months    SHOULDER SURGERY Left 09/2020    TUBAL LIGATION  1992       family history includes Arthritis in her father and paternal grandmother; Bladder Cancer in her mother; Breast cancer in her maternal aunt and maternal grandmother; Cancer in her maternal aunt, maternal grandmother, mother, paternal aunt, and paternal grandmother; Dementia in her paternal grandmother; Diabetes in her paternal aunt; Heart disease in her father, maternal grandfather, mother, and paternal grandfather; Heart failure in her father; Hypertension in her father and mother; No Known Problems in her son; Stroke in her brother.    Social History     Tobacco Use    Smoking status: Never     Passive exposure: Never    Smokeless tobacco: Never   Substance Use Topics    Alcohol use: Yes     Comment: None    Drug use: Not Currently     Types: Other-see comments     Comment: None       Review of Systems   Constitutional:  Negative for chills, fever, malaise/fatigue and weight loss.   Respiratory:  Negative for cough, sputum production, shortness of breath and wheezing.    Cardiovascular:  Negative for chest pain, palpitations, orthopnea and leg swelling.   Gastrointestinal:  Negative for constipation, diarrhea, nausea and vomiting.   Genitourinary:  Negative for dysuria, frequency, hematuria and urgency.        Outpatient Encounter Medications as of 5/21/2024   Medication Sig Note  Dispense Refill    ALPRAZolam (XANAX) 0.5 MG tablet Take 1 tablet (0.5 mg total) by mouth 2 (two) times daily as needed for Anxiety.  60 tablet 0    apixaban (ELIQUIS) 5 mg Tab Take 1 tablet (5 mg total) by mouth 2 (two) times daily.  60 tablet 11    butalbital-acetaminophen-caffeine -40 mg (FIORICET, ESGIC) -40 mg per tablet Take 1 tablet by mouth every 4 (four) hours as needed for Pain.  60 tablet 1    dicyclomine (BENTYL) 10 MG capsule Take 1 capsule (10 mg total) by mouth 4 (four) times daily. (Patient taking differently: Take 10 mg by mouth as needed.)  120 capsule 1    esomeprazole (NEXIUM) 40 MG capsule TAKE 1 CAPSULE BY MOUTH TWICE DAILY BEFORE MEALS  180 capsule 2    Lactobac no.41/Bifidobact no.7 (PROBIOTIC-10 ORAL) Take by mouth once daily.       methocarbamoL (ROBAXIN) 500 MG Tab TAKE 1 TABLET (500 MG TOTAL) BY MOUTH 3 (THREE) TIMES DAILY AS NEEDED (PAIN).  60 tablet 1    nystatin (MYCOSTATIN) powder Apply topically 4 (four) times daily. (Patient taking differently: Apply topically as needed.)  60 g 0    ondansetron (ZOFRAN) 8 MG tablet Take 1 tablet (8 mg total) by mouth every 8 (eight) hours as needed for Nausea.  20 tablet 1    promethazine (PHENERGAN) 12.5 MG Tab as needed.       propranoloL (INDERAL LA) 60 MG 24 hr capsule Take 60 mg by mouth as needed.       rosuvastatin (CRESTOR) 40 MG Tab TAKE 1 TABLET BY MOUTH ONCE DAILY  90 tablet 1    telmisartan (MICARDIS) 20 MG Tab TAKE 1 TABLET BY MOUTH EVERY DAY 2/5/2024: 0.5 tablet daily 90 tablet 1    tirzepatide (MOUNJARO) 2.5 mg/0.5 mL PnIj Inject 2.5 mg into the skin every 7 days.  12 Pen 0    traMADoL (ULTRAM) 50 mg tablet Take 1 tablet (50 mg total) by mouth every 8 (eight) hours as needed for Pain.  21 tablet 0    traZODone (DESYREL) 50 MG tablet Take 1 tablet (50 mg total) by mouth every evening.  90 tablet 3    [DISCONTINUED] amitriptyline (ELAVIL) 25 MG tablet Take 1 tablet (25 mg total) by mouth nightly as needed for Insomnia.  90  tablet 1    [DISCONTINUED] meclizine (ANTIVERT) 25 mg tablet Take 1 tablet (25 mg total) by mouth 2 (two) times daily as needed.  60 tablet 0    [DISCONTINUED] sertraline (ZOLOFT) 25 MG tablet Take 1 tablet (25 mg total) by mouth once daily.  90 tablet 3    meclizine (ANTIVERT) 25 mg tablet Take 1 tablet (25 mg total) by mouth 2 (two) times daily as needed.  60 tablet 0    sertraline (ZOLOFT) 50 MG tablet Take 1 tablet (50 mg total) by mouth once daily.  90 tablet 3     No facility-administered encounter medications on file as of 5/21/2024.        Review of patient's allergies indicates:   Allergen Reactions    Gabapentin Hallucinations    Lyrica [pregabalin] Hallucinations    Mobic [meloxicam] Itching           Physical Exam      Vital Signs  Pulse: 79  SpO2: 97 %  BP: 132/70  BP Location: Right arm  Patient Position: Sitting  Height and Weight  Height: 5' (152.4 cm)  Weight: 66.9 kg (147 lb 7.8 oz)  BSA (Calculated - sq m): 1.68 sq meters  BMI (Calculated): 28.8  Weight in (lb) to have BMI = 25: 127.7]    Physical Exam  Vitals reviewed.   Constitutional:       General: She is not in acute distress.     Appearance: She is well-developed. She is not diaphoretic.   HENT:      Head: Normocephalic and atraumatic.      Right Ear: External ear normal.      Left Ear: External ear normal.      Nose: Nose normal.   Eyes:      General:         Right eye: No discharge.         Left eye: No discharge.      Conjunctiva/sclera: Conjunctivae normal.   Cardiovascular:      Rate and Rhythm: Normal rate and regular rhythm.      Heart sounds: Normal heart sounds.   Pulmonary:      Effort: Pulmonary effort is normal. No respiratory distress.   Musculoskeletal:         General: Normal range of motion.      Cervical back: Normal range of motion.   Skin:     Coloration: Skin is not pale.      Findings: No rash.   Neurological:      Mental Status: She is alert and oriented to person, place, and time.   Psychiatric:         Behavior:  "Behavior normal.         Thought Content: Thought content normal.          Laboratory:  CBC:  Recent Labs   Lab Result Units 05/07/24  1116   WBC K/uL 7.07   RBC M/uL 4.68   Hemoglobin g/dL 12.9   Hematocrit % 40.4   Platelets K/uL 370   MCV fL 86   MCH pg 27.6   MCHC g/dL 31.9*     CMP:  Recent Labs   Lab Result Units 05/07/24  1116   Glucose mg/dL 123*   Calcium mg/dL 9.9   Albumin g/dL 3.7   Total Protein g/dL 7.3   Sodium mmol/L 139   Potassium mmol/L 4.5   CO2 mmol/L 26   Chloride mmol/L 105   BUN mg/dL 12   Alkaline Phosphatase U/L 102   ALT U/L 18   AST U/L 16   Total Bilirubin mg/dL 0.3     URINALYSIS:  No results for input(s): "COLORU", "CLARITYU", "SPECGRAV", "PHUR", "PROTEINUA", "GLUCOSEU", "BILIRUBINCON", "BLOODU", "WBCU", "RBCU", "BACTERIA", "MUCUS", "NITRITE", "LEUKOCYTESUR", "UROBILINOGEN", "HYALINECASTS" in the last 2160 hours.   LIPIDS:  No results for input(s): "TSH", "HDL", "CHOL", "TRIG", "LDLCALC", "CHOLHDL", "NONHDLCHOL", "TOTALCHOLEST" in the last 2160 hours.  TSH:  No results for input(s): "TSH" in the last 2160 hours.  A1C:  No results for input(s): "HGBA1C" in the last 2160 hours.        Assessment/Plan     Jayla Loyd is a 73 y.o.female with:    1. Type 2 diabetes mellitus with other specified complication, without long-term current use of insulin  Overview:  Sees dr butt had recent a1c, he also does foot exam    Orders:  -     Comprehensive Metabolic Panel; Future; Expected date: 05/21/2024  -     Lipid Panel; Future; Expected date: 05/21/2024  -     Microalbumin/Creatinine Ratio, Urine; Future  -     Hemoglobin A1C; Future; Expected date: 05/21/2024    2. Anxiety  -     sertraline (ZOLOFT) 50 MG tablet; Take 1 tablet (50 mg total) by mouth once daily.  Dispense: 90 tablet; Refill: 3    3. Acute deep vein thrombosis (DVT) of femoral vein of left lower extremity  Assessment & Plan:  Per IC, continue eliquis       4. Hypertension, unspecified type  Overview:  At goal on lasix and " micardis, no chest pain or shortness of breath     Assessment & Plan:  At goal       5. Hyperlipidemia, unspecified hyperlipidemia type  Overview:  On crestor     Assessment & Plan:  At goal       Other orders  -     meclizine (ANTIVERT) 25 mg tablet; Take 1 tablet (25 mg total) by mouth 2 (two) times daily as needed.  Dispense: 60 tablet; Refill: 0         Use of the [facility] Patient Portal discussed and encouraged during today's visit  -Continue current medications and maintain follow up with specialists.  Return to clinic in .  Future Appointments   Date Time Provider Department Center   7/26/2024 11:00 AM Dusty Wright MD [facility] PULMON [place]   8/19/2024  9:00 AM [facility] LAB[facility] Sujit Porter   8/19/2024 10:00 AM Joy Tamez MD [facility] BMT Sujit Barnard MD  5/21/2024 4:02 PM    Primary Care Internal Medicine

## 2024-05-30 ENCOUNTER — OFFICE VISIT (OUTPATIENT)
Dept: PRIMARY CARE CLINIC | Facility: CLINIC | Age: 74
End: 2024-05-30
Payer: MEDICARE

## 2024-05-30 DIAGNOSIS — R42 DIZZINESS: Primary | ICD-10-CM

## 2024-05-30 DIAGNOSIS — L65.9 HAIR LOSS: ICD-10-CM

## 2024-05-30 DIAGNOSIS — E11.69 TYPE 2 DIABETES MELLITUS WITH OTHER SPECIFIED COMPLICATION, WITHOUT LONG-TERM CURRENT USE OF INSULIN: ICD-10-CM

## 2024-05-30 PROCEDURE — 99214 OFFICE O/P EST MOD 30 MIN: CPT | Mod: 95,,, | Performed by: INTERNAL MEDICINE

## 2024-05-30 PROCEDURE — 3051F HG A1C>EQUAL 7.0%<8.0%: CPT | Mod: CPTII,95,, | Performed by: INTERNAL MEDICINE

## 2024-05-30 NOTE — PROGRESS NOTES
[facility]  Internal Medicine Clinic Note  The patient location is: [place]  The chief complaint leading to consultation is: VERTIGO     Visit type: audiovisual    Face to Face time with patient: 10  10 minutes of total time spent on the encounter, which includes face to face time and non-face to face time preparing to see the patient (eg, review of tests), Obtaining and/or reviewing separately obtained history, Documenting clinical information in the electronic or other health record, Independently interpreting results (not separately reported) and communicating results to the patient/family/caregiver, or Care coordination (not separately reported).         Each patient to whom he or she provides medical services by telemedicine is:  (1) informed of the relationship between the physician and patient and the respective role of any other health care provider with respect to management of the patient; and (2) notified that he or she may decline to receive medical services by telemedicine and may withdraw from such care at any time.    Notes:     Chief Complaint      Chief Complaint   Patient presents with    Dizziness     History of Present Illness      Jayla Loyd is a 73 y.o. female who presents today for chief complaint BPPV.   Dizziness:    Associated symptoms: headaches.no weakness and no chest pain.  Diabetes  She has type 2 diabetes mellitus. No MedicAlert identification noted. The initial diagnosis of diabetes was made 2014 years ago. Hypoglycemia symptoms include dizziness, headaches and tremors. Pertinent negatives for hypoglycemia include no confusion, hunger, mood changes, nervousness/anxiousness, pallor, seizures, sleepiness, speech difficulty or sweats. Associated symptoms include blurred vision and fatigue. Pertinent negatives for diabetes include no chest pain, no foot paresthesias, no foot ulcerations, no polydipsia, no polyphagia, no polyuria, no visual change, no weakness and no weight loss.  Hypoglycemia complications include blackouts. Pertinent negatives for hypoglycemia complications include no hospitalization, no nocturnal hypoglycemia, no required assistance and no required glucagon injection. Symptoms are improving. Pertinent negatives for diabetic complications include no autonomic neuropathy, CVA, heart disease, nephropathy, peripheral neuropathy, PVD or retinopathy. Risk factors for coronary artery disease include dyslipidemia, family history and stress. Current diabetic treatment includes diet and oral agent (monotherapy). She is compliant with treatment most of the time. She is currently taking insulin pre-dinner. Insulin injections are given by patient. Rotation sites for injection include the abdominal wall. Her weight is decreasing steadily. She is following a generally healthy diet. When asked about meal planning, she reported none and carbohydrate counting. Meal planning includes none and carbohydrate counting. She has not had a previous visit with a dietitian. She participates in exercise intermittently. She monitors blood glucose at home 1-2 x per day. She monitors urine at home <1 x per month. Blood glucose monitoring compliance is fair. There is no change in her home blood glucose trend. She sees a podiatrist.Eye exam is current.      She had an episode of dizziness 6 days ago, blurred vision, 10-15 seconds,   She did vestibular therapy x1 in March - will re refer   She wants to get back to exercise     She is on mounjaro for dm-- she would like to increase from 2.5 to 5 when she runs out of this rx     She reports some hair loss she thinks that's due to GLP1     Active Problem List with Overview Notes    Diagnosis Date Noted    Acute deep vein thrombosis (DVT) of femoral vein of left lower extremity 02/06/2024    Acute deep vein thrombosis (DVT) of proximal vein of lower extremity 02/01/2024    Abnormal CT of the chest 03/29/2023    Lung nodules 03/29/2023    Aortic arch  atherosclerosis 03/29/2023     Mild calcification of aortic arch and cusps seen on CT         Overweight (BMI 25.0-29.9) 03/29/2023    Type 2 diabetes mellitus without complication, without long-term current use of insulin 03/29/2023    DM type 2 without retinopathy 03/29/2023     Per pt      Osteoarthritis of both shoulders 02/03/2023    Peripheral vascular disease 08/25/2022    Acute ankle pain 05/31/2022    Night sweats 05/25/2022    Cox's esophagus 02/16/2022    Chronic cough 04/13/2021    Tremor 02/15/2021    Sleep disturbance 02/15/2021    Venous insufficiency 02/15/2021    Dizziness 01/19/2021    Subclinical hyperthyroidism 12/17/2020    HLD (hyperlipidemia) 07/22/2020     On crestor       Arthritis of left shoulder region 07/16/2020     seeing [place] [facility] pending shoulder MRI       Anxiety 07/16/2020    Type 2 diabetes mellitus with other specified complication, without long-term current use of insulin      Sees dr butt had recent a1c, he also does foot exam      Allergic rhinitis due to pollen 07/08/2020    Hematuria, unspecified 07/08/2020    Iron deficiency anemia, unspecified 06/30/2019    Hyperphosphatemia 08/19/2018    HTN (hypertension) 08/19/2018     At goal on lasix and micardis, no chest pain or shortness of breath       Proteinuria, unspecified 08/19/2018     Seeing dr hanson       Gastroesophageal reflux disease 07/16/2014    Adenomatous polyp of colon 07/16/2014    Chronic mixed headache syndrome 07/16/2014     Has chronic migraines, uses fioricet   Tried amovig, has not tried triptan - not interested  Sees [facility]      Type 2 diabetes mellitus with hypercholesterolemia 08/01/2012       Health Maintenance   Topic Date Due    TETANUS VACCINE  02/01/2008    Foot Exam  09/03/2021    DEXA Scan  08/11/2023    Lipid Panel  08/01/2024    Hemoglobin A1c  08/01/2024    Mammogram  12/11/2024    Eye Exam  02/20/2025    High Dose Statin  05/21/2025    Colorectal Cancer Screening   02/08/2027    Hepatitis C Screening  Completed    Shingles Vaccine  Discontinued       Past Medical History:   Diagnosis Date    Carpal tunnel syndrome, right upper limb 06/23/2022    Diabetes mellitus type II     GERD (gastroesophageal reflux disease)     Hx of multiple pulmonary nodules 06/15/2022    Migraines     Proteinuria     Pulmonary embolus 2024    Right lung with concurrent DVT left leg    Trigger finger, right ring finger 06/23/2022       Past Surgical History:   Procedure Laterality Date    CATARACT EXTRACTION      COSMETIC SURGERY  1971    rhinoplasty    EYE SURGERY  3 years ago    cataracts    JOINT REPLACEMENT  Twice in last 24 months    SHOULDER SURGERY Left 09/2020    TUBAL LIGATION  1992       family history includes Arthritis in her father and paternal grandmother; Bladder Cancer in her mother; Breast cancer in her maternal aunt and maternal grandmother; Cancer in her maternal aunt, maternal grandmother, mother, paternal aunt, and paternal grandmother; Dementia in her paternal grandmother; Diabetes in her paternal aunt; Heart disease in her father, maternal grandfather, mother, and paternal grandfather; Heart failure in her father; Hypertension in her father and mother; No Known Problems in her son; Stroke in her brother.    Social History     Tobacco Use    Smoking status: Never     Passive exposure: Never    Smokeless tobacco: Never   Substance Use Topics    Alcohol use: Yes     Comment: None    Drug use: Not Currently     Types: Other-see comments     Comment: None       Review of Systems   Constitutional:  Positive for fatigue. Negative for weight loss.   Eyes:  Positive for blurred vision.   Cardiovascular:  Negative for chest pain.   Skin:  Negative for pallor.   Neurological:  Positive for dizziness, tremors and headaches. Negative for seizures, speech difficulty and weakness.   Endo/Heme/Allergies:  Negative for polydipsia and polyphagia.   Psychiatric/Behavioral:  Negative for confusion.  The patient is not nervous/anxious.         Outpatient Encounter Medications as of 5/30/2024   Medication Sig Note Dispense Refill    ALPRAZolam (XANAX) 0.5 MG tablet Take 1 tablet (0.5 mg total) by mouth 2 (two) times daily as needed for Anxiety.  60 tablet 0    apixaban (ELIQUIS) 5 mg Tab Take 1 tablet (5 mg total) by mouth 2 (two) times daily.  60 tablet 11    butalbital-acetaminophen-caffeine -40 mg (FIORICET, ESGIC) -40 mg per tablet Take 1 tablet by mouth every 4 (four) hours as needed for Pain.  60 tablet 1    dicyclomine (BENTYL) 10 MG capsule Take 1 capsule (10 mg total) by mouth 4 (four) times daily. (Patient taking differently: Take 10 mg by mouth as needed.)  120 capsule 1    esomeprazole (NEXIUM) 40 MG capsule TAKE 1 CAPSULE BY MOUTH TWICE DAILY BEFORE MEALS  180 capsule 2    Lactobac no.41/Bifidobact no.7 (PROBIOTIC-10 ORAL) Take by mouth once daily.       meclizine (ANTIVERT) 25 mg tablet Take 1 tablet (25 mg total) by mouth 2 (two) times daily as needed.  60 tablet 0    methocarbamoL (ROBAXIN) 500 MG Tab TAKE 1 TABLET (500 MG TOTAL) BY MOUTH 3 (THREE) TIMES DAILY AS NEEDED (PAIN).  60 tablet 1    nystatin (MYCOSTATIN) powder Apply topically 4 (four) times daily. (Patient taking differently: Apply topically as needed.)  60 g 0    ondansetron (ZOFRAN) 8 MG tablet Take 1 tablet (8 mg total) by mouth every 8 (eight) hours as needed for Nausea.  20 tablet 1    promethazine (PHENERGAN) 12.5 MG Tab as needed.       propranoloL (INDERAL LA) 60 MG 24 hr capsule Take 60 mg by mouth as needed.       rosuvastatin (CRESTOR) 40 MG Tab TAKE 1 TABLET BY MOUTH ONCE DAILY  90 tablet 1    sertraline (ZOLOFT) 50 MG tablet Take 1 tablet (50 mg total) by mouth once daily.  90 tablet 3    telmisartan (MICARDIS) 20 MG Tab TAKE 1 TABLET BY MOUTH EVERY DAY 2/5/2024: 0.5 tablet daily 90 tablet 1    tirzepatide (MOUNJARO) 2.5 mg/0.5 mL PnIj Inject 2.5 mg into the skin every 7 days.  12 Pen 0    traMADoL (ULTRAM) 50  "mg tablet Take 1 tablet (50 mg total) by mouth every 8 (eight) hours as needed for Pain.  21 tablet 0    traZODone (DESYREL) 50 MG tablet Take 1 tablet (50 mg total) by mouth every evening.  90 tablet 3     No facility-administered encounter medications on file as of 5/30/2024.        Review of patient's allergies indicates:   Allergen Reactions    Gabapentin Hallucinations    Lyrica [pregabalin] Hallucinations    Mobic [meloxicam] Itching           Physical Exam       ]    Physical Exam  Constitutional:       General: She is not in acute distress.     Appearance: She is well-developed. She is not diaphoretic.   HENT:      Head: Normocephalic and atraumatic.      Right Ear: External ear normal.      Left Ear: External ear normal.      Nose: Nose normal.   Eyes:      General:         Right eye: No discharge.         Left eye: No discharge.      Conjunctiva/sclera: Conjunctivae normal.   Pulmonary:      Effort: Pulmonary effort is normal. No respiratory distress.   Musculoskeletal:         General: Normal range of motion.      Cervical back: Normal range of motion.   Skin:     Coloration: Skin is not pale.      Findings: No rash.   Neurological:      Mental Status: She is alert and oriented to person, place, and time.   Psychiatric:         Behavior: Behavior normal.         Thought Content: Thought content normal.          Laboratory:  CBC:  Recent Labs   Lab Result Units 05/07/24  1116   WBC K/uL 7.07   RBC M/uL 4.68   Hemoglobin g/dL 12.9   Hematocrit % 40.4   Platelets K/uL 370   MCV fL 86   MCH pg 27.6   MCHC g/dL 31.9*     CMP:  Recent Labs   Lab Result Units 05/07/24  1116   Glucose mg/dL 123*   Calcium mg/dL 9.9   Albumin g/dL 3.7   Total Protein g/dL 7.3   Sodium mmol/L 139   Potassium mmol/L 4.5   CO2 mmol/L 26   Chloride mmol/L 105   BUN mg/dL 12   Alkaline Phosphatase U/L 102   ALT U/L 18   AST U/L 16   Total Bilirubin mg/dL 0.3     URINALYSIS:  No results for input(s): "COLORU", "CLARITYU", "SPECGRAV", " ""PHUR", "PROTEINUA", "GLUCOSEU", "BILIRUBINCON", "BLOODU", "WBCU", "RBCU", "BACTERIA", "MUCUS", "NITRITE", "LEUKOCYTESUR", "UROBILINOGEN", "HYALINECASTS" in the last 2160 hours.   LIPIDS:  No results for input(s): "TSH", "HDL", "CHOL", "TRIG", "LDLCALC", "CHOLHDL", "NONHDLCHOL", "TOTALCHOLEST" in the last 2160 hours.  TSH:  No results for input(s): "TSH" in the last 2160 hours.  A1C:  No results for input(s): "HGBA1C" in the last 2160 hours.        Assessment/Plan     Jayla Loyd is a 73 y.o.female with:    1. Dizziness  -     Ambulatory referral/consult to Physical/Occupational Therapy; Future; Expected date: 06/06/2024    2. Hair loss\  Encouraged biotin supplement     3. Type 2 diabetes mellitus with other specified complication, without long-term current use of insulin  Overview:  Sees dr butt had recent a1c, he also does foot exam    Assessment & Plan:  Ok to increase glp1 when ready             Use of the [facility] [employer] Patient Portal discussed and encouraged during today's visit  -Continue current medications and maintain follow up with specialists.  Return to clinic in .  Future Appointments   Date Time Provider Department Center   7/26/2024 11:00 AM Dusty Wright MD [facility]quez   8/19/2024  9:00 AM [facility] LAB [facility] Sujit Porter   8/19/2024 10:00 AM Joy Tamez MD [facility] BMT Sujit Porter   11/21/2024  9:40 AM Geena Barnard MD [facility] Barnard [facility]  5/30/2024 7:27 AM    Primary Care Internal Medicine                     "

## 2024-06-04 RX ORDER — METFORMIN HYDROCHLORIDE 500 MG/1
1000 TABLET, EXTENDED RELEASE ORAL NIGHTLY
Qty: 180 TABLET | Refills: 1 | OUTPATIENT
Start: 2024-06-04

## 2024-06-04 NOTE — TELEPHONE ENCOUNTER
No care due was identified.  [facility] Embedded Care Due Messages. Reference number: [id number].   6/04/2024 8:02:12 AM CDT

## 2024-06-04 NOTE — TELEPHONE ENCOUNTER
Refill Decision Note   Jayla Loyd  is requesting a refill authorization.  Brief Assessment and Rationale for Refill:  Quick Discontinue     Medication Therapy Plan: The original prescription was discontinued on 2/6/2024 by Yani Garzon MA for the following reason: Patient no longer taking.      Comments:     Note composed:8:18 AM 06/04/2024

## 2024-06-07 DIAGNOSIS — G44.89 CHRONIC MIXED HEADACHE SYNDROME: ICD-10-CM

## 2024-06-07 NOTE — TELEPHONE ENCOUNTER
No care due was identified.  [facility] Embedded Care Due Messages. Reference number: [id number].   6/07/2024 2:44:22 PM CDT

## 2024-06-09 ENCOUNTER — PATIENT MESSAGE (OUTPATIENT)
Dept: PRIMARY CARE CLINIC | Facility: CLINIC | Age: 74
End: 2024-06-09
Payer: MEDICARE

## 2024-06-10 ENCOUNTER — PATIENT MESSAGE (OUTPATIENT)
Dept: PRIMARY CARE CLINIC | Facility: CLINIC | Age: 74
End: 2024-06-10
Payer: MEDICARE

## 2024-06-10 RX ORDER — BUTALBITAL, ACETAMINOPHEN AND CAFFEINE 50; 325; 40 MG/1; MG/1; MG/1
1 TABLET ORAL EVERY 4 HOURS PRN
Qty: 60 TABLET | Refills: 1 | Status: SHIPPED | OUTPATIENT
Start: 2024-06-10

## 2024-06-10 NOTE — TELEPHONE ENCOUNTER
Pt states she read an article stating she shouldn't take zoloft and eliquis together, asking if she should change the zoloft?

## 2024-06-10 NOTE — TELEPHONE ENCOUNTER
Pt was last seen 05/31 for dizziness. Complaining of headaches and taking Fiorecet 2xdaily is not working. Pt is requesting Tylenol 3

## 2024-06-17 ENCOUNTER — PATIENT MESSAGE (OUTPATIENT)
Dept: PRIMARY CARE CLINIC | Facility: CLINIC | Age: 74
End: 2024-06-17
Payer: MEDICARE

## 2024-06-17 DIAGNOSIS — E11.69 TYPE 2 DIABETES MELLITUS WITH OTHER SPECIFIED COMPLICATION, WITHOUT LONG-TERM CURRENT USE OF INSULIN: ICD-10-CM

## 2024-06-17 RX ORDER — TIRZEPATIDE 5 MG/.5ML
5 INJECTION, SOLUTION SUBCUTANEOUS
Qty: 4 PEN | Refills: 0 | Status: SHIPPED | OUTPATIENT
Start: 2024-06-17

## 2024-06-17 NOTE — TELEPHONE ENCOUNTER
Pt requesting med refill of mounjaro at 5mg dose, pended     LOV with Geena Barnard MD , 5/30/2024

## 2024-06-17 NOTE — TELEPHONE ENCOUNTER
No care due was identified.  [facility] Embedded Care Due Messages. Reference number: [id number].   6/17/2024 11:27:50 AM CDT

## 2024-07-01 DIAGNOSIS — F41.9 ANXIETY: ICD-10-CM

## 2024-07-01 DIAGNOSIS — G44.89 CHRONIC MIXED HEADACHE SYNDROME: ICD-10-CM

## 2024-07-01 NOTE — TELEPHONE ENCOUNTER
Care Due:                  Date            Visit Type   Department     Provider  --------------------------------------------------------------------------------                                ESTABLISHED                              PATIENT -    [facility] PRIMARY  Last Visit: 05-      [facility]           Geena Barnard  Next Visit: None Scheduled  None         None Found                                                            Last  Test          Frequency    Reason                     Performed    Due Date  --------------------------------------------------------------------------------    Lipid Panel.  12 months..  rosuvastatin.............  08- 07-    [facility] Embedded Care Due Messages. Reference number: [id number].   7/01/2024 12:54:49 PM CDT

## 2024-07-02 RX ORDER — ALPRAZOLAM 0.5 MG/1
0.5 TABLET ORAL 2 TIMES DAILY PRN
Qty: 60 TABLET | Refills: 0 | Status: SHIPPED | OUTPATIENT
Start: 2024-07-02

## 2024-07-02 RX ORDER — BUTALBITAL, ACETAMINOPHEN AND CAFFEINE 50; 325; 40 MG/1; MG/1; MG/1
1 TABLET ORAL EVERY 4 HOURS PRN
Qty: 60 TABLET | Refills: 1 | Status: SHIPPED | OUTPATIENT
Start: 2024-07-02

## 2024-07-07 DIAGNOSIS — M19.212 OTHER SECONDARY OSTEOARTHRITIS OF BOTH SHOULDERS: ICD-10-CM

## 2024-07-07 DIAGNOSIS — M19.211 OTHER SECONDARY OSTEOARTHRITIS OF BOTH SHOULDERS: ICD-10-CM

## 2024-07-08 RX ORDER — TRAMADOL HYDROCHLORIDE 50 MG/1
50 TABLET ORAL EVERY 8 HOURS PRN
Qty: 21 TABLET | Refills: 0 | Status: SHIPPED | OUTPATIENT
Start: 2024-07-08

## 2024-07-08 RX ORDER — MECLIZINE HYDROCHLORIDE 25 MG/1
25 TABLET ORAL 2 TIMES DAILY PRN
Qty: 60 TABLET | Refills: 0 | Status: SHIPPED | OUTPATIENT
Start: 2024-07-08

## 2024-07-08 RX ORDER — METHOCARBAMOL 500 MG/1
500 TABLET, FILM COATED ORAL 3 TIMES DAILY PRN
Qty: 60 TABLET | Refills: 1 | Status: SHIPPED | OUTPATIENT
Start: 2024-07-08

## 2024-07-08 NOTE — TELEPHONE ENCOUNTER
Care Due:                  Date            Visit Type   Department     Provider  --------------------------------------------------------------------------------                                ESTABLISHED                              PATIENT -    [facility] PRIMARY  Last Visit: 05-      [facility]      CARE           Geena Barnard  Next Visit: None Scheduled  None         None Found                                                            Last  Test          Frequency    Reason                     Performed    Due Date  --------------------------------------------------------------------------------    HBA1C.......  6 months...  tirzepatide..............  02- 07-    [facility] Embedded Care Due Messages. Reference number: [id number].   7/07/2024 11:44:09 PM DONN

## 2024-07-09 ENCOUNTER — PATIENT MESSAGE (OUTPATIENT)
Dept: CARDIOLOGY | Facility: CLINIC | Age: 74
End: 2024-07-09
Payer: MEDICARE

## 2024-07-11 ENCOUNTER — PATIENT MESSAGE (OUTPATIENT)
Dept: PRIMARY CARE CLINIC | Facility: CLINIC | Age: 74
End: 2024-07-11
Payer: MEDICARE

## 2024-07-15 DIAGNOSIS — E11.69 TYPE 2 DIABETES MELLITUS WITH OTHER SPECIFIED COMPLICATION, WITHOUT LONG-TERM CURRENT USE OF INSULIN: ICD-10-CM

## 2024-07-15 NOTE — TELEPHONE ENCOUNTER
No care due was identified.  [facility] Embedded Care Due Messages. Reference number: [id number].   7/15/2024 11:41:08 AM CDT

## 2024-07-15 NOTE — TELEPHONE ENCOUNTER
Refill Routing Note   Medication(s) are not appropriate for processing by [facility] for the following reason(s):        New or recently adjusted medication    [facility] action(s):  Defer               Appointments  past 12m or future 3m with PCP    Date Provider   Last Visit   5/30/2024 Geena Barnard MD   Next Visit   11/21/2024 Geena Barnard MD   ED visits in past 90 days: 0        Note composed:4:18 PM 07/15/2024

## 2024-07-17 RX ORDER — TIRZEPATIDE 5 MG/.5ML
5 INJECTION, SOLUTION SUBCUTANEOUS
Qty: 4 PEN | Refills: 0 | Status: SHIPPED | OUTPATIENT
Start: 2024-07-17

## 2024-07-26 ENCOUNTER — OFFICE VISIT (OUTPATIENT)
Dept: PULMONOLOGY | Facility: CLINIC | Age: 74
End: 2024-07-26
Payer: MEDICARE

## 2024-07-26 VITALS
HEIGHT: 60 IN | HEART RATE: 102 BPM | OXYGEN SATURATION: 94 % | DIASTOLIC BLOOD PRESSURE: 71 MMHG | WEIGHT: 137.56 LBS | SYSTOLIC BLOOD PRESSURE: 104 MMHG | BODY MASS INDEX: 27.01 KG/M2

## 2024-07-26 DIAGNOSIS — R91.8 LUNG NODULES: Primary | ICD-10-CM

## 2024-07-26 DIAGNOSIS — R91.1 PULMONARY NODULE: ICD-10-CM

## 2024-07-26 PROCEDURE — 99999 PR PBB SHADOW E&M-EST. PATIENT-LVL V: CPT | Mod: PBBFAC,,, | Performed by: INTERNAL MEDICINE

## 2024-07-26 NOTE — PROGRESS NOTES
Subjective:       Patient ID: Jayla Loyd is a 73 y.o. female.    Chief Complaint: Pulmonary Nodules (Left Lung)    72 yo female who was found to have RLL PE and large DVT Feb 2024 after BONNIE presents for evaluation of pulm nodules noted on CT. She reports hearing about a lung nodule 4-5 years ago. She has no significant smoking history. She has no occupational exposures. Her father was a  and may have had some secondary asbestos exposure through laundry.  She has no respiratory complaints and has completely recovered from her PE. She is on Eliquis and has fu plan with cardiology after repeat LE US.    Pulmonary Nodules  Review of Systems      Past Medical History:   Diagnosis Date    Carpal tunnel syndrome, right upper limb 06/23/2022    Diabetes mellitus type II     GERD (gastroesophageal reflux disease)     Hx of multiple pulmonary nodules 06/15/2022    Migraines     Proteinuria     Pulmonary embolus 2024    Right lung with concurrent DVT left leg    Trigger finger, right ring finger 06/23/2022        Family History   Problem Relation Name Age of Onset    Bladder Cancer Mother jaxon     Hypertension Mother jaxon         CABG    Cancer Mother jaxon     Heart disease Mother jaxon     Heart failure Father Dadbrady         CABG    Hypertension Father Daddy     Heart disease Father Daddy     Arthritis Father Dadbrady     Stroke Brother Carmelo     No Known Problems Son      Cancer Maternal Aunt Gloria     Breast cancer Maternal Aunt Gloria     Cancer Paternal Aunt anabel     Diabetes Paternal Aunt anabel     Cancer Maternal Grandmother mitesh     Breast cancer Maternal Grandmother mitesh     Heart disease Maternal Grandfather Parents     Cancer Paternal Grandmother emigdio     Dementia Paternal Grandmother emigdio     Arthritis Paternal Grandmother emigdio     Heart disease Paternal Grandfather Family       If not mentioned in HPI, Family history is reviewed and not contributory    Social History      Tobacco Use    Smoking status: Never     Passive exposure: Never    Smokeless tobacco: Never   Substance Use Topics    Alcohol use: Yes     Comment: None    Drug use: Not Currently     Types: Other-see comments     Comment: None        Objective:        Vitals:    07/26/24 1110   BP: 104/71   Pulse: 102     Wt Readings from Last 3 Encounters:   07/26/24 62.4 kg (137 lb 9.1 oz)   05/21/24 66.9 kg (147 lb 7.8 oz)   05/09/24 68 kg (149 lb 14.6 oz)     Temp Readings from Last 3 Encounters:   05/07/24 98.3 °F (36.8 °C) (Oral)   04/19/24 97.5 °F (36.4 °C) (Temporal)   04/12/24 97.7 °F (36.5 °C)     BP Readings from Last 3 Encounters:   07/26/24 104/71   05/21/24 132/70   05/09/24 127/79     Pulse Readings from Last 3 Encounters:   07/26/24 102   05/21/24 79   05/09/24 90       Physical Exam   Constitutional: She appears well-developed and well-nourished.   Pulmonary/Chest: Normal expansion and effort normal.   Vitals reviewed.    CBC  Lab Results   Component Value Date    WBC 7.07 05/07/2024    HGB 12.9 05/07/2024    HCT 40.4 05/07/2024    MCV 86 05/07/2024     05/07/2024         CMP  Sodium   Date Value Ref Range Status   05/07/2024 139 136 - 145 mmol/L Final   07/26/2018 140 135 - 146 Final     Potassium   Date Value Ref Range Status   05/07/2024 4.5 3.5 - 5.1 mmol/L Final   07/26/2018 4.2 3.5 - 5.3 Final     Chloride   Date Value Ref Range Status   05/07/2024 105 95 - 110 mmol/L Final   07/26/2018 106 98 - 110 Final     CO2   Date Value Ref Range Status   05/07/2024 26 23 - 29 mmol/L Final   07/26/2018 27 20 - 31 Final     Glucose   Date Value Ref Range Status   05/07/2024 123 (H) 70 - 110 mg/dL Final   06/21/2018 114 (A) 65 - 99 Final     BUN   Date Value Ref Range Status   05/07/2024 12 8 - 23 mg/dL Final   07/26/2018 18 7 - 25 mg/dL Final     Creatinine   Date Value Ref Range Status   05/07/2024 0.9 0.5 - 1.4 mg/dL Final   06/21/2018 0.65 0.50 - 0.99 Final     Calcium   Date Value Ref Range Status  "  05/07/2024 9.9 8.7 - 10.5 mg/dL Final   07/26/2018 9.1 8.6 - 10.4 mg/dL Final   07/26/2018 9.1 8.6 - 10.4 mg/dL Final     Total Protein   Date Value Ref Range Status   05/07/2024 7.3 6.0 - 8.4 g/dL Final     Albumin   Date Value Ref Range Status   05/07/2024 3.7 3.5 - 5.2 g/dL Final   07/26/2018 4.0 3.6 - 5.1 Final   07/26/2018 53  Final     Total Bilirubin   Date Value Ref Range Status   05/07/2024 0.3 0.1 - 1.0 mg/dL Final     Comment:     For infants and newborns, interpretation of results should be based  on gestational age, weight and in agreement with clinical  observations.    Premature Infant recommended reference ranges:  Up to 24 hours.............<8.0 mg/dL  Up to 48 hours............<12.0 mg/dL  3-5 days..................<15.0 mg/dL  6-29 days.................<15.0 mg/dL       Alkaline Phosphatase   Date Value Ref Range Status   05/07/2024 102 55 - 135 U/L Final     AST   Date Value Ref Range Status   05/07/2024 16 10 - 40 U/L Final   07/26/2018 16 10 - 35 U/L Final     ALT   Date Value Ref Range Status   05/07/2024 18 10 - 44 U/L Final   07/26/2018 27 6 - 29 U/L Final     Anion Gap   Date Value Ref Range Status   05/07/2024 8 8 - 16 mmol/L Final   05/14/2018 17 9 - 18 mmol/L Final     eGFR   Date Value Ref Range Status   05/07/2024 >60.0 >60 mL/min/1.73 m^2 Final       ABG  No results found for: "PH", "PO2", "PCO2"        Personal Diagnostic Review  I have personally reviewed the following data and added my own interpretation as below:  CT Chest 5/6/24 images personally reviewed and shows resolved RLL pulmonary embolus, multiple sub 4mm nodules unchanged from prior and of no clincial significance.  Cardiology note reviewed      7/26/2024    11:10 AM 5/21/2024     3:11 PM 5/9/2024    10:50 AM 5/7/2024     9:37 AM 4/30/2024     3:13 PM 4/30/2024     3:02 PM 4/19/2024     2:33 PM   Pulmonary Function Tests   SpO2 94 % 97 %  96 % 99 % 99 % 95 %   Height 5' (1.524 m) 5' (1.524 m) 5' (1.524 m) 5' (1.524 m)  "     Weight 62.4 kg (137 lb 9.1 oz) 66.9 kg (147 lb 7.8 oz) 68 kg (149 lb 14.6 oz) 67.9 kg (149 lb 12.8 oz)      BMI (Calculated) 26.9 28.8 29.3 29.3            Assessment:       1. Lung nodules    2. Pulmonary nodule        Outpatient Encounter Medications as of 7/26/2024   Medication Sig Dispense Refill    ALPRAZolam (XANAX) 0.5 MG tablet Take 1 tablet (0.5 mg total) by mouth 2 (two) times daily as needed for Anxiety. 60 tablet 0    apixaban (ELIQUIS) 5 mg Tab Take 1 tablet (5 mg total) by mouth 2 (two) times daily. 60 tablet 11    blood sugar diagnostic (TRUE METRIX GLUCOSE TEST STRIP) Strp 1 strip by Misc.(Non-Drug; Combo Route) route once daily. 100 each 3    blood-glucose meter (TRUE METRIX GLUCOSE METER) kit Use as instructed 1 each 0    butalbital-acetaminophen-caffeine -40 mg (FIORICET, ESGIC) -40 mg per tablet Take 1 tablet by mouth every 4 (four) hours as needed. for pain. 60 tablet 1    dicyclomine (BENTYL) 10 MG capsule Take 1 capsule (10 mg total) by mouth 4 (four) times daily. (Patient taking differently: Take 10 mg by mouth as needed.) 120 capsule 1    esomeprazole (NEXIUM) 40 MG capsule TAKE 1 CAPSULE BY MOUTH TWICE DAILY BEFORE MEALS 180 capsule 2    Lactobac no.41/Bifidobact no.7 (PROBIOTIC-10 ORAL) Take by mouth once daily.      lancets (LANCETS,THIN) Misc 1 Lancet by Misc.(Non-Drug; Combo Route) route once daily. 100 each 3    meclizine (ANTIVERT) 25 mg tablet Take 1 tablet (25 mg total) by mouth 2 (two) times daily as needed. 60 tablet 0    methocarbamoL (ROBAXIN) 500 MG Tab Take 1 tablet (500 mg total) by mouth 3 (three) times daily as needed (pain). 60 tablet 1    mupirocin (BACTROBAN) 2 % ointment Apply topically 3 (three) times daily. 30 g 1    nystatin (MYCOSTATIN) powder Apply topically 4 (four) times daily. (Patient taking differently: Apply topically as needed.) 60 g 0    ondansetron (ZOFRAN) 8 MG tablet Take 1 tablet (8 mg total) by mouth every 8 (eight) hours  as needed for Nausea. 20 tablet 1    promethazine (PHENERGAN) 12.5 MG Tab as needed.      propranoloL (INDERAL LA) 60 MG 24 hr capsule Take 60 mg by mouth as needed.      rosuvastatin (CRESTOR) 40 MG Tab TAKE 1 TABLET BY MOUTH ONCE DAILY 90 tablet 1    sertraline (ZOLOFT) 50 MG tablet Take 1 tablet (50 mg total) by mouth once daily. 90 tablet 3    telmisartan (MICARDIS) 20 MG Tab TAKE 1 TABLET BY MOUTH EVERY DAY 90 tablet 1    tirzepatide (MOUNJARO) 5 mg/0.5 mL PnIj Inject 5 mg into the skin every 7 days. 4 Pen 0    traMADoL (ULTRAM) 50 mg tablet Take 1 tablet (50 mg total) by mouth every 8 (eight) hours as needed for Pain. 21 tablet 0    traZODone (DESYREL) 50 MG tablet Take 1 tablet (50 mg total) by mouth every evening. 90 tablet 3    [DISCONTINUED] ALPRAZolam (XANAX) 0.5 MG tablet Take 1 tablet (0.5 mg total) by mouth 2 (two) times daily as needed for Anxiety. 60 tablet 0    [DISCONTINUED] butalbital-acetaminophen-caffeine -40 mg (FIORICET, ESGIC) -40 mg per tablet TAKE 1 TABLET BY MOUTH EVERY 4 HOURS AS NEEDED FOR PAIN 60 tablet 1    [DISCONTINUED] meclizine (ANTIVERT) 25 mg tablet Take 1 tablet (25 mg total) by mouth 2 (two) times daily as needed. 60 tablet 0    [DISCONTINUED] methocarbamoL (ROBAXIN) 500 MG Tab TAKE 1 TABLET (500 MG TOTAL) BY MOUTH 3 (THREE) TIMES DAILY AS NEEDED (PAIN). 60 tablet 1    [DISCONTINUED] tirzepatide (MOUNJARO) 2.5 mg/0.5 mL PnIj Inject 2.5 mg into the skin every 7 days. 12 Pen 0    [DISCONTINUED] tirzepatide (MOUNJARO) 5 mg/0.5 mL PnIj Inject 5 mg into the skin every 7 days. 4 Pen 0    [DISCONTINUED] traMADoL (ULTRAM) 50 mg tablet Take 1 tablet (50 mg total) by mouth every 8 (eight) hours as needed for Pain. 21 tablet 0     No facility-administered encounter medications on file as of 7/26/2024.     1. Pulmonary nodule  - Ambulatory referral/consult to Pulmonology    2. Lung nodules    Plan:     Problem List Items Addressed This Visit          Pulmonary     Lung nodules - Primary    Current Assessment & Plan     Sub 4 mm. Low risk patient profile. No further followup needed. Patient reassured.          Other Visit Diagnoses       Pulmonary nodule                Please note Overview Notes are historic documentation. Please review A/P for current updates.  No follow-ups on file.    Future Appointments   Date Time Provider Department Center   8/1/2024 12:30 PM [facility] [place]   8/19/2024  9:00 AM [facility] LAB BMT [facility] LABBMT [place]   8/19/2024 10:00 AM Joy Tamez MD [facility] HC BMT [place]   11/7/2024  2:00 PM VASCULAR, [place] [facility] [place]   11/14/2024  1:30 PM Edmond Sifuentes MD PhD [facility] PERVAS [address]   11/21/2024  9:40 AM Geena Barnard MD [facility] PRICRE [place]           Dusty Wright MD

## 2024-08-12 ENCOUNTER — PATIENT MESSAGE (OUTPATIENT)
Dept: CARDIOLOGY | Facility: CLINIC | Age: 74
End: 2024-08-12
Payer: MEDICARE

## 2024-08-12 DIAGNOSIS — E11.69 TYPE 2 DIABETES MELLITUS WITH OTHER SPECIFIED COMPLICATION, WITHOUT LONG-TERM CURRENT USE OF INSULIN: ICD-10-CM

## 2024-08-12 NOTE — TELEPHONE ENCOUNTER
No care due was identified.  [facility] Embedded Care Due Messages. Reference number: [id number].   8/12/2024 3:40:54 PM CDT

## 2024-08-13 ENCOUNTER — PATIENT MESSAGE (OUTPATIENT)
Dept: PRIMARY CARE CLINIC | Facility: CLINIC | Age: 74
End: 2024-08-13
Payer: MEDICARE

## 2024-08-13 DIAGNOSIS — R11.0 NAUSEA: Primary | ICD-10-CM

## 2024-08-13 RX ORDER — TIRZEPATIDE 5 MG/.5ML
5 INJECTION, SOLUTION SUBCUTANEOUS
Qty: 12 PEN | Refills: 0 | Status: SHIPPED | OUTPATIENT
Start: 2024-08-13

## 2024-08-13 RX ORDER — ONDANSETRON 4 MG/1
4 TABLET, ORALLY DISINTEGRATING ORAL EVERY 8 HOURS PRN
Qty: 30 TABLET | Refills: 0 | Status: SHIPPED | OUTPATIENT
Start: 2024-08-13

## 2024-08-13 NOTE — TELEPHONE ENCOUNTER
Refill Routing Note   Medication(s) are not appropriate for processing by [facility] for the following reason(s):        New or recently adjusted medication    [facility] action(s):  Defer               Appointments  past 12m or future 3m with PCP    Date Provider   Last Visit   5/30/2024 Geena Barnard MD   Next Visit   11/21/2024 Geena Barnard MD   ED visits in past 90 days: 0        Note composed:5:56 AM 08/13/2024

## 2024-08-19 ENCOUNTER — PATIENT MESSAGE (OUTPATIENT)
Dept: PRIMARY CARE CLINIC | Facility: CLINIC | Age: 74
End: 2024-08-19
Payer: MEDICARE

## 2024-08-19 ENCOUNTER — LAB VISIT (OUTPATIENT)
Dept: LAB | Facility: HOSPITAL | Age: 74
End: 2024-08-19
Attending: INTERNAL MEDICINE
Payer: MEDICARE

## 2024-08-19 ENCOUNTER — OFFICE VISIT (OUTPATIENT)
Dept: HEMATOLOGY/ONCOLOGY | Facility: CLINIC | Age: 74
End: 2024-08-19
Payer: MEDICARE

## 2024-08-19 VITALS
BODY MASS INDEX: 26.9 KG/M2 | SYSTOLIC BLOOD PRESSURE: 110 MMHG | TEMPERATURE: 98 F | WEIGHT: 137 LBS | DIASTOLIC BLOOD PRESSURE: 58 MMHG | HEART RATE: 76 BPM | OXYGEN SATURATION: 96 % | HEIGHT: 60 IN | RESPIRATION RATE: 18 BRPM

## 2024-08-19 DIAGNOSIS — D50.0 IRON DEFICIENCY ANEMIA DUE TO CHRONIC BLOOD LOSS: ICD-10-CM

## 2024-08-19 DIAGNOSIS — E11.69 TYPE 2 DIABETES MELLITUS WITH OTHER SPECIFIED COMPLICATION, WITHOUT LONG-TERM CURRENT USE OF INSULIN: ICD-10-CM

## 2024-08-19 DIAGNOSIS — I82.4Y1 ACUTE DEEP VEIN THROMBOSIS (DVT) OF PROXIMAL VEIN OF RIGHT LOWER EXTREMITY: ICD-10-CM

## 2024-08-19 DIAGNOSIS — D50.0 IRON DEFICIENCY ANEMIA DUE TO CHRONIC BLOOD LOSS: Primary | ICD-10-CM

## 2024-08-19 LAB
ALBUMIN SERPL BCP-MCNC: 3.7 G/DL (ref 3.5–5.2)
ALBUMIN SERPL BCP-MCNC: 3.7 G/DL (ref 3.5–5.2)
ALP SERPL-CCNC: 109 U/L (ref 55–135)
ALP SERPL-CCNC: 109 U/L (ref 55–135)
ALT SERPL W/O P-5'-P-CCNC: 19 U/L (ref 10–44)
ALT SERPL W/O P-5'-P-CCNC: 19 U/L (ref 10–44)
ANION GAP SERPL CALC-SCNC: 6 MMOL/L (ref 8–16)
ANION GAP SERPL CALC-SCNC: 6 MMOL/L (ref 8–16)
AST SERPL-CCNC: 17 U/L (ref 10–40)
AST SERPL-CCNC: 17 U/L (ref 10–40)
BASOPHILS # BLD AUTO: 0.05 K/UL (ref 0–0.2)
BASOPHILS NFR BLD: 0.6 % (ref 0–1.9)
BILIRUB SERPL-MCNC: 0.3 MG/DL (ref 0.1–1)
BILIRUB SERPL-MCNC: 0.3 MG/DL (ref 0.1–1)
BUN SERPL-MCNC: 9 MG/DL (ref 8–23)
BUN SERPL-MCNC: 9 MG/DL (ref 8–23)
CALCIUM SERPL-MCNC: 9.8 MG/DL (ref 8.7–10.5)
CALCIUM SERPL-MCNC: 9.8 MG/DL (ref 8.7–10.5)
CHLORIDE SERPL-SCNC: 106 MMOL/L (ref 95–110)
CHLORIDE SERPL-SCNC: 106 MMOL/L (ref 95–110)
CHOLEST SERPL-MCNC: 191 MG/DL (ref 120–199)
CHOLEST/HDLC SERPL: 3 {RATIO} (ref 2–5)
CO2 SERPL-SCNC: 29 MMOL/L (ref 23–29)
CO2 SERPL-SCNC: 29 MMOL/L (ref 23–29)
CREAT SERPL-MCNC: 0.8 MG/DL (ref 0.5–1.4)
CREAT SERPL-MCNC: 0.8 MG/DL (ref 0.5–1.4)
DIFFERENTIAL METHOD BLD: NORMAL
EOSINOPHIL # BLD AUTO: 0.1 K/UL (ref 0–0.5)
EOSINOPHIL NFR BLD: 1.5 % (ref 0–8)
ERYTHROCYTE [DISTWIDTH] IN BLOOD BY AUTOMATED COUNT: 12.4 % (ref 11.5–14.5)
EST. GFR  (NO RACE VARIABLE): >60 ML/MIN/1.73 M^2
EST. GFR  (NO RACE VARIABLE): >60 ML/MIN/1.73 M^2
ESTIMATED AVG GLUCOSE: 105 MG/DL (ref 68–131)
FERRITIN SERPL-MCNC: 257 NG/ML (ref 20–300)
GLUCOSE SERPL-MCNC: 118 MG/DL (ref 70–110)
GLUCOSE SERPL-MCNC: 118 MG/DL (ref 70–110)
HBA1C MFR BLD: 5.3 % (ref 4–5.6)
HCT VFR BLD AUTO: 40.9 % (ref 37–48.5)
HDLC SERPL-MCNC: 63 MG/DL (ref 40–75)
HDLC SERPL: 33 % (ref 20–50)
HGB BLD-MCNC: 13.5 G/DL (ref 12–16)
IMM GRANULOCYTES # BLD AUTO: 0.02 K/UL (ref 0–0.04)
IMM GRANULOCYTES NFR BLD AUTO: 0.2 % (ref 0–0.5)
IRON SERPL-MCNC: 62 UG/DL (ref 30–160)
LDLC SERPL CALC-MCNC: 102.6 MG/DL (ref 63–159)
LYMPHOCYTES # BLD AUTO: 3.3 K/UL (ref 1–4.8)
LYMPHOCYTES NFR BLD: 38.3 % (ref 18–48)
MCH RBC QN AUTO: 29.3 PG (ref 27–31)
MCHC RBC AUTO-ENTMCNC: 33 G/DL (ref 32–36)
MCV RBC AUTO: 89 FL (ref 82–98)
MONOCYTES # BLD AUTO: 0.5 K/UL (ref 0.3–1)
MONOCYTES NFR BLD: 6.2 % (ref 4–15)
NEUTROPHILS # BLD AUTO: 4.6 K/UL (ref 1.8–7.7)
NEUTROPHILS NFR BLD: 53.2 % (ref 38–73)
NONHDLC SERPL-MCNC: 128 MG/DL
NRBC BLD-RTO: 0 /100 WBC
PLATELET # BLD AUTO: 357 K/UL (ref 150–450)
PMV BLD AUTO: 9.2 FL (ref 9.2–12.9)
POTASSIUM SERPL-SCNC: 4.3 MMOL/L (ref 3.5–5.1)
POTASSIUM SERPL-SCNC: 4.3 MMOL/L (ref 3.5–5.1)
PROT SERPL-MCNC: 7.2 G/DL (ref 6–8.4)
PROT SERPL-MCNC: 7.2 G/DL (ref 6–8.4)
RBC # BLD AUTO: 4.61 M/UL (ref 4–5.4)
SATURATED IRON: 23 % (ref 20–50)
SODIUM SERPL-SCNC: 141 MMOL/L (ref 136–145)
SODIUM SERPL-SCNC: 141 MMOL/L (ref 136–145)
TOTAL IRON BINDING CAPACITY: 266 UG/DL (ref 250–450)
TRANSFERRIN SERPL-MCNC: 180 MG/DL (ref 200–375)
TRIGL SERPL-MCNC: 127 MG/DL (ref 30–150)
WBC # BLD AUTO: 8.67 K/UL (ref 3.9–12.7)

## 2024-08-19 PROCEDURE — 83540 ASSAY OF IRON: CPT | Mod: HCNC | Performed by: INTERNAL MEDICINE

## 2024-08-19 PROCEDURE — 1126F AMNT PAIN NOTED NONE PRSNT: CPT | Mod: HCNC,CPTII,S$GLB, | Performed by: INTERNAL MEDICINE

## 2024-08-19 PROCEDURE — 99215 OFFICE O/P EST HI 40 MIN: CPT | Mod: HCNC,S$GLB,, | Performed by: INTERNAL MEDICINE

## 2024-08-19 PROCEDURE — 3044F HG A1C LEVEL LT 7.0%: CPT | Mod: HCNC,CPTII,S$GLB, | Performed by: INTERNAL MEDICINE

## 2024-08-19 PROCEDURE — 1159F MED LIST DOCD IN RCRD: CPT | Mod: HCNC,CPTII,S$GLB, | Performed by: INTERNAL MEDICINE

## 2024-08-19 PROCEDURE — 3288F FALL RISK ASSESSMENT DOCD: CPT | Mod: HCNC,CPTII,S$GLB, | Performed by: INTERNAL MEDICINE

## 2024-08-19 PROCEDURE — 82728 ASSAY OF FERRITIN: CPT | Mod: HCNC | Performed by: INTERNAL MEDICINE

## 2024-08-19 PROCEDURE — 3008F BODY MASS INDEX DOCD: CPT | Mod: HCNC,CPTII,S$GLB, | Performed by: INTERNAL MEDICINE

## 2024-08-19 PROCEDURE — 83036 HEMOGLOBIN GLYCOSYLATED A1C: CPT | Mod: HCNC | Performed by: INTERNAL MEDICINE

## 2024-08-19 PROCEDURE — 1101F PT FALLS ASSESS-DOCD LE1/YR: CPT | Mod: HCNC,CPTII,S$GLB, | Performed by: INTERNAL MEDICINE

## 2024-08-19 PROCEDURE — 3074F SYST BP LT 130 MM HG: CPT | Mod: HCNC,CPTII,S$GLB, | Performed by: INTERNAL MEDICINE

## 2024-08-19 PROCEDURE — 80053 COMPREHEN METABOLIC PANEL: CPT | Mod: HCNC | Performed by: INTERNAL MEDICINE

## 2024-08-19 PROCEDURE — 85025 COMPLETE CBC W/AUTO DIFF WBC: CPT | Mod: HCNC | Performed by: INTERNAL MEDICINE

## 2024-08-19 PROCEDURE — 80061 LIPID PANEL: CPT | Mod: HCNC | Performed by: INTERNAL MEDICINE

## 2024-08-19 PROCEDURE — 3078F DIAST BP <80 MM HG: CPT | Mod: HCNC,CPTII,S$GLB, | Performed by: INTERNAL MEDICINE

## 2024-08-19 PROCEDURE — 36415 COLL VENOUS BLD VENIPUNCTURE: CPT | Mod: HCNC | Performed by: INTERNAL MEDICINE

## 2024-08-19 PROCEDURE — 99999 PR PBB SHADOW E&M-EST. PATIENT-LVL IV: CPT | Mod: PBBFAC,HCNC,, | Performed by: INTERNAL MEDICINE

## 2024-08-19 NOTE — PROGRESS NOTES
Hematology and Medical Oncology   Follow Up     08/19/2024    Reason For Referral: Iron Deficiency Anemia      History of Present Ilness:   Jayla Loyd (Jayla) is a pleasant 73 y.o.female with a recent DVT secondary to COVID and profound iron deficiency anemia.       The patient reports she first noted pain to her left posterior thigh in January 2024 that progressively worsened and spread to her calf. She endorses associated swelling and warmth. The patient endorses SCANLON and non productive cough x3-4 weeks noting that her symptoms started when she was diagnosed with COVID at the beginning of January. She has been taking cough syrup at home for this with no improvement.   --LLE venous ultrasound on 2/1/2024 revealed extensive occlusive DVT of the left distal SFA, popliteal, and posterior tibial veins.    --CTA Chest on 2/1/2024 revealed right lower lobe pulmonary arterial emboli. She was admitted for heparin drip and discharged on Eliquis. -- Echo on 2/2/2024 with no evidence of right heart strain.      She is tolerating Eliquis. Has close follow up with Dr. Sifuentes.    Interval History:  Started ozympic in February without weight loss and then switched to Mojero.     Developed red lesion on her left breast that mirror's a long standing lesion on her right breast. Mammogram is up to date.       PAST MEDICAL HISTORY:   Past Medical History:   Diagnosis Date    Carpal tunnel syndrome, right upper limb 06/23/2022    Diabetes mellitus type II     GERD (gastroesophageal reflux disease)     Hx of multiple pulmonary nodules 06/15/2022    Migraines     Proteinuria     Pulmonary embolus 2024    Right lung with concurrent DVT left leg    Trigger finger, right ring finger 06/23/2022       PAST SURGICAL HISTORY:   Past Surgical History:   Procedure Laterality Date    CATARACT EXTRACTION      COSMETIC SURGERY  1971    rhinoplasty    EYE SURGERY  3 years ago    cataracts    JOINT REPLACEMENT  Twice in last 24 months     SHOULDER SURGERY Left 09/2020    TUBAL LIGATION  1992       PAST SOCIAL HISTORY:   reports that she has never smoked. She has never been exposed to tobacco smoke. She has never used smokeless tobacco. She reports current alcohol use. She reports that she does not currently use drugs after having used the following drugs: Other-see comments.    FAMILY HISTORY:  Family History   Problem Relation Name Age of Onset    Bladder Cancer Mother jaxon     Hypertension Mother jaxon         CABG    Cancer Mother jaxon     Heart disease Mother jaxon     Heart failure Father Dadbrady         CABG    Hypertension Father Daddy     Heart disease Father Dadbrady     Arthritis Father Dadbrady     Stroke Brother Carmelo     No Known Problems Son      Cancer Maternal Aunt Gloria     Breast cancer Maternal Aunt Gloria     Cancer Paternal Aunt anabel     Diabetes Paternal Aunt anabel     Cancer Maternal Grandmother mitesh     Breast cancer Maternal Grandmother mitesh     Heart disease Maternal Grandfather Parents     Cancer Paternal Grandmother emigdio     Dementia Paternal Grandmother emigdio     Arthritis Paternal Grandmother emigdio     Heart disease Paternal Grandfather Family        CURRENT MEDICATIONS:   Current Outpatient Medications   Medication Sig    ALPRAZolam (XANAX) 0.5 MG tablet Take 1 tablet (0.5 mg total) by mouth 2 (two) times daily as needed for Anxiety.    apixaban (ELIQUIS) 5 mg Tab Take 1 tablet (5 mg total) by mouth 2 (two) times daily.    blood sugar diagnostic (TRUE METRIX GLUCOSE TEST STRIP) Strp 1 strip by Misc.(Non-Drug; Combo Route) route once daily.    blood-glucose meter (TRUE METRIX GLUCOSE METER) kit Use as instructed    butalbital-acetaminophen-caffeine -40 mg (FIORICET, ESGIC) -40 mg per tablet Take 1 tablet by mouth every 4 (four) hours as needed. for pain.    dicyclomine (BENTYL) 10 MG capsule Take 1 capsule (10 mg total) by mouth 4 (four) times daily. (Patient taking differently: Take 10 mg by mouth as needed.)     esomeprazole (NEXIUM) 40 MG capsule TAKE 1 CAPSULE BY MOUTH TWICE DAILY BEFORE MEALS    Lactobac no.41/Bifidobact no.7 (PROBIOTIC-10 ORAL) Take by mouth once daily.    lancets (LANCETS,THIN) Misc 1 Lancet by Misc.(Non-Drug; Combo Route) route once daily.    meclizine (ANTIVERT) 25 mg tablet Take 1 tablet (25 mg total) by mouth 2 (two) times daily as needed.    methocarbamoL (ROBAXIN) 500 MG Tab Take 1 tablet (500 mg total) by mouth 3 (three) times daily as needed (pain).    mupirocin (BACTROBAN) 2 % ointment Apply topically 3 (three) times daily.    nystatin (MYCOSTATIN) powder Apply topically 4 (four) times daily. (Patient taking differently: Apply topically as needed.)    ondansetron (ZOFRAN-ODT) 4 MG TbDL Take 1 tablet (4 mg total) by mouth every 8 (eight) hours as needed.    propranoloL (INDERAL LA) 60 MG 24 hr capsule Take 60 mg by mouth as needed.    rosuvastatin (CRESTOR) 40 MG Tab TAKE 1 TABLET BY MOUTH ONCE DAILY    sertraline (ZOLOFT) 50 MG tablet Take 1 tablet (50 mg total) by mouth once daily.    telmisartan (MICARDIS) 20 MG Tab TAKE 1 TABLET BY MOUTH EVERY DAY    tirzepatide (MOUNJARO) 5 mg/0.5 mL PnIj Inject 5 mg into the skin every 7 days.    traMADoL (ULTRAM) 50 mg tablet Take 1 tablet (50 mg total) by mouth every 8 (eight) hours as needed for Pain.    traZODone (DESYREL) 50 MG tablet Take 1 tablet (50 mg total) by mouth every evening.    ondansetron (ZOFRAN) 8 MG tablet Take 1 tablet (8 mg total) by mouth every 8 (eight) hours as needed for Nausea. (Patient not taking: Reported on 8/19/2024)    promethazine (PHENERGAN) 12.5 MG Tab as needed. (Patient not taking: Reported on 8/19/2024)     No current facility-administered medications for this visit.     ALLERGIES:   Review of patient's allergies indicates:   Allergen Reactions    Gabapentin Hallucinations    Lyrica [pregabalin] Hallucinations    Mobic [meloxicam] Itching         Review of Systems:     Review of Systems   Constitutional:  Negative for  appetite change, chills, diaphoresis, fatigue, fever and unexpected weight change.   HENT:   Negative for hearing loss, mouth sores, nosebleeds, sore throat, trouble swallowing and voice change.    Eyes:  Negative for eye problems and icterus.   Respiratory:  Positive for cough and shortness of breath. Negative for chest tightness, hemoptysis and wheezing.    Cardiovascular:  Positive for leg swelling. Negative for chest pain and palpitations.   Gastrointestinal:  Negative for abdominal distention, abdominal pain, blood in stool, diarrhea, nausea and vomiting.   Endocrine: Negative for hot flashes.   Genitourinary:  Negative for bladder incontinence, difficulty urinating, dysuria and hematuria.    Musculoskeletal:  Negative for arthralgias, back pain, flank pain, gait problem, myalgias, neck pain and neck stiffness.   Skin:  Negative for itching, rash and wound.   Neurological:  Negative for dizziness, extremity weakness, gait problem, headaches, numbness, seizures and speech difficulty.   Hematological:  Negative for adenopathy. Does not bruise/bleed easily.   Psychiatric/Behavioral:  Negative for confusion, depression and sleep disturbance. The patient is not nervous/anxious.         Physical Exam:     Vitals:    08/19/24 0954   BP: (!) 110/58   Pulse:    Resp:    Temp:      Physical Exam  Constitutional:       General: She is not in acute distress.     Appearance: She is well-developed. She is not diaphoretic.   HENT:      Head: Normocephalic and atraumatic.      Mouth/Throat:      Pharynx: No oropharyngeal exudate.   Eyes:      Conjunctiva/sclera: Conjunctivae normal.      Pupils: Pupils are equal, round, and reactive to light.   Neck:      Thyroid: No thyromegaly.      Vascular: No JVD.      Trachea: No tracheal deviation.   Cardiovascular:      Rate and Rhythm: Normal rate and regular rhythm.      Heart sounds: Normal heart sounds. No murmur heard.     No friction rub.   Pulmonary:      Effort: Pulmonary  effort is normal. No respiratory distress.      Breath sounds: Normal breath sounds. No stridor. No wheezing or rales.   Chest:      Chest wall: No tenderness.   Abdominal:      General: Bowel sounds are normal. There is no distension.      Palpations: Abdomen is soft.      Tenderness: There is no abdominal tenderness. There is no guarding or rebound.   Musculoskeletal:         General: No tenderness or deformity. Normal range of motion.      Cervical back: Normal range of motion and neck supple.   Skin:     General: Skin is warm and dry.      Capillary Refill: Capillary refill takes less than 2 seconds.      Coloration: Skin is not pale.      Findings: No erythema or rash.   Neurological:      Mental Status: She is alert and oriented to person, place, and time.      Cranial Nerves: No cranial nerve deficit.      Sensory: No sensory deficit.      Motor: No abnormal muscle tone.      Coordination: Coordination normal.      Deep Tendon Reflexes: Reflexes normal.   Psychiatric:         Behavior: Behavior normal.         Thought Content: Thought content normal.         Judgment: Judgment normal.         ECOG Performance Status: (foot note - ECOG PS provided by [employer]cology [employer]) 1 - Symptomatic but completely ambulatory    Karnofsky Performance Score:  90%- Able to Carry on Normal Activity: Minor Symptoms of Disease    Labs:   Lab Results   Component Value Date    WBC 8.67 08/19/2024    HGB 13.5 08/19/2024    HCT 40.9 08/19/2024     08/19/2024    CHOL 191 08/19/2024    TRIG 127 08/19/2024    HDL 63 08/19/2024    ALT 19 08/19/2024    ALT 19 08/19/2024    AST 17 08/19/2024    AST 17 08/19/2024     08/19/2024     08/19/2024    K 4.3 08/19/2024    K 4.3 08/19/2024     08/19/2024     08/19/2024    CREATININE 0.8 08/19/2024    CREATININE 0.8 08/19/2024    BUN 9 08/19/2024    BUN 9 08/19/2024    CO2 29 08/19/2024    CO2 29 08/19/2024    TSH 1.567 08/25/2022    INR 1.0 02/01/2024     HGBA1C 5.3 08/19/2024    MICROALBUR 23.8 05/14/2018     Iron: 85 -->62  TIBC: 283 --> 266  Ferritin: 319 --> 257    Imaging: Imaging has been reviewed     Assessment and Plan:     Ms. Loyd is a pleasant 73 year old with iron deficiency anemia and DVT and PE.    Iron deficiency anemia due to chronic blood loss  --Responded very well to IV iron with a normalization in hemoglobin  --Will follow iron levels q 3 months  --Not currently iron deficient requiring replacement  --Verified age appropriate cancer screening has been completed    Pulmonary Embolism  --Seen on imaging in Feb 2024  --Doing well on eliquis  --Imaging shows a stable chronic clot in the left lower extremity  --Managed as per Dr. Sifuentes      40 minutes in total were spent on this encounter of which 20 minutes were spent face to face with the patient and her  family to discuss the disease, natural history, treatment options and survival statistics. I have provided the patient with an opportunity to ask questions and have all questions answered to her satisfaction.        she will return to clinic in 3 months, but knows to call in the interim if symptoms change or should a problem arise.        Joy Tamez MD  Hematology and Medical Oncology  Bone Marrow Transplant  [facility]      BMT Chart Routing      Follow up with physician 3 months. 1.see me in 3 months with a cbc,cmp, iron, ferrition   Follow up with YVONNE    Provider visit type    Infusion scheduling note    Injection scheduling note    Labs    Imaging    Pharmacy appointment    Other referrals

## 2024-08-22 ENCOUNTER — PATIENT MESSAGE (OUTPATIENT)
Dept: CARDIOLOGY | Facility: CLINIC | Age: 74
End: 2024-08-22
Payer: MEDICARE

## 2024-08-23 ENCOUNTER — LAB VISIT (OUTPATIENT)
Dept: LAB | Facility: HOSPITAL | Age: 74
End: 2024-08-23
Attending: NURSE PRACTITIONER
Payer: MEDICARE

## 2024-08-23 ENCOUNTER — OFFICE VISIT (OUTPATIENT)
Dept: PRIMARY CARE CLINIC | Facility: CLINIC | Age: 74
End: 2024-08-23
Payer: MEDICARE

## 2024-08-23 VITALS
SYSTOLIC BLOOD PRESSURE: 112 MMHG | BODY MASS INDEX: 26.75 KG/M2 | HEART RATE: 82 BPM | OXYGEN SATURATION: 97 % | WEIGHT: 136.25 LBS | HEIGHT: 60 IN | DIASTOLIC BLOOD PRESSURE: 64 MMHG

## 2024-08-23 DIAGNOSIS — E11.69 TYPE 2 DIABETES MELLITUS WITH OTHER SPECIFIED COMPLICATION, WITHOUT LONG-TERM CURRENT USE OF INSULIN: ICD-10-CM

## 2024-08-23 DIAGNOSIS — R80.9 PROTEINURIA, UNSPECIFIED TYPE: ICD-10-CM

## 2024-08-23 DIAGNOSIS — E11.69 TYPE 2 DIABETES MELLITUS WITH OTHER SPECIFIED COMPLICATION, WITHOUT LONG-TERM CURRENT USE OF INSULIN: Primary | ICD-10-CM

## 2024-08-23 DIAGNOSIS — I95.9 HYPOTENSION, UNSPECIFIED HYPOTENSION TYPE: ICD-10-CM

## 2024-08-23 LAB
ALBUMIN/CREAT UR: 34.7 UG/MG (ref 0–30)
CREAT UR-MCNC: 274 MG/DL (ref 15–325)
MICROALBUMIN UR DL<=1MG/L-MCNC: 95 UG/ML

## 2024-08-23 PROCEDURE — 82043 UR ALBUMIN QUANTITATIVE: CPT | Mod: HCNC | Performed by: NURSE PRACTITIONER

## 2024-08-23 PROCEDURE — 99999 PR PBB SHADOW E&M-EST. PATIENT-LVL IV: CPT | Mod: PBBFAC,,, | Performed by: NURSE PRACTITIONER

## 2024-08-23 PROCEDURE — 82570 ASSAY OF URINE CREATININE: CPT | Mod: HCNC | Performed by: NURSE PRACTITIONER

## 2024-08-23 NOTE — PROGRESS NOTES
[facility] Note    Chief Complaint      Chief Complaint   Patient presents with    Hypotension       History of Present Illness      Jayla Loyd is a 73 y.o. female with chronic conditions of cts, gerd, previous PE (on eliquis), migraines, who presents today for: blood pressure follow up. Has been having lower blood pressure readings and occasionally feels like breaths more shallow? Had labs done by Dr. Baumann look good. Currently taking 1/2 Telmisartan for proteinuria.   Today /64. Doesn't want to stop Mounjaro has lost >30lbs since starting.   Denies current chest pain/sob/lightheadedness.     Past Medical History:  Past Medical History:   Diagnosis Date    Carpal tunnel syndrome, right upper limb 06/23/2022    Diabetes mellitus type II     GERD (gastroesophageal reflux disease)     Hx of multiple pulmonary nodules 06/15/2022    Migraines     Proteinuria     Pulmonary embolus 2024    Right lung with concurrent DVT left leg    Trigger finger, right ring finger 06/23/2022       Past Surgical History:   has a past surgical history that includes Cataract extraction; Eye surgery (3 years ago); Tubal ligation (1992); Cosmetic surgery (1971); Shoulder surgery (Left, 09/2020); and Joint replacement (Twice in last 24 months).    Family History:  family history includes Arthritis in her father and paternal grandmother; Bladder Cancer in her mother; Breast cancer in her maternal aunt and maternal grandmother; Cancer in her maternal aunt, maternal grandmother, mother, paternal aunt, and paternal grandmother; Dementia in her paternal grandmother; Diabetes in her paternal aunt; Heart disease in her father, maternal grandfather, mother, and paternal grandfather; Heart failure in her father; Hypertension in her father and mother; No Known Problems in her son; Stroke in her brother.     Social History:  Social History     Tobacco Use    Smoking status: Never     Passive exposure: Never    Smokeless  tobacco: Never   Substance Use Topics    Alcohol use: Yes     Comment: None    Drug use: Not Currently     Types: Other-see comments     Comment: None       Review of Systems   Constitutional:  Negative for chills and fever.   Respiratory:  Negative for cough and shortness of breath.    Cardiovascular:  Negative for chest pain and palpitations.   Gastrointestinal:  Negative for constipation, diarrhea, nausea and vomiting.   Genitourinary:  Negative for dysuria and hematuria.   Musculoskeletal:  Negative for falls.   Neurological:  Negative for headaches.        Medications:  Outpatient Encounter Medications as of 8/23/2024   Medication Sig Note Dispense Refill    ALPRAZolam (XANAX) 0.5 MG tablet Take 1 tablet (0.5 mg total) by mouth 2 (two) times daily as needed for Anxiety.  60 tablet 0    apixaban (ELIQUIS) 5 mg Tab Take 1 tablet (5 mg total) by mouth 2 (two) times daily.  60 tablet 11    blood sugar diagnostic (TRUE METRIX GLUCOSE TEST STRIP) Strp 1 strip by Misc.(Non-Drug; Combo Route) route once daily.  100 each 3    blood-glucose meter (TRUE METRIX GLUCOSE METER) kit Use as instructed  1 each 0    butalbital-acetaminophen-caffeine -40 mg (FIORICET, ESGIC) -40 mg per tablet Take 1 tablet by mouth every 4 (four) hours as needed. for pain.  60 tablet 1    dicyclomine (BENTYL) 10 MG capsule Take 1 capsule (10 mg total) by mouth 4 (four) times daily. (Patient taking differently: Take 10 mg by mouth as needed.)  120 capsule 1    esomeprazole (NEXIUM) 40 MG capsule TAKE 1 CAPSULE BY MOUTH TWICE DAILY BEFORE MEALS  180 capsule 2    Lactobac no.41/Bifidobact no.7 (PROBIOTIC-10 ORAL) Take by mouth once daily.       lancets (LANCETS,THIN) Misc 1 Lancet by Misc.(Non-Drug; Combo Route) route once daily.  100 each 3    meclizine (ANTIVERT) 25 mg tablet Take 1 tablet (25 mg total) by mouth 2 (two) times daily as needed.  60 tablet 0    methocarbamoL (ROBAXIN) 500 MG Tab Take 1 tablet (500 mg total) by mouth 3  (three) times daily as needed (pain).  60 tablet 1    mupirocin (BACTROBAN) 2 % ointment Apply topically 3 (three) times daily.  30 g 1    nystatin (MYCOSTATIN) powder Apply topically 4 (four) times daily. (Patient taking differently: Apply topically as needed.)  60 g 0    ondansetron (ZOFRAN) 8 MG tablet Take 1 tablet (8 mg total) by mouth every 8 (eight) hours as needed for Nausea.  20 tablet 1    ondansetron (ZOFRAN-ODT) 4 MG TbDL Take 1 tablet (4 mg total) by mouth every 8 (eight) hours as needed.  30 tablet 0    promethazine (PHENERGAN) 12.5 MG Tab as needed.       propranoloL (INDERAL LA) 60 MG 24 hr capsule Take 60 mg by mouth as needed.       rosuvastatin (CRESTOR) 40 MG Tab TAKE 1 TABLET BY MOUTH ONCE DAILY  90 tablet 1    sertraline (ZOLOFT) 50 MG tablet Take 1 tablet (50 mg total) by mouth once daily.  90 tablet 3    telmisartan (MICARDIS) 20 MG Tab TAKE 1 TABLET BY MOUTH EVERY DAY 2/5/2024: 0.5 tablet daily 90 tablet 1    tirzepatide (MOUNJARO) 5 mg/0.5 mL PnIj Inject 5 mg into the skin every 7 days.  12 Pen 0    traMADoL (ULTRAM) 50 mg tablet Take 1 tablet (50 mg total) by mouth every 8 (eight) hours as needed for Pain.  21 tablet 0    traZODone (DESYREL) 50 MG tablet Take 1 tablet (50 mg total) by mouth every evening.  90 tablet 3     No facility-administered encounter medications on file as of 8/23/2024.       Allergies:  Review of patient's allergies indicates:   Allergen Reactions    Gabapentin Hallucinations    Lyrica [pregabalin] Hallucinations    Mobic [meloxicam] Itching       Health Maintenance:  Immunization History   Administered Date(s) Administered    COVID-19, MRNA, LN-S, PF (MODERNA FULL 0.5 ML DOSE) 02/18/2021, 03/19/2021, 12/17/2021    Influenza 12/02/2014    Influenza - Trivalent (ADULT) 10/25/2013, 12/02/2014    Influenza Split 12/02/2014    Td (ADULT) 02/01/1998    Td - PF (ADULT) 02/01/1998      Health Maintenance   Topic Date Due    TETANUS VACCINE  02/01/2008    Foot Exam   09/03/2021    DEXA Scan  08/11/2023    Mammogram  12/11/2024    Hemoglobin A1c  02/19/2025    Eye Exam  02/20/2025    High Dose Statin  08/19/2025    Lipid Panel  08/19/2025    Colorectal Cancer Screening  02/08/2027    Hepatitis C Screening  Completed    Shingles Vaccine  Discontinued        Physical Exam      Vital Signs  Pulse: 82  SpO2: 97 %  BP: 112/64  Pain Score: 0-No pain  Height and Weight  Height: 5' (152.4 cm)  Weight: 61.8 kg (136 lb 3.9 oz)  BSA (Calculated - sq m): 1.62 sq meters  BMI (Calculated): 26.6  Weight in (lb) to have BMI = 25: 127.7]    Physical Exam  Constitutional:       Appearance: She is well-developed.   HENT:      Head: Normocephalic and atraumatic.   Cardiovascular:      Rate and Rhythm: Normal rate and regular rhythm.      Heart sounds: Normal heart sounds. No murmur heard.  Pulmonary:      Effort: Pulmonary effort is normal. No respiratory distress.      Breath sounds: Normal breath sounds.   Skin:     General: Skin is warm and dry.   Neurological:      Mental Status: She is alert. Mental status is at baseline.   Psychiatric:         Behavior: Behavior normal.          Laboratory:  CBC:  Recent Labs   Lab 02/02/24  0307 05/07/24  1116 08/19/24  0901   WBC 10.91  10.91 7.07 8.67   RBC 3.92 L  3.92 L 4.68 4.61   Hemoglobin 9.3 L  9.3 L 12.9 13.5   Hematocrit 30.4 L  30.4 L 40.4 40.9   Platelets 315  315 370 357   MCV 78 L  78 L 86 89   MCH 23.7 L  23.7 L 27.6 29.3   MCHC 30.6 L  30.6 L 31.9 L 33.0     CMP:  Recent Labs   Lab 02/02/24  0307 05/07/24  1116 08/19/24  0901   Glucose 144 H 123 H 118 H  118 H   Calcium 9.7 9.9 9.8  9.8   Albumin 3.0 L 3.7 3.7  3.7   Total Protein 6.8 7.3 7.2  7.2   Sodium 138 139 141  141   Potassium 4.2 4.5 4.3  4.3   CO2 26 26 29  29   Chloride 103 105 106  106   BUN 10 12 9  9   Alkaline Phosphatase 134 102 109  109   ALT 10 18 19  19   AST 14 16 17  17   Total Bilirubin 0.2 0.3 0.3  0.3     URINALYSIS:       LIPIDS:  Recent Labs    Lab 03/07/22  0839 08/25/22  1050 08/01/23  0947 08/19/24  0901   TSH 1.872 1.567  --   --    HDL 77 H  --  71 63   Cholesterol 175  --  166 191   Triglycerides 94  --  88 127   LDL Cholesterol 79.2  --  77.4 102.6   HDL/Cholesterol Ratio 44.0  --  42.8 33.0   Non-HDL Cholesterol 98  --  95 128   Total Cholesterol/HDL Ratio 2.3  --  2.3 3.0     TSH:  Recent Labs   Lab 03/07/22  0839 08/25/22  1050   TSH 1.872 1.567     A1C:  Recent Labs   Lab 07/19/22  0807 08/01/23  0947 02/01/24  2137 08/19/24  0901   Hemoglobin A1C 6.9 H 6.7 H 7.0 H 5.3       Assessment/Plan     Jayla Loyd is a 73 y.o.female with:    1. Type 2 diabetes mellitus with other specified complication, without long-term current use of insulin  - Microalbumin/Creatinine Ratio, Urine; Future    2. Proteinuria, unspecified type  - Microalbumin/Creatinine Ratio, Urine; Future    3. Hypotension, unspecified hypotension type   Had lower readings but reviewed previous Bps and has been between 110s-130s/60-70s   Has lost weight which may be contributing    Intrusted to go to ER over weekend if <90/60.   Encouraged pt to stay hydrated  Will discuss with Dr. Barnard    Chronic conditions status updated as per HPI.  Other than changes above, cont current medications and maintain follow up with specialists.  No follow-ups on file.    Future Appointments   Date Time Provider Department Center   8/23/2024  3:00 PM Jaylene Sims, NP [facility] PRICRE [place]   8/23/2024  3:30 PM SPECIMEN LAB, [facility] SPE LAB [place]   11/7/2024  2:00 PM VASCULAR, [place] [facility] [place]   11/14/2024  1:30 PM Edmond Sifuentes MD PhD [facility] PERVAS Maur[facility]io [facility]   11/18/2024  9:00 AM NOM LAB BMT [facility] LABBMT Sujit [facility]   11/18/2024 10:00 AM Joy Tamez MD [facility] HC BMT [facility]   11/21/2024  9:40 AM Geena Barnard MD [facility]       CARLOS Goodson  [facility]

## 2024-08-25 ENCOUNTER — PATIENT MESSAGE (OUTPATIENT)
Dept: PRIMARY CARE CLINIC | Facility: CLINIC | Age: 74
End: 2024-08-25
Payer: MEDICARE

## 2024-08-26 ENCOUNTER — PATIENT MESSAGE (OUTPATIENT)
Dept: PRIMARY CARE CLINIC | Facility: CLINIC | Age: 74
End: 2024-08-26
Payer: MEDICARE

## 2024-08-27 ENCOUNTER — PATIENT MESSAGE (OUTPATIENT)
Dept: PRIMARY CARE CLINIC | Facility: CLINIC | Age: 74
End: 2024-08-27
Payer: MEDICARE

## 2024-08-30 DIAGNOSIS — G44.89 CHRONIC MIXED HEADACHE SYNDROME: ICD-10-CM

## 2024-08-30 DIAGNOSIS — F41.9 ANXIETY: ICD-10-CM

## 2024-08-31 NOTE — TELEPHONE ENCOUNTER
No care due was identified.  [facility] Embedded Care Due Messages. Reference number: [id number].   8/30/2024 7:06:38 PM CDT

## 2024-08-31 NOTE — TELEPHONE ENCOUNTER
No care due was identified.  [facility] Embedded Care Due Messages. Reference number: [id number].   8/30/2024 7:06:28 PM CDT

## 2024-08-31 NOTE — TELEPHONE ENCOUNTER
No care due was identified.  [facility] Embedded Care Due Messages. Reference number: [id number].   8/30/2024 7:08:27 PM CDT

## 2024-09-02 ENCOUNTER — PATIENT MESSAGE (OUTPATIENT)
Dept: PRIMARY CARE CLINIC | Facility: CLINIC | Age: 74
End: 2024-09-02
Payer: MEDICARE

## 2024-09-02 ENCOUNTER — PATIENT MESSAGE (OUTPATIENT)
Dept: OBSTETRICS AND GYNECOLOGY | Facility: CLINIC | Age: 74
End: 2024-09-02
Payer: MEDICARE

## 2024-09-03 RX ORDER — ALPRAZOLAM 0.5 MG/1
0.5 TABLET ORAL 2 TIMES DAILY PRN
Qty: 60 TABLET | Refills: 0 | Status: SHIPPED | OUTPATIENT
Start: 2024-09-03

## 2024-09-03 RX ORDER — MECLIZINE HYDROCHLORIDE 25 MG/1
25 TABLET ORAL 2 TIMES DAILY PRN
Qty: 60 TABLET | Refills: 0 | Status: SHIPPED | OUTPATIENT
Start: 2024-09-03

## 2024-09-03 RX ORDER — BUTALBITAL, ACETAMINOPHEN AND CAFFEINE 50; 325; 40 MG/1; MG/1; MG/1
1 TABLET ORAL EVERY 4 HOURS PRN
Qty: 60 TABLET | Refills: 0 | Status: SHIPPED | OUTPATIENT
Start: 2024-09-03

## 2024-09-03 NOTE — TELEPHONE ENCOUNTER
Pt asking if PCP recommends she restart the telmisartan, but every other day. States her BP is now normal at 120/67. States she is leaving for vacation Thursday and needs advice before she leaves. Last note from PCP reads: If your Bp is low, you need to stop your BP meds until it normalizes, there is no other solution. If it persistently low we will need to do further investigation

## 2024-09-08 NOTE — TELEPHONE ENCOUNTER
No care due was identified.  [facility] Embedded Care Due Messages. Reference number: [id number].   9/08/2024 8:03:24 AM CDT

## 2024-09-09 RX ORDER — DICYCLOMINE HYDROCHLORIDE 10 MG/1
10 CAPSULE ORAL 4 TIMES DAILY PRN
Qty: 60 CAPSULE | Refills: 1 | Status: SHIPPED | OUTPATIENT
Start: 2024-09-09

## 2024-09-09 NOTE — TELEPHONE ENCOUNTER
Refill Routing Note   Medication(s) are not appropriate for processing by [facility] for the following reason(s):      Medication outside of protocol    [facility] action(s):  Route Care Due:  None identified            Appointments  past 12m or future 3m with PCP    Date Provider   Last Visit   5/30/2024 Geena Barnard MD   Next Visit   11/21/2024 Geena Barnard MD   ED visits in past 90 days: 0        Note composed:9:03 PM 09/08/2024

## 2024-09-17 DIAGNOSIS — R11.0 NAUSEA: ICD-10-CM

## 2024-09-17 RX ORDER — ONDANSETRON 4 MG/1
4 TABLET, ORALLY DISINTEGRATING ORAL EVERY 8 HOURS PRN
Qty: 30 TABLET | Refills: 0 | Status: SHIPPED | OUTPATIENT
Start: 2024-09-17

## 2024-09-17 NOTE — TELEPHONE ENCOUNTER
No care due was identified.  [facility] Embedded Care Due Messages. Reference number: [id number].   9/17/2024 8:31:13 AM CDT

## 2024-09-21 NOTE — TELEPHONE ENCOUNTER
No care due was identified.  [facility] Embedded Care Due Messages. Reference number: [id number].   9/21/2024 8:30:06 AM CDT

## 2024-09-22 RX ORDER — ROSUVASTATIN CALCIUM 40 MG/1
TABLET, COATED ORAL
Qty: 90 TABLET | Refills: 2 | Status: SHIPPED | OUTPATIENT
Start: 2024-09-22

## 2024-09-22 NOTE — TELEPHONE ENCOUNTER
Refill Decision Note   Jayla Rach  is requesting a refill authorization.  Brief Assessment and Rationale for Refill:  Approve     Medication Therapy Plan:        Comments:     Note composed:8:51 AM 09/22/2024

## 2024-09-27 ENCOUNTER — OFFICE VISIT (OUTPATIENT)
Dept: ORTHOPEDICS | Facility: CLINIC | Age: 74
End: 2024-09-27
Payer: MEDICARE

## 2024-09-27 VITALS — WEIGHT: 136.25 LBS | HEIGHT: 60 IN | BODY MASS INDEX: 26.75 KG/M2

## 2024-09-27 DIAGNOSIS — M19.072 OSTEOARTHRITIS OF MIDFOOT, LEFT: Primary | ICD-10-CM

## 2024-09-27 PROCEDURE — 99999 PR PBB SHADOW E&M-EST. PATIENT-LVL IV: CPT | Mod: PBBFAC,HCNC,, | Performed by: ORTHOPAEDIC SURGERY

## 2024-09-27 NOTE — PROGRESS NOTES
Jayla Loyd  Returns today for follow-up.  This is a 73-year-old female with bilateral midfoot arthritis, worse on the left than on the right who I last saw on 04/15/2024.  She had a left midfoot injection by Interventional Radiology at that time and reports that she had pain relief through most of August.  Around a month ago she started having recurring pain and also pain in her arch and up the back of her leg with ambulation.  Her pain caused her to go to an urgent care center where some new x-rays were done which did not show any acute abnormalities.  She comes in today for re-evaluation.    Examination:  She walks in with a slight antalgic gait.  There is noticeable prominence of the midfoot bilaterally.  There is no diffuse swelling of the left foot or ankle.  On sitting exam she has tenderness over the midfoot around the 2nd and 3rd tarsometatarsal joints.  She has some mild tenderness laterally over the base of the 5th metatarsal and somewhat along the course of the peroneal tendons.  She also has some plantar tenderness underneath the midfoot.  She is neurovascularly intact.    Impression:  1. 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  I suspect that the previous injection wore off and she has had a flare-up of pain in her midfoot resulting in compensatory gait causing pain in other areas.  I would recommend that she go back to Interventional Radiology for repeat injection.      Follow-up in three months

## 2024-09-30 ENCOUNTER — PATIENT MESSAGE (OUTPATIENT)
Dept: PRIMARY CARE CLINIC | Facility: CLINIC | Age: 74
End: 2024-09-30
Payer: MEDICARE

## 2024-09-30 DIAGNOSIS — F41.9 ANXIETY: ICD-10-CM

## 2024-10-01 NOTE — TELEPHONE ENCOUNTER
No care due was identified.  [facility] Embedded Care Due Messages. Reference number: [id number].   9/30/2024 11:53:36 PM CDT

## 2024-10-02 RX ORDER — ALPRAZOLAM 0.5 MG/1
0.5 TABLET ORAL 2 TIMES DAILY PRN
Qty: 60 TABLET | Refills: 0 | Status: SHIPPED | OUTPATIENT
Start: 2024-10-02

## 2024-10-10 ENCOUNTER — HOSPITAL ENCOUNTER (OUTPATIENT)
Dept: INTERVENTIONAL RADIOLOGY/VASCULAR | Facility: HOSPITAL | Age: 74
Discharge: HOME | End: 2024-10-10
Attending: ORTHOPAEDIC SURGERY
Payer: MEDICARE

## 2024-10-10 VITALS
OXYGEN SATURATION: 97 % | RESPIRATION RATE: 20 BRPM | BODY MASS INDEX: 25.52 KG/M2 | HEIGHT: 60 IN | DIASTOLIC BLOOD PRESSURE: 59 MMHG | SYSTOLIC BLOOD PRESSURE: 118 MMHG | WEIGHT: 130 LBS | TEMPERATURE: 97 F | HEART RATE: 57 BPM

## 2024-10-10 DIAGNOSIS — M19.90 OSTEOARTHRITIS: ICD-10-CM

## 2024-10-10 DIAGNOSIS — M19.072 OSTEOARTHRITIS OF MIDFOOT, LEFT: ICD-10-CM

## 2024-10-10 LAB — POCT GLUCOSE: 101 MG/DL (ref 70–110)

## 2024-10-10 PROCEDURE — 94760 N-INVAS EAR/PLS OXIMETRY 1: CPT

## 2024-10-10 PROCEDURE — 63600175 PHARM REV CODE 636 W HCPCS: Performed by: STUDENT IN AN ORGANIZED HEALTH CARE EDUCATION/TRAINING PROGRAM

## 2024-10-10 PROCEDURE — 99900035 HC TECH TIME PER 15 MIN (STAT)

## 2024-10-10 PROCEDURE — 25500020 PHARM REV CODE 255: Performed by: STUDENT IN AN ORGANIZED HEALTH CARE EDUCATION/TRAINING PROGRAM

## 2024-10-10 RX ORDER — METHYLPREDNISOLONE ACETATE 40 MG/ML
INJECTION, SUSPENSION INTRA-ARTICULAR; INTRALESIONAL; INTRAMUSCULAR; SOFT TISSUE
Status: COMPLETED | OUTPATIENT
Start: 2024-10-10 | End: 2024-10-10

## 2024-10-10 RX ORDER — BUPIVACAINE HYDROCHLORIDE 2.5 MG/ML
INJECTION, SOLUTION EPIDURAL; INFILTRATION; INTRACAUDAL
Status: COMPLETED | OUTPATIENT
Start: 2024-10-10 | End: 2024-10-10

## 2024-10-10 RX ADMIN — IOHEXOL 1 ML: 300 INJECTION, SOLUTION INTRAVENOUS at 02:10

## 2024-10-10 RX ADMIN — BUPIVACAINE HYDROCHLORIDE 3 ML: 2.5 INJECTION, SOLUTION EPIDURAL; INFILTRATION; INTRACAUDAL; PERINEURAL at 02:10

## 2024-10-10 RX ADMIN — METHYLPREDNISOLONE ACETATE 40 MG: 40 INJECTION, SUSPENSION INTRA-ARTICULAR; INTRALESIONAL; INTRAMUSCULAR; SOFT TISSUE at 02:10

## 2024-10-10 NOTE — Clinical Note
Left: Foot.   Scrubbed with ChloroPrep With Tint.   Painted with Chlorohexidine/Alcohol.  Hair: N/A.  Skin prep dry before draping.  Prepped by: Minda Millard,  10/10/2024 2:01 PM.

## 2024-10-10 NOTE — PLAN OF CARE
VSS. Discharge instructions reviewed with patient, patient verbalizes understanding. No complaints of pain or discomfort. Patient discharged home safely to family.

## 2024-10-10 NOTE — H&P
H&P Note  Interventional Radiology    Reason for Consult: steroid injection, L foot    SUBJECTIVE:     Chief Complaint: left foot pain    History of Present Illness: 73F PMHx PE on AC and left foot OA presents for L 2nd/3rd TMT joint steroid injection.     Past Medical History:   Diagnosis Date    Carpal tunnel syndrome, right upper limb 06/23/2022    Diabetes mellitus type II     GERD (gastroesophageal reflux disease)     Hx of multiple pulmonary nodules 06/15/2022    Migraines     Proteinuria     Pulmonary embolus 2024    Right lung with concurrent DVT left leg    Trigger finger, right ring finger 06/23/2022     Past Surgical History:   Procedure Laterality Date    CATARACT EXTRACTION      COSMETIC SURGERY  1971    rhinoplasty    EYE SURGERY  3 years ago    cataracts    JOINT REPLACEMENT  Twice in last 24 months    SHOULDER SURGERY Left 09/2020    TUBAL LIGATION  1992     Family History   Problem Relation Name Age of Onset    Bladder Cancer Mother jaxon     Hypertension Mother jaxon         CABG    Cancer Mother jaxon     Heart disease Mother jaxon     Heart failure Father Dadbrady         CABG    Hypertension Father Daddy     Heart disease Father Dadbrady     Arthritis Father Dadbrady     Stroke Brother Carmelo     No Known Problems Son      Cancer Maternal Aunt Gloria     Breast cancer Maternal Aunt Gloria     Cancer Paternal Aunt anabel     Diabetes Paternal Aunt anabel     Cancer Maternal Grandmother mitesh     Breast cancer Maternal Grandmother mitesh     Heart disease Maternal Grandfather Parents     Cancer Paternal Grandmother emigdio     Dementia Paternal Grandmother emigdio     Arthritis Paternal Grandmother emigdio     Heart disease Paternal Grandfather Family      Social History     Tobacco Use    Smoking status: Never     Passive exposure: Never    Smokeless tobacco: Never   Substance Use Topics    Alcohol use: Yes     Comment: None    Drug use: Not Currently     Types: Other-see comments     Comment: None       Review of  "Systems:  Constitutional/General:No fever, chills, change in appetite or weight loss.  Hematological/Immuno: no known coagulopathies  Respiratory: no shortness of breath  Cardiovascular: no chest pain  Gastrointestinal: no abdominal pain  Genito-Urinary: no dysuria  Musculoskeletal: endorses L foot pain  Skin: Negative for rash, itching, pigmentation changes, nail or hair changes.  Neurological: no TIA or stroke symptoms  Psychiatric: normal mood/affect, good insight/judgement      OBJECTIVE:     Vital Signs Range (Last 24H):  Temp:  [98.1 °F (36.7 °C)]   Pulse:  [66]   Resp:  [14]   BP: (111)/(62)   SpO2:  [100 %]     Physical Exam:  General- Patient AAO x3 in NAD  ENT- EOMI  Neck- No masses  CV- Normal rate  Resp-  No increased WOB  GI- Non-distended  Extrem- No cyanosis, clubbing, edema  Derm- No rashes, masses, or lesions noted  Neuro-  No focal deficits noted    Physical Exam  Body mass index is 25.39 kg/m².    Scheduled Meds:   Continuous Infusions:   PRN Meds:    Allergies:   Review of patient's allergies indicates:   Allergen Reactions    Gabapentin Hallucinations    Lyrica [pregabalin] Hallucinations    Mobic [meloxicam] Itching       Labs:  No results for input(s): "INR", "PT", "PTT" in the last 168 hours.  No results for input(s): "WBC", "HGB", "HCT", "MCV", "PLT" in the last 168 hours. No results for input(s): "GLU", "NA", "K", "CL", "CO2", "BUN", "CREATININE", "CALCIUM", "MG", "ALT", "AST", "ALBUMIN", "BILITOT", "BILIDIR" in the last 168 hours.    Vitals (Most Recent):  Temp: 98.1 °F (36.7 °C) (10/10/24 1221)  Pulse: 66 (10/10/24 1221)  Resp: 14 (10/10/24 1221)  BP: 111/62 (10/10/24 1221)  SpO2: 100 % (10/10/24 1221)    ASA: 3  Mallampati: N/A    Consent obtained.     ASSESSMENT/PLAN:     73F L foot pain presenting for L 2nd/3rd TMT steroid injection with local anesthesia.     There are no hospital problems to display for this patient.          Amanda Lezama MD    "

## 2024-10-10 NOTE — PROCEDURES
Interventional Radiology Post-Procedure Note    Pre Op Diagnosis: L foot pain  Post Op Diagnosis: Same    Procedure: L 2nd/3rd TMT joint steroid injection    Procedure performed by: Amanda Lezama MD    Written Informed Consent Obtained: Yes  Specimen Removed: NO  Estimated Blood Loss: Minimal    Findings:   Fluoro-guided injection of L 2nd/3rd TMT joint with 2cc bupivicaine 0.25% + Depomedrol 40mg/mL.     Patient tolerated procedure well.      Amanda Lezama MD  Interventional Radiology

## 2024-10-10 NOTE — DISCHARGE SUMMARY
Radiology Discharge Summary      Hospital Course: No complications    Admit Date: 10/10/2024  Discharge Date: 10/10/2024     Instructions Given to Patient: Yes  Diet: Resume prior diet  Activity: Activity as tolerated    Description of Condition on Discharge: Stable  Vital Signs (Most Recent): Temp: 98.1 °F (36.7 °C) (10/10/24 1221)  Pulse: (!) 58 (10/10/24 1412)  Resp: 18 (10/10/24 1412)  BP: 105/71 (10/10/24 1412)  SpO2: 100 % (10/10/24 1412)    Discharge Disposition: Home    Discharge Diagnosis: L foot OA     Follow-up: Follow-up with referring provider    Amanda Lezama MD

## 2024-10-17 ENCOUNTER — PATIENT MESSAGE (OUTPATIENT)
Dept: ADMINISTRATIVE | Facility: CLINIC | Age: 74
End: 2024-10-17
Payer: MEDICARE

## 2024-10-21 ENCOUNTER — TELEPHONE (OUTPATIENT)
Dept: ADMINISTRATIVE | Facility: CLINIC | Age: 74
End: 2024-10-21
Payer: MEDICARE

## 2024-10-22 ENCOUNTER — OFFICE VISIT (OUTPATIENT)
Dept: HOME HEALTH SERVICES | Facility: CLINIC | Age: 74
End: 2024-10-22
Payer: MEDICARE

## 2024-10-22 ENCOUNTER — TELEPHONE (OUTPATIENT)
Dept: ADMINISTRATIVE | Facility: CLINIC | Age: 74
End: 2024-10-22
Payer: MEDICARE

## 2024-10-22 VITALS — BODY MASS INDEX: 25.13 KG/M2 | WEIGHT: 128 LBS | HEIGHT: 60 IN

## 2024-10-22 DIAGNOSIS — R11.0 NAUSEA: ICD-10-CM

## 2024-10-22 DIAGNOSIS — E78.5 HYPERLIPIDEMIA, UNSPECIFIED HYPERLIPIDEMIA TYPE: ICD-10-CM

## 2024-10-22 DIAGNOSIS — R26.9 ABNORMALITY OF GAIT AND MOBILITY: ICD-10-CM

## 2024-10-22 DIAGNOSIS — E78.00 TYPE 2 DIABETES MELLITUS WITH HYPERCHOLESTEROLEMIA: ICD-10-CM

## 2024-10-22 DIAGNOSIS — I70.0 AORTIC ARCH ATHEROSCLEROSIS: ICD-10-CM

## 2024-10-22 DIAGNOSIS — Z00.00 ENCOUNTER FOR PREVENTIVE HEALTH EXAMINATION: Primary | ICD-10-CM

## 2024-10-22 DIAGNOSIS — E11.69 TYPE 2 DIABETES MELLITUS WITH HYPERCHOLESTEROLEMIA: ICD-10-CM

## 2024-10-22 DIAGNOSIS — G44.89 CHRONIC MIXED HEADACHE SYNDROME: ICD-10-CM

## 2024-10-22 DIAGNOSIS — I10 HYPERTENSION, UNSPECIFIED TYPE: ICD-10-CM

## 2024-10-22 DIAGNOSIS — F41.9 ANXIETY: ICD-10-CM

## 2024-10-22 RX ORDER — ONDANSETRON 4 MG/1
4 TABLET, ORALLY DISINTEGRATING ORAL EVERY 8 HOURS PRN
Qty: 30 TABLET | Refills: 0 | Status: SHIPPED | OUTPATIENT
Start: 2024-10-22

## 2024-10-22 NOTE — PATIENT INSTRUCTIONS
Counseling and Referral of Other Preventative  (Italic type indicates deductible and co-insurance are waived)    Patient Name: Jayla Loyd  Today's Date: 10/22/2024    Health Maintenance       Date Due Completion Date    TETANUS VACCINE 02/01/2008 2/1/1998    RSV Vaccine (Age 60+ and Pregnant patients) (1 - Risk 60-74 years 1-dose series) Never done ---    Foot Exam 09/03/2021 9/3/2020    DEXA Scan 08/11/2023 8/11/2020    Mammogram 12/11/2024 12/11/2023    Hemoglobin A1c 02/19/2025 8/19/2024    Eye Exam 02/20/2025 2/20/2024    Lipid Panel 08/19/2025 8/19/2024    Diabetes Urine Screening 08/23/2025 8/23/2024    High Dose Statin 10/22/2025 10/22/2024    Colorectal Cancer Screening 02/08/2027 2/8/2022        No orders of the defined types were placed in this encounter.    The following information is provided to all patients.  This information is to help you find resources for any of the problems found today that may be affecting your health:                  Living healthy guide: www.[facility].[place].gov      Understanding Diabetes: www.diabetes.org      Eating healthy: www.cdc.gov/healthyweight      CDC home safety checklist: www.cdc.gov/steadi/patient.html      Agency on Aging: www.goea.[place].[facility]      Alcoholics anonymous (AA): www.aa.org      Physical Activity: www.adrianna.nih.gov/mc9qgto      Tobacco use: www.quitwithusla.org

## 2024-10-22 NOTE — TELEPHONE ENCOUNTER
No care due was identified.  [facility] Embedded Care Due Messages. Reference number: [id number].   10/22/2024 12:17:51 PM CDT

## 2024-10-22 NOTE — PROGRESS NOTES
The patient location is: [place]  The chief complaint leading to consultation is: Medicare AW    Visit type: audiovisual    Face to Face time with patient: 40  55 minutes of total time spent on the encounter, which includes face to face time and non-face to face time preparing to see the patient (eg, review of tests), Obtaining and/or reviewing separately obtained history, Documenting clinical information in the electronic or other health record, Independently interpreting results (not separately reported) and communicating results to the patient/family/caregiver, or Care coordination (not separately reported).         Each patient to whom he or she provides medical services by telemedicine is:  (1) informed of the relationship between the physician and patient and the respective role of any other health care provider with respect to management of the patient; and (2) notified that he or she may decline to receive medical services by telemedicine and may withdraw from such care at any time.    Notes:       Jayla Loyd presented for a follow-up Medicare AWV today. The following components were reviewed and updated:    Medical history  Family History  Social history  Allergies and Current Medications  Health Risk Assessment  Health Maintenance  Care Team    **See Completed Assessments for Annual Wellness visit with in the encounter summary    The following assessments were completed:  Depression Screening  Cognitive function Screening  Timed Get Up Test  Whisper Test      Opioid documentation:      Patient does have a current opioid prescription.      Patient accepted further discussion regarding opioid medication use.      Patient is currently taking tramadol narcotic for foot pain.        Pain level today is 0/10.       In addition to narcotic pain medications, patient is also using steroid injection for pain control.       Patient is not followed by a specialist currently for their pain and will not be referred  today.       Patient's opioid risk potential based on ORT-OUD tool:       [place]ving each box that applies   No   Yes     Family history of substance abuse   Alcohol [x] []   Illegal drugs [x] []   Rx drugs [x] []     Personal history of substance abuse   Alcohol [x] []   Illegal drugs [x] []   Rx drugs [x] []     Age between 16-45 years   [x]   []     Patient with ADD, OCD, Bipolar disorder, schizoprenia   [x]   []     Patient with depression   [x]   []                         Scoring total                                                  0               Non-opioid treatment options have been discussed today and added to the patient's after visit summary.          Vitals:    10/22/24 1053   Weight: 58.1 kg (128 lb)   Height: 5' (1.524 m)     Body mass index is 25 kg/m².       Physical Exam  Constitutional:       Appearance: Normal appearance.   Eyes:      General: No scleral icterus.  Neurological:      Mental Status: She is alert.   Psychiatric:         Mood and Affect: Mood normal.         Behavior: Behavior normal.           Diagnoses and health risks identified today and associated recommendations/orders:  1. Encounter for preventive health examination  - Screenings performed, as noted above. Personal preventative testing needs reviewed.     2. Type 2 diabetes mellitus with hypercholesterolemia  - Controlled, followed by PCP  - Ambulatory referral/consult to Podiatry; Future    3. Abnormality of gait and mobility  - Foot pain can cause occasional difficulty walking. Has some difficulty ascending stairs, as well, but this is improvig with weight loss.     4. Chronic mixed headache syndrome  - Stable, followed by [facility] neurology    5. Anxiety  - Controlled, followed by PCP    6. Hyperlipidemia, unspecified hyperlipidemia type  - Stable, on statin    7. Hypertension, unspecified type  - Controlled, followed by PCP    8. Aortic arch atherosclerosis  - Stable finding on imaging. On Statin      Provided Jayla with a 5-10  year written screening schedule and personal prevention plan. Recommendations were developed using the USPSTF age appropriate recommendations. Education, counseling, and referrals were provided as needed.  After Visit Summary printed and given to patient which includes a list of additional screenings\tests needed.    Follow up in about 1 year (around 10/22/2025) for your next annual wellness visit.      Maritza Ya, NP      I offered to discuss advanced care planning, including how to pick a person who would make decisions for you if you were unable to make them for yourself, called a health care power of , and what kind of decisions you might make such as use of life sustaining treatments such as ventilators and tube feeding when faced with a life limiting illness recorded on a living will that they will need to know. (How you want to be cared for as you near the end of your natural life)     X Patient is interested in learning more about how to make advanced directives.  I provided them paperwork and offered to discuss this with them.

## 2024-10-28 RX ORDER — MECLIZINE HYDROCHLORIDE 25 MG/1
25 TABLET ORAL 2 TIMES DAILY PRN
Qty: 60 TABLET | Refills: 0 | Status: SHIPPED | OUTPATIENT
Start: 2024-10-28

## 2024-10-30 ENCOUNTER — PATIENT MESSAGE (OUTPATIENT)
Dept: PRIMARY CARE CLINIC | Facility: CLINIC | Age: 74
End: 2024-10-30
Payer: MEDICARE

## 2024-10-31 ENCOUNTER — PATIENT MESSAGE (OUTPATIENT)
Dept: PRIMARY CARE CLINIC | Facility: CLINIC | Age: 74
End: 2024-10-31
Payer: MEDICARE

## 2024-11-04 ENCOUNTER — PATIENT MESSAGE (OUTPATIENT)
Dept: PRIMARY CARE CLINIC | Facility: CLINIC | Age: 74
End: 2024-11-04
Payer: MEDICARE

## 2024-11-05 DIAGNOSIS — E11.69 TYPE 2 DIABETES MELLITUS WITH OTHER SPECIFIED COMPLICATION, WITHOUT LONG-TERM CURRENT USE OF INSULIN: ICD-10-CM

## 2024-11-05 RX ORDER — TIRZEPATIDE 5 MG/.5ML
5 INJECTION, SOLUTION SUBCUTANEOUS
Qty: 6 ML | Refills: 0 | Status: SHIPPED | OUTPATIENT
Start: 2024-11-05

## 2024-11-05 NOTE — TELEPHONE ENCOUNTER
No care due was identified.  [facility] Embedded Care Due Messages. Reference number: [id number].   11/05/2024 6:09:28 AM CST

## 2024-11-06 ENCOUNTER — PATIENT MESSAGE (OUTPATIENT)
Dept: PRIMARY CARE CLINIC | Facility: CLINIC | Age: 74
End: 2024-11-06
Payer: MEDICARE

## 2024-11-06 DIAGNOSIS — G44.89 CHRONIC MIXED HEADACHE SYNDROME: ICD-10-CM

## 2024-11-06 NOTE — TELEPHONE ENCOUNTER
Refill Decision Note   Jayla Rach  is requesting a refill authorization.  Brief Assessment and Rationale for Refill:  Approve     Medication Therapy Plan:         Comments:     Note composed:7:39 PM 11/05/2024

## 2024-11-07 ENCOUNTER — HOSPITAL ENCOUNTER (OUTPATIENT)
Dept: CARDIOLOGY | Facility: HOSPITAL | Age: 74
Discharge: HOME | End: 2024-11-07
Attending: INTERNAL MEDICINE
Payer: MEDICARE

## 2024-11-07 DIAGNOSIS — I82.412 ACUTE DEEP VEIN THROMBOSIS (DVT) OF FEMORAL VEIN OF LEFT LOWER EXTREMITY: ICD-10-CM

## 2024-11-07 PROCEDURE — 93970 EXTREMITY STUDY: CPT | Mod: 26,HCNC,, | Performed by: INTERNAL MEDICINE

## 2024-11-07 PROCEDURE — 93970 EXTREMITY STUDY: CPT | Mod: HCNC,PO

## 2024-11-08 RX ORDER — AMITRIPTYLINE HYDROCHLORIDE 25 MG/1
25 TABLET, FILM COATED ORAL NIGHTLY PRN
Qty: 90 TABLET | Refills: 1 | OUTPATIENT
Start: 2024-11-08 | End: 2025-11-08

## 2024-11-08 NOTE — TELEPHONE ENCOUNTER
Pt requesting pended med refill, states she alternates this med with xanax at night and doesn't take them at the same time. Pt also has tramadol on her med list.     LOV with Geena Barnard MD , 5/30/2024

## 2024-11-08 NOTE — TELEPHONE ENCOUNTER
No care due was identified.  [facility] Embedded Care Due Messages. Reference number: [id number].   11/08/2024 10:41:19 AM CST

## 2024-11-10 ENCOUNTER — PATIENT MESSAGE (OUTPATIENT)
Dept: CARDIOLOGY | Facility: CLINIC | Age: 74
End: 2024-11-10
Payer: MEDICARE

## 2024-11-11 ENCOUNTER — DOCUMENTATION ONLY (OUTPATIENT)
Dept: REHABILITATION | Facility: HOSPITAL | Age: 74
End: 2024-11-11
Payer: MEDICARE

## 2024-11-11 NOTE — PROGRESS NOTES
OUTPATIENT PHYSICAL THERAPY DISCHARGE SUMMARY     Name: Jayla Loyd  Clinic Number: [id number]    Diagnosis: No diagnosis found.  Physician: Geena Barnard MD  Treatment Orders: PT Eval and Treat  Past Medical History:   Diagnosis Date    Carpal tunnel syndrome, right upper limb 06/23/2022    Diabetes mellitus type II     GERD (gastroesophageal reflux disease)     Hx of multiple pulmonary nodules 06/15/2022    Migraines     Proteinuria     Pulmonary embolus 2024    Right lung with concurrent DVT left leg    Trigger finger, right ring finger 06/23/2022       Initial visit: 3/4/2024  Date of Last visit: 3/4/2024  Date of Discharge Note:  11/11/2024  Plan of care Expiration Date: 04/30/2024  Total Visits Received: 1    ASSESSMENT   Jayla Loyd only presented for 1 PT session then cancelled the remaining PT visits scheduled. Jayla has not attempted to schedule any further appointments. Therefore, PT plan of care is being discharged at this time. Due to such limited PT attendance and unexpected pt self discharge, no progress noted and no final discharge measures were assessed.     Discharge reason : Patient self discharge    PLAN   This patient is discharged from Outpatient Physical Therapy Services.     Siobhan Flores, PT  11/11/2024

## 2024-11-12 ENCOUNTER — PATIENT MESSAGE (OUTPATIENT)
Dept: PRIMARY CARE CLINIC | Facility: CLINIC | Age: 74
End: 2024-11-12
Payer: MEDICARE

## 2024-11-14 ENCOUNTER — OFFICE VISIT (OUTPATIENT)
Dept: CARDIOLOGY | Facility: CLINIC | Age: 74
End: 2024-11-14
Payer: MEDICARE

## 2024-11-14 VITALS
WEIGHT: 125.25 LBS | HEART RATE: 81 BPM | HEIGHT: 60 IN | DIASTOLIC BLOOD PRESSURE: 76 MMHG | BODY MASS INDEX: 24.59 KG/M2 | SYSTOLIC BLOOD PRESSURE: 124 MMHG

## 2024-11-14 DIAGNOSIS — E78.2 MIXED HYPERLIPIDEMIA: ICD-10-CM

## 2024-11-14 DIAGNOSIS — I73.9 PERIPHERAL VASCULAR DISEASE: ICD-10-CM

## 2024-11-14 DIAGNOSIS — I87.2 VENOUS INSUFFICIENCY: ICD-10-CM

## 2024-11-14 DIAGNOSIS — I10 PRIMARY HYPERTENSION: ICD-10-CM

## 2024-11-14 DIAGNOSIS — I82.532 CHRONIC DEEP VEIN THROMBOSIS (DVT) OF POPLITEAL VEIN OF LEFT LOWER EXTREMITY: Primary | ICD-10-CM

## 2024-11-14 DIAGNOSIS — I70.0 AORTIC ARCH ATHEROSCLEROSIS: ICD-10-CM

## 2024-11-14 DIAGNOSIS — E78.00 TYPE 2 DIABETES MELLITUS WITH HYPERCHOLESTEROLEMIA: ICD-10-CM

## 2024-11-14 DIAGNOSIS — E11.69 TYPE 2 DIABETES MELLITUS WITH HYPERCHOLESTEROLEMIA: ICD-10-CM

## 2024-11-14 PROCEDURE — 99999 PR PBB SHADOW E&M-EST. PATIENT-LVL V: CPT | Mod: PBBFAC,HCNC,, | Performed by: INTERNAL MEDICINE

## 2024-11-14 RX ORDER — PSEUDOEPHEDRINE HCL 30 MG
30 TABLET ORAL EVERY 4 HOURS PRN
COMMUNITY

## 2024-11-14 RX ORDER — ELECTROLYTES/DEXTROSE
5 SOLUTION, ORAL ORAL DAILY
COMMUNITY

## 2024-11-14 NOTE — PATIENT INSTRUCTIONS
Assessment/Plan:  Jayla Loyd is a 73 y.o. female with LLE DVT/PE (on Eliquis), DM2, GERD, obesity, who presents for a follow up appointment.    LLE DVT/PE- Likely related to recent COVID infection.  Mrs. Molina reports no chest pain, SOB, or significant LE edema.  BLE Venous Ultrasound on 11/7/2024 showed the left popliteal vein is partially compressible with chronic appearing thrombus present, consistent with chronic DVT. CTA Chest on 5/6/2024 showed resolution of right lower lobe pulmonary arterial emboli.  No new pulmonary thromboemboli.  Continue Eliquis 5 mg bid.  Repeat BLE venous ultrasound in 6 months.    2. Obesity- Encourage diet, exercise, and weight loss.    3. HLD- The 10-year ASCVD risk score ([place] DK, et al., 2019) is: 25.8%. Continue statin and Eliquis with goal LDL < 70.     Follow up in 6 months with BLE venous ultrasound prior

## 2024-11-14 NOTE — PROGRESS NOTES
[facility]      Chief Complaint   Patient presents with    Follow-up       Patient ID: Jayla Loyd is a 74 y.o. female with LLE DVT/PE (on Eliquis), DM2, GERD, obesity, who presents for a follow up appointment.  Pertinent history/events are as follows:     -Pt kindly referred by Viktor Summers PA-C for evaluation of DVT/PE.    -On 2/1/2024, Mrs. Loyd presented to the vascular lab for left lower extremity venous ultrasound due to left leg and foot pain.  The study revealed extensive occlusive DVT of the left distal SFA, popliteal, and posterior tibial veins.  Mrs. Loyd reports ongoing SOB.  Given these issues, I recommend the following:  -admit for initiation of full dose anticoagulation with heparin drip, and CTA chest to evaluate for pulmonary embolism.  -transition to Eliquis prior to discharge.   -results and plan discussed in detail with Mrs. Loyd, who voiced understanding.    -At our initial clinic visit on 2/6/2024, Mrs. Molina reports SOB which started before being diagnosed with COVID in early 1/2024.  States SOB worsened since that time.  Reports left leg pain starting in 1/2024.  LLE venous ultrasound on 2/1/2024 revealed extensive occlusive DVT of the left distal SFA, popliteal, and posterior tibial veins.  CTA Chest on 2/1/2024 revealed right lower lobe pulmonary arterial emboli. She was admitted for heparin drip and discharged on Eliquis.  Echo on 2/2/2024 with no evidence of right heart strain.  She is tolerating Eliquis with no bleeding issues.  Leg pain and swelling has improved since hospital disharge.  She continues to have SOB.  Plan:   LLE DVT/PE- Likely related to recent COVID infection. Refer to Heme/Onc for hypercoagulable workup and age appropriate cancer screening.  Continue Eliquis 5 mg bid.  Obesity- Encourage diet, exercise, and weight loss.    -At follow up clinic visit on 5/9/2024, Mrs. Molina reported no chest pain, SOB, or significant LE edema.  BLE Venous  Ultrasound on 5/8/2024 revealed left popliteal chronic DVT and no RLE DVT.  CTA Chest on 5/6/2024 showed resolution of right lower lobe pulmonary arterial emboli.  No new pulmonary thromboemboli.  Plan:  LLE DVT/PE- Likely related to recent COVID infection.  Mrs. Molina reports no chest pain, SOB, or significant LE edema.  BLE Venous Ultrasound on 5/8/2024 revealed left popliteal chronic DVT and no RLE DVT.  CTA Chest on 5/6/2024 showed resolution of right lower lobe pulmonary arterial emboli.  No new pulmonary thromboemboli.  Continue Eliquis 5 mg bid.  Repeat BLE venous ultrasound in 6 months.  Obesity- Encourage diet, exercise, and weight loss.    HPI:  Mrs. Molina reports doing well with no chest pain or SOB. She has lost 24 pounds since clinic visit on 5/9/2024. BLE Venous Ultrasound on 11/7/2024 showed the left popliteal vein is partially compressible with chronic appearing thrombus present, consistent with chronic DVT.    Past Medical History:   Diagnosis Date    Carpal tunnel syndrome, right upper limb 06/23/2022    Diabetes mellitus type II     GERD (gastroesophageal reflux disease)     Hx of multiple pulmonary nodules 06/15/2022    Migraines     Proteinuria     Pulmonary embolus 2024    Right lung with concurrent DVT left leg    Trigger finger, right ring finger 06/23/2022     Past Surgical History:   Procedure Laterality Date    CATARACT EXTRACTION      COSMETIC SURGERY  1971    rhinoplasty    EYE SURGERY  3 years ago    cataracts    JOINT REPLACEMENT  Twice in last 24 months    shoulders    PLANTAR FASCIA RELEASE  1993    SHOULDER SURGERY Left 09/2020    SINUS SURGERY  1993    TUBAL LIGATION  1992     Social History     Socioeconomic History    Marital status:    Tobacco Use    Smoking status: Never     Passive exposure: Never    Smokeless tobacco: Never   Substance and Sexual Activity    Alcohol use: Yes     Comment: None    Drug use: Never     Types: Other-see comments     Comment: None    Sexual  activity: Not Currently     Partners: Male     Birth control/protection: Post-menopausal     Social Drivers of Health     Financial Resource Strain: Low Risk  (11/11/2024)    Received from [facility]    Overall Financial Resource Strain (CARDIA)     Difficulty of Paying Living Expenses: Not hard at all   Food Insecurity: No Food Insecurity (11/11/2024)    Received from [facility]    Hunger Vital Sign     Worried About Running Out of Food in the Last Year: Never true     Ran Out of Food in the Last Year: Never true   Transportation Needs: No Transportation Needs (11/11/2024)    Received from [facility]    PRAPARE - Transportation     Lack of Transportation (Medical): No     Lack of Transportation (Non-Medical): No   Physical Activity: Unknown (11/11/2024)    Received from [facility]    Exercise Vital Sign     Days of Exercise per Week: 0 days   Recent Concern: Physical Activity - Inactive (10/22/2024)    Exercise Vital Sign     Days of Exercise per Week: 0 days     Minutes of Exercise per Session: 0 min   Stress: Stress Concern Present (11/11/2024)    Received from [facility]    [employer] [place] [employer] - Occupational Stress Questionnaire     Feeling of Stress : To some extent   Housing Stability: Unknown (11/11/2024)    Received from [facility]    Housing Stability Vital Sign     Unable to Pay for Housing in the Last Year: No     Family History   Problem Relation Name Age of Onset    Bladder Cancer Mother jaxon     Hypertension Mother jaxon         CABG    Cancer Mother jaxon     Heart disease Mother jaxon     Heart failure Father Daddy         CABG    Hypertension Father Daddy     Heart disease Father Daddy     Arthritis Father Daddy     Stroke Brother Carmelo     Arthritis Son          knee replacement    Cancer Maternal Aunt Gloria     Breast cancer Maternal Aunt Gloria     Cancer Paternal Aunt anabel     Diabetes Paternal Aunt anabel     Cancer Maternal Grandmother mitesh     Breast cancer Maternal  Grandmother mitesh     Heart disease Maternal Grandfather Parents     Cancer Paternal Grandmother emigdio     Dementia Paternal Grandmother emigdio     Arthritis Paternal Grandmother emigdio     Heart disease Paternal Grandfather Family        Review of patient's allergies indicates:   Allergen Reactions    Gabapentin Hallucinations    Lyrica [pregabalin] Hallucinations    Mobic [meloxicam] Itching       Medication List with Changes/Refills   Current Medications    ALPRAZOLAM (XANAX) 0.5 MG TABLET    Take 1 tablet (0.5 mg total) by mouth 2 (two) times daily as needed for Anxiety.    AMITRIPTYLINE (ELAVIL) 25 MG TABLET    Take 1 tablet (25 mg total) by mouth nightly as needed for Insomnia.    APIXABAN (ELIQUIS) 5 MG TAB    Take 1 tablet (5 mg total) by mouth 2 (two) times daily.    BIOTIN 5 MG CAP    Take 5 mg by mouth once daily.    BLOOD SUGAR DIAGNOSTIC (TRUE METRIX GLUCOSE TEST STRIP) STRP    1 strip by Misc.(Non-Drug; Combo Route) route once daily.    BLOOD-GLUCOSE METER (TRUE METRIX GLUCOSE METER) KIT    Use as instructed    BUTALBITAL-ACETAMINOPHEN-CAFFEINE -40 MG (FIORICET, ESGIC) -40 MG PER TABLET    Take 1 tablet by mouth every 4 (four) hours as needed for Headaches. for pain.    DICYCLOMINE (BENTYL) 10 MG CAPSULE    Take 1 capsule (10 mg total) by mouth 4 (four) times daily as needed (abdominal discomfort).    ESOMEPRAZOLE (NEXIUM) 40 MG CAPSULE    TAKE 1 CAPSULE BY MOUTH TWICE DAILY BEFORE MEALS    LACTOBAC NO.41/BIFIDOBACT NO.7 (PROBIOTIC-10 ORAL)    Take by mouth once daily.    LANCETS (LANCETS,THIN) MISC    1 Lancet by Misc.(Non-Drug; Combo Route) route once daily.    MECLIZINE (ANTIVERT) 25 MG TABLET    Take 1 tablet (25 mg total) by mouth 2 (two) times daily as needed.    METHOCARBAMOL (ROBAXIN) 500 MG TAB    Take 1 tablet (500 mg total) by mouth 3 (three) times daily as needed (pain).    MUPIROCIN (BACTROBAN) 2 % OINTMENT    Apply topically 3 (three) times daily.    NYSTATIN (MYCOSTATIN) POWDER     Apply topically 4 (four) times daily.    ONDANSETRON (ZOFRAN-ODT) 4 MG TBDL    Take 1 tablet (4 mg total) by mouth every 8 (eight) hours as needed.    PROMETHAZINE (PHENERGAN) 12.5 MG TAB    as needed.    PROPRANOLOL (INDERAL LA) 60 MG 24 HR CAPSULE    Take 60 mg by mouth as needed.    PSEUDOEPHEDRINE (SUDAFED) 30 MG TABLET    Take 30 mg by mouth every 4 (four) hours as needed.    ROSUVASTATIN (CRESTOR) 40 MG TAB    TAKE 1 TABLET BY MOUTH ONCE DAILY    SERTRALINE (ZOLOFT) 50 MG TABLET    Take 1 tablet (50 mg total) by mouth once daily.    TELMISARTAN (MICARDIS) 20 MG TAB    TAKE 1 TABLET BY MOUTH EVERY DAY    TIRZEPATIDE (MOUNJARO) 5 MG/0.5 ML PNIJ    Inject 5 mg into the skin every 7 days.    TRAMADOL (ULTRAM) 50 MG TABLET    Take 1 tablet (50 mg total) by mouth every 8 (eight) hours as needed for Pain.    TRAZODONE (DESYREL) 50 MG TABLET    Take 1 tablet (50 mg total) by mouth every evening.       Review of Systems  Constitution: Denies chills, fever, and sweats.  HENT: Denies headaches or blurry vision.  Cardiovascular: Denies chest pain or irregular heart beat.  Respiratory: Positive for cough and shortness of breath.  Gastrointestinal: Denies abdominal pain, nausea, or vomiting.  Musculoskeletal: Denies muscle cramps.  Neurological: Denies dizziness or focal weakness.  Psychiatric/Behavioral: Normal mental status.  Hematologic/Lymphatic: Denies bleeding problem or easy bruising/bleeding.  Skin: Denies rash or suspicious lesions    Physical Examination  /76   Pulse 81   Ht 5' (1.524 m)   Wt 56.8 kg (125 lb 3.5 oz)   LMP 05/25/1998 (Approximate)   BMI 24.46 kg/m²     Constitutional: No acute distress, conversant  HEENT: Sclera anicteric, Pupils equal, round and reactive to light, extraocular motions intact, Oropharynx clear  Neck: No JVD, no carotid bruits  Cardiovascular: regular rate and rhythm, no murmur, rubs or gallops, normal S1/S2  Pulmonary: Clear to auscultation bilaterally  Abdominal: Abdomen  soft, nontender, nondistended, positive bowel sounds  Extremities: No lower extremity edema   Pulses:  Carotid pulses are 2+ on the right side, and 2+ on the left side.  Radial pulses are 2+ on the right side, and 2+ on the left side.   Femoral pulses are 2+ on the right side, and 2+ on the left side.  Popliteal pulses are 2+ on the right side, and 2+ on the left side.   Dorsalis pedis pulses are 2+ on the right side, and 2+ on the left side.   Posterior tibial pulses are 2+ on the right side, and 2+ on the left side.    Skin: No ecchymosis, erythema, or ulcers  Psych: Alert and oriented x 3, appropriate affect  Neuro: CNII-XII intact, no focal deficits    Labs:  Most Recent Data  CBC:   Lab Results   Component Value Date    WBC 8.67 08/19/2024    HGB 13.5 08/19/2024    HCT 40.9 08/19/2024     08/19/2024    MCV 89 08/19/2024    RDW 12.4 08/19/2024     BMP:   Lab Results   Component Value Date     08/19/2024     08/19/2024    K 4.3 08/19/2024    K 4.3 08/19/2024     08/19/2024     08/19/2024    CO2 29 08/19/2024    CO2 29 08/19/2024    BUN 9 08/19/2024    BUN 9 08/19/2024    CREATININE 0.8 08/19/2024    CREATININE 0.8 08/19/2024     (H) 08/19/2024     (H) 08/19/2024    CALCIUM 9.8 08/19/2024    CALCIUM 9.8 08/19/2024    MG 2.0 02/02/2024    PHOS 4.2 05/13/2021     LFTS;   Lab Results   Component Value Date    PROT 7.2 08/19/2024    PROT 7.2 08/19/2024    ALBUMIN 3.7 08/19/2024    ALBUMIN 3.7 08/19/2024    BILITOT 0.3 08/19/2024    BILITOT 0.3 08/19/2024    AST 17 08/19/2024    AST 17 08/19/2024    ALKPHOS 109 08/19/2024    ALKPHOS 109 08/19/2024    ALT 19 08/19/2024    ALT 19 08/19/2024     COAGS:   Lab Results   Component Value Date    INR 1.0 02/01/2024     FLP:   Lab Results   Component Value Date    CHOL 191 08/19/2024    HDL 63 08/19/2024    LDLCALC 102.6 08/19/2024    TRIG 127 08/19/2024    CHOLHDL 33.0 08/19/2024     CARDIAC:   Lab Results   Component Value Date     TROPONINI 0.063 (H) 02/02/2024    BNP 10 02/01/2024       Imaging:    EKG 2/1/2024:  Normal sinus rhythm   Nonspecific T wave abnormality   Low voltage, precordial leads     BLE Venous Ultrasound 11/7/2024:    There is no evidence of a right lower extremity DVT.    The left popliteal vein is partially compressible with chronic appearing thrombus present, consistent with chronic DVT.    BLE Venous Ultrasound 5/8/2024:    There is no evidence of a right lower extremity DVT.    Left popliteal vein chronic DVT.    CTA Chest 5/6/2024:  1. Resolution of right lower lobe pulmonary arterial emboli.  No new pulmonary thromboemboli.  2. Unchanged pulmonary nodules    Echo 2/2/2024:    Left Ventricle: The left ventricle is normal in size. Ventricular mass is normal. Normal wall thickness. Normal wall motion. There is normal systolic function with a visually estimated ejection fraction of 60 - 65%. Ejection fraction by visual approximation is 65%. There is normal diastolic function.    Right Ventricle: Normal right ventricular cavity size. Wall thickness is normal. Right ventricle wall motion  is normal. Systolic function is normal.    Pulmonary Artery: The estimated pulmonary artery systolic pressure is 31 mmHg.    IVC/SVC: Normal venous pressure at 3 mmHg.    LLE Venous Ultrasound 2/1/2024:    The left superficial femoral distal vein is noncompressible with thrombus present, consistent with acute DVT.    The left popliteal vein is noncompressible with thrombus present, consistent with acute DVT.    The left posterior tibial vein is noncompressible with thrombus present, consistent with acute DVT.    The contralateral (right) common femoral vein is patent.    Patient transferred to the emergency department for admission for initiation of full-dose anticoagulation, and CTA chest to evaluate for pulmonary embolism.    CTA Chest 2/1/2024:  Right lower lobe pulmonary arterial emboli.   Small hiatal  hernia.    Assessment/Plan:  Jayla Loyd is a 73 y.o. female with LLE DVT/PE (on Eliquis), DM2, GERD, obesity, who presents for a follow up appointment.    LLE DVT/PE- Likely related to recent COVID infection.  Mrs. Molina reports no chest pain, SOB, or significant LE edema.  BLE Venous Ultrasound on 11/7/2024 showed the left popliteal vein is partially compressible with chronic appearing thrombus present, consistent with chronic DVT. CTA Chest on 5/6/2024 showed resolution of right lower lobe pulmonary arterial emboli.  No new pulmonary thromboemboli.  Continue Eliquis 5 mg bid.  Repeat BLE venous ultrasound in 6 months.    2. Obesity- Encourage diet, exercise, and weight loss.    3. HLD- The 10-year ASCVD risk score ([place] DK, et al., 2019) is: 25.8%. Continue statin and Eliquis with goal LDL < 70.     Follow up in 6 months with BLE venous ultrasound prior    Total duration of face to face visit time 30 minutes.  Total time spent counseling greater than fifty percent of total visit time.  Counseling included discussion regarding imaging findings, diagnosis, possibilities, treatment options, risks and benefits.  The patient had many questions regarding the options and long-term effects.    Edmond Sifuentes MD, PhD  Interventional Cardiology

## 2024-11-18 ENCOUNTER — OFFICE VISIT (OUTPATIENT)
Dept: HEMATOLOGY/ONCOLOGY | Facility: CLINIC | Age: 74
End: 2024-11-18
Payer: MEDICARE

## 2024-11-18 ENCOUNTER — LAB VISIT (OUTPATIENT)
Dept: LAB | Facility: HOSPITAL | Age: 74
End: 2024-11-18
Attending: INTERNAL MEDICINE
Payer: MEDICARE

## 2024-11-18 VITALS
RESPIRATION RATE: 18 BRPM | HEART RATE: 61 BPM | WEIGHT: 124.31 LBS | OXYGEN SATURATION: 93 % | BODY MASS INDEX: 24.41 KG/M2 | HEIGHT: 60 IN | SYSTOLIC BLOOD PRESSURE: 107 MMHG | TEMPERATURE: 99 F | DIASTOLIC BLOOD PRESSURE: 58 MMHG

## 2024-11-18 DIAGNOSIS — D51.0 PERNICIOUS ANEMIA: ICD-10-CM

## 2024-11-18 DIAGNOSIS — I82.412 ACUTE DEEP VEIN THROMBOSIS (DVT) OF FEMORAL VEIN OF LEFT LOWER EXTREMITY: ICD-10-CM

## 2024-11-18 DIAGNOSIS — D50.0 IRON DEFICIENCY ANEMIA DUE TO CHRONIC BLOOD LOSS: ICD-10-CM

## 2024-11-18 DIAGNOSIS — E78.00 ELEVATED LDL CHOLESTEROL LEVEL: ICD-10-CM

## 2024-11-18 DIAGNOSIS — D50.0 IRON DEFICIENCY ANEMIA DUE TO CHRONIC BLOOD LOSS: Primary | ICD-10-CM

## 2024-11-18 LAB
ALBUMIN SERPL BCP-MCNC: 3.8 G/DL (ref 3.5–5.2)
ALBUMIN SERPL BCP-MCNC: 3.8 G/DL (ref 3.5–5.2)
ALP SERPL-CCNC: 117 U/L (ref 40–150)
ALP SERPL-CCNC: 117 U/L (ref 40–150)
ALT SERPL W/O P-5'-P-CCNC: 34 U/L (ref 10–44)
ALT SERPL W/O P-5'-P-CCNC: 34 U/L (ref 10–44)
ANION GAP SERPL CALC-SCNC: 10 MMOL/L (ref 8–16)
ANION GAP SERPL CALC-SCNC: 10 MMOL/L (ref 8–16)
AST SERPL-CCNC: 23 U/L (ref 10–40)
AST SERPL-CCNC: 23 U/L (ref 10–40)
BASOPHILS # BLD AUTO: 0.07 K/UL (ref 0–0.2)
BASOPHILS # BLD AUTO: 0.07 K/UL (ref 0–0.2)
BASOPHILS NFR BLD: 0.9 % (ref 0–1.9)
BASOPHILS NFR BLD: 0.9 % (ref 0–1.9)
BILIRUB SERPL-MCNC: 0.4 MG/DL (ref 0.1–1)
BILIRUB SERPL-MCNC: 0.4 MG/DL (ref 0.1–1)
BUN SERPL-MCNC: 12 MG/DL (ref 8–23)
BUN SERPL-MCNC: 12 MG/DL (ref 8–23)
CALCIUM SERPL-MCNC: 10 MG/DL (ref 8.7–10.5)
CALCIUM SERPL-MCNC: 10 MG/DL (ref 8.7–10.5)
CHLORIDE SERPL-SCNC: 104 MMOL/L (ref 95–110)
CHLORIDE SERPL-SCNC: 104 MMOL/L (ref 95–110)
CO2 SERPL-SCNC: 27 MMOL/L (ref 23–29)
CO2 SERPL-SCNC: 27 MMOL/L (ref 23–29)
CREAT SERPL-MCNC: 0.8 MG/DL (ref 0.5–1.4)
CREAT SERPL-MCNC: 0.8 MG/DL (ref 0.5–1.4)
DIFFERENTIAL METHOD BLD: ABNORMAL
DIFFERENTIAL METHOD BLD: ABNORMAL
EOSINOPHIL # BLD AUTO: 0 K/UL (ref 0–0.5)
EOSINOPHIL # BLD AUTO: 0 K/UL (ref 0–0.5)
EOSINOPHIL NFR BLD: 0.5 % (ref 0–8)
EOSINOPHIL NFR BLD: 0.5 % (ref 0–8)
ERYTHROCYTE [DISTWIDTH] IN BLOOD BY AUTOMATED COUNT: 12.2 % (ref 11.5–14.5)
ERYTHROCYTE [DISTWIDTH] IN BLOOD BY AUTOMATED COUNT: 12.2 % (ref 11.5–14.5)
EST. GFR  (NO RACE VARIABLE): >60 ML/MIN/1.73 M^2
EST. GFR  (NO RACE VARIABLE): >60 ML/MIN/1.73 M^2
FERRITIN SERPL-MCNC: 256 NG/ML (ref 20–300)
FERRITIN SERPL-MCNC: 256 NG/ML (ref 20–300)
GLUCOSE SERPL-MCNC: 118 MG/DL (ref 70–110)
GLUCOSE SERPL-MCNC: 118 MG/DL (ref 70–110)
HCT VFR BLD AUTO: 42.2 % (ref 37–48.5)
HCT VFR BLD AUTO: 42.2 % (ref 37–48.5)
HGB BLD-MCNC: 14.3 G/DL (ref 12–16)
HGB BLD-MCNC: 14.3 G/DL (ref 12–16)
IMM GRANULOCYTES # BLD AUTO: 0.01 K/UL (ref 0–0.04)
IMM GRANULOCYTES # BLD AUTO: 0.01 K/UL (ref 0–0.04)
IMM GRANULOCYTES NFR BLD AUTO: 0.1 % (ref 0–0.5)
IMM GRANULOCYTES NFR BLD AUTO: 0.1 % (ref 0–0.5)
IRON SERPL-MCNC: 60 UG/DL (ref 30–160)
IRON SERPL-MCNC: 60 UG/DL (ref 30–160)
LYMPHOCYTES # BLD AUTO: 2.5 K/UL (ref 1–4.8)
LYMPHOCYTES # BLD AUTO: 2.5 K/UL (ref 1–4.8)
LYMPHOCYTES NFR BLD: 31.3 % (ref 18–48)
LYMPHOCYTES NFR BLD: 31.3 % (ref 18–48)
MCH RBC QN AUTO: 29.4 PG (ref 27–31)
MCH RBC QN AUTO: 29.4 PG (ref 27–31)
MCHC RBC AUTO-ENTMCNC: 33.9 G/DL (ref 32–36)
MCHC RBC AUTO-ENTMCNC: 33.9 G/DL (ref 32–36)
MCV RBC AUTO: 87 FL (ref 82–98)
MCV RBC AUTO: 87 FL (ref 82–98)
MONOCYTES # BLD AUTO: 0.5 K/UL (ref 0.3–1)
MONOCYTES # BLD AUTO: 0.5 K/UL (ref 0.3–1)
MONOCYTES NFR BLD: 6.5 % (ref 4–15)
MONOCYTES NFR BLD: 6.5 % (ref 4–15)
NEUTROPHILS # BLD AUTO: 4.9 K/UL (ref 1.8–7.7)
NEUTROPHILS # BLD AUTO: 4.9 K/UL (ref 1.8–7.7)
NEUTROPHILS NFR BLD: 60.7 % (ref 38–73)
NEUTROPHILS NFR BLD: 60.7 % (ref 38–73)
NRBC BLD-RTO: 0 /100 WBC
NRBC BLD-RTO: 0 /100 WBC
PLATELET # BLD AUTO: 384 K/UL (ref 150–450)
PLATELET # BLD AUTO: 384 K/UL (ref 150–450)
PMV BLD AUTO: 9 FL (ref 9.2–12.9)
PMV BLD AUTO: 9 FL (ref 9.2–12.9)
POTASSIUM SERPL-SCNC: 4 MMOL/L (ref 3.5–5.1)
POTASSIUM SERPL-SCNC: 4 MMOL/L (ref 3.5–5.1)
PROT SERPL-MCNC: 7.3 G/DL (ref 6–8.4)
PROT SERPL-MCNC: 7.3 G/DL (ref 6–8.4)
RBC # BLD AUTO: 4.86 M/UL (ref 4–5.4)
RBC # BLD AUTO: 4.86 M/UL (ref 4–5.4)
SATURATED IRON: 24 % (ref 20–50)
SATURATED IRON: 24 % (ref 20–50)
SODIUM SERPL-SCNC: 141 MMOL/L (ref 136–145)
SODIUM SERPL-SCNC: 141 MMOL/L (ref 136–145)
TOTAL IRON BINDING CAPACITY: 250 UG/DL (ref 250–450)
TOTAL IRON BINDING CAPACITY: 250 UG/DL (ref 250–450)
TRANSFERRIN SERPL-MCNC: 169 MG/DL (ref 200–375)
TRANSFERRIN SERPL-MCNC: 169 MG/DL (ref 200–375)
WBC # BLD AUTO: 8.11 K/UL (ref 3.9–12.7)
WBC # BLD AUTO: 8.11 K/UL (ref 3.9–12.7)

## 2024-11-18 PROCEDURE — 3060F POS MICROALBUMINURIA REV: CPT | Mod: HCNC,CPTII,S$GLB, | Performed by: INTERNAL MEDICINE

## 2024-11-18 PROCEDURE — 3066F NEPHROPATHY DOC TX: CPT | Mod: HCNC,CPTII,S$GLB, | Performed by: INTERNAL MEDICINE

## 2024-11-18 PROCEDURE — 3288F FALL RISK ASSESSMENT DOCD: CPT | Mod: HCNC,CPTII,S$GLB, | Performed by: INTERNAL MEDICINE

## 2024-11-18 PROCEDURE — 3078F DIAST BP <80 MM HG: CPT | Mod: HCNC,CPTII,S$GLB, | Performed by: INTERNAL MEDICINE

## 2024-11-18 PROCEDURE — 1126F AMNT PAIN NOTED NONE PRSNT: CPT | Mod: HCNC,CPTII,S$GLB, | Performed by: INTERNAL MEDICINE

## 2024-11-18 PROCEDURE — 82728 ASSAY OF FERRITIN: CPT | Mod: HCNC | Performed by: INTERNAL MEDICINE

## 2024-11-18 PROCEDURE — 1101F PT FALLS ASSESS-DOCD LE1/YR: CPT | Mod: HCNC,CPTII,S$GLB, | Performed by: INTERNAL MEDICINE

## 2024-11-18 PROCEDURE — 3044F HG A1C LEVEL LT 7.0%: CPT | Mod: HCNC,CPTII,S$GLB, | Performed by: INTERNAL MEDICINE

## 2024-11-18 PROCEDURE — 84466 ASSAY OF TRANSFERRIN: CPT | Mod: HCNC | Performed by: INTERNAL MEDICINE

## 2024-11-18 PROCEDURE — 3074F SYST BP LT 130 MM HG: CPT | Mod: HCNC,CPTII,S$GLB, | Performed by: INTERNAL MEDICINE

## 2024-11-18 PROCEDURE — 85025 COMPLETE CBC W/AUTO DIFF WBC: CPT | Mod: HCNC | Performed by: INTERNAL MEDICINE

## 2024-11-18 PROCEDURE — 36415 COLL VENOUS BLD VENIPUNCTURE: CPT | Mod: HCNC | Performed by: INTERNAL MEDICINE

## 2024-11-18 PROCEDURE — 99999 PR PBB SHADOW E&M-EST. PATIENT-LVL V: CPT | Mod: PBBFAC,HCNC,, | Performed by: INTERNAL MEDICINE

## 2024-11-18 PROCEDURE — 80053 COMPREHEN METABOLIC PANEL: CPT | Mod: HCNC | Performed by: INTERNAL MEDICINE

## 2024-11-18 PROCEDURE — 1159F MED LIST DOCD IN RCRD: CPT | Mod: HCNC,CPTII,S$GLB, | Performed by: INTERNAL MEDICINE

## 2024-11-18 PROCEDURE — 3008F BODY MASS INDEX DOCD: CPT | Mod: HCNC,CPTII,S$GLB, | Performed by: INTERNAL MEDICINE

## 2024-11-18 PROCEDURE — 99215 OFFICE O/P EST HI 40 MIN: CPT | Mod: HCNC,S$GLB,, | Performed by: INTERNAL MEDICINE

## 2024-11-18 NOTE — PROGRESS NOTES
Hematology and Medical Oncology   Follow Up     08/19/2024    Reason For Referral: Iron Deficiency Anemia      History of Present Ilness:   Jayla Loyd (Jayla) is a pleasant 74 y.o.female with a recent DVT secondary to COVID and profound iron deficiency anemia.       The patient reports she first noted pain to her left posterior thigh in January 2024 that progressively worsened and spread to her calf. She endorses associated swelling and warmth. The patient endorses SCANLON and non productive cough x3-4 weeks noting that her symptoms started when she was diagnosed with COVID at the beginning of January. She has been taking cough syrup at home for this with no improvement.   --LLE venous ultrasound on 2/1/2024 revealed extensive occlusive DVT of the left distal SFA, popliteal, and posterior tibial veins.    --CTA Chest on 2/1/2024 revealed right lower lobe pulmonary arterial emboli. She was admitted for heparin drip and discharged on Eliquis. -- Echo on 2/2/2024 with no evidence of right heart strain.      She is tolerating Eliquis. Has close follow up with Dr. Sifuentes.    Interval History:  Continues on Mojero. Main complaint is ongoing fatigue. Contemplating increasing the dose, but will discuss with PCP.      Happy to see that her iron and hgb levels are normal.        PAST MEDICAL HISTORY:   Past Medical History:   Diagnosis Date    Carpal tunnel syndrome, right upper limb 06/23/2022    Diabetes mellitus type II     GERD (gastroesophageal reflux disease)     Hx of multiple pulmonary nodules 06/15/2022    Migraines     Proteinuria     Pulmonary embolus 2024    Right lung with concurrent DVT left leg    Trigger finger, right ring finger 06/23/2022       PAST SURGICAL HISTORY:   Past Surgical History:   Procedure Laterality Date    CATARACT EXTRACTION      COSMETIC SURGERY  1971    rhinoplasty    EYE SURGERY  3 years ago    cataracts    JOINT REPLACEMENT  Twice in last 24 months    shoulders    PLANTAR FASCIA  RELEASE  1993    SHOULDER SURGERY Left 09/2020    SINUS SURGERY  1993    TUBAL LIGATION  1992       PAST SOCIAL HISTORY:   reports that she has never smoked. She has never been exposed to tobacco smoke. She has never used smokeless tobacco. She reports current alcohol use. She reports that she does not use drugs.    FAMILY HISTORY:  Family History   Problem Relation Name Age of Onset    Bladder Cancer Mother jaxon     Hypertension Mother jaxon         CABG    Cancer Mother jaxon     Heart disease Mother jaxon     Heart failure Father Dadbrady         CABG    Hypertension Father Daddy     Heart disease Father Daddy     Arthritis Father Dadbrady     Stroke Brother Carmelo     Arthritis Son          knee replacement    Cancer Maternal Aunt Gloria     Breast cancer Maternal Aunt Gloria     Cancer Paternal Aunt anabel     Diabetes Paternal Aunt anabel     Cancer Maternal Grandmother mitesh     Breast cancer Maternal Grandmother mitesh     Heart disease Maternal Grandfather Parents     Cancer Paternal Grandmother emigdio     Dementia Paternal Grandmother emigdio     Arthritis Paternal Grandmother emigdio     Heart disease Paternal Grandfather Family        CURRENT MEDICATIONS:   Current Outpatient Medications   Medication Sig    ALPRAZolam (XANAX) 0.5 MG tablet Take 1 tablet (0.5 mg total) by mouth 2 (two) times daily as needed for Anxiety.    amitriptyline (ELAVIL) 25 MG tablet Take 1 tablet (25 mg total) by mouth nightly as needed for Insomnia.    apixaban (ELIQUIS) 5 mg Tab Take 1 tablet (5 mg total) by mouth 2 (two) times daily.    biotin 5 mg Cap Take 5 mg by mouth once daily.    blood sugar diagnostic (TRUE METRIX GLUCOSE TEST STRIP) Strp 1 strip by Misc.(Non-Drug; Combo Route) route once daily.    blood-glucose meter (TRUE METRIX GLUCOSE METER) kit Use as instructed    butalbital-acetaminophen-caffeine -40 mg (FIORICET, ESGIC) -40 mg per tablet Take 1 tablet by mouth every 4 (four) hours as needed for Headaches. for pain.     dicyclomine (BENTYL) 10 MG capsule Take 1 capsule (10 mg total) by mouth 4 (four) times daily as needed (abdominal discomfort).    esomeprazole (NEXIUM) 40 MG capsule TAKE 1 CAPSULE BY MOUTH TWICE DAILY BEFORE MEALS    Lactobac no.41/Bifidobact no.7 (PROBIOTIC-10 ORAL) Take by mouth once daily.    lancets (LANCETS,THIN) Misc 1 Lancet by Misc.(Non-Drug; Combo Route) route once daily.    meclizine (ANTIVERT) 25 mg tablet Take 1 tablet (25 mg total) by mouth 2 (two) times daily as needed.    methocarbamoL (ROBAXIN) 500 MG Tab Take 1 tablet (500 mg total) by mouth 3 (three) times daily as needed (pain).    mupirocin (BACTROBAN) 2 % ointment Apply topically 3 (three) times daily.    nystatin (MYCOSTATIN) powder Apply topically 4 (four) times daily.    ondansetron (ZOFRAN-ODT) 4 MG TbDL Take 1 tablet (4 mg total) by mouth every 8 (eight) hours as needed.    promethazine (PHENERGAN) 12.5 MG Tab as needed.    propranoloL (INDERAL LA) 60 MG 24 hr capsule Take 60 mg by mouth as needed.    pseudoephedrine (SUDAFED) 30 MG tablet Take 30 mg by mouth every 4 (four) hours as needed.    rosuvastatin (CRESTOR) 40 MG Tab TAKE 1 TABLET BY MOUTH ONCE DAILY    sertraline (ZOLOFT) 50 MG tablet Take 1 tablet (50 mg total) by mouth once daily.    telmisartan (MICARDIS) 20 MG Tab TAKE 1 TABLET BY MOUTH EVERY DAY    tirzepatide (MOUNJARO) 5 mg/0.5 mL PnIj Inject 5 mg into the skin every 7 days.    traMADoL (ULTRAM) 50 mg tablet Take 1 tablet (50 mg total) by mouth every 8 (eight) hours as needed for Pain.    traZODone (DESYREL) 50 MG tablet Take 1 tablet (50 mg total) by mouth every evening.     No current facility-administered medications for this visit.     ALLERGIES:   Review of patient's allergies indicates:   Allergen Reactions    Gabapentin Hallucinations    Lyrica [pregabalin] Hallucinations    Mobic [meloxicam] Itching         Review of Systems:     Review of Systems   Constitutional:  Positive for fatigue. Negative for appetite  change, chills, diaphoresis, fever and unexpected weight change.   HENT:   Negative for hearing loss, mouth sores, nosebleeds, sore throat, trouble swallowing and voice change.    Eyes:  Negative for eye problems and icterus.   Respiratory:  Negative for chest tightness, cough, hemoptysis, shortness of breath and wheezing.    Cardiovascular:  Negative for chest pain, leg swelling and palpitations.   Gastrointestinal:  Negative for abdominal distention, abdominal pain, blood in stool, diarrhea, nausea and vomiting.   Endocrine: Negative for hot flashes.   Genitourinary:  Negative for bladder incontinence, difficulty urinating, dysuria and hematuria.    Musculoskeletal:  Negative for arthralgias, back pain, flank pain, gait problem, myalgias, neck pain and neck stiffness.   Skin:  Negative for itching, rash and wound.   Neurological:  Negative for dizziness, extremity weakness, gait problem, headaches, numbness, seizures and speech difficulty.   Hematological:  Negative for adenopathy. Does not bruise/bleed easily.   Psychiatric/Behavioral:  Negative for confusion, depression and sleep disturbance. The patient is not nervous/anxious.         Physical Exam:     Vitals:    11/18/24 0959   BP: (!) 107/58   Pulse: 61   Resp: 18   Temp: 98.5 °F (36.9 °C)     Physical Exam  Constitutional:       General: She is not in acute distress.     Appearance: She is well-developed. She is not diaphoretic.   HENT:      Head: Normocephalic and atraumatic.      Mouth/Throat:      Pharynx: No oropharyngeal exudate.   Eyes:      Conjunctiva/sclera: Conjunctivae normal.      Pupils: Pupils are equal, round, and reactive to light.   Neck:      Thyroid: No thyromegaly.      Vascular: No JVD.      Trachea: No tracheal deviation.   Cardiovascular:      Rate and Rhythm: Normal rate and regular rhythm.      Heart sounds: Normal heart sounds. No murmur heard.     No friction rub.   Pulmonary:      Effort: Pulmonary effort is normal. No  respiratory distress.      Breath sounds: Normal breath sounds. No stridor. No wheezing or rales.   Chest:      Chest wall: No tenderness.   Abdominal:      General: Bowel sounds are normal. There is no distension.      Palpations: Abdomen is soft.      Tenderness: There is no abdominal tenderness. There is no guarding or rebound.   Musculoskeletal:         General: No tenderness or deformity. Normal range of motion.      Cervical back: Normal range of motion and neck supple.   Skin:     General: Skin is warm and dry.      Capillary Refill: Capillary refill takes less than 2 seconds.      Coloration: Skin is not pale.      Findings: No erythema or rash.   Neurological:      Mental Status: She is alert and oriented to person, place, and time.      Cranial Nerves: No cranial nerve deficit.      Sensory: No sensory deficit.      Motor: No abnormal muscle tone.      Coordination: Coordination normal.      Deep Tendon Reflexes: Reflexes normal.   Psychiatric:         Behavior: Behavior normal.         Thought Content: Thought content normal.         Judgment: Judgment normal.         ECOG Performance Status: (foot note - ECOG PS provided by [employer]cology [employer]) 1 - Symptomatic but completely ambulatory    Karnofsky Performance Score:  90%- Able to Carry on Normal Activity: Minor Symptoms of Disease    Labs:   Lab Results   Component Value Date    WBC 8.11 11/18/2024    WBC 8.11 11/18/2024    HGB 14.3 11/18/2024    HGB 14.3 11/18/2024    HCT 42.2 11/18/2024    HCT 42.2 11/18/2024     11/18/2024     11/18/2024    CHOL 191 08/19/2024    TRIG 127 08/19/2024    HDL 63 08/19/2024    ALT 34 11/18/2024    ALT 34 11/18/2024    AST 23 11/18/2024    AST 23 11/18/2024     11/18/2024     11/18/2024    K 4.0 11/18/2024    K 4.0 11/18/2024     11/18/2024     11/18/2024    CREATININE 0.8 11/18/2024    CREATININE 0.8 11/18/2024    BUN 12 11/18/2024    BUN 12 11/18/2024    CO2 27  11/18/2024    CO2 27 11/18/2024    TSH 1.567 08/25/2022    INR 1.0 02/01/2024    HGBA1C 5.3 08/19/2024    MICROALBUR 23.8 05/14/2018     Iron: 85 -->62 --> 60  TIBC: 283 --> 266 --> 250  Ferritin: 319 --> 257 --> 256    Imaging: Imaging has been reviewed     Assessment and Plan:     Ms. Loyd is a pleasant 74 year old with iron deficiency anemia and DVT and PE.    Iron deficiency anemia due to chronic blood loss  --Responded very well to IV iron with a normalization in hemoglobin  --Will follow iron levels q 6 months  --Not currently iron deficient requiring replacement  --Verified age appropriate cancer screening has been completed    Pulmonary Embolism  --Seen on imaging in Feb 2024  --Doing well on eliquis  --Imaging shows a stable chronic clot in the left lower extremity  --Managed as per Dr. Sifuentes      40 minutes in total were spent on this encounter of which 20 minutes were spent face to face with the patient and her  family to discuss the disease, natural history, treatment options and survival statistics. I have provided the patient with an opportunity to ask questions and have all questions answered to her satisfaction.        she will return to clinic in 6 months, but knows to call in the interim if symptoms change or should a problem arise.        Joy Tamez MD  Hematology and Medical Oncology  B[facility] Marrow Transplant  [facility]      BMT Chart Routing      Follow up with physician 6 months. 1, see me in 6 months with a cbc,cmp, iron, ferritin   Follow up with YVONNE    Provider visit type    Infusion scheduling note    Injection scheduling note    Labs    Imaging    Pharmacy appointment    Other referrals

## 2024-11-21 ENCOUNTER — OFFICE VISIT (OUTPATIENT)
Dept: PRIMARY CARE CLINIC | Facility: CLINIC | Age: 74
End: 2024-11-21
Payer: MEDICARE

## 2024-11-21 ENCOUNTER — PATIENT MESSAGE (OUTPATIENT)
Dept: HEMATOLOGY/ONCOLOGY | Facility: CLINIC | Age: 74
End: 2024-11-21
Payer: MEDICARE

## 2024-11-21 ENCOUNTER — LAB VISIT (OUTPATIENT)
Dept: LAB | Facility: HOSPITAL | Age: 74
End: 2024-11-21
Attending: INTERNAL MEDICINE
Payer: MEDICARE

## 2024-11-21 VITALS
HEIGHT: 60 IN | SYSTOLIC BLOOD PRESSURE: 110 MMHG | HEART RATE: 85 BPM | DIASTOLIC BLOOD PRESSURE: 62 MMHG | OXYGEN SATURATION: 95 % | BODY MASS INDEX: 24.45 KG/M2 | WEIGHT: 124.56 LBS

## 2024-11-21 DIAGNOSIS — F41.9 ANXIETY: Primary | ICD-10-CM

## 2024-11-21 DIAGNOSIS — D50.0 IRON DEFICIENCY ANEMIA DUE TO CHRONIC BLOOD LOSS: ICD-10-CM

## 2024-11-21 DIAGNOSIS — D50.9 IRON DEFICIENCY ANEMIA, UNSPECIFIED IRON DEFICIENCY ANEMIA TYPE: ICD-10-CM

## 2024-11-21 DIAGNOSIS — E78.00 ELEVATED LDL CHOLESTEROL LEVEL: ICD-10-CM

## 2024-11-21 DIAGNOSIS — I10 HYPERTENSION, UNSPECIFIED TYPE: ICD-10-CM

## 2024-11-21 DIAGNOSIS — M79.673 PAIN OF FOOT, UNSPECIFIED LATERALITY: ICD-10-CM

## 2024-11-21 DIAGNOSIS — E11.69 TYPE 2 DIABETES MELLITUS WITH OTHER SPECIFIED COMPLICATION, WITHOUT LONG-TERM CURRENT USE OF INSULIN: ICD-10-CM

## 2024-11-21 DIAGNOSIS — R11.0 NAUSEA: ICD-10-CM

## 2024-11-21 DIAGNOSIS — D51.0 PERNICIOUS ANEMIA: ICD-10-CM

## 2024-11-21 DIAGNOSIS — G44.89 CHRONIC MIXED HEADACHE SYNDROME: ICD-10-CM

## 2024-11-21 LAB
ALBUMIN SERPL BCP-MCNC: 4 G/DL (ref 3.5–5.2)
ALP SERPL-CCNC: 125 U/L (ref 40–150)
ALT SERPL W/O P-5'-P-CCNC: 30 U/L (ref 10–44)
ANION GAP SERPL CALC-SCNC: 11 MMOL/L (ref 8–16)
AST SERPL-CCNC: 26 U/L (ref 10–40)
BASOPHILS # BLD AUTO: 0.06 K/UL (ref 0–0.2)
BASOPHILS NFR BLD: 0.6 % (ref 0–1.9)
BILIRUB SERPL-MCNC: 0.4 MG/DL (ref 0.1–1)
BUN SERPL-MCNC: 13 MG/DL (ref 8–23)
CALCIUM SERPL-MCNC: 10.2 MG/DL (ref 8.7–10.5)
CHLORIDE SERPL-SCNC: 104 MMOL/L (ref 95–110)
CHOLEST SERPL-MCNC: 202 MG/DL (ref 120–199)
CHOLEST/HDLC SERPL: 2.9 {RATIO} (ref 2–5)
CO2 SERPL-SCNC: 25 MMOL/L (ref 23–29)
CREAT SERPL-MCNC: 0.8 MG/DL (ref 0.5–1.4)
DIFFERENTIAL METHOD BLD: NORMAL
EOSINOPHIL # BLD AUTO: 0.1 K/UL (ref 0–0.5)
EOSINOPHIL NFR BLD: 0.7 % (ref 0–8)
ERYTHROCYTE [DISTWIDTH] IN BLOOD BY AUTOMATED COUNT: 12.3 % (ref 11.5–14.5)
EST. GFR  (NO RACE VARIABLE): >60 ML/MIN/1.73 M^2
FERRITIN SERPL-MCNC: 264 NG/ML (ref 20–300)
GLUCOSE SERPL-MCNC: 107 MG/DL (ref 70–110)
HCT VFR BLD AUTO: 42.2 % (ref 37–48.5)
HDLC SERPL-MCNC: 70 MG/DL (ref 40–75)
HDLC SERPL: 34.7 % (ref 20–50)
HGB BLD-MCNC: 14.2 G/DL (ref 12–16)
IMM GRANULOCYTES # BLD AUTO: 0.02 K/UL (ref 0–0.04)
IMM GRANULOCYTES NFR BLD AUTO: 0.2 % (ref 0–0.5)
IRON SERPL-MCNC: 66 UG/DL (ref 30–160)
LDLC SERPL CALC-MCNC: 116.6 MG/DL (ref 63–159)
LYMPHOCYTES # BLD AUTO: 2.9 K/UL (ref 1–4.8)
LYMPHOCYTES NFR BLD: 29.5 % (ref 18–48)
MCH RBC QN AUTO: 29.5 PG (ref 27–31)
MCHC RBC AUTO-ENTMCNC: 33.6 G/DL (ref 32–36)
MCV RBC AUTO: 88 FL (ref 82–98)
MONOCYTES # BLD AUTO: 0.7 K/UL (ref 0.3–1)
MONOCYTES NFR BLD: 7 % (ref 4–15)
NEUTROPHILS # BLD AUTO: 6 K/UL (ref 1.8–7.7)
NEUTROPHILS NFR BLD: 62 % (ref 38–73)
NONHDLC SERPL-MCNC: 132 MG/DL
NRBC BLD-RTO: 0 /100 WBC
PLATELET # BLD AUTO: 373 K/UL (ref 150–450)
PMV BLD AUTO: 9.7 FL (ref 9.2–12.9)
POTASSIUM SERPL-SCNC: 4.5 MMOL/L (ref 3.5–5.1)
PROT SERPL-MCNC: 7.7 G/DL (ref 6–8.4)
RBC # BLD AUTO: 4.81 M/UL (ref 4–5.4)
SATURATED IRON: 26 % (ref 20–50)
SODIUM SERPL-SCNC: 140 MMOL/L (ref 136–145)
TOTAL IRON BINDING CAPACITY: 250 UG/DL (ref 250–450)
TRANSFERRIN SERPL-MCNC: 169 MG/DL (ref 200–375)
TRIGL SERPL-MCNC: 77 MG/DL (ref 30–150)
VIT B12 SERPL-MCNC: 981 PG/ML (ref 210–950)
WBC # BLD AUTO: 9.7 K/UL (ref 3.9–12.7)

## 2024-11-21 PROCEDURE — 85025 COMPLETE CBC W/AUTO DIFF WBC: CPT | Mod: HCNC | Performed by: INTERNAL MEDICINE

## 2024-11-21 PROCEDURE — 1159F MED LIST DOCD IN RCRD: CPT | Mod: CPTII,S$GLB,, | Performed by: INTERNAL MEDICINE

## 2024-11-21 PROCEDURE — 3066F NEPHROPATHY DOC TX: CPT | Mod: CPTII,S$GLB,, | Performed by: INTERNAL MEDICINE

## 2024-11-21 PROCEDURE — 3060F POS MICROALBUMINURIA REV: CPT | Mod: CPTII,S$GLB,, | Performed by: INTERNAL MEDICINE

## 2024-11-21 PROCEDURE — 80061 LIPID PANEL: CPT | Mod: HCNC | Performed by: INTERNAL MEDICINE

## 2024-11-21 PROCEDURE — 80053 COMPREHEN METABOLIC PANEL: CPT | Mod: HCNC | Performed by: INTERNAL MEDICINE

## 2024-11-21 PROCEDURE — 36415 COLL VENOUS BLD VENIPUNCTURE: CPT | Performed by: INTERNAL MEDICINE

## 2024-11-21 PROCEDURE — 3074F SYST BP LT 130 MM HG: CPT | Mod: CPTII,S$GLB,, | Performed by: INTERNAL MEDICINE

## 2024-11-21 PROCEDURE — 3008F BODY MASS INDEX DOCD: CPT | Mod: CPTII,S$GLB,, | Performed by: INTERNAL MEDICINE

## 2024-11-21 PROCEDURE — 82607 VITAMIN B-12: CPT | Mod: HCNC | Performed by: INTERNAL MEDICINE

## 2024-11-21 PROCEDURE — 3044F HG A1C LEVEL LT 7.0%: CPT | Mod: CPTII,S$GLB,, | Performed by: INTERNAL MEDICINE

## 2024-11-21 PROCEDURE — 99999 PR PBB SHADOW E&M-EST. PATIENT-LVL IV: CPT | Mod: PBBFAC,,, | Performed by: INTERNAL MEDICINE

## 2024-11-21 PROCEDURE — 83540 ASSAY OF IRON: CPT | Mod: HCNC | Performed by: INTERNAL MEDICINE

## 2024-11-21 PROCEDURE — 83921 ORGANIC ACID SINGLE QUANT: CPT | Mod: HCNC | Performed by: INTERNAL MEDICINE

## 2024-11-21 PROCEDURE — 3078F DIAST BP <80 MM HG: CPT | Mod: CPTII,S$GLB,, | Performed by: INTERNAL MEDICINE

## 2024-11-21 PROCEDURE — 82728 ASSAY OF FERRITIN: CPT | Mod: HCNC | Performed by: INTERNAL MEDICINE

## 2024-11-21 PROCEDURE — 99214 OFFICE O/P EST MOD 30 MIN: CPT | Mod: S$GLB,,, | Performed by: INTERNAL MEDICINE

## 2024-11-21 RX ORDER — ONDANSETRON 4 MG/1
4 TABLET, ORALLY DISINTEGRATING ORAL EVERY 8 HOURS PRN
Qty: 30 TABLET | Refills: 3 | Status: SHIPPED | OUTPATIENT
Start: 2024-11-21

## 2024-11-21 RX ORDER — BUTALBITAL, ACETAMINOPHEN AND CAFFEINE 50; 325; 40 MG/1; MG/1; MG/1
1 TABLET ORAL EVERY 4 HOURS PRN
Qty: 60 TABLET | Refills: 0 | Status: SHIPPED | OUTPATIENT
Start: 2024-11-21

## 2024-11-21 NOTE — PROGRESS NOTES
[facility] Note    Chief Complaint      Chief Complaint   Patient presents with    Follow-up     6 mth     History of Present Illness      Jayla Loyd is a 74 y.o. female who presents today for chief complaint follow up DM .   HPI   Hx of DVT PE seeing dr najera Staying on eliquis  Rena seeng dr townsend s/p IV Fe feeling better   Dm is good and she has lost weight on GLP1 - near 30# weight loss   She is having foot pain - seeing treuting   She feels good on zoloft for depression   Active Problem List with Overview Notes    Diagnosis Date Noted    Chronic deep vein thrombosis (DVT) of popliteal vein of left lower extremity 11/14/2024    Acute deep vein thrombosis (DVT) of femoral vein of left lower extremity 02/06/2024    Acute deep vein thrombosis (DVT) of proximal vein of lower extremity 02/01/2024    Abnormal CT of the chest 03/29/2023    Lung nodules 03/29/2023    Aortic arch atherosclerosis 03/29/2023     Mild calcification of aortic arch and cusps seen on CT         Overweight (BMI 25.0-29.9) 03/29/2023    Type 2 diabetes mellitus without complication, without long-term current use of insulin 03/29/2023    DM type 2 without retinopathy 03/29/2023     Per pt      Osteoarthritis of both shoulders 02/03/2023    Peripheral vascular disease 08/25/2022    Acute ankle pain 05/31/2022    Night sweats 05/25/2022    Cox's esophagus 02/16/2022    Chronic cough 04/13/2021    Tremor 02/15/2021    Sleep disturbance 02/15/2021    Venous insufficiency 02/15/2021    Dizziness 01/19/2021    Subclinical hyperthyroidism 12/17/2020    HLD (hyperlipidemia) 07/22/2020     On crestor       Arthritis of left shoulder region 07/16/2020     seeing [place] at [facility] pending shoulder MRI       Anxiety 07/16/2020    Type 2 diabetes mellitus with other specified complication, without long-term current use of insulin      Sees dr butt had recent a1c, he also does foot exam      Allergic rhinitis due to pollen  07/08/2020    Hematuria, unspecified 07/08/2020    Iron deficiency anemia, unspecified 06/30/2019    Hyperphosphatemia 08/19/2018    HTN (hypertension) 08/19/2018     At goal on lasix and micardis, no chest pain or shortness of breath       Proteinuria, unspecified 08/19/2018     Seeing dr hanson       Gastroesophageal reflux disease 07/16/2014    Adenomatous polyp of colon 07/16/2014    Chronic mixed headache syndrome 07/16/2014     Has chronic migraines, uses fioricet   Tried amovig, has not tried triptan - not interested  Sees Charly Do- Neuro      Type 2 diabetes mellitus with hypercholesterolemia 08/01/2012       Health Maintenance   Topic Date Due    TETANUS VACCINE  02/01/2008    RSV Vaccine (Age 60+ and Pregnant patients) (1 - Risk 60-74 years 1-dose series) Never done    Foot Exam  09/03/2021    DEXA Scan  08/11/2023    Mammogram  12/11/2024    Eye Exam  02/20/2025    Hemoglobin A1c  02/19/2025    Diabetes Urine Screening  08/23/2025    High Dose Statin  11/21/2025    Lipid Panel  11/21/2025    Colorectal Cancer Screening  02/08/2027    Hepatitis C Screening  Completed    Shingles Vaccine  Discontinued    Influenza Vaccine  Discontinued    COVID-19 Vaccine  Discontinued    Pneumococcal Vaccines (Age 65+)  Discontinued       Past Medical History:   Diagnosis Date    Carpal tunnel syndrome, right upper limb 06/23/2022    Diabetes mellitus type II     GERD (gastroesophageal reflux disease)     Hx of multiple pulmonary nodules 06/15/2022    Migraines     Proteinuria     Pulmonary embolus 2024    Right lung with concurrent DVT left leg    Trigger finger, right ring finger 06/23/2022       Past Surgical History:   Procedure Laterality Date    CATARACT EXTRACTION      COSMETIC SURGERY  1971    rhinoplasty    EYE SURGERY  3 years ago    cataracts    JOINT REPLACEMENT  Twice in last 24 months    shoulders    PLANTAR FASCIA RELEASE  1993    SHOULDER SURGERY Left 09/2020    SINUS SURGERY  1993    TUBAL LIGATION   1992       family history includes Arthritis in her father, paternal grandmother, and son; Bladder Cancer in her mother; Breast cancer in her maternal aunt and maternal grandmother; Cancer in her maternal aunt, maternal grandmother, mother, paternal aunt, and paternal grandmother; Dementia in her paternal grandmother; Diabetes in her paternal aunt; Heart disease in her father, maternal grandfather, mother, and paternal grandfather; Heart failure in her father; Hypertension in her father and mother; Stroke in her brother.    Social History     Tobacco Use    Smoking status: Never     Passive exposure: Never    Smokeless tobacco: Never   Substance Use Topics    Alcohol use: Yes     Comment: None    Drug use: Never     Types: Other-see comments     Comment: None       Review of Systems   Constitutional:  Negative for chills, fever, malaise/fatigue and weight loss.   Respiratory:  Negative for cough, sputum production, shortness of breath and wheezing.    Cardiovascular:  Negative for chest pain, palpitations, orthopnea and leg swelling.   Gastrointestinal:  Negative for constipation, diarrhea, nausea and vomiting.   Genitourinary:  Negative for dysuria, frequency, hematuria and urgency.   Musculoskeletal:  Positive for joint pain.        Outpatient Encounter Medications as of 11/21/2024   Medication Sig Note Dispense Refill    ALPRAZolam (XANAX) 0.5 MG tablet Take 1 tablet (0.5 mg total) by mouth 2 (two) times daily as needed for Anxiety.  60 tablet 0    amitriptyline (ELAVIL) 25 MG tablet Take 1 tablet (25 mg total) by mouth nightly as needed for Insomnia.  90 tablet 1    apixaban (ELIQUIS) 5 mg Tab Take 1 tablet (5 mg total) by mouth 2 (two) times daily.  60 tablet 11    biotin 5 mg Cap Take 5 mg by mouth once daily.       blood sugar diagnostic (TRUE METRIX GLUCOSE TEST STRIP) Strp 1 strip by Misc.(Non-Drug; Combo Route) route once daily.  100 each 3    blood-glucose meter (TRUE METRIX GLUCOSE METER) kit Use as  instructed  1 each 0    dicyclomine (BENTYL) 10 MG capsule Take 1 capsule (10 mg total) by mouth 4 (four) times daily as needed (abdominal discomfort).  60 capsule 1    esomeprazole (NEXIUM) 40 MG capsule TAKE 1 CAPSULE BY MOUTH TWICE DAILY BEFORE MEALS  180 capsule 2    Lactobac no.41/Bifidobact no.7 (PROBIOTIC-10 ORAL) Take by mouth once daily.       lancets (LANCETS,THIN) Misc 1 Lancet by Misc.(Non-Drug; Combo Route) route once daily.  100 each 3    meclizine (ANTIVERT) 25 mg tablet Take 1 tablet (25 mg total) by mouth 2 (two) times daily as needed.  60 tablet 0    methocarbamoL (ROBAXIN) 500 MG Tab Take 1 tablet (500 mg total) by mouth 3 (three) times daily as needed (pain).  60 tablet 1    mupirocin (BACTROBAN) 2 % ointment Apply topically 3 (three) times daily.  30 g 1    nystatin (MYCOSTATIN) powder Apply topically 4 (four) times daily.  60 g 0    promethazine (PHENERGAN) 12.5 MG Tab as needed.       propranoloL (INDERAL LA) 60 MG 24 hr capsule Take 60 mg by mouth as needed.       pseudoephedrine (SUDAFED) 30 MG tablet Take 30 mg by mouth every 4 (four) hours as needed.       rosuvastatin (CRESTOR) 40 MG Tab TAKE 1 TABLET BY MOUTH ONCE DAILY  90 tablet 2    sertraline (ZOLOFT) 50 MG tablet Take 1 tablet (50 mg total) by mouth once daily.  90 tablet 3    telmisartan (MICARDIS) 20 MG Tab TAKE 1 TABLET BY MOUTH EVERY DAY 2/5/2024: 0.5 tablet daily 90 tablet 1    tirzepatide (MOUNJARO) 5 mg/0.5 mL PnIj Inject 5 mg into the skin every 7 days.  6 mL 0    traMADoL (ULTRAM) 50 mg tablet Take 1 tablet (50 mg total) by mouth every 8 (eight) hours as needed for Pain.  21 tablet 0    traZODone (DESYREL) 50 MG tablet Take 1 tablet (50 mg total) by mouth every evening.  90 tablet 3    [DISCONTINUED] butalbital-acetaminophen-caffeine -40 mg (FIORICET, ESGIC) -40 mg per tablet Take 1 tablet by mouth every 4 (four) hours as needed for Headaches. for pain.  60 tablet 0    [DISCONTINUED] ondansetron (ZOFRAN-ODT) 4  MG TbDL Take 1 tablet (4 mg total) by mouth every 8 (eight) hours as needed.  30 tablet 0    butalbital-acetaminophen-caffeine -40 mg (FIORICET, ESGIC) -40 mg per tablet Take 1 tablet by mouth every 4 (four) hours as needed for Headaches. for pain.  60 tablet 0    ondansetron (ZOFRAN-ODT) 4 MG TbDL Take 1 tablet (4 mg total) by mouth every 8 (eight) hours as needed (nausea).  30 tablet 3    [DISCONTINUED] meclizine (ANTIVERT) 25 mg tablet Take 1 tablet (25 mg total) by mouth 2 (two) times daily as needed.  60 tablet 0    [DISCONTINUED] tirzepatide (MOUNJARO) 5 mg/0.5 mL PnIj Inject 5 mg into the skin every 7 days.  12 Pen 0     No facility-administered encounter medications on file as of 11/21/2024.        Review of patient's allergies indicates:   Allergen Reactions    Gabapentin Hallucinations    Lyrica [pregabalin] Hallucinations    Mobic [meloxicam] Itching           Physical Exam      Vital Signs  Pulse: 85  SpO2: 95 %  BP: 110/62  BP Location: Right arm  Patient Position: Sitting  Height and Weight  Height: 5' (152.4 cm)  Weight: 56.5 kg (124 lb 9 oz)  BSA (Calculated - sq m): 1.55 sq meters  BMI (Calculated): 24.3  Weight in (lb) to have BMI = 25: 127.7]    Physical Exam  Vitals reviewed.   Constitutional:       General: She is not in acute distress.     Appearance: She is well-developed. She is not diaphoretic.   HENT:      Head: Normocephalic and atraumatic.      Right Ear: External ear normal.      Left Ear: External ear normal.      Nose: Nose normal.   Eyes:      General:         Right eye: No discharge.         Left eye: No discharge.      Conjunctiva/sclera: Conjunctivae normal.   Pulmonary:      Effort: Pulmonary effort is normal. No respiratory distress.   Musculoskeletal:         General: Normal range of motion.      Cervical back: Normal range of motion.   Skin:     Coloration: Skin is not pale.      Findings: No rash.   Neurological:      Mental Status: She is alert and oriented to  "person, place, and time.   Psychiatric:         Behavior: Behavior normal.         Thought Content: Thought content normal.          Laboratory:  CBC:  Recent Labs   Lab Result Units 11/18/24  0852 11/21/24  1128   WBC K/uL 8.11  8.11 9.70   RBC M/uL 4.86  4.86 4.81   Hemoglobin g/dL 14.3  14.3 14.2   Hematocrit % 42.2  42.2 42.2   Platelets K/uL 384  384 373   MCV fL 87  87 88   MCH pg 29.4  29.4 29.5   MCHC g/dL 33.9  33.9 33.6     CMP:  Recent Labs   Lab Result Units 11/18/24  0852 11/21/24  1128   Glucose mg/dL 118*  118* 107   Calcium mg/dL 10.0  10.0 10.2   Albumin g/dL 3.8  3.8 4.0   Total Protein g/dL 7.3  7.3 7.7   Sodium mmol/L 141  141 140   Potassium mmol/L 4.0  4.0 4.5   CO2 mmol/L 27  27 25   Chloride mmol/L 104  104 104   BUN mg/dL 12  12 13   Alkaline Phosphatase U/L 117  117 125   ALT U/L 34  34 30   AST U/L 23  23 26   Total Bilirubin mg/dL 0.4  0.4 0.4     URINALYSIS:  No results for input(s): "COLORU", "CLARITYU", "SPECGRAV", "PHUR", "PROTEINUA", "GLUCOSEU", "BILIRUBINCON", "BLOODU", "WBCU", "RBCU", "BACTERIA", "MUCUS", "NITRITE", "LEUKOCYTESUR", "UROBILINOGEN", "HYALINECASTS" in the last 2160 hours.   LIPIDS:  Recent Labs   Lab Result Units 11/21/24  1128   HDL mg/dL 70   Cholesterol mg/dL 202*   Triglycerides mg/dL 77   LDL Cholesterol mg/dL 116.6   HDL/Cholesterol Ratio % 34.7   Non-HDL Cholesterol mg/dL 132   Total Cholesterol/HDL Ratio  2.9     TSH:  No results for input(s): "TSH" in the last 2160 hours.  A1C:  No results for input(s): "HGBA1C" in the last 2160 hours.    Radiology:      Assessment/Plan     Jayla Loyd is a 74 y.o.female with:    1. Anxiety  Assessment & Plan:  Doing well no change      2. Chronic mixed headache syndrome  Overview:  Has chronic migraines, uses fioricet   Tried amovig, has not tried triptan - not interested  Sees Charly Do- Neuro    Orders:  -     butalbital-acetaminophen-caffeine -40 mg (FIORICET, ESGIC) -40 mg per " tablet; Take 1 tablet by mouth every 4 (four) hours as needed for Headaches. for pain.  Dispense: 60 tablet; Refill: 0    3. Nausea  -     ondansetron (ZOFRAN-ODT) 4 MG TbDL; Take 1 tablet (4 mg total) by mouth every 8 (eight) hours as needed (nausea).  Dispense: 30 tablet; Refill: 3    4. Hypertension, unspecified type  Overview:  At goal on lasix and micardis, no chest pain or shortness of breath     Assessment & Plan:  At goal may reduce med with wieght loss       5. Type 2 diabetes mellitus with other specified complication, without long-term current use of insulin  Overview:  Sees dr butt had recent a1c, he also does foot exam    Assessment & Plan:  Doing well, would not further increase meds      6. Iron deficiency anemia, unspecified iron deficiency anemia type  Assessment & Plan:  S/p IV Fe      7. Pain of foot, unspecified laterality     Per ortho     Use of the [facility] Patient Portal discussed and encouraged during today's visit  -Continue current medications and maintain follow up with specialists.  Future Appointments   Date Time Provider Department Center   12/12/2024  1:00 PM [facility] M[facility]2 [facility] [place]   12/12/2024  2:00 PM [facility] DEXA1 [facility] BONE [place]   12/20/2024  2:00 PM Ehsan Falk MD [facility]THO [facility]   2/4/2025 10:00 AM Anat Anderson, NP [facility] PRICRE [place]   2/4/2025  1:15 PM Ehsan Burton Jr., DPM [facility] [place]       Geena Barnard MD  12/5/2024 10:37 AM    Primary Care Internal Medicine

## 2024-11-25 ENCOUNTER — PATIENT MESSAGE (OUTPATIENT)
Dept: PRIMARY CARE CLINIC | Facility: CLINIC | Age: 74
End: 2024-11-25
Payer: MEDICARE

## 2024-11-25 LAB — METHYLMALONATE SERPL-SCNC: 0.2 UMOL/L

## 2024-12-04 ENCOUNTER — PATIENT MESSAGE (OUTPATIENT)
Dept: PRIMARY CARE CLINIC | Facility: CLINIC | Age: 74
End: 2024-12-04
Payer: MEDICARE

## 2024-12-04 ENCOUNTER — PATIENT MESSAGE (OUTPATIENT)
Dept: HEMATOLOGY/ONCOLOGY | Facility: CLINIC | Age: 74
End: 2024-12-04
Payer: MEDICARE

## 2024-12-04 DIAGNOSIS — E11.69 TYPE 2 DIABETES MELLITUS WITH OTHER SPECIFIED COMPLICATION, WITHOUT LONG-TERM CURRENT USE OF INSULIN: Primary | ICD-10-CM

## 2024-12-05 NOTE — TELEPHONE ENCOUNTER
Lov 11/21/24  Last A1c was 5.3 on 8/19/24. Pt is requesting an order for next wk. Order pended for review.

## 2024-12-09 ENCOUNTER — PATIENT MESSAGE (OUTPATIENT)
Dept: PRIMARY CARE CLINIC | Facility: CLINIC | Age: 74
End: 2024-12-09
Payer: MEDICARE

## 2024-12-10 RX ORDER — TIRZEPATIDE 5 MG/.5ML
5 INJECTION, SOLUTION SUBCUTANEOUS
Qty: 6 ML | Refills: 0 | Status: SHIPPED | OUTPATIENT
Start: 2024-12-10 | End: 2024-12-13 | Stop reason: SDUPTHER

## 2024-12-10 NOTE — TELEPHONE ENCOUNTER
Care Due:                  Date            Visit Type   Department     Provider  --------------------------------------------------------------------------------                                EP -                              PRIMARY      [facility] PRIMARY  Last Visit: 11-      CARE ([facility])   CARE           Geena Barnard  Next Visit: None Scheduled  None         None Found                                                            Last  Test          Frequency    Reason                     Performed    Due Date  --------------------------------------------------------------------------------    HBA1C.......  6 months...  tirzepatide..............  08-   02-    [facility] Embedded Care Due Messages. Reference number: [id number].   12/10/2024 1:57:18 PM CST

## 2024-12-12 ENCOUNTER — HOSPITAL ENCOUNTER (OUTPATIENT)
Dept: RADIOLOGY | Facility: HOSPITAL | Age: 74
Discharge: HOME | End: 2024-12-12
Attending: OBSTETRICS & GYNECOLOGY
Payer: MEDICARE

## 2024-12-12 VITALS — BODY MASS INDEX: 24.35 KG/M2 | WEIGHT: 124 LBS | HEIGHT: 60 IN

## 2024-12-12 DIAGNOSIS — Z78.0 ASYMPTOMATIC MENOPAUSE: ICD-10-CM

## 2024-12-12 DIAGNOSIS — Z12.31 SCREENING MAMMOGRAM FOR BREAST CANCER: ICD-10-CM

## 2024-12-12 PROCEDURE — 77080 DXA BONE DENSITY AXIAL: CPT | Mod: TC

## 2024-12-12 PROCEDURE — 77063 BREAST TOMOSYNTHESIS BI: CPT | Mod: TC

## 2024-12-13 RX ORDER — TIRZEPATIDE 5 MG/.5ML
5 INJECTION, SOLUTION SUBCUTANEOUS
Qty: 6 ML | Refills: 3 | Status: SHIPPED | OUTPATIENT
Start: 2024-12-13 | End: 2025-11-14

## 2024-12-16 RX ORDER — MECLIZINE HYDROCHLORIDE 25 MG/1
25 TABLET ORAL 2 TIMES DAILY PRN
Qty: 60 TABLET | Refills: 0 | Status: SHIPPED | OUTPATIENT
Start: 2024-12-16

## 2024-12-16 NOTE — TELEPHONE ENCOUNTER
No care due was identified.  [facility] Embedded Care Due Messages. Reference number: [id number].   12/16/2024 3:51:35 PM CST

## 2024-12-17 DIAGNOSIS — G44.89 CHRONIC MIXED HEADACHE SYNDROME: ICD-10-CM

## 2024-12-17 RX ORDER — BUTALBITAL, ACETAMINOPHEN AND CAFFEINE 50; 325; 40 MG/1; MG/1; MG/1
1 TABLET ORAL EVERY 4 HOURS PRN
Qty: 60 TABLET | Refills: 1 | Status: SHIPPED | OUTPATIENT
Start: 2024-12-17

## 2024-12-17 NOTE — TELEPHONE ENCOUNTER
No care due was identified.  [facility] Embedded Care Due Messages. Reference number: [id number].   12/17/2024 11:36:28 AM CST

## 2024-12-29 DIAGNOSIS — E11.69 TYPE 2 DIABETES MELLITUS WITH OTHER SPECIFIED COMPLICATION, WITHOUT LONG-TERM CURRENT USE OF INSULIN: ICD-10-CM

## 2024-12-29 DIAGNOSIS — F41.9 ANXIETY: ICD-10-CM

## 2024-12-29 NOTE — TELEPHONE ENCOUNTER
No care due was identified.  [facility] Embedded Care Due Messages. Reference number: [id number].   12/29/2024 1:04:22 PM CST

## 2024-12-29 NOTE — TELEPHONE ENCOUNTER
No care due was identified.  Health [facility] Embedded Care Due Messages. Reference number: [id number].   12/29/2024 1:03:47 PM CST

## 2024-12-30 ENCOUNTER — PATIENT MESSAGE (OUTPATIENT)
Dept: PRIMARY CARE CLINIC | Facility: CLINIC | Age: 74
End: 2024-12-30
Payer: MEDICARE

## 2024-12-30 RX ORDER — ALPRAZOLAM 0.5 MG/1
0.5 TABLET ORAL 2 TIMES DAILY PRN
Qty: 60 TABLET | Refills: 0 | Status: SHIPPED | OUTPATIENT
Start: 2024-12-30

## 2024-12-30 RX ORDER — TIRZEPATIDE 5 MG/.5ML
5 INJECTION, SOLUTION SUBCUTANEOUS
Qty: 6 ML | Refills: 3 | Status: SHIPPED | OUTPATIENT
Start: 2024-12-30 | End: 2025-12-01

## 2025-01-13 ENCOUNTER — TELEPHONE (OUTPATIENT)
Dept: CARDIOLOGY | Facility: CLINIC | Age: 75
End: 2025-01-13
Payer: MEDICARE

## 2025-01-14 DIAGNOSIS — E11.69 TYPE 2 DIABETES MELLITUS WITH OTHER SPECIFIED COMPLICATION, WITHOUT LONG-TERM CURRENT USE OF INSULIN: ICD-10-CM

## 2025-01-14 RX ORDER — TIRZEPATIDE 5 MG/.5ML
5 INJECTION, SOLUTION SUBCUTANEOUS WEEKLY
Qty: 6 ML | Refills: 3 | Status: SHIPPED | OUTPATIENT
Start: 2025-01-14

## 2025-01-24 DIAGNOSIS — G44.89 CHRONIC MIXED HEADACHE SYNDROME: ICD-10-CM

## 2025-01-24 RX ORDER — BUTALBITAL, ACETAMINOPHEN AND CAFFEINE 50; 325; 40 MG/1; MG/1; MG/1
1 TABLET ORAL EVERY 4 HOURS PRN
Qty: 60 TABLET | Refills: 0 | Status: SHIPPED | OUTPATIENT
Start: 2025-01-24

## 2025-01-24 NOTE — TELEPHONE ENCOUNTER
No care due was identified.  Health [facility] Embedded Care Due Messages. Reference number: [id number].   1/24/2025 10:19:10 AM CST

## 2025-01-30 ENCOUNTER — PATIENT MESSAGE (OUTPATIENT)
Dept: CARDIOLOGY | Facility: CLINIC | Age: 75
End: 2025-01-30
Payer: MEDICARE

## 2025-01-30 NOTE — TELEPHONE ENCOUNTER
Called and spoke with pt to let her know that the May schedule was not open just yet and that she would be added to the wait list to schedule when it does open up. Pt verbalized understanding.

## 2025-02-04 ENCOUNTER — PATIENT MESSAGE (OUTPATIENT)
Dept: PRIMARY CARE CLINIC | Facility: CLINIC | Age: 75
End: 2025-02-04
Payer: MEDICARE

## 2025-02-04 ENCOUNTER — OFFICE VISIT (OUTPATIENT)
Dept: PRIMARY CARE CLINIC | Facility: CLINIC | Age: 75
End: 2025-02-04
Payer: MEDICARE

## 2025-02-04 ENCOUNTER — LAB VISIT (OUTPATIENT)
Dept: LAB | Facility: HOSPITAL | Age: 75
End: 2025-02-04
Attending: NURSE PRACTITIONER
Payer: MEDICARE

## 2025-02-04 VITALS
OXYGEN SATURATION: 98 % | HEART RATE: 90 BPM | HEIGHT: 60 IN | WEIGHT: 118.63 LBS | RESPIRATION RATE: 16 BRPM | DIASTOLIC BLOOD PRESSURE: 60 MMHG | BODY MASS INDEX: 23.29 KG/M2 | SYSTOLIC BLOOD PRESSURE: 100 MMHG

## 2025-02-04 DIAGNOSIS — D50.9 IRON DEFICIENCY ANEMIA, UNSPECIFIED IRON DEFICIENCY ANEMIA TYPE: ICD-10-CM

## 2025-02-04 DIAGNOSIS — G44.89 CHRONIC MIXED HEADACHE SYNDROME: ICD-10-CM

## 2025-02-04 DIAGNOSIS — E11.69 TYPE 2 DIABETES MELLITUS WITH OTHER SPECIFIED COMPLICATION, WITHOUT LONG-TERM CURRENT USE OF INSULIN: Primary | ICD-10-CM

## 2025-02-04 DIAGNOSIS — E11.69 TYPE 2 DIABETES MELLITUS WITH OTHER SPECIFIED COMPLICATION, WITHOUT LONG-TERM CURRENT USE OF INSULIN: ICD-10-CM

## 2025-02-04 DIAGNOSIS — E53.8 B12 DEFICIENCY: ICD-10-CM

## 2025-02-04 DIAGNOSIS — I10 HYPERTENSION, UNSPECIFIED TYPE: ICD-10-CM

## 2025-02-04 DIAGNOSIS — E78.5 HYPERLIPIDEMIA, UNSPECIFIED HYPERLIPIDEMIA TYPE: ICD-10-CM

## 2025-02-04 DIAGNOSIS — D64.9 ANEMIA, UNSPECIFIED TYPE: ICD-10-CM

## 2025-02-04 DIAGNOSIS — I82.412 ACUTE DEEP VEIN THROMBOSIS (DVT) OF FEMORAL VEIN OF LEFT LOWER EXTREMITY: ICD-10-CM

## 2025-02-04 DIAGNOSIS — J01.10 ACUTE NON-RECURRENT FRONTAL SINUSITIS: ICD-10-CM

## 2025-02-04 LAB
ALBUMIN SERPL BCP-MCNC: 4.2 G/DL (ref 3.5–5.2)
ALP SERPL-CCNC: 96 U/L (ref 40–150)
ALT SERPL W/O P-5'-P-CCNC: 16 U/L (ref 10–44)
ANION GAP SERPL CALC-SCNC: 16 MMOL/L (ref 8–16)
AST SERPL-CCNC: 20 U/L (ref 10–40)
BASOPHILS # BLD AUTO: 0.06 K/UL (ref 0–0.2)
BASOPHILS NFR BLD: 0.7 % (ref 0–1.9)
BILIRUB SERPL-MCNC: 0.4 MG/DL (ref 0.1–1)
BUN SERPL-MCNC: 18 MG/DL (ref 8–23)
CALCIUM SERPL-MCNC: 11 MG/DL (ref 8.7–10.5)
CHLORIDE SERPL-SCNC: 102 MMOL/L (ref 95–110)
CHOLEST SERPL-MCNC: 190 MG/DL (ref 120–199)
CHOLEST/HDLC SERPL: 2.9 {RATIO} (ref 2–5)
CO2 SERPL-SCNC: 25 MMOL/L (ref 23–29)
CREAT SERPL-MCNC: 1 MG/DL (ref 0.5–1.4)
DIFFERENTIAL METHOD BLD: ABNORMAL
EOSINOPHIL # BLD AUTO: 0.1 K/UL (ref 0–0.5)
EOSINOPHIL NFR BLD: 0.9 % (ref 0–8)
ERYTHROCYTE [DISTWIDTH] IN BLOOD BY AUTOMATED COUNT: 12.9 % (ref 11.5–14.5)
EST. GFR  (NO RACE VARIABLE): 59.9 ML/MIN/1.73 M^2
FERRITIN SERPL-MCNC: 232 NG/ML (ref 20–300)
GLUCOSE SERPL-MCNC: 102 MG/DL (ref 70–110)
HCT VFR BLD AUTO: 45.3 % (ref 37–48.5)
HDLC SERPL-MCNC: 65 MG/DL (ref 40–75)
HDLC SERPL: 34.2 % (ref 20–50)
HGB BLD-MCNC: 14.4 G/DL (ref 12–16)
IMM GRANULOCYTES # BLD AUTO: 0.01 K/UL (ref 0–0.04)
IMM GRANULOCYTES NFR BLD AUTO: 0.1 % (ref 0–0.5)
IRON SERPL-MCNC: 73 UG/DL (ref 30–160)
LDLC SERPL CALC-MCNC: 105.4 MG/DL (ref 63–159)
LYMPHOCYTES # BLD AUTO: 3 K/UL (ref 1–4.8)
LYMPHOCYTES NFR BLD: 35 % (ref 18–48)
MCH RBC QN AUTO: 29.7 PG (ref 27–31)
MCHC RBC AUTO-ENTMCNC: 31.8 G/DL (ref 32–36)
MCV RBC AUTO: 93 FL (ref 82–98)
MONOCYTES # BLD AUTO: 0.6 K/UL (ref 0.3–1)
MONOCYTES NFR BLD: 7 % (ref 4–15)
NEUTROPHILS # BLD AUTO: 4.8 K/UL (ref 1.8–7.7)
NEUTROPHILS NFR BLD: 56.3 % (ref 38–73)
NONHDLC SERPL-MCNC: 125 MG/DL
NRBC BLD-RTO: 0 /100 WBC
PLATELET # BLD AUTO: 378 K/UL (ref 150–450)
PMV BLD AUTO: 9.9 FL (ref 9.2–12.9)
POTASSIUM SERPL-SCNC: 5.6 MMOL/L (ref 3.5–5.1)
PROT SERPL-MCNC: 8.1 G/DL (ref 6–8.4)
RBC # BLD AUTO: 4.85 M/UL (ref 4–5.4)
SATURATED IRON: 28 % (ref 20–50)
SODIUM SERPL-SCNC: 143 MMOL/L (ref 136–145)
TOTAL IRON BINDING CAPACITY: 259 UG/DL (ref 250–450)
TRANSFERRIN SERPL-MCNC: 175 MG/DL (ref 200–375)
TRIGL SERPL-MCNC: 98 MG/DL (ref 30–150)
VIT B12 SERPL-MCNC: 611 PG/ML (ref 210–950)
WBC # BLD AUTO: 8.57 K/UL (ref 3.9–12.7)

## 2025-02-04 PROCEDURE — 83540 ASSAY OF IRON: CPT | Mod: HCNC | Performed by: NURSE PRACTITIONER

## 2025-02-04 PROCEDURE — 36415 COLL VENOUS BLD VENIPUNCTURE: CPT | Performed by: NURSE PRACTITIONER

## 2025-02-04 PROCEDURE — 82728 ASSAY OF FERRITIN: CPT | Mod: HCNC | Performed by: NURSE PRACTITIONER

## 2025-02-04 PROCEDURE — 80053 COMPREHEN METABOLIC PANEL: CPT | Mod: HCNC | Performed by: NURSE PRACTITIONER

## 2025-02-04 PROCEDURE — 85025 COMPLETE CBC W/AUTO DIFF WBC: CPT | Mod: HCNC | Performed by: NURSE PRACTITIONER

## 2025-02-04 PROCEDURE — 82607 VITAMIN B-12: CPT | Mod: HCNC | Performed by: NURSE PRACTITIONER

## 2025-02-04 PROCEDURE — 80061 LIPID PANEL: CPT | Mod: HCNC | Performed by: NURSE PRACTITIONER

## 2025-02-04 RX ORDER — AMOXICILLIN 500 MG/1
500 TABLET, FILM COATED ORAL EVERY 12 HOURS
Qty: 14 TABLET | Refills: 0 | Status: SHIPPED | OUTPATIENT
Start: 2025-02-04 | End: 2025-02-11

## 2025-02-04 RX ORDER — TRIAMCINOLONE ACETONIDE 40 MG/ML
40 INJECTION, SUSPENSION INTRA-ARTICULAR; INTRAMUSCULAR ONCE
Status: COMPLETED | OUTPATIENT
Start: 2025-02-04 | End: 2025-02-04

## 2025-02-04 RX ORDER — AZITHROMYCIN 250 MG/1
TABLET, FILM COATED ORAL
Qty: 6 TABLET | Refills: 0 | Status: SHIPPED | OUTPATIENT
Start: 2025-02-04 | End: 2025-02-09

## 2025-02-04 RX ADMIN — TRIAMCINOLONE ACETONIDE 40 MG: 40 INJECTION, SUSPENSION INTRA-ARTICULAR; INTRAMUSCULAR at 10:02

## 2025-02-04 NOTE — PROGRESS NOTES
[facility] Note    Chief Complaint      Chief Complaint   Patient presents with    Follow-up     History of Present Illness      Jayla Loyd is a 74 y.o. female patient with chronic conditions of anxiety, nausea, hypertension, type 2 diabetes, iron deficiency anemia who presents today for follow up from last visit  Continuing care from Dr. Barnard  Six-month follow-up in November 20, 2024    Hx of DVT PE seeing dr najera Staying on eliquis  Rena seeng dr townsend s/p IV Fe feeling better   Dm is good and she has lost weight on GLP1 - near 30# weight loss   She feels good on zoloft for depression       History of Present Illness    CHIEF COMPLAINT:  Jayla presents today for follow-up and to establish care with a new provider.    CURRENT ILLNESS:  She presents with upper respiratory symptoms that began Wednesday with a sore throat, progressing to include croupy symptoms. She reports post-nasal drip and clogged ears, though ear symptoms have improved today. She notes eye pain attributed to sinus involvement and a very red throat since symptom onset. She has been taking 1500mg vitamin C twice daily for the past 3-4 days for symptom management.    MEDICAL HISTORY:  She has diabetes, currently managed with Mounjaro 5mg. She reports being close to her goal but needs to continue medication due to diabetic status. She has a history of bilateral shoulder replacements and knee issues. She generally reports being healthy and rarely ill.    HEADACHES:  She experiences daily headaches and reports worsening migraines since starting Mounjaro. She has a family history of migraines, noting her father was also affected.    MEDICATIONS:  She takes Mounjaro 5mg and Eliquis. Her supplements include B complex, zinc, D3, biotin, collagen, and two folic acids daily (taken for hair health). She expresses concern about potential drug interactions between Eliquis and collagen supplements.      ROS:  Eyes: +eye  pain  ENT: +sore throat, +post nasal drip  Neurological: +headache         Health Maintenance   Topic Date Due    TETANUS VACCINE  02/01/2008    RSV Vaccine (Age 60+ and Pregnant patients) (1 - Risk 60-74 years 1-dose series) Never done    Foot Exam  09/03/2021    Hemoglobin A1c  02/19/2025    Diabetic Eye Exam  02/20/2025    Diabetes Urine Screening  08/23/2025    Mammogram  12/12/2025    High Dose Statin  02/04/2026    Lipid Panel  02/04/2026    DEXA Scan  12/12/2026    Colorectal Cancer Screening  02/08/2027    Hepatitis C Screening  Completed    Shingles Vaccine  Discontinued    Influenza Vaccine  Discontinued    COVID-19 Vaccine  Discontinued    Pneumococcal Vaccines (Age 50+)  Discontinued       Past Medical History:   Diagnosis Date    Anxiety 2004    Carpal tunnel syndrome, right upper limb 06/23/2022    Depression 2004    Diabetes mellitus type II     GERD (gastroesophageal reflux disease)     Hx of multiple pulmonary nodules 06/15/2022    Migraines     Proteinuria     Pulmonary embolus 2024    Right lung with concurrent DVT left leg    Trigger finger, right ring finger 06/23/2022       Past Surgical History:   Procedure Laterality Date    CATARACT EXTRACTION      COLPOSCOPY  2023    COSMETIC SURGERY  1971    rhinoplasty    DILATION AND CURETTAGE OF UTERUS  1992    EYE SURGERY  3 years ago    cataracts    JOINT REPLACEMENT  Twice in last 24 months    shoulders    PLANTAR FASCIA RELEASE  1993    SHOULDER SURGERY Left 09/2020    SINUS SURGERY  1993    TUBAL LIGATION  1992       family history includes Arthritis in her father, paternal grandmother, and son; Asthma in her sister; Bladder Cancer in her mother; Breast cancer in her maternal aunt and maternal grandmother; Cancer in her maternal aunt, maternal grandmother, mother, paternal aunt, and paternal grandmother; Dementia in her paternal grandmother; Diabetes in her paternal aunt; Heart disease in her father, maternal grandfather, mother, and paternal  grandfather; Heart failure in her father; Hypertension in her father and mother; Migraines in her father; Stroke in her brother.    Social History     Tobacco Use    Smoking status: Never     Passive exposure: Never    Smokeless tobacco: Never   Substance Use Topics    Alcohol use: Yes     Comment: None    Drug use: Not Currently     Types: Other-see comments     Comment: None       Outpatient Encounter Medications as of 2/4/2025   Medication Sig Note Dispense Refill    ALPRAZolam (XANAX) 0.5 MG tablet Take 1 tablet (0.5 mg total) by mouth 2 (two) times daily as needed for Anxiety.  60 tablet 0    amitriptyline (ELAVIL) 25 MG tablet Take 1 tablet (25 mg total) by mouth nightly as needed for Insomnia.  90 tablet 1    apixaban (ELIQUIS) 5 mg Tab Take 1 tablet (5 mg total) by mouth 2 (two) times daily.  180 tablet 3    biotin 5 mg Cap Take 5 mg by mouth once daily.       blood sugar diagnostic (TRUE METRIX GLUCOSE TEST STRIP) Strp 1 strip by Misc.(Non-Drug; Combo Route) route once daily.  100 each 3    blood-glucose meter (TRUE METRIX GLUCOSE METER) kit Use as instructed  1 each 0    butalbital-acetaminophen-caffeine -40 mg (FIORICET, ESGIC) -40 mg per tablet TAKE 1 TABLET BY MOUTH EVERY 4 HOURS AS NEEDED FOR PAIN  60 tablet 0    dicyclomine (BENTYL) 10 MG capsule Take 1 capsule (10 mg total) by mouth 4 (four) times daily as needed (abdominal discomfort).  60 capsule 1    esomeprazole (NEXIUM) 40 MG capsule TAKE 1 CAPSULE BY MOUTH TWICE DAILY BEFORE MEALS  180 capsule 2    Lactobac no.41/Bifidobact no.7 (PROBIOTIC-10 ORAL) Take by mouth once daily.       lancets (LANCETS,THIN) Misc 1 Lancet by Misc.(Non-Drug; Combo Route) route once daily.  100 each 3    meclizine (ANTIVERT) 25 mg tablet Take 1 tablet (25 mg total) by mouth 2 (two) times daily as needed.  60 tablet 0    methocarbamoL (ROBAXIN) 500 MG Tab Take 1 tablet (500 mg total) by mouth 3 (three) times daily as needed (pain).  60 tablet 1    mupirocin  (BACTROBAN) 2 % ointment Apply topically 3 (three) times daily.  30 g 1    nystatin (MYCOSTATIN) powder Apply topically 4 (four) times daily.  60 g 0    promethazine (PHENERGAN) 12.5 MG Tab as needed.       propranoloL (INDERAL LA) 60 MG 24 hr capsule Take 60 mg by mouth as needed.       rosuvastatin (CRESTOR) 40 MG Tab TAKE 1 TABLET BY MOUTH ONCE DAILY  90 tablet 2    sertraline (ZOLOFT) 50 MG tablet Take 1 tablet (50 mg total) by mouth once daily.  90 tablet 3    telmisartan (MICARDIS) 20 MG Tab TAKE 1 TABLET BY MOUTH EVERY DAY 2024: 0.5 tablet daily 90 tablet 1    tirzepatide (MOUNJARO) 5 mg/0.5 mL PnIj Inject 5 mg into the skin once a week.  6 mL 3    traMADoL (ULTRAM) 50 mg tablet Take 1 tablet (50 mg total) by mouth every 8 (eight) hours as needed for Pain.  21 tablet 0    traZODone (DESYREL) 50 MG tablet Take 1 tablet (50 mg total) by mouth every evening.  90 tablet 3    [DISCONTINUED] ondansetron (ZOFRAN-ODT) 4 MG TbDL Take 1 tablet (4 mg total) by mouth every 8 (eight) hours as needed (nausea).  30 tablet 3    [] amoxicillin (AMOXIL) 500 MG Tab Take 1 tablet (500 mg total) by mouth every 12 (twelve) hours. for 7 days  14 tablet 0    [] azithromycin (Z-RACHAEL) 250 MG tablet Take 2 tablets by mouth on day 1; Take 1 tablet by mouth on days 2-5  6 tablet 0    [DISCONTINUED] apixaban (ELIQUIS) 5 mg Tab Take 1 tablet (5 mg total) by mouth 2 (two) times daily.  60 tablet 11    [DISCONTINUED] butalbital-acetaminophen-caffeine -40 mg (FIORICET, ESGIC) -40 mg per tablet TAKE 1 TABLET BY MOUTH EVERY 4 HOURS AS NEEDED FOR PAIN  60 tablet 1    [DISCONTINUED] pseudoephedrine (SUDAFED) 30 MG tablet Take 30 mg by mouth every 4 (four) hours as needed.       [DISCONTINUED] tirzepatide (MOUNJARO) 5 mg/0.5 mL PnIj Inject 5 mg into the skin every 7 days.  6 mL 3    [] triamcinolone acetonide injection 40 mg         No facility-administered encounter medications on file as of 2025.         Review of patient's allergies indicates:   Allergen Reactions    Gabapentin Hallucinations    Lyrica [pregabalin] Hallucinations    Mobic [meloxicam] Itching       Physical Exam      Vital Signs  Pulse: 90  Resp: 16  SpO2: 98 %  BP: 100/60  BP Location: Right arm  Patient Position: Sitting  Height and Weight  Height: 5' (152.4 cm)  Weight: 53.8 kg (118 lb 9.7 oz)  BSA (Calculated - sq m): 1.51 sq meters  BMI (Calculated): 23.2  Weight in (lb) to have BMI = 25: 127.7    Physical Exam  Vitals and nursing note reviewed.   Constitutional:       General: She is not in acute distress.     Appearance: Normal appearance. She is well-developed. She is not ill-appearing.   HENT:      Head: Normocephalic and atraumatic.      Right Ear: External ear normal.      Left Ear: External ear normal.   Eyes:      Conjunctiva/sclera: Conjunctivae normal.      Pupils: Pupils are equal, round, and reactive to light.   Neck:      Thyroid: No thyromegaly.      Vascular: No JVD.      Trachea: No tracheal deviation.   Cardiovascular:      Rate and Rhythm: Normal rate and regular rhythm.      Heart sounds: Normal heart sounds. No murmur heard.  Pulmonary:      Effort: Pulmonary effort is normal.      Breath sounds: Normal breath sounds.   Chest:      Chest wall: No tenderness.   Abdominal:      Palpations: Abdomen is soft.      Tenderness: There is no abdominal tenderness. There is no guarding.   Musculoskeletal:         General: Normal range of motion.      Cervical back: Normal range of motion and neck supple.   Lymphadenopathy:      Cervical: No cervical adenopathy.   Skin:     General: Skin is warm and dry.   Neurological:      General: No focal deficit present.      Mental Status: She is alert and oriented to person, place, and time.   Psychiatric:         Mood and Affect: Mood normal.         Behavior: Behavior normal.         Thought Content: Thought content normal.         Judgment: Judgment normal.          Laboratory:  CBC:  Lab  Results   Component Value Date    WBC 8.57 02/04/2025    RBC 4.85 02/04/2025    HGB 14.4 02/04/2025    HCT 45.3 02/04/2025     02/04/2025    MCV 93 02/04/2025    MCH 29.7 02/04/2025    MCHC 31.8 (L) 02/04/2025    MCHC 33.6 11/21/2024    MCHC 33.9 11/18/2024    MCHC 33.9 11/18/2024     CMP:  Lab Results   Component Value Date     02/04/2025    CALCIUM 11.0 (H) 02/04/2025    ALBUMIN 4.2 02/04/2025    PROT 8.1 02/04/2025     02/04/2025    K 5.6 (H) 02/04/2025    CO2 25 02/04/2025     02/04/2025    BUN 18 02/04/2025    ALKPHOS 96 02/04/2025    ALT 16 02/04/2025    AST 20 02/04/2025    BILITOT 0.4 02/04/2025    BILITOT 0.4 11/21/2024    BILITOT 0.4 11/18/2024    BILITOT 0.4 11/18/2024     URINALYSIS:  Lab Results   Component Value Date    COLORU Yellow 05/17/2021    SPECGRAV >=1.030 (A) 05/17/2021    PHUR 7.0 05/17/2021    PROTEINUA 2+ (A) 05/17/2021    BACTERIA Few (A) 05/17/2021    NITRITE Negative 05/17/2021    LEUKOCYTESUR Negative 05/17/2021    UROBILINOGEN Negative 05/17/2021    HYALINECASTS 0 05/17/2021    HYALINECASTS 0 05/13/2021    HYALINECASTS 0 01/19/2021      LIPIDS:  Lab Results   Component Value Date    TSH 1.567 08/25/2022    TSH 1.872 03/07/2022    TSH 1.220 01/19/2021    TSH 1.220 01/19/2021    HDL 65 02/04/2025    HDL 70 11/21/2024    HDL 63 08/19/2024    CHOL 190 02/04/2025    CHOL 202 (H) 11/21/2024    CHOL 191 08/19/2024    TRIG 98 02/04/2025    TRIG 77 11/21/2024    TRIG 127 08/19/2024    LDLCALC 105.4 02/04/2025    LDLCALC 116.6 11/21/2024    LDLCALC 102.6 08/19/2024    CHOLHDL 34.2 02/04/2025    CHOLHDL 34.7 11/21/2024    CHOLHDL 33.0 08/19/2024    NONHDLCHOL 125 02/04/2025    NONHDLCHOL 132 11/21/2024    NONHDLCHOL 128 08/19/2024    TOTALCHOLEST 2.9 02/04/2025    TOTALCHOLEST 2.9 11/21/2024    TOTALCHOLEST 3.0 08/19/2024     TSH:  Lab Results   Component Value Date    TSH 1.567 08/25/2022    TSH 1.872 03/07/2022    TSH 1.220 01/19/2021    TSH 1.220 01/19/2021      A1C:  Lab Results   Component Value Date    HGBA1C 5.3 08/19/2024    HGBA1C 7.0 (H) 02/01/2024    HGBA1C 6.7 (H) 08/01/2023    HGBA1C 6.9 (H) 07/19/2022    HGBA1C 6.9 (H) 04/05/2021    HGBA1C 6.4 (H) 11/12/2020         Assessment/Plan     Jayla Loyd is a 74 y.o.female with:    Assessment & Plan    IMPRESSION:  - Assessed symptoms consistent with upper respiratory infection  - Evaluated need for antibiotic and steroid treatment for current infection  - Reviewed cholesterol levels and need for monitoring    TYPE 2 DIABETES MELLITUS WITH OTHER SPECIFIED COMPLICATION, WITHOUT LONG-TERM CURRENT USE OF INSULIN:  - Continue Mounjaro 5mg for diabetes management and weight control.  - Discussed the importance of maintaining current dosage to avoid increased side effects and acknowledged patient's progress towards goal weight.  - Ordered comprehensive labs, including glucose test.    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  - Assessed and planned to investigate potential interactions between Eliquis and collagen supplements.    HEADACHES/MIGRAINES:  - Noted patient's report of daily headaches and family history of migraines.  - Observed that Mounjaro medication has increased headache frequency.  - Confirmed scheduled appointment with neurologist next week for assessment.  - Continued Fioricet (butalbital-acetaminophen-caffeine) for migraine treatment.    ANTICOAGULATION THERAPY:  - Continued Eliquis at current dose for anticoagulation therapy.    SUPPLEMENTS:  - Explained potential benefits of collagen supplementation and lack of known interactions with Eliquis.  - Jayla to take vitamins and supplements, including B complex, zinc, D3, biotin, folic acid, and vitamin C.    UPPER RESPIRATORY INFECTION/SINUS INFECTION:  - Administered steroid injection and prescribed amoxicillin for upper  respiratory infection symptoms.  - Noted symptoms including sore throat (initial symptom), congestion, cough, and postnasal drip.  - Observed red ears and throat during physical exam.  - Recommend hot showers to alleviate congestion.  - Instructed patient to follow up if symptoms worsen or do not improve with current treatment.    LABS:  - Ordered comprehensive labs including iron, B12, infection markers, anemia indicators, electrolytes, liver and kidney function tests, and cholesterol panel.    FOLLOW UP:  - Use patient portal for any non-urgent communication or medication refill requests.         Type 2 diabetes mellitus with other specified complication, without long-term current use of insulin  -     CBC Auto Differential; Future; Expected date: 02/04/2025  -     Comprehensive Metabolic Panel; Future; Expected date: 02/04/2025  -     Iron and TIBC; Future; Expected date: 02/04/2025  -     Ferritin; Future; Expected date: 02/04/2025  -     Lipid Panel; Future; Expected date: 02/04/2025  -     Vitamin B12; Future; Expected date: 02/04/2025    Chronic mixed headache syndrome  -     CBC Auto Differential; Future; Expected date: 02/04/2025  -     Comprehensive Metabolic Panel; Future; Expected date: 02/04/2025  -     Iron and TIBC; Future; Expected date: 02/04/2025  -     Ferritin; Future; Expected date: 02/04/2025  -     Lipid Panel; Future; Expected date: 02/04/2025  -     Vitamin B12; Future; Expected date: 02/04/2025    Hypertension, unspecified type  -     CBC Auto Differential; Future; Expected date: 02/04/2025  -     Comprehensive Metabolic Panel; Future; Expected date: 02/04/2025  -     Iron and TIBC; Future; Expected date: 02/04/2025  -     Ferritin; Future; Expected date: 02/04/2025  -     Lipid Panel; Future; Expected date: 02/04/2025  -     Vitamin B12; Future; Expected date: 02/04/2025    Iron deficiency anemia, unspecified iron deficiency anemia type  -     CBC Auto Differential; Future; Expected date:  02/04/2025  -     Comprehensive Metabolic Panel; Future; Expected date: 02/04/2025  -     Iron and TIBC; Future; Expected date: 02/04/2025  -     Ferritin; Future; Expected date: 02/04/2025  -     Lipid Panel; Future; Expected date: 02/04/2025  -     Vitamin B12; Future; Expected date: 02/04/2025    Acute deep vein thrombosis (DVT) of femoral vein of left lower extremity  -     CBC Auto Differential; Future; Expected date: 02/04/2025  -     Comprehensive Metabolic Panel; Future; Expected date: 02/04/2025  -     Iron and TIBC; Future; Expected date: 02/04/2025  -     Ferritin; Future; Expected date: 02/04/2025  -     Lipid Panel; Future; Expected date: 02/04/2025  -     Vitamin B12; Future; Expected date: 02/04/2025    Hyperlipidemia, unspecified hyperlipidemia type  -     CBC Auto Differential; Future; Expected date: 02/04/2025  -     Comprehensive Metabolic Panel; Future; Expected date: 02/04/2025  -     Iron and TIBC; Future; Expected date: 02/04/2025  -     Ferritin; Future; Expected date: 02/04/2025  -     Lipid Panel; Future; Expected date: 02/04/2025  -     Vitamin B12; Future; Expected date: 02/04/2025    Anemia, unspecified type  -     CBC Auto Differential; Future; Expected date: 02/04/2025  -     Comprehensive Metabolic Panel; Future; Expected date: 02/04/2025  -     Iron and TIBC; Future; Expected date: 02/04/2025  -     Ferritin; Future; Expected date: 02/04/2025  -     Lipid Panel; Future; Expected date: 02/04/2025  -     Vitamin B12; Future; Expected date: 02/04/2025    B12 deficiency  -     CBC Auto Differential; Future; Expected date: 02/04/2025  -     Comprehensive Metabolic Panel; Future; Expected date: 02/04/2025  -     Iron and TIBC; Future; Expected date: 02/04/2025  -     Ferritin; Future; Expected date: 02/04/2025  -     Lipid Panel; Future; Expected date: 02/04/2025  -     Vitamin B12; Future; Expected date: 02/04/2025    Acute non-recurrent frontal sinusitis  -     triamcinolone acetonide  injection 40 mg  -     azithromycin (Z-RACHAEL) 250 MG tablet; Take 2 tablets by mouth on day 1; Take 1 tablet by mouth on days 2-5  Dispense: 6 tablet; Refill: 0  -     amoxicillin (AMOXIL) 500 MG Tab; Take 1 tablet (500 mg total) by mouth every 12 (twelve) hours. for 7 days  Dispense: 14 tablet; Refill: 0          Health Maintenance Due   Topic Date Due    TETANUS VACCINE  02/01/2008    RSV Vaccine (Age 60+ and Pregnant patients) (1 - Risk 60-74 years 1-dose series) Never done    Foot Exam  09/03/2021    Hemoglobin A1c  02/19/2025    Diabetic Eye Exam  02/20/2025        I spent 36 minutes on the day of this encounter for preparing for, evaluating, treating, and managing this patient.      -Continue current medications and maintain follow up with specialists.  Return to clinic as needed for any concerns   No follow-ups on file.     This note was generated with the assistance of ambient listening technology. Verbal consent was obtained by the patient and accompanying visitor(s) for the recording of patient appointment to facilitate this note. I attest to having reviewed and edited the generated note for accuracy, though some syntax or spelling errors may persist. Please contact the author of this note for any clarification.        HELEN Lewis  [facility]

## 2025-02-06 ENCOUNTER — PATIENT MESSAGE (OUTPATIENT)
Dept: PRIMARY CARE CLINIC | Facility: CLINIC | Age: 75
End: 2025-02-06
Payer: MEDICARE

## 2025-02-07 ENCOUNTER — PATIENT MESSAGE (OUTPATIENT)
Dept: PRIMARY CARE CLINIC | Facility: CLINIC | Age: 75
End: 2025-02-07
Payer: MEDICARE

## 2025-02-07 ENCOUNTER — PATIENT MESSAGE (OUTPATIENT)
Dept: CARDIOLOGY | Facility: CLINIC | Age: 75
End: 2025-02-07
Payer: MEDICARE

## 2025-02-07 DIAGNOSIS — R11.0 NAUSEA: ICD-10-CM

## 2025-02-08 RX ORDER — ONDANSETRON 4 MG/1
4 TABLET, ORALLY DISINTEGRATING ORAL EVERY 8 HOURS PRN
Qty: 30 TABLET | Refills: 3 | Status: CANCELLED | OUTPATIENT
Start: 2025-02-08

## 2025-02-10 DIAGNOSIS — R11.0 NAUSEA: ICD-10-CM

## 2025-02-10 RX ORDER — ONDANSETRON 4 MG/1
4 TABLET, ORALLY DISINTEGRATING ORAL EVERY 8 HOURS PRN
Qty: 30 TABLET | Refills: 3 | OUTPATIENT
Start: 2025-02-10

## 2025-02-10 RX ORDER — ONDANSETRON 4 MG/1
4 TABLET, ORALLY DISINTEGRATING ORAL EVERY 8 HOURS PRN
Qty: 30 TABLET | Refills: 3 | Status: SHIPPED | OUTPATIENT
Start: 2025-02-10

## 2025-02-21 ENCOUNTER — OFFICE VISIT (OUTPATIENT)
Dept: PRIMARY CARE CLINIC | Facility: CLINIC | Age: 75
End: 2025-02-21
Payer: MEDICARE

## 2025-02-21 VITALS
DIASTOLIC BLOOD PRESSURE: 70 MMHG | RESPIRATION RATE: 18 BRPM | WEIGHT: 118.38 LBS | SYSTOLIC BLOOD PRESSURE: 110 MMHG | HEIGHT: 60 IN | OXYGEN SATURATION: 96 % | BODY MASS INDEX: 23.24 KG/M2 | HEART RATE: 80 BPM

## 2025-02-21 DIAGNOSIS — R11.0 NAUSEA: ICD-10-CM

## 2025-02-21 DIAGNOSIS — E11.69 TYPE 2 DIABETES MELLITUS WITH OTHER SPECIFIED COMPLICATION, WITHOUT LONG-TERM CURRENT USE OF INSULIN: Primary | ICD-10-CM

## 2025-02-21 DIAGNOSIS — G44.89 CHRONIC MIXED HEADACHE SYNDROME: ICD-10-CM

## 2025-02-21 RX ORDER — TIRZEPATIDE 5 MG/.5ML
5 INJECTION, SOLUTION SUBCUTANEOUS WEEKLY
Qty: 6 ML | Refills: 3 | Status: SHIPPED | OUTPATIENT
Start: 2025-02-21

## 2025-02-21 RX ORDER — ONDANSETRON 4 MG/1
4 TABLET, ORALLY DISINTEGRATING ORAL EVERY 8 HOURS PRN
Qty: 30 TABLET | Refills: 3 | Status: SHIPPED | OUTPATIENT
Start: 2025-02-21

## 2025-02-21 RX ORDER — BUTALBITAL, ACETAMINOPHEN AND CAFFEINE 50; 325; 40 MG/1; MG/1; MG/1
1 TABLET ORAL EVERY 4 HOURS PRN
Qty: 60 TABLET | Refills: 0 | Status: SHIPPED | OUTPATIENT
Start: 2025-02-21

## 2025-02-21 NOTE — PROGRESS NOTES
[facility] Note    Chief Complaint      Chief Complaint   Patient presents with    Establish Care       History of Present Illness      Jayla Loyd is a 74 y.o. female who presents today for   Chief Complaint   Patient presents with    Establish Care   .  Patient comes to appointment for f/u on diabetic medications . Unfortunately patient was placed on my schedule in error . She needs refills on medications that were not filled at her last visit     Problem List Items Addressed This Visit       Chronic mixed headache syndrome    Overview   Has chronic migraines, uses fioricet   Tried amovig, has not tried triptan - not interested  Sees Charly Do- Neuro         Type 2 diabetes mellitus with other specified complication, without long-term current use of insulin - Primary    Overview   Cont mounjaro as prescribed           Other Visit Diagnoses         Nausea                  Past Medical History:  Past Medical History:   Diagnosis Date    Anxiety 2004    Carpal tunnel syndrome, right upper limb 06/23/2022    Depression 2004    Diabetes mellitus type II     GERD (gastroesophageal reflux disease)     Hx of multiple pulmonary nodules 06/15/2022    Migraines     Proteinuria     Pulmonary embolus 2024    Right lung with concurrent DVT left leg    Trigger finger, right ring finger 06/23/2022       Past Surgical History:  Past Surgical History:   Procedure Laterality Date    CATARACT EXTRACTION      COLPOSCOPY  2023    COSMETIC SURGERY  1971    rhinoplasty    DILATION AND CURETTAGE OF UTERUS  1992    EYE SURGERY  3 years ago    cataracts    JOINT REPLACEMENT  Twice in last 24 months    shoulders    PLANTAR FASCIA RELEASE  1993    SHOULDER SURGERY Left 09/2020    SINUS SURGERY  1993    TUBAL LIGATION  1992       Family History:  family history includes Arthritis in her father, paternal grandmother, and son; Asthma in her sister; Bladder Cancer in her mother; Breast cancer in her maternal  aunt and maternal grandmother; Cancer in her maternal aunt, maternal grandmother, mother, paternal aunt, and paternal grandmother; Dementia in her paternal grandmother; Diabetes in her paternal aunt; Heart disease in her father, maternal grandfather, mother, and paternal grandfather; Heart failure in her father; Hypertension in her father and mother; Migraines in her father; Stroke in her brother.    Social History:  Social History[1]    Review of Systems:   Review of Systems   Constitutional:  Negative for fever and weight loss.   HENT:  Negative for congestion, hearing loss and sore throat.    Eyes:  Negative for blurred vision.   Respiratory:  Negative for cough and shortness of breath.    Cardiovascular:  Negative for chest pain, palpitations, claudication and leg swelling.   Gastrointestinal:  Negative for abdominal pain, constipation, diarrhea and heartburn.   Genitourinary:  Negative for dysuria.   Musculoskeletal:  Negative for back pain and myalgias.   Skin:  Negative for rash.   Neurological:  Negative for focal weakness and headaches.   Psychiatric/Behavioral:  Negative for depression and suicidal ideas. The patient is not nervous/anxious.          Medications:  Encounter Medications[2]     Allergies:  Review of patient's allergies indicates:   Allergen Reactions    Gabapentin Hallucinations    Lyrica [pregabalin] Hallucinations    Mobic [meloxicam] Itching         Physical Exam         Vitals:    02/21/25 1439   BP: 110/70   Pulse: 80   Resp: 18         Physical Exam  Constitutional:       Appearance: She is well-developed.   Eyes:      Pupils: Pupils are equal, round, and reactive to light.   Neck:      Thyroid: No thyromegaly.   Cardiovascular:      Rate and Rhythm: Normal rate.      Heart sounds: Normal heart sounds. No murmur heard.     No friction rub. No gallop.   Pulmonary:      Breath sounds: Normal breath sounds.   Abdominal:      General: Bowel sounds are normal.      Palpations: Abdomen is  "soft.   Musculoskeletal:         General: Normal range of motion.      Cervical back: Normal range of motion.   Lymphadenopathy:      Cervical: No cervical adenopathy.   Skin:     General: Skin is warm.      Findings: No rash.   Neurological:      Mental Status: She is alert and oriented to person, place, and time.      Cranial Nerves: No cranial nerve deficit.   Psychiatric:         Behavior: Behavior normal.          Laboratory:  CBC:  Recent Labs   Lab Result Units 02/04/25  1058   WBC K/uL 8.57   RBC M/uL 4.85   Hemoglobin g/dL 14.4   Hematocrit % 45.3   Platelets K/uL 378   MCV fL 93   MCH pg 29.7   MCHC g/dL 31.8*     CMP:  Recent Labs   Lab Result Units 02/04/25  1058   Glucose mg/dL 102   Calcium mg/dL 11.0*   Albumin g/dL 4.2   Total Protein g/dL 8.1   Sodium mmol/L 143   Potassium mmol/L 5.6*   CO2 mmol/L 25   Chloride mmol/L 102   BUN mg/dL 18   Alkaline Phosphatase U/L 96   ALT U/L 16   AST U/L 20   Total Bilirubin mg/dL 0.4     URINALYSIS:  No results for input(s): "COLORU", "CLARITYU", "SPECGRAV", "PHUR", "PROTEINUA", "GLUCOSEU", "BILIRUBINCON", "BLOODU", "WBCU", "RBCU", "BACTERIA", "MUCUS", "NITRITE", "LEUKOCYTESUR", "UROBILINOGEN", "HYALINECASTS" in the last 2160 hours.   LIPIDS:  Recent Labs   Lab Result Units 02/04/25  1058   HDL mg/dL 65   Cholesterol mg/dL 190   Triglycerides mg/dL 98   LDL Cholesterol mg/dL 105.4   HDL/Cholesterol Ratio % 34.2   Non-HDL Cholesterol mg/dL 125   Total Cholesterol/HDL Ratio  2.9     TSH:  No results for input(s): "TSH" in the last 2160 hours.  A1C:  No results for input(s): "HGBA1C" in the last 2160 hours.    Radiology:        Assessment:     Jayla Loyd is a 74 y.o.female with:    Type 2 diabetes mellitus with other specified complication, without long-term current use of insulin    Nausea    Chronic mixed headache syndrome          Plan:     Problem List Items Addressed This Visit       Chronic mixed headache syndrome    Overview   Has chronic migraines, " uses fioricet   Tried amovig, has not tried triptan - not interested  Sees Charly Do- Neuro         Type 2 diabetes mellitus with other specified complication, without long-term current use of insulin - Primary    Overview   Cont mounjaro as prescribed           Other Visit Diagnoses         Nausea                As above, continue current medications and maintain follow up with specialists.  Return to clinic prn [facility]                       [1]   Social History  Socioeconomic History    Marital status:    Tobacco Use    Smoking status: Never     Passive exposure: Never    Smokeless tobacco: Never   Substance and Sexual Activity    Alcohol use: Yes     Comment: None    Drug use: Not Currently     Types: Other-see comments     Comment: None    Sexual activity: Not Currently     Partners: Male     Birth control/protection: Post-menopausal     Social Drivers of Health     Financial Resource Strain: Low Risk  (11/11/2024)    Received from [facility]    Overall Financial Resource Strain (CARDIA)     Difficulty of Paying Living Expenses: Not hard at all   Food Insecurity: No Food Insecurity (11/11/2024)    Received from [facility]    Hunger Vital Sign     Worried About Running Out of Food in the Last Year: Never true     Ran Out of Food in the Last Year: Never true   Transportation Needs: No Transportation Needs (11/11/2024)    Received from [facility]    PRAPARE - Transportation     Lack of Transportation (Medical): No     Lack of Transportation (Non-Medical): No   Physical Activity: Unknown (11/11/2024)    Received from [facility]    Exercise Vital Sign     Days of Exercise per Week: 0 days   Recent Concern: Physical Activity - Inactive (10/22/2024)    Exercise Vital Sign     Days of Exercise per Week: 0 days     Minutes of Exercise per Session: 0 min   Stress: Stress Concern Present (11/11/2024)    Received from [facility]     [employer] [place] [place] [employer] - Occupational Stress Questionnaire     Feeling of Stress : To some extent   Housing Stability: Unknown (11/11/2024)    Received from [facility]    Housing Stability Vital Sign     Unable to Pay for Housing in the Last Year: No   [2]   Outpatient Encounter Medications as of 2/21/2025   Medication Sig Note Dispense Refill    ALPRAZolam (XANAX) 0.5 MG tablet Take 1 tablet (0.5 mg total) by mouth 2 (two) times daily as needed for Anxiety.  60 tablet 0    amitriptyline (ELAVIL) 25 MG tablet Take 1 tablet (25 mg total) by mouth nightly as needed for Insomnia.  90 tablet 1    apixaban (ELIQUIS) 5 mg Tab Take 1 tablet (5 mg total) by mouth 2 (two) times daily.  180 tablet 3    biotin 5 mg Cap Take 5 mg by mouth once daily.       blood sugar diagnostic (TRUE METRIX GLUCOSE TEST STRIP) Strp 1 strip by Misc.(Non-Drug; Combo Route) route once daily.  100 each 3    blood-glucose meter (TRUE METRIX GLUCOSE METER) kit Use as instructed  1 each 0    butalbital-acetaminophen-caffeine -40 mg (FIORICET, ESGIC) -40 mg per tablet TAKE 1 TABLET BY MOUTH EVERY 4 HOURS AS NEEDED FOR PAIN  60 tablet 0    dicyclomine (BENTYL) 10 MG capsule Take 1 capsule (10 mg total) by mouth 4 (four) times daily as needed (abdominal discomfort).  60 capsule 1    esomeprazole (NEXIUM) 40 MG capsule TAKE 1 CAPSULE BY MOUTH TWICE DAILY BEFORE MEALS  180 capsule 2    Lactobac no.41/Bifidobact no.7 (PROBIOTIC-10 ORAL) Take by mouth once daily.       lancets (LANCETS,THIN) Misc 1 Lancet by Misc.(Non-Drug; Combo Route) route once daily.  100 each 3    meclizine (ANTIVERT) 25 mg tablet Take 1 tablet (25 mg total) by mouth 2 (two) times daily as needed.  60 tablet 0    methocarbamoL (ROBAXIN) 500 MG Tab Take 1 tablet (500 mg total) by mouth 3 (three) times daily as needed (pain).  60 tablet 1    mupirocin (BACTROBAN) 2 % ointment Apply topically 3 (three) times daily.  30 g 1    nystatin (MYCOSTATIN) powder  Apply topically 4 (four) times daily.  60 g 0    ondansetron (ZOFRAN-ODT) 4 MG TbDL Take 1 tablet (4 mg total) by mouth every 8 (eight) hours as needed (nausea).  30 tablet 3    promethazine (PHENERGAN) 12.5 MG Tab as needed.       propranoloL (INDERAL LA) 60 MG 24 hr capsule Take 60 mg by mouth as needed.       rosuvastatin (CRESTOR) 40 MG Tab TAKE 1 TABLET BY MOUTH ONCE DAILY  90 tablet 2    sertraline (ZOLOFT) 50 MG tablet Take 1 tablet (50 mg total) by mouth once daily.  90 tablet 3    telmisartan (MICARDIS) 20 MG Tab TAKE 1 TABLET BY MOUTH EVERY DAY 2024: 0.5 tablet daily 90 tablet 1    tirzepatide (MOUNJARO) 5 mg/0.5 mL PnIj Inject 5 mg into the skin once a week.  6 mL 3    traMADoL (ULTRAM) 50 mg tablet Take 1 tablet (50 mg total) by mouth every 8 (eight) hours as needed for Pain.  21 tablet 0    traZODone (DESYREL) 50 MG tablet Take 1 tablet (50 mg total) by mouth every evening.  90 tablet 3    [] amoxicillin (AMOXIL) 500 MG Tab Take 1 tablet (500 mg total) by mouth every 12 (twelve) hours. for 7 days  14 tablet 0    [] azithromycin (Z-RACHAEL) 250 MG tablet Take 2 tablets by mouth on day 1; Take 1 tablet by mouth on days 2-5  6 tablet 0    [DISCONTINUED] ondansetron (ZOFRAN-ODT) 4 MG TbDL Take 1 tablet (4 mg total) by mouth every 8 (eight) hours as needed (nausea).  30 tablet 3     No facility-administered encounter medications on file as of 2025.

## 2025-03-11 ENCOUNTER — OFFICE VISIT (OUTPATIENT)
Dept: PRIMARY CARE CLINIC | Facility: CLINIC | Age: 75
End: 2025-03-11
Payer: MEDICARE

## 2025-03-11 VITALS
OXYGEN SATURATION: 98 % | WEIGHT: 116.06 LBS | DIASTOLIC BLOOD PRESSURE: 50 MMHG | HEART RATE: 74 BPM | SYSTOLIC BLOOD PRESSURE: 102 MMHG | BODY MASS INDEX: 22.67 KG/M2

## 2025-03-11 DIAGNOSIS — E11.9 TYPE 2 DIABETES MELLITUS WITHOUT COMPLICATION, WITHOUT LONG-TERM CURRENT USE OF INSULIN: ICD-10-CM

## 2025-03-11 DIAGNOSIS — I12.9 TYPE 2 DM WITH CKD STAGE 3 AND HYPERTENSION: ICD-10-CM

## 2025-03-11 DIAGNOSIS — E78.2 MIXED HYPERLIPIDEMIA: ICD-10-CM

## 2025-03-11 DIAGNOSIS — Z76.89 ENCOUNTER TO ESTABLISH CARE: Primary | ICD-10-CM

## 2025-03-11 DIAGNOSIS — Z23 IMMUNIZATION DUE: ICD-10-CM

## 2025-03-11 DIAGNOSIS — I82.532 CHRONIC DEEP VEIN THROMBOSIS (DVT) OF POPLITEAL VEIN OF LEFT LOWER EXTREMITY: ICD-10-CM

## 2025-03-11 DIAGNOSIS — N18.30 TYPE 2 DM WITH CKD STAGE 3 AND HYPERTENSION: ICD-10-CM

## 2025-03-11 DIAGNOSIS — Z71.89 ADVANCED CARE PLANNING/COUNSELING DISCUSSION: ICD-10-CM

## 2025-03-11 DIAGNOSIS — E11.22 TYPE 2 DM WITH CKD STAGE 3 AND HYPERTENSION: ICD-10-CM

## 2025-03-11 PROCEDURE — 1159F MED LIST DOCD IN RCRD: CPT | Mod: CPTII,S$GLB,,

## 2025-03-11 PROCEDURE — 3074F SYST BP LT 130 MM HG: CPT | Mod: CPTII,S$GLB,,

## 2025-03-11 PROCEDURE — 99215 OFFICE O/P EST HI 40 MIN: CPT | Mod: S$GLB,,,

## 2025-03-11 PROCEDURE — 99999 PR PBB SHADOW E&M-EST. PATIENT-LVL V: CPT | Mod: PBBFAC,,,

## 2025-03-11 PROCEDURE — 3008F BODY MASS INDEX DOCD: CPT | Mod: CPTII,S$GLB,,

## 2025-03-11 PROCEDURE — 99417 PROLNG OP E/M EACH 15 MIN: CPT | Mod: S$GLB,,,

## 2025-03-11 PROCEDURE — 3078F DIAST BP <80 MM HG: CPT | Mod: CPTII,S$GLB,,

## 2025-03-11 PROCEDURE — 1160F RVW MEDS BY RX/DR IN RCRD: CPT | Mod: CPTII,S$GLB,,

## 2025-03-11 PROCEDURE — 1126F AMNT PAIN NOTED NONE PRSNT: CPT | Mod: CPTII,S$GLB,,

## 2025-03-11 RX ORDER — CODEINE PHOSPHATE AND GUAIFENESIN 10; 100 MG/5ML; MG/5ML
5 SOLUTION ORAL 3 TIMES DAILY PRN
COMMUNITY
Start: 2025-03-08 | End: 2025-03-18

## 2025-03-11 NOTE — PROGRESS NOTES
[facility]      Subjective     Patient Name: Jayla Loyd  YOB: 1950  Patient Age: 74 y.o.  Patient Sex: female  Patient Phone: [phone]  PCP: No primary care provider on file.  Last PCP Appointment: Patient does not have a PCP or has not yet seen their PCP    History of Present Illness    CHIEF COMPLAINT:  Ms. Loyd presents today to establish care with a new primary care physician.    CURRENT SYMPTOMS:  She reports improvement in upper respiratory symptoms with residual mild cough, sore throat, and congestion.    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 She has diabetes, previously managed with Metformin 1000mg, now transitioned to Mounjaro with good efficacy. She has one stable pulmonary nodule remaining in the left lower lobe after others resolved. She has chronic proteinuria with microscopic hematuria. She experiences migraines and [place] [facility] [place] She reports essential tremor affecting left hand that's managed with propranolol; most noticeable when holding items such as a phone.    SURGICAL HISTORY:  She has undergone two shoulder replacements and one rotator cuff repair, with residual pain during weather changes.    CURRENT MEDICATIONS:  She takes Mounjaro for diabetes, Eliquis for DVT, Propranolol 20mg daily, Telmisartan, Rosuvastatin for cholesterol, Zofran for Mounjaro-related nausea, Zoloft, and Xanax as needed for sleep. She reports good medication tolerance and does not take Xanax nightly.    FAMILY HISTORY:  Her father had hyperlipidemia, hypertension, and congestive heart failure, and  in his sleep. Her mother had cancer and a urostomy, with history of heavy smoking. Her brother had a stroke two years ago and  one year ago, with history of substance abuse. Her paternal aunt had brittle diabetes and lung cancer with smoking history. Her maternal aunt  had breast cancer and was a smoker.    SOCIAL HISTORY:  She denies regular smoking history, reporting only experimental use in the 1970s. She currently denies alcohol use and reports no desire for alcoholic beverages.    Answers submitted by the patient for this visit:  Diabetes Questionnaire (Submitted on 3/8/2025)  Chief Complaint: Diabetes problem  Diabetes type: type 2  MedicAlert ID: No  Disease duration: 10 Years  blurred vision: No  chest pain: No  fatigue: No  foot paresthesias: No  foot ulcerations: No  polydipsia: No  polyphagia: No  polyuria: No  visual change: No  weakness: No  weight loss: Yes  Symptom course: improving  confusion: No  speech difficulty: No  dizziness: No  nervous/anxious: No  headaches: No  hunger: No  mood changes: No  pallor: No  seizures: No  tremors: No  sleepiness: No  sweats: No  blackouts: No  hospitalization: No  nocturnal hypoglycemia: No  required assistance: No  required glucagon: No  CVA: No  heart disease: No  nephropathy: No  peripheral neuropathy: No  PVD: No  retinopathy: No  autonomic neuropathy: No  CAD risks: dyslipidemia, family history, stress, diabetes mellitus  Current treatments: oral agent (monotherapy)  Treatment compliance: some of the time  Home blood tests: 1-2 x per week  Home urines: <1 x per month  Monitoring compliance: adequate  Blood glucose trend: decreasing steadily  Weight trend: decreasing steadily  Current diet: generally healthy  Meal planning: avoidance of concentrated sweets, carbohydrate counting  Exercise: intermittently  Dietitian visit: No  Eye exam current: No  Sees podiatrist: No          Health Maintenance Summary            Current Care Gaps       TETANUS VACCINE (Every 10 Years) Overdue since 2/1/2008 02/01/1998  Imm Admin: Td - PF (ADULT)    02/01/1998  Imm Admin: Td (ADULT)              Foot Exam (Yearly) Overdue since 9/3/2021      10/22/2024  Order placed for Ambulatory referral/consult to Podiatry by Maritza Ya NP     09/03/2020  SmartData: WORKFLOW - DIABETES - DIABETIC FOOT EXAM PERFORMED    10/31/2018  SmartData: WORKFLOW - DIABETES - DIABETIC FOOT EXAM PERFORMED              RSV Vaccine (Age 60+ and Pregnant patients) (1 - Risk 60-74 years 1-dose series) Never done     No completion history exists for this topic.                      Awaiting Completion       Hemoglobin A1c (Every 6 Months) Scheduled for 6/4/2025 03/11/2025  Order placed for Hemoglobin A1C by Dre Mcclellan MD    08/19/2024  Hemoglobin A1C External component of Hemoglobin A1C    02/01/2024  Hemoglobin A1C External component of Hemoglobin A1C    02/01/2024  Hemoglobin A1c    08/01/2023  Hemoglobin A1C External component of Hemoglobin A1C     Only the first 5 history entries have been loaded, but more history exists.                    Upcoming       Diabetic Eye Exam (Yearly) Postponed until 3/18/2025      02/20/2024  Outside Claim: OK EYE EXAM, NEW PATIENT,COMPREHESV    01/04/2024  Outside Claim: OK FUNDAL PHOTOGRAPHY    01/04/2024  Outside Claim: OK EYE EXAM, EST PATIENT,COMPREHESV    03/09/2023  Outside Claim: OK EYE EXAM, EST PATIENT,COMPREHESV    07/22/2021  Outside Claim: OK EYE EXAM, EST PATIENT,COMPREHESV      Only the first 5 history entries have been loaded, but more history exists.              Diabetes Urine Screening (Yearly) Next due on 8/23/2025 08/23/2024  MICROALB/CREAT RATIO component of Microalbumin/Creatinine Ratio, Urine    08/01/2023  MICROALB/CREAT RATIO component of Microalbumin/Creatinine Ratio, Urine    07/19/2022  MICROALB/CREAT RATIO component of MICROALBUMIN / CREATININE RATIO URINE    11/12/2020  MICROALB/CREAT RATIO component of Microalbumin/creatinine urine ratio    05/14/2018  MICROALB/CREAT RATIO component of Microalbumin, Random Urine (w/Creatinine)      Only the first 5 history entries have been loaded, but more history exists.              Mammogram (Yearly) Next due on 12/12/2025 12/12/2024  Mammo Digital  Screening Bilat w/ Raul    12/11/2023  Mammo Digital Screening Bilat w/ Raul    12/08/2022  Mammo Digital Screening Bilat w/ Raul    08/10/2021  [facility] MAMMOGRAPHY    07/10/2020  Mammo Digital Diagnostic Bilat w/ Raul      Only the first 5 history entries have been loaded, but more history exists.              Lipid Panel (Yearly) Next due on 2/4/2026 02/04/2025  Cholesterol Total component of Lipid Panel    11/21/2024  Cholesterol Total component of Lipid Panel    08/19/2024  Cholesterol Total component of Lipid Panel    08/01/2023  Cholesterol Total component of Lipid Panel    03/07/2022  Cholesterol Total component of Lipid Panel      Only the first 5 history entries have been loaded, but more history exists.              High Dose Statin (Yearly) Next due on 3/11/2026      03/11/2025  Registry Metric: Last Current Statin Reviewed Date    09/22/2024  Registry Metric: Last Current Statin Order Date              DEXA Scan (Every 2 Years) Next due on 12/12/2026 12/12/2024  DXA Bone Density Axial Skeleton 1 or more sites    08/11/2020   DEXA SCAN    08/14/2018  DXA Bone Density Spine And Hip    02/19/2016  Outside Procedure: CHG DEXA,BONE DENSITY, 1 + SITE, AXIAL SKELETON              Colorectal Cancer Screening (Colonoscopy - Every 5 Years) Next due on 2/8/2027 02/08/2022   COLONOSCOPY    02/02/2017   COLONOSCOPY                      Completed or No Longer Recommended       Hepatitis C Screening  Completed      07/26/2018  Hepatitis C Ab component of Hepatitis C antibody              Shingles Vaccine  Discontinued      No completion history exists for this topic.              Influenza Vaccine  Discontinued      01/06/2022  SmartData: WORKFLOW - HEALTHY PLANET - EXTERNAL DATA - EXTERNAL PROCEDURE DATE - INFLUENZA    12/02/2014  Imm Admin: Influenza - Trivalent (ADULT)    12/02/2014  Imm Admin: Influenza Split    12/02/2014  Imm Admin: Influenza    10/25/2013  Imm Admin: Influenza - Trivalent (ADULT)      Only the first 5 history entries have been loaded, but more history exists.            COVID-19 Vaccine  Discontinued      12/17/2021  Imm Admin: COVID-19, MRNA, LN-S, PF (MODERNA FULL 0.5 ML DOSE)    03/19/2021  Imm Admin: COVID-19, MRNA, LN-S, PF (MODERNA FULL 0.5 ML DOSE)    02/18/2021  Imm Admin: COVID-19, MRNA, LN-S, PF (MODERNA FULL 0.5 ML DOSE)              Pneumococcal Vaccines (Age 50+)  Discontinued      No completion history exists for this topic.                            4Ms for Medical Decision-Making in Older Adults    Last Completed EAWV: 10/22/2024    MEDICATIONS:  High Risk Medications:  Total Active Medications: 6  ALPRAZolam - 0.5 MG  amitriptyline - 25 MG  guaiFENesin-codeine 100-10 mg/5 ml -  mg/5 mL  methocarbamoL Tab - 500 MG  sertraline - 50 MG  traMADoL - 50 mg    MOBILITY:  Activities of Daily Living:      10/22/2024    11:14 AM   Activities of Daily Living   Ambulation Independent   Dressing Independent   Transfers Independent   Toileting Continent of bowel;Continent of bladder   Feeding Independent   Cleaning home/Chores Independent   Telephone use Independent   Shopping Independent   Paying bills Independent   Taking meds Independent     Fall Risk:      2/21/2025     2:45 PM 11/18/2024    10:00 AM 11/14/2024     1:30 PM   Fall Risk Assessment - Outpatient   Mobility Status Ambulatory Ambulatory Ambulatory   Number of falls 0 0 0   Identified as fall risk False False False     Disability Status:      10/22/2024    11:16 AM   Disability Status   Are you deaf or do you have serious difficulty hearing? N   Are you blind or do you have serious difficulty seeing, even when wearing glasses? N   Because of a physical, mental, or emotional condition, do you have serious difficulty concentrating, remembering, or making decisions? N   Do you have serious difficulty walking or climbing stairs? Y   Do you have difficulty dressing or bathing? N   Because of a physical, mental, or emotional  condition, do you have difficulty doing errands alone such as visiting a doctor's office or shopping? N     Nutrition Screening:      10/22/2024    11:13 AM   Nutrition Screening   Has food intake declined over the past three months due to loss of appetite, digestive problems, chewing or swallowing difficulties? No decrease in food intake   Involuntary weight loss during the last 3 months? No weight loss   Mobility? Goes out   Has the patient suffered psychological stress or acute disease in the past three months? No   Neuropsychological problems? No psychological problems   Body Mass Index (BMI)?  BMI 23 or greater   Screening Score 14   Interpretation Normal nutritional status    Screening Score: 0-7 Malnourished, 8-11 At Risk, 12-14 Normal  Get Up and Go:      3/29/2023     9:47 AM   Get Up and Go   Trial 1 8 seconds     Whisper Test:      3/29/2023     9:49 AM   Whisper Test   Whisper Test Normal           MENTATION:   Has Dementia Dx: No  Has Anxiety Dx: Yes    Depression Patient Health Questionnaire:      2/21/2025     2:37 PM   Depression Patient Health Questionnaire   Over the last two weeks how often have you been bothered by little interest or pleasure in doing things Not at all   Over the last two weeks how often have you been bothered by feeling down, depressed or hopeless Not at all   PHQ-2 Total Score 0     Cognitive Function Screening:      10/22/2024    11:12 AM   Cognitive Function Screening   Mini-Cog 3 Minute Recall 3     Cognitive Function Screening Total - Less than 4 = Abnormal,  Greater than or equal to 4 = Normal        WHAT MATTERS MOST:  Advance Care Planning   ACP Status:   Patient does not have an ACP conversation on file  Living Will: No  Power of : No  LaPOST: No    What is most important right now is to focus on avoiding the hospital, remaining as independent as possible, and symptom/pain control    Accordingly, we have decided that the best plan to meet the patient's goals  includes continuing with treatment      What matters most to patient today is: establishing care                 Social History[1]    Past Medical History:   Diagnosis Date    Anxiety 2004    Carpal tunnel syndrome, right upper limb 06/23/2022    Depression 2004    Diabetes mellitus type II     GERD (gastroesophageal reflux disease)     Hx of multiple pulmonary nodules 06/15/2022    Migraines     Proteinuria     Pulmonary embolus 2024    Right lung with concurrent DVT left leg    Trigger finger, right ring finger 06/23/2022       Prior to Admission medications    Medication Sig Start Date End Date Taking? Authorizing Provider   ALPRAZolam (XANAX) 0.5 MG tablet Take 1 tablet (0.5 mg total) by mouth 2 (two) times daily as needed for Anxiety. 12/30/24  Yes Geena Barnard MD   amitriptyline (ELAVIL) 25 MG tablet Take 1 tablet (25 mg total) by mouth nightly as needed for Insomnia. 11/11/24 11/11/25 Yes Geena Barnard MD   apixaban (ELIQUIS) 5 mg Tab Take 1 tablet (5 mg total) by mouth 2 (two) times daily. 1/9/25  Yes Edmond Sifuentes MD PhD   biotin 5 mg Cap Take 5 mg by mouth once daily.   Yes Provider, Historical   blood sugar diagnostic (TRUE METRIX GLUCOSE TEST STRIP) Strp 1 strip by Misc.(Non-Drug; Combo Route) route once daily. 7/17/24  Yes Geena Barnard MD   blood-glucose meter (TRUE METRIX GLUCOSE METER) kit Use as instructed 7/17/24 7/16/25 Yes Geena Barnard MD   butalbital-acetaminophen-caffeine -40 mg (FIORICET, ESGIC) -40 mg per tablet Take 1 tablet by mouth every 4 (four) hours as needed. for pain. 2/21/25  Yes Dwaine Yadav MD   dicyclomine (BENTYL) 10 MG capsule Take 1 capsule (10 mg total) by mouth 4 (four) times daily as needed (abdominal discomfort). 9/9/24  Yes Geena Barnard MD   esomeprazole (NEXIUM) 40 MG capsule TAKE 1 CAPSULE BY MOUTH TWICE DAILY BEFORE MEALS 4/10/24  Yes GanGeena herndon MD guaiFENesin-codeine 100-10 mg/5 ml (TUSSI-ORGANIDIN  NR)  mg/5 mL syrup Take 5 mLs by mouth 3 (three) times daily as needed. 3/8/25 3/18/25 Yes Provider, Historical   Lactobac no.41/Bifidobact no.7 (PROBIOTIC-10 ORAL) Take by mouth once daily.   Yes Provider, Historical   lancets (LANCETS,THIN) Misc 1 Lancet by Misc.(Non-Drug; Combo Route) route once daily. 7/17/24  Yes Geena Barnard MD   meclizine (ANTIVERT) 25 mg tablet Take 1 tablet (25 mg total) by mouth 2 (two) times daily as needed. 12/16/24  Yes Geena Barnard MD   methocarbamoL (ROBAXIN) 500 MG Tab Take 1 tablet (500 mg total) by mouth 3 (three) times daily as needed (pain). 7/8/24  Yes Geena Barnard MD   mupirocin (BACTROBAN) 2 % ointment Apply topically 3 (three) times daily. 7/17/24  Yes Geena Barnard MD   nystatin (MYCOSTATIN) powder Apply topically 4 (four) times daily. 2/28/24  Yes Mana Pulido MD   ondansetron (ZOFRAN-ODT) 4 MG TbDL Take 1 tablet (4 mg total) by mouth every 8 (eight) hours as needed (nausea). 2/21/25  Yes Dwaine Yadav MD   promethazine (PHENERGAN) 12.5 MG Tab as needed. 9/20/23  Yes Provider, Historical   propranoloL (INDERAL LA) 60 MG 24 hr capsule Take 60 mg by mouth as needed. 1/31/24  Yes Provider, Historical   rosuvastatin (CRESTOR) 40 MG Tab TAKE 1 TABLET BY MOUTH ONCE DAILY 9/22/24  Yes Geena Barnard MD   sertraline (ZOLOFT) 50 MG tablet Take 1 tablet (50 mg total) by mouth once daily. 5/21/24 5/21/25 Yes Geena Barnard MD   telmisartan (MICARDIS) 20 MG Tab TAKE 1 TABLET BY MOUTH EVERY DAY 6/27/23  Yes Geena Barnard MD   tirzepatide (MOUNJARO) 5 mg/0.5 mL PnIj Inject 5 mg into the skin once a week. 2/21/25  Yes Dwaine Yadav MD   traMADoL (ULTRAM) 50 mg tablet Take 1 tablet (50 mg total) by mouth every 8 (eight) hours as needed for Pain. 7/8/24  Yes Ehsan Falk MD   traZODone (DESYREL) 50 MG tablet Take 1 tablet (50 mg total) by mouth every evening. 5/7/24 5/7/25 Yes Geena Barnard MD        OBJECTIVE:     Vitals:    03/11/25 1249   BP: (!) 102/50   BP Location: Left arm   Patient Position: Sitting   Pulse: 74   SpO2: 98%   Weight: 52.7 kg (116 lb 1.2 oz)       Body mass index is 22.67 kg/m².     PHYSICAL EXAM  GEN - A+OX4, NAD   HEENT - PERRL, EOMI,   Neck - No thyromegaly or cervical LAD.   CV - RRR, no m/r   Chest - CTAB, no wheezing or rhonchi  Abd - S/NT/ND/+BS.   Ext - 2+BDP and radial pulses. Minimal BLE edeam  MSK - normal ROM, no tenderness  Neuro - 5/5 BUE and BLE strength.  LN - No LAD appreciated.  Skin - No rash.     LABS  Lab Results   Component Value Date    WBC 8.57 02/04/2025    HGB 14.4 02/04/2025    HCT 45.3 02/04/2025    MCV 93 02/04/2025     02/04/2025         CMP  Sodium   Date Value Ref Range Status   02/04/2025 143 136 - 145 mmol/L Final   07/26/2018 140 135 - 146 Final     Potassium   Date Value Ref Range Status   02/04/2025 5.6 (H) 3.5 - 5.1 mmol/L Final     Comment:     *No Visible Hemolysis   07/26/2018 4.2 3.5 - 5.3 Final     Chloride   Date Value Ref Range Status   02/04/2025 102 95 - 110 mmol/L Final   07/26/2018 106 98 - 110 Final     CO2   Date Value Ref Range Status   02/04/2025 25 23 - 29 mmol/L Final   07/26/2018 27 20 - 31 Final     Glucose   Date Value Ref Range Status   02/04/2025 102 70 - 110 mg/dL Final   06/21/2018 114 (A) 65 - 99 Final     BUN   Date Value Ref Range Status   02/04/2025 18 8 - 23 mg/dL Final   07/26/2018 18 7 - 25 mg/dL Final     Creatinine   Date Value Ref Range Status   02/04/2025 1.0 0.5 - 1.4 mg/dL Final   06/21/2018 0.65 0.50 - 0.99 Final     Calcium   Date Value Ref Range Status   02/04/2025 11.0 (H) 8.7 - 10.5 mg/dL Final   07/26/2018 9.1 8.6 - 10.4 mg/dL Final   07/26/2018 9.1 8.6 - 10.4 mg/dL Final     Total Protein   Date Value Ref Range Status   02/04/2025 8.1 6.0 - 8.4 g/dL Final     Albumin   Date Value Ref Range Status   02/04/2025 4.2 3.5 - 5.2 g/dL Final   07/26/2018 4.0 3.6 - 5.1 Final   07/26/2018 53  Final      Total Bilirubin   Date Value Ref Range Status   02/04/2025 0.4 0.1 - 1.0 mg/dL Final     Comment:     For infants and newborns, interpretation of results should be based  on gestational age, weight and in agreement with clinical  observations.    Premature Infant recommended reference ranges:  Up to 24 hours.............<8.0 mg/dL  Up to 48 hours............<12.0 mg/dL  3-5 days..................<15.0 mg/dL  6-29 days.................<15.0 mg/dL       Alkaline Phosphatase   Date Value Ref Range Status   02/04/2025 96 40 - 150 U/L Final     AST   Date Value Ref Range Status   02/04/2025 20 10 - 40 U/L Final   07/26/2018 16 10 - 35 U/L Final     ALT   Date Value Ref Range Status   02/04/2025 16 10 - 44 U/L Final   07/26/2018 27 6 - 29 U/L Final     Anion Gap   Date Value Ref Range Status   02/04/2025 16 8 - 16 mmol/L Final   05/14/2018 17 9 - 18 mmol/L Final     eGFR   Date Value Ref Range Status   02/04/2025 59.9 (A) >60 mL/min/1.73 m^2 Final       ASSESSMENT & PLAN:   Ms. Jayla Loyd is a 74 y.o. female who was seen in clinic today to establish care. Assessed recent upper respiratory symptoms, likely viral in nature. Symptoms noted to be improving. Reviewed recent lab results showing slightly elevated potassium and calcium levels. Calcium elevation likely due to supplementation. Evaluated chronic conditions: DVT managed with Eliquis, diabetes controlled with Mounjaro. Considered history of pulmonary nodules, noting stability based on recent imaging; no further imaging follow up needed currently. Assessed mild decline in renal function (GFR 59.9). Evaluated essential tremor, currently managed with low-dose propranolol. Will order fasting pre-labs and schedule 3 month follow up.        1. Immunization due    2. Advanced care planning/counseling discussion    3. Encounter to establish care    4. Type 2 diabetes mellitus without complication, without long-term current use of insulin  -     Comprehensive  Metabolic Panel; Future; Expected date: 03/11/2025  -     Hemoglobin A1C; Future; Expected date: 03/11/2025         Follow up in about 3 months (around 6/11/2025).     All questions answered. Pt to call/message the Primary Clinic for any additional concerns    I spent a total of 88 minutes on the day of the visit.      This includes face to face time and non-face to face time preparing to see the patient (eg, review of tests), obtaining and/or reviewing separately obtained history, documenting clinical information in the electronic or other health record, independently interpreting results and communicating results to the patient/family/caregiver, or care coordinator.       Dre Mcclellan MD  [facility]    This note was generated with the assistance of ambient listening technology. Verbal consent was obtained by the patient and accompanying visitor(s) for the recording of patient appointment to facilitate this note. I attest to having reviewed and edited the generated note for accuracy, though some syntax or spelling errors may persist. Please contact the author of this note for any clarification.         [1]   Social History  Socioeconomic History    Marital status:    Tobacco Use    Smoking status: Never     Passive exposure: Never    Smokeless tobacco: Never   Substance and Sexual Activity    Alcohol use: Yes     Comment: None    Drug use: Not Currently     Types: Other-see comments     Comment: None    Sexual activity: Not Currently     Partners: Male     Birth control/protection: Post-menopausal     Social Drivers of Health     Financial Resource Strain: Low Risk  (3/8/2025)    Overall Financial Resource Strain (CARDIA)     Difficulty of Paying Living Expenses: Not very hard   Food Insecurity: No Food Insecurity (3/8/2025)    Hunger Vital Sign     Worried About Running Out of Food in the Last Year: Never true     Ran Out of Food in the Last Year: Never true   Transportation  Needs: No Transportation Needs (3/8/2025)    PRAPARE - Transportation     Lack of Transportation (Medical): No     Lack of Transportation (Non-Medical): No   Physical Activity: Inactive (3/8/2025)    Exercise Vital Sign     Days of Exercise per Week: 0 days     Minutes of Exercise per Session: 0 min   Stress: Stress Concern Present (3/8/2025)    [employer] - Occupational Stress Questionnaire     Feeling of Stress : To some extent   Housing Stability: Low Risk  (3/8/2025)    Housing Stability Vital Sign     Unable to Pay for Housing in the Last Year: No     Number of Times Moved in the Last Year: 0     Homeless in the Last Year: No

## 2025-03-11 NOTE — PATIENT INSTRUCTIONS
For Osteopenia:  Recommend daily supplement with calcium (calcium carbonate: 1000mg daily) and vitamin D3: 25mcg daily or 1000units daily). This can be taken individually or as a combination therapy (ex Citracal or Viactiv).    Goal for 60oz of water daily

## 2025-03-12 NOTE — ASSESSMENT & PLAN NOTE
- No reports of complications  - Continue DVT therapy with eliquis. Continue statin therapy  - F/U with Vascular as scheduled  - Aerobic exercise encouraged as tolerated

## 2025-03-12 NOTE — ASSESSMENT & PLAN NOTE
- Provided vaccination information for future immunization if pt is amenable   - Will reassess healthcare gaps and discuss pertinent vaccinations at follow up visit

## 2025-03-12 NOTE — ASSESSMENT & PLAN NOTE
Lab Results   Component Value Date    HGBA1C 5.3 08/19/2024    HGBA1C 7.0 (H) 02/01/2024    HGBA1C 6.7 (H) 08/01/2023    HGBA1C 6.9 (H) 07/19/2022    HGBA1C 6.9 (H) 04/05/2021   - Glucose and A1C controlled on current therapy  - Continue current regimen  - Low sugar/carb diet encouraged  - Aerobic exercise as tolerated, goal 150min/week  - Regular home blood glucose checks  - Complete annual eye & foot examinations

## 2025-03-12 NOTE — ASSESSMENT & PLAN NOTE
I initiated the process of voluntary advance care planning today and explained the importance of this process to the patient.  I introduced the concept of advance directives to the patient, as well. The patient has received  information about the importance of designating a Health Care Power of  (HCPOA). The patient was also instructed to communicate with this person about their wishes for future healthcare, should they become sick and lose decision-making capacity. The patient spouse, Dwight Loyd, previously appointed a HCPOA. I encouraged her to communicate with her HCPOA her wishes for future healthcare, should she become sick and lose decision-making capacity, which includes code status.                             A total of 5 min was spent on advance care planning, goals of care discussion, emotional support, formulating and communicating prognosis and exploring burden/benefit of various approaches of treatment. This discussion occurred on a fully voluntary basis with the verbal consent of the patient and/or family.

## 2025-03-12 NOTE — ASSESSMENT & PLAN NOTE
Lab Results   Component Value Date    HGBA1C 5.3 08/19/2024    HGBA1C 7.0 (H) 02/01/2024    HGBA1C 6.7 (H) 08/01/2023    HGBA1C 6.9 (H) 07/19/2022    HGBA1C 6.9 (H) 04/05/2021     Lab Results   Component Value Date    EGFRNORACEVR 59.9 (A) 02/04/2025    EGFRNORACEVR >60.0 11/21/2024    EGFRNORACEVR >60.0 11/18/2024    EGFRNORACEVR >60.0 11/18/2024     Lab Results   Component Value Date    MICALBCREAT 34.7 (H) 08/23/2024     BP Readings from Last 5 Encounters:   03/11/25 (!) 102/50   02/21/25 110/70   02/04/25 100/60   11/21/24 110/62   11/18/24 (!) 107/58     - BP well controlled  - A1C and GFR stable on current therapies   - Trend GFR UMAC  - Continue current regimen  - Low sugar/carb renal and cardiac friendly diet encouraged  - Aerobic exercise as tolerated, goal 150min/week  - Regular home BP and blood glucose checks ecouraged  - Complete annual eye & foot examinations  - Avoid nephrotoxic agents and renally dose medications

## 2025-03-31 DIAGNOSIS — K21.9 GASTROESOPHAGEAL REFLUX DISEASE: ICD-10-CM

## 2025-04-01 RX ORDER — ESOMEPRAZOLE MAGNESIUM 40 MG/1
40 CAPSULE, DELAYED RELEASE ORAL
Qty: 180 CAPSULE | Refills: 2 | Status: SHIPPED | OUTPATIENT
Start: 2025-04-01

## 2025-04-12 DIAGNOSIS — R11.0 NAUSEA: ICD-10-CM

## 2025-04-14 RX ORDER — ONDANSETRON 4 MG/1
4 TABLET, ORALLY DISINTEGRATING ORAL EVERY 8 HOURS PRN
Qty: 30 TABLET | Refills: 3 | Status: SHIPPED | OUTPATIENT
Start: 2025-04-14

## 2025-04-16 ENCOUNTER — PATIENT MESSAGE (OUTPATIENT)
Dept: PRIMARY CARE CLINIC | Facility: CLINIC | Age: 75
End: 2025-04-16
Payer: MEDICARE

## 2025-04-16 DIAGNOSIS — I10 PRIMARY HYPERTENSION: Primary | ICD-10-CM

## 2025-04-17 DIAGNOSIS — R11.0 NAUSEA: Primary | ICD-10-CM

## 2025-04-17 RX ORDER — DICYCLOMINE HYDROCHLORIDE 10 MG/1
10 CAPSULE ORAL 4 TIMES DAILY PRN
Qty: 60 CAPSULE | Refills: 1 | Status: SHIPPED | OUTPATIENT
Start: 2025-04-17

## 2025-04-21 RX ORDER — TELMISARTAN 20 MG/1
20 TABLET ORAL DAILY
Qty: 90 TABLET | Refills: 1 | Status: SHIPPED | OUTPATIENT
Start: 2025-04-21

## 2025-04-25 ENCOUNTER — PATIENT MESSAGE (OUTPATIENT)
Dept: PRIMARY CARE CLINIC | Facility: CLINIC | Age: 75
End: 2025-04-25
Payer: MEDICARE

## 2025-04-25 DIAGNOSIS — R11.0 NAUSEA: ICD-10-CM

## 2025-04-25 RX ORDER — ONDANSETRON 4 MG/1
4 TABLET, ORALLY DISINTEGRATING ORAL EVERY 8 HOURS PRN
Qty: 30 TABLET | Refills: 3 | Status: SHIPPED | OUTPATIENT
Start: 2025-04-25

## 2025-04-28 ENCOUNTER — PATIENT MESSAGE (OUTPATIENT)
Dept: PRIMARY CARE CLINIC | Facility: CLINIC | Age: 75
End: 2025-04-28
Payer: MEDICARE

## 2025-04-28 DIAGNOSIS — G44.89 CHRONIC MIXED HEADACHE SYNDROME: ICD-10-CM

## 2025-04-28 RX ORDER — BUTALBITAL, ACETAMINOPHEN AND CAFFEINE 50; 325; 40 MG/1; MG/1; MG/1
1 TABLET ORAL EVERY 4 HOURS PRN
Qty: 30 TABLET | Refills: 0 | Status: SHIPPED | OUTPATIENT
Start: 2025-04-28 | End: 2025-04-28

## 2025-04-28 RX ORDER — BUTALBITAL, ACETAMINOPHEN AND CAFFEINE 50; 325; 40 MG/1; MG/1; MG/1
1 TABLET ORAL EVERY 4 HOURS PRN
Qty: 30 TABLET | Refills: 0 | Status: SHIPPED | OUTPATIENT
Start: 2025-04-28 | End: 2025-04-29 | Stop reason: SDUPTHER

## 2025-04-29 ENCOUNTER — PATIENT MESSAGE (OUTPATIENT)
Dept: PRIMARY CARE CLINIC | Facility: CLINIC | Age: 75
End: 2025-04-29
Payer: MEDICARE

## 2025-04-29 ENCOUNTER — TELEPHONE (OUTPATIENT)
Dept: PRIMARY CARE CLINIC | Facility: CLINIC | Age: 75
End: 2025-04-29
Payer: MEDICARE

## 2025-04-29 DIAGNOSIS — G44.89 CHRONIC MIXED HEADACHE SYNDROME: ICD-10-CM

## 2025-04-29 RX ORDER — BUTALBITAL, ACETAMINOPHEN AND CAFFEINE 50; 325; 40 MG/1; MG/1; MG/1
1 TABLET ORAL EVERY 4 HOURS PRN
Qty: 30 TABLET | Refills: 0 | Status: SHIPPED | OUTPATIENT
Start: 2025-04-29

## 2025-04-29 NOTE — TELEPHONE ENCOUNTER
Spoke to [email or web address] she filled and picked up Fioricet on 06/17/2022 for #60.     Called in the refill that was done yesterday the pharm did not get it electronically.   
Detail Level: Zone
Photo Preface (Leave Blank If You Do Not Want): Photographs were obtained today

## 2025-04-29 NOTE — TELEPHONE ENCOUNTER
Placed call to Dann at [facility] pharmacy and RX called in for Fioricet -40 #30 one every 4 hourse as needed for heachache no additional refills.

## 2025-05-02 ENCOUNTER — TELEPHONE (OUTPATIENT)
Dept: PRIMARY CARE CLINIC | Facility: CLINIC | Age: 75
End: 2025-05-02
Payer: MEDICARE

## 2025-05-02 NOTE — TELEPHONE ENCOUNTER
Spoke to pt to re schedule her appt with pcp because pcp will be out of office on 06/11. Pt agreed tot the new appt.

## 2025-05-07 ENCOUNTER — PATIENT MESSAGE (OUTPATIENT)
Dept: PRIMARY CARE CLINIC | Facility: CLINIC | Age: 75
End: 2025-05-07
Payer: MEDICARE

## 2025-05-07 ENCOUNTER — HOSPITAL ENCOUNTER (OUTPATIENT)
Dept: CARDIOLOGY | Facility: HOSPITAL | Age: 75
Discharge: HOME | End: 2025-05-07
Attending: INTERNAL MEDICINE
Payer: MEDICARE

## 2025-05-07 ENCOUNTER — TELEPHONE (OUTPATIENT)
Dept: PRIMARY CARE CLINIC | Facility: CLINIC | Age: 75
End: 2025-05-07
Payer: MEDICARE

## 2025-05-07 DIAGNOSIS — F41.9 ANXIETY: ICD-10-CM

## 2025-05-07 DIAGNOSIS — I82.532 CHRONIC DEEP VEIN THROMBOSIS (DVT) OF POPLITEAL VEIN OF LEFT LOWER EXTREMITY: ICD-10-CM

## 2025-05-07 PROCEDURE — 93970 EXTREMITY STUDY: CPT | Mod: 26,HCNC,, | Performed by: INTERNAL MEDICINE

## 2025-05-07 PROCEDURE — 93970 EXTREMITY STUDY: CPT | Mod: HCNC,PO

## 2025-05-07 RX ORDER — SERTRALINE HYDROCHLORIDE 50 MG/1
50 TABLET, FILM COATED ORAL DAILY
Qty: 90 TABLET | Refills: 3 | Status: SHIPPED | OUTPATIENT
Start: 2025-05-07 | End: 2026-05-07

## 2025-05-11 ENCOUNTER — PATIENT MESSAGE (OUTPATIENT)
Dept: PRIMARY CARE CLINIC | Facility: CLINIC | Age: 75
End: 2025-05-11
Payer: MEDICARE

## 2025-05-12 DIAGNOSIS — F41.9 ANXIETY: ICD-10-CM

## 2025-05-12 RX ORDER — ALPRAZOLAM 0.5 MG/1
0.5 TABLET ORAL 2 TIMES DAILY PRN
Qty: 60 TABLET | Refills: 0 | Status: SHIPPED | OUTPATIENT
Start: 2025-05-12

## 2025-05-14 ENCOUNTER — OFFICE VISIT (OUTPATIENT)
Dept: CARDIOLOGY | Facility: CLINIC | Age: 75
End: 2025-05-14
Payer: MEDICARE

## 2025-05-14 VITALS
HEART RATE: 60 BPM | HEIGHT: 60 IN | BODY MASS INDEX: 22.59 KG/M2 | SYSTOLIC BLOOD PRESSURE: 105 MMHG | WEIGHT: 115.06 LBS | DIASTOLIC BLOOD PRESSURE: 65 MMHG

## 2025-05-14 DIAGNOSIS — E11.69 TYPE 2 DIABETES MELLITUS WITH HYPERCHOLESTEROLEMIA: ICD-10-CM

## 2025-05-14 DIAGNOSIS — I82.532 CHRONIC DEEP VEIN THROMBOSIS (DVT) OF POPLITEAL VEIN OF LEFT LOWER EXTREMITY: Primary | ICD-10-CM

## 2025-05-14 DIAGNOSIS — I70.0 AORTIC ARCH ATHEROSCLEROSIS: ICD-10-CM

## 2025-05-14 DIAGNOSIS — E78.2 MIXED HYPERLIPIDEMIA: ICD-10-CM

## 2025-05-14 DIAGNOSIS — E78.00 TYPE 2 DIABETES MELLITUS WITH HYPERCHOLESTEROLEMIA: ICD-10-CM

## 2025-05-14 DIAGNOSIS — I10 PRIMARY HYPERTENSION: ICD-10-CM

## 2025-05-14 PROCEDURE — 99999 PR PBB SHADOW E&M-EST. PATIENT-LVL IV: CPT | Mod: PBBFAC,,, | Performed by: INTERNAL MEDICINE

## 2025-05-14 PROCEDURE — 1101F PT FALLS ASSESS-DOCD LE1/YR: CPT | Mod: CPTII,S$GLB,, | Performed by: INTERNAL MEDICINE

## 2025-05-14 PROCEDURE — 1126F AMNT PAIN NOTED NONE PRSNT: CPT | Mod: CPTII,S$GLB,, | Performed by: INTERNAL MEDICINE

## 2025-05-14 PROCEDURE — 3044F HG A1C LEVEL LT 7.0%: CPT | Mod: CPTII,S$GLB,, | Performed by: INTERNAL MEDICINE

## 2025-05-14 PROCEDURE — 3008F BODY MASS INDEX DOCD: CPT | Mod: CPTII,S$GLB,, | Performed by: INTERNAL MEDICINE

## 2025-05-14 PROCEDURE — 3074F SYST BP LT 130 MM HG: CPT | Mod: CPTII,S$GLB,, | Performed by: INTERNAL MEDICINE

## 2025-05-14 PROCEDURE — 99215 OFFICE O/P EST HI 40 MIN: CPT | Mod: S$GLB,,, | Performed by: INTERNAL MEDICINE

## 2025-05-14 PROCEDURE — 4010F ACE/ARB THERAPY RXD/TAKEN: CPT | Mod: CPTII,S$GLB,, | Performed by: INTERNAL MEDICINE

## 2025-05-14 PROCEDURE — 1159F MED LIST DOCD IN RCRD: CPT | Mod: CPTII,S$GLB,, | Performed by: INTERNAL MEDICINE

## 2025-05-14 PROCEDURE — 3078F DIAST BP <80 MM HG: CPT | Mod: CPTII,S$GLB,, | Performed by: INTERNAL MEDICINE

## 2025-05-14 PROCEDURE — 3288F FALL RISK ASSESSMENT DOCD: CPT | Mod: CPTII,S$GLB,, | Performed by: INTERNAL MEDICINE

## 2025-05-14 RX ORDER — LINACLOTIDE 72 UG/1
72 CAPSULE, GELATIN COATED ORAL
COMMUNITY
Start: 2025-04-21

## 2025-05-14 RX ORDER — PROMETHAZINE HYDROCHLORIDE AND DEXTROMETHORPHAN HYDROBROMIDE 6.25; 15 MG/5ML; MG/5ML
SYRUP ORAL EVERY 8 HOURS PRN
COMMUNITY
Start: 2025-02-05

## 2025-05-14 RX ORDER — FOLIC ACID 0.4 MG
400 TABLET ORAL DAILY
COMMUNITY

## 2025-05-14 RX ORDER — BUTALB/ACETAMINOPHEN/CAFFEINE 50-325-40
1 TABLET ORAL DAILY
COMMUNITY

## 2025-05-14 NOTE — PROGRESS NOTES
[facility]      CC: Follow up DVT/PE      Patient ID: Jayla Loyd is a 74 y.o. female with LLE DVT/PE (on Eliquis), DM2, GERD, obesity, who presents for a follow up appointment.  Pertinent history/events are as follows:     -Pt kindly referred by Viktor Summers PA-C for evaluation of DVT/PE.    -On 2/1/2024, Mrs. Loyd presented to the vascular lab for left lower extremity venous ultrasound due to left leg and foot pain.  The study revealed extensive occlusive DVT of the left distal SFA, popliteal, and posterior tibial veins.  Mrs. Loyd reports ongoing SOB.  Given these issues, I recommend the following:  -admit for initiation of full dose anticoagulation with heparin drip, and CTA chest to evaluate for pulmonary embolism.  -transition to Eliquis prior to discharge.   -results and plan discussed in detail with Mrs. Loyd, who voiced understanding.    -At our initial clinic visit on 2/6/2024, Mrs. Molina reports SOB which started before being diagnosed with COVID in early 1/2024.  States SOB worsened since that time.  Reports left leg pain starting in 1/2024.  LLE venous ultrasound on 2/1/2024 revealed extensive occlusive DVT of the left distal SFA, popliteal, and posterior tibial veins.  CTA Chest on 2/1/2024 revealed right lower lobe pulmonary arterial emboli. She was admitted for heparin drip and discharged on Eliquis.  Echo on 2/2/2024 with no evidence of right heart strain.  She is tolerating Eliquis with no bleeding issues.  Leg pain and swelling has improved since hospital disharge.  She continues to have SOB.  Plan:   LLE DVT/PE- Likely related to recent COVID infection. Refer to Heme/Onc for hypercoagulable workup and age appropriate cancer screening.  Continue Eliquis 5 mg bid.  Obesity- Encourage diet, exercise, and weight loss.    -At follow up clinic visit on 5/9/2024, Mrs. Molina reported no chest pain, SOB, or significant LE edema.  BLE Venous Ultrasound on 5/8/2024 revealed  left popliteal chronic DVT and no RLE DVT.  CTA Chest on 5/6/2024 showed resolution of right lower lobe pulmonary arterial emboli.  No new pulmonary thromboemboli.  Plan:  LLE DVT/PE- Likely related to recent COVID infection.  Mrs. Molina reports no chest pain, SOB, or significant LE edema.  BLE Venous Ultrasound on 5/8/2024 revealed left popliteal chronic DVT and no RLE DVT.  CTA Chest on 5/6/2024 showed resolution of right lower lobe pulmonary arterial emboli.  No new pulmonary thromboemboli.  Continue Eliquis 5 mg bid.  Repeat BLE venous ultrasound in 6 months.  Obesity- Encourage diet, exercise, and weight loss.    -11/14/2024 clinic visit: Mrs. Molina reports doing well with no chest pain or SOB. She has lost 24 pounds since clinic visit on 5/9/2024. BLE Venous Ultrasound on 11/7/2024 showed the left popliteal vein is partially compressible with chronic appearing thrombus present, consistent with chronic DVT.  Plan:  LLE DVT/PE- Likely related to recent COVID infection.  Mrs. Molina reports no chest pain, SOB, or significant LE edema.  BLE Venous Ultrasound on 11/7/2024 showed the left popliteal vein is partially compressible with chronic appearing thrombus present, consistent with chronic DVT. CTA Chest on 5/6/2024 showed resolution of right lower lobe pulmonary arterial emboli.  No new pulmonary thromboemboli.  Continue Eliquis 5 mg bid.  Repeat BLE venous ultrasound in 6 months.  Obesity- Encourage diet, exercise, and weight loss.  HLD- The 10-year ASCVD risk score (Heaven DK, et al., 2019) is: 25.8%. Continue statin and Eliquis with goal LDL < 70.     HPI:  Mrs. Molina reports doing well with no chest pain or SOB. She has chronic LLE popliteal vein DVT with occasional mild calf pain lasting few minutes, not requiring pain medication. She denies swelling. She has compression stockings but only wears them during air travel. CT in February showed resolution of right lower pulmonary embolism with no new pulmonary emboli.  She continues Eliquis with good tolerance, experiencing only occasional minor bruising and more pronounced bruising at a recent IV site where a vein was damaged during insertion. She takes Mounjaro 5 mg for weight loss, having transitioned from ineffective Ozempic trial to Mounjaro 2.5 mg, with dose increase to 5 mg in May. BLE Venous Ultrasound 5/7/2025 showed the left popliteal vein non-compressible consistent with chronic DVT.     Past Medical History:   Diagnosis Date    Anxiety 2004    Carpal tunnel syndrome, right upper limb 06/23/2022    Depression 2004    Diabetes mellitus type II     GERD (gastroesophageal reflux disease)     Hx of multiple pulmonary nodules 06/15/2022    Migraines     Proteinuria     Pulmonary embolus 2024    Right lung with concurrent DVT left leg    Trigger finger, right ring finger 06/23/2022     Past Surgical History:   Procedure Laterality Date    CATARACT EXTRACTION      COLPOSCOPY  2023    COSMETIC SURGERY  1971    rhinoplasty    DILATION AND CURETTAGE OF UTERUS  1992    EYE SURGERY  3 years ago    cataracts    JOINT REPLACEMENT  Twice in last 24 months    shoulders    PLANTAR FASCIA RELEASE  1993    SHOULDER SURGERY Left 09/2020    SINUS SURGERY  1993    TUBAL LIGATION  1992     Social History     Socioeconomic History    Marital status:    Tobacco Use    Smoking status: Never     Passive exposure: Never    Smokeless tobacco: Never   Substance and Sexual Activity    Alcohol use: Yes     Comment: None    Drug use: Not Currently     Types: Other-see comments     Comment: None    Sexual activity: Not Currently     Partners: Male     Birth control/protection: Post-menopausal     Social Drivers of Health     Financial Resource Strain: Low Risk  (3/8/2025)    Overall Financial Resource Strain (CARDIA)     Difficulty of Paying Living Expenses: Not very hard   Food Insecurity: No Food Insecurity (3/8/2025)    Hunger Vital Sign     Worried About Running Out of Food in the Last Year:  Never true     Ran Out of Food in the Last Year: Never true   Transportation Needs: No Transportation Needs (3/8/2025)    PRAPARE - Transportation     Lack of Transportation (Medical): No     Lack of Transportation (Non-Medical): No   Physical Activity: Inactive (3/8/2025)    Exercise Vital Sign     Days of Exercise per Week: 0 days     Minutes of Exercise per Session: 0 min   Stress: Stress Concern Present (3/8/2025)    [employer] - Occupational Stress Questionnaire     Feeling of Stress : To some extent   Housing Stability: Low Risk  (3/8/2025)    Housing Stability Vital Sign     Unable to Pay for Housing in the Last Year: No     Number of Times Moved in the Last Year: 0     Homeless in the Last Year: No     Family History   Problem Relation Name Age of Onset    Bladder Cancer Mother jaxon     Hypertension Mother jaxon         CABG    Cancer Mother jaxon     Heart disease Mother jaxon     Heart failure Father Daddy         CABG    Hypertension Father Daddy     Heart disease Father Daddy     Arthritis Father Daddy     Migraines Father Daddy     Stroke Brother Carmelo     Arthritis Son          knee replacement    Cancer Maternal Aunt Gloria     Breast cancer Maternal Aunt Gloria     Cancer Paternal Aunt anabel     Diabetes Paternal Aunt anabel     Cancer Maternal Grandmother mitesh     Breast cancer Maternal Grandmother mitesh     Heart disease Maternal Grandfather Parents     Cancer Paternal Grandmother emigdio     Dementia Paternal Grandmother emigdio     Arthritis Paternal Grandmother emigdio     Heart disease Paternal Grandfather Family     Asthma Sister Dianna Winchester        Review of patient's allergies indicates:   Allergen Reactions    Gabapentin Hallucinations    Lyrica [pregabalin] Hallucinations    Mobic [meloxicam] Itching       Medication List with Changes/Refills   Current Medications    ALPRAZOLAM (XANAX) 0.5 MG TABLET    Take 1 tablet (0.5 mg total) by mouth 2 (two) times daily as needed for  Anxiety.    AMITRIPTYLINE (ELAVIL) 25 MG TABLET    Take 1 tablet (25 mg total) by mouth nightly as needed for Insomnia.    APIXABAN (ELIQUIS) 5 MG TAB    Take 1 tablet (5 mg total) by mouth 2 (two) times daily.    BIOTIN 5 MG CAP    Take 5 mg by mouth once daily.    BLOOD SUGAR DIAGNOSTIC (TRUE METRIX GLUCOSE TEST STRIP) STRP    1 strip by Misc.(Non-Drug; Combo Route) route once daily.    BLOOD-GLUCOSE METER (TRUE METRIX GLUCOSE METER) KIT    Use as instructed    CALCIUM CITRATE-VITAMIN D3 315-200 MG (CITRACAL+D) 315 MG-5 MCG (200 UNIT) PER TABLET    Take 1 tablet by mouth once daily.    DICYCLOMINE (BENTYL) 10 MG CAPSULE    Take 1 capsule (10 mg total) by mouth 4 (four) times daily as needed (abdominal discomfort).    ESOMEPRAZOLE (NEXIUM) 40 MG CAPSULE    TAKE 1 CAPSULE BY MOUTH TWICE DAILY BEFORE MEALS    FOLIC ACID (FOLVITE) 400 MCG TABLET    Take 400 mcg by mouth once daily.    LACTOBAC NO.41/BIFIDOBACT NO.7 (PROBIOTIC-10 ORAL)    Take by mouth once daily.    LANCETS (LANCETS,THIN) MISC    1 Lancet by Misc.(Non-Drug; Combo Route) route once daily.    LINZESS 72 MCG CAP CAPSULE    Take 72 mcg by mouth before breakfast.    MECLIZINE (ANTIVERT) 25 MG TABLET    Take 1 tablet (25 mg total) by mouth 2 (two) times daily as needed.    METHOCARBAMOL (ROBAXIN) 500 MG TAB    Take 1 tablet (500 mg total) by mouth 3 (three) times daily as needed (pain).    MUPIROCIN (BACTROBAN) 2 % OINTMENT    Apply topically 3 (three) times daily.    NYSTATIN (MYCOSTATIN) POWDER    Apply topically 4 (four) times daily.    ONDANSETRON (ZOFRAN-ODT) 4 MG TBDL    Take 1 tablet (4 mg total) by mouth every 8 (eight) hours as needed (nausea).    PROMETHAZINE-DEXTROMETHORPHAN (PROMETHAZINE-DM) 6.25-15 MG/5 ML SYRP    Take by mouth every 8 (eight) hours as needed.    PROPRANOLOL (INDERAL LA) 60 MG 24 HR CAPSULE    Take 60 mg by mouth as needed.    ROSUVASTATIN (CRESTOR) 40 MG TAB    TAKE 1 TABLET BY MOUTH ONCE DAILY    SERTRALINE (ZOLOFT) 50 MG  TABLET    Take 1 tablet (50 mg total) by mouth once daily.    TELMISARTAN (MICARDIS) 20 MG TAB    Take 1 tablet (20 mg total) by mouth once daily.    TIRZEPATIDE (MOUNJARO) 5 MG/0.5 ML PNIJ    Inject 5 mg into the skin once a week.    TRAMADOL (ULTRAM) 50 MG TABLET    Take 1 tablet (50 mg total) by mouth every 8 (eight) hours as needed for Pain.    TRAZODONE (DESYREL) 50 MG TABLET    Take 1 tablet (50 mg total) by mouth every evening.   Changed and/or Refilled Medications    Modified Medication Previous Medication    BUTALBITAL-ACETAMINOPHEN-CAFFEINE -40 MG (FIORICET, ESGIC) -40 MG PER TABLET butalbital-acetaminophen-caffeine -40 mg (FIORICET, ESGIC) -40 mg per tablet       Take 1 tablet by mouth every 4 (four) hours as needed for Headaches.    Take 1 tablet by mouth every 4 (four) hours as needed for Headaches.       Review of Systems  General: -fever, -chills, +fatigue, -weight gain, -weight loss, +loss of energy, +decreased energy levels  Eyes: -vision changes, -redness, -discharge  ENT: -ear pain, -nasal congestion, -sore throat  Cardiovascular: -chest pain, -palpitations, -lower extremity edema  Respiratory: -cough, -shortness of breath  Gastrointestinal: -abdominal pain, -nausea, -vomiting, -diarrhea, +constipation, -blood in stool, +indigestion  Genitourinary: -dysuria, -hematuria, -frequency  Musculoskeletal: -joint pain, +muscle pain, +limb pain  Skin: -rash, -lesion, +easy bruising  Neurological: +headache, -dizziness, -numbness, -tingling  Psychiatric: -anxiety, -depression, -sleep difficulty     Physical Examination  /65 (BP Location: Right arm, Patient Position: Sitting)   Pulse 60   Ht 5' (1.524 m)   Wt 52.2 kg (115 lb 1.3 oz)   LMP 05/25/1998 (Approximate)   BMI 22.48 kg/m²     Constitutional: No acute distress, conversant  HEENT: Sclera anicteric, Pupils equal, round and reactive to light, extraocular motions intact, Oropharynx clear  Neck: No JVD, no carotid  bruits  Cardiovascular: regular rate and rhythm, no murmur, rubs or gallops, normal S1/S2  Pulmonary: Clear to auscultation bilaterally  Abdominal: Abdomen soft, nontender, nondistended, positive bowel sounds  Extremities: No lower extremity edema   Pulses:  Carotid pulses are 2+ on the right side, and 2+ on the left side.  Radial pulses are 2+ on the right side, and 2+ on the left side.   Femoral pulses are 2+ on the right side, and 2+ on the left side.  Popliteal pulses are 2+ on the right side, and 2+ on the left side.   Dorsalis pedis pulses are 2+ on the right side, and 2+ on the left side.   Posterior tibial pulses are 2+ on the right side, and 2+ on the left side.    Skin: No ecchymosis, erythema, or ulcers  Psych: Alert and oriented x 3, appropriate affect  Neuro: CNII-XII intact, no focal deficits    Labs:  Most Recent Data  CBC:   Lab Results   Component Value Date    WBC 8.57 02/04/2025    HGB 14.4 02/04/2025    HCT 45.3 02/04/2025     02/04/2025    MCV 93 02/04/2025    RDW 12.9 02/04/2025     BMP:   Lab Results   Component Value Date     06/05/2025    K 4.6 06/05/2025     06/05/2025    CO2 26 06/05/2025    BUN 18 06/05/2025    CREATININE 0.8 06/05/2025    GLU 97 06/05/2025    CALCIUM 9.4 06/05/2025    MG 2.0 02/02/2024    PHOS 4.2 05/13/2021     LFTS;   Lab Results   Component Value Date    PROT 6.8 06/05/2025    ALBUMIN 3.9 06/05/2025    BILITOT 0.3 06/05/2025    AST 20 06/05/2025    ALKPHOS 77 06/05/2025    ALT 25 06/05/2025     COAGS:   Lab Results   Component Value Date    INR 1.0 02/01/2024     FLP:   Lab Results   Component Value Date    CHOL 190 02/04/2025    HDL 65 02/04/2025    LDLCALC 105.4 02/04/2025    TRIG 98 02/04/2025    CHOLHDL 34.2 02/04/2025     CARDIAC:   Lab Results   Component Value Date    TROPONINI 0.063 (H) 02/02/2024    BNP 10 02/01/2024       Imaging:    EKG 2/1/2024:  Normal sinus rhythm   Nonspecific T wave abnormality   Low voltage, precordial leads     BLE  Venous Ultrasound 5/7/2025:    There is no evidence of a right lower extremity DVT.    The left popliteal vein non-compressible consistent with chronic DVT. .    BLE Venous Ultrasound 11/7/2024:    There is no evidence of a right lower extremity DVT.    The left popliteal vein is partially compressible with chronic appearing thrombus present, consistent with chronic DVT.    BLE Venous Ultrasound 5/8/2024:    There is no evidence of a right lower extremity DVT.    Left popliteal vein chronic DVT.    CTA Chest 5/6/2024:  1. Resolution of right lower lobe pulmonary arterial emboli.  No new pulmonary thromboemboli.  2. Unchanged pulmonary nodules    Echo 2/2/2024:    Left Ventricle: The left ventricle is normal in size. Ventricular mass is normal. Normal wall thickness. Normal wall motion. There is normal systolic function with a visually estimated ejection fraction of 60 - 65%. Ejection fraction by visual approximation is 65%. There is normal diastolic function.    Right Ventricle: Normal right ventricular cavity size. Wall thickness is normal. Right ventricle wall motion  is normal. Systolic function is normal.    Pulmonary Artery: The estimated pulmonary artery systolic pressure is 31 mmHg.    IVC/SVC: Normal venous pressure at 3 mmHg.    LLE Venous Ultrasound 2/1/2024:    The left superficial femoral distal vein is noncompressible with thrombus present, consistent with acute DVT.    The left popliteal vein is noncompressible with thrombus present, consistent with acute DVT.    The left posterior tibial vein is noncompressible with thrombus present, consistent with acute DVT.    The contralateral (right) common femoral vein is patent.    Patient transferred to the emergency department for admission for initiation of full-dose anticoagulation, and CTA chest to evaluate for pulmonary embolism.    CTA Chest 2/1/2024:  Right lower lobe pulmonary arterial emboli.   Small hiatal hernia.    Assessment/Plan:  Jayla Winchester  Rach is a 73 y.o. female with LLE DVT/PE (on Eliquis), DM2, GERD, obesity, who presents for a follow up appointment.    LLE DVT/PE- Likely related to recent COVID infection.  Mrs. Molina reports no chest pain, SOB, or significant LE edema.  BLE Venous Ultrasound on 11/7/2024 showed the left popliteal vein is partially compressible with chronic appearing thrombus present, consistent with chronic DVT. CTA Chest on 5/6/2024 showed resolution of right lower lobe pulmonary arterial emboli.  No new pulmonary thromboemboli.  BLE Venous Ultrasound 5/7/2025 showed the left popliteal vein non-compressible consistent with chronic DVT. Continue Eliquis 5 mg bid.      2. Obesity- Encourage diet, exercise, and weight loss.    3. HLD- The 10-year ASCVD risk score (Heaven DK, et al., 2019) is: 25.8%. Continue statin and Eliquis with goal LDL < 70.     Follow up in 6 months with BLE venous ultrasound prior    Total duration of face to face visit time 30 minutes.  Total time spent counseling greater than fifty percent of total visit time.  Counseling included discussion regarding imaging findings, diagnosis, possibilities, treatment options, risks and benefits.  The patient had many questions regarding the options and long-term effects.    Edmond Sifuentes MD, PhD  Interventional Cardiology

## 2025-05-30 DIAGNOSIS — G44.89 CHRONIC MIXED HEADACHE SYNDROME: ICD-10-CM

## 2025-06-02 RX ORDER — BUTALBITAL, ACETAMINOPHEN AND CAFFEINE 50; 325; 40 MG/1; MG/1; MG/1
1 TABLET ORAL EVERY 4 HOURS PRN
Qty: 30 TABLET | Refills: 0 | Status: SHIPPED | OUTPATIENT
Start: 2025-06-02

## 2025-06-05 ENCOUNTER — LAB VISIT (OUTPATIENT)
Dept: LAB | Facility: HOSPITAL | Age: 75
End: 2025-06-05
Payer: MEDICARE

## 2025-06-05 DIAGNOSIS — N18.30 TYPE 2 DM WITH CKD STAGE 3 AND HYPERTENSION: ICD-10-CM

## 2025-06-05 DIAGNOSIS — E11.22 TYPE 2 DM WITH CKD STAGE 3 AND HYPERTENSION: ICD-10-CM

## 2025-06-05 DIAGNOSIS — I12.9 TYPE 2 DM WITH CKD STAGE 3 AND HYPERTENSION: ICD-10-CM

## 2025-06-05 LAB
ALBUMIN SERPL BCP-MCNC: 3.9 G/DL (ref 3.5–5.2)
ALP SERPL-CCNC: 77 UNIT/L (ref 40–150)
ALT SERPL W/O P-5'-P-CCNC: 25 UNIT/L (ref 10–44)
ANION GAP (OHS): 8 MMOL/L (ref 8–16)
AST SERPL-CCNC: 20 UNIT/L (ref 11–45)
BILIRUB SERPL-MCNC: 0.3 MG/DL (ref 0.1–1)
BUN SERPL-MCNC: 18 MG/DL (ref 8–23)
CALCIUM SERPL-MCNC: 9.4 MG/DL (ref 8.7–10.5)
CHLORIDE SERPL-SCNC: 107 MMOL/L (ref 95–110)
CO2 SERPL-SCNC: 26 MMOL/L (ref 23–29)
CREAT SERPL-MCNC: 0.8 MG/DL (ref 0.5–1.4)
EAG (OHS): 103 MG/DL (ref 68–131)
GFR SERPLBLD CREATININE-BSD FMLA CKD-EPI: >60 ML/MIN/1.73/M2
GLUCOSE SERPL-MCNC: 97 MG/DL (ref 70–110)
HBA1C MFR BLD: 5.2 % (ref 4–5.6)
POTASSIUM SERPL-SCNC: 4.6 MMOL/L (ref 3.5–5.1)
PROT SERPL-MCNC: 6.8 GM/DL (ref 6–8.4)
SODIUM SERPL-SCNC: 141 MMOL/L (ref 136–145)

## 2025-06-05 PROCEDURE — 83036 HEMOGLOBIN GLYCOSYLATED A1C: CPT

## 2025-06-05 PROCEDURE — 36415 COLL VENOUS BLD VENIPUNCTURE: CPT

## 2025-06-05 PROCEDURE — 82565 ASSAY OF CREATININE: CPT

## 2025-06-06 ENCOUNTER — RESULTS FOLLOW-UP (OUTPATIENT)
Dept: PRIMARY CARE CLINIC | Facility: CLINIC | Age: 75
End: 2025-06-06
Payer: MEDICARE

## 2025-06-18 ENCOUNTER — OFFICE VISIT (OUTPATIENT)
Dept: PRIMARY CARE CLINIC | Facility: CLINIC | Age: 75
End: 2025-06-18
Payer: MEDICARE

## 2025-06-18 VITALS
DIASTOLIC BLOOD PRESSURE: 65 MMHG | BODY MASS INDEX: 22.05 KG/M2 | HEIGHT: 60 IN | WEIGHT: 112.31 LBS | OXYGEN SATURATION: 96 % | HEART RATE: 59 BPM | SYSTOLIC BLOOD PRESSURE: 106 MMHG

## 2025-06-18 DIAGNOSIS — I12.9 TYPE 2 DM WITH CKD STAGE 3 AND HYPERTENSION: ICD-10-CM

## 2025-06-18 DIAGNOSIS — F33.1 MODERATE EPISODE OF RECURRENT MAJOR DEPRESSIVE DISORDER: Primary | ICD-10-CM

## 2025-06-18 DIAGNOSIS — F41.9 ANXIETY AND DEPRESSION: ICD-10-CM

## 2025-06-18 DIAGNOSIS — N18.30 TYPE 2 DM WITH CKD STAGE 3 AND HYPERTENSION: ICD-10-CM

## 2025-06-18 DIAGNOSIS — E11.22 TYPE 2 DM WITH CKD STAGE 3 AND HYPERTENSION: ICD-10-CM

## 2025-06-18 DIAGNOSIS — F32.A ANXIETY AND DEPRESSION: ICD-10-CM

## 2025-06-18 PROCEDURE — 99999 PR PBB SHADOW E&M-EST. PATIENT-LVL V: CPT | Mod: PBBFAC,,,

## 2025-06-18 PROCEDURE — 99214 OFFICE O/P EST MOD 30 MIN: CPT | Mod: S$GLB,,,

## 2025-06-18 PROCEDURE — 3078F DIAST BP <80 MM HG: CPT | Mod: CPTII,S$GLB,,

## 2025-06-18 PROCEDURE — 3008F BODY MASS INDEX DOCD: CPT | Mod: CPTII,S$GLB,,

## 2025-06-18 PROCEDURE — 1159F MED LIST DOCD IN RCRD: CPT | Mod: CPTII,S$GLB,,

## 2025-06-18 PROCEDURE — 3288F FALL RISK ASSESSMENT DOCD: CPT | Mod: CPTII,S$GLB,,

## 2025-06-18 PROCEDURE — 3044F HG A1C LEVEL LT 7.0%: CPT | Mod: CPTII,S$GLB,,

## 2025-06-18 PROCEDURE — 4010F ACE/ARB THERAPY RXD/TAKEN: CPT | Mod: CPTII,S$GLB,,

## 2025-06-18 PROCEDURE — 3074F SYST BP LT 130 MM HG: CPT | Mod: CPTII,S$GLB,,

## 2025-06-18 PROCEDURE — 1126F AMNT PAIN NOTED NONE PRSNT: CPT | Mod: CPTII,S$GLB,,

## 2025-06-18 PROCEDURE — 1160F RVW MEDS BY RX/DR IN RCRD: CPT | Mod: CPTII,S$GLB,,

## 2025-06-18 PROCEDURE — 1101F PT FALLS ASSESS-DOCD LE1/YR: CPT | Mod: CPTII,S$GLB,,

## 2025-06-18 RX ORDER — BUTALBITAL, ACETAMINOPHEN AND CAFFEINE 50; 325; 40 MG/1; MG/1; MG/1
1 TABLET ORAL
COMMUNITY

## 2025-06-18 RX ORDER — BUPROPION HYDROCHLORIDE 75 MG/1
75 TABLET ORAL 2 TIMES DAILY
Qty: 60 TABLET | Refills: 11 | Status: SHIPPED | OUTPATIENT
Start: 2025-06-18 | End: 2026-06-18

## 2025-06-18 RX ORDER — TIRZEPATIDE 2.5 MG/.5ML
2.5 INJECTION, SOLUTION SUBCUTANEOUS
Qty: 4 PEN | Refills: 0 | Status: SHIPPED | OUTPATIENT
Start: 2025-06-18

## 2025-06-18 NOTE — PROGRESS NOTES
[facility]    Subjective     Patient Name: Jayla Loyd  YOB: 1950  Patient Age: 74 y.o.  Patient Sex: female  Patient Phone: [phone]  PCP: Dre Mcclellan MD  Last PCP Appointment: 6/18/2025    History of Present Illness    CHIEF COMPLAINT:  Ms. Loyd presents today for follow-up regarding depression.    DEPRESSION AND ANXIETY:  She reports feeling emotionally drained and expresses uncertainty about continuing Prozac. She takes Xanax exclusively at nighttime, avoiding daytime use. She is interested in adding Wellbutrin to her current morning Zoloft regimen. She denies history of seizures. She is experiencing emotional distress related to her grandson's substance use issues.    CURRENT SYMPTOMS:  She reports persistent lightheadedness and significant fatigue, though able to concentrate and engage in activities like reading and cricket. She notes bilateral shoulder concerns with perceived thickening around the shoulder area without changes in range of motion.    FAMILY HISTORY:  She reports family history of substance abuse, including ex- and brother. Her grandson (22) has history of fentanyl use and substance abuse. He was diagnosed with autism spectrum disorder and previously prescribed multiple medications including Depakote from age [age] or [age]. He is currently only taking Prozac.    MEDICATIONS:  Current medications include:  - Zoloft: Taking in morning  - Xanax: Taking at night only  - Mounjaro: Continuing for weight management  - Linzess: Taking smallest dose daily for medication-induced constipation  - Tramadol: Using occasionally for pain relief, avoiding concurrent use with Fioricet  - Meclizine: Taking OTC in mornings  - Eliquis: Continuing without issues  She reports not taking prescribed Trazodone due to sleep interference. She cannot take Advil due to medication interactions.    RECENT PROCEDURES:  EGD was performed last month with reportedly good  results.    Answers submitted by the patient for this visit:  Diabetes Questionnaire (Submitted on 6/11/2025)  Chief Complaint: Diabetes problem  Diabetes type: type 2  MedicAlert ID: No  Disease duration: 13 Years  blurred vision: No  chest pain: No  fatigue: Yes  foot paresthesias: Yes  foot ulcerations: No  polydipsia: No  polyphagia: No  polyuria: No  visual change: No  weakness: No  weight loss: No  Symptom course: stable  confusion: No  speech difficulty: No  dizziness: No  nervous/anxious: No  headaches: Yes  hunger: No  mood changes: No  pallor: No  seizures: No  tremors: No  sleepiness: Yes  sweats: No  blackouts: No  hospitalization: No  nocturnal hypoglycemia: No  required assistance: No  required glucagon: No  CVA: No  heart disease: No  nephropathy: No  peripheral neuropathy: No  PVD: No  retinopathy: No  autonomic neuropathy: No  CAD risks: dyslipidemia, family history, hypertension, sedentary lifestyle, stress  Current treatments: diet, oral agent (monotherapy)  Treatment compliance: most of the time  Home blood tests: 1-2 x per week  Home urines: <1 x per month  Monitoring compliance: good  Blood glucose trend: decreasing steadily  Weight trend: stable  Current diet: generally healthy, low fat/cholesterol, low salt  Meal planning: none  Exercise: rarely  Dietitian visit: No  Eye exam current: Yes  Sees podiatrist: Yes            6/18/2025   PHQ-9 Depression Patient Health Questionnaire   Over the last two weeks how often have you been bothered by little interest or pleasure in doing things 3   Over the last two weeks how often have you been bothered by feeling down, depressed or hopeless 2   Over the last two weeks how often have you been bothered by trouble falling or staying asleep, or sleeping too much 1   Over the last two weeks how often have you been bothered by feeling tired or having little energy 3   Over the last two weeks how often have you been bothered by a poor appetite or overeating 2    Over the last two weeks how often have you been bothered by feeling bad about yourself - or that you are a failure or have let yourself or your family down 0   Over the last two weeks how often have you been bothered by trouble concentrating on things, such as reading the newspaper or watching television 0   Over the last two weeks how often have you been bothered by moving or speaking so slowly that other people could have noticed. 0   Over the last two weeks how often have you been bothered by thoughts that you would be better off dead, or of hurting yourself 0   If you checked off any problems, how difficult have these problems made it for you to do your work, take care of things at home or get along with other people? Not difficult at all   PHQ-9 Score 11          6/18/2025     2:53 PM   DOMINIQUE-7   Was test performed? Yes   1. Feeling nervous, anxious, or on edge? Several days   2. Not being able to stop or control worrying? Nearly everyday   3. Worrying too much about different things? Nearly everyday   4. Trouble relaxing? Not at all   5. Being so restless that it is hard to sit still? Not at all   6. Becoming easily annoyed or irritable? Several days   7. Feeling afraid as if something awful might happen? More than half the days   8. If you checked off any problems, how difficult have these problems made it for you to do your work, take care of things at home, or get along with other people? Somewhat difficult   DOMINIQUE-7 Score 10   Number answered (out of first 7) 7   Interpretation Moderate Anxiety       Health Maintenance Summary            Current Care Gaps       TETANUS VACCINE (Every 10 Years) Overdue since 2/1/2008 02/01/1998  Imm Admin: Td - PF (ADULT)    02/01/1998  Imm Admin: Td (ADULT)              Diabetic Eye Exam (Yearly) Overdue since 2/20/2025 02/20/2024  Outside Claim: [place] EYE EXAM, NEW PATIENT,COMPREHESV    01/04/2024  Outside Claim: NC FUNDAL PHOTOGRAPHY    01/04/2024  Outside Claim:  [place] EYE EXAM, EST PATIENT,COMPREHESV    03/09/2023  Outside Claim: [place] EYE EXAM, EST PATIENT,COMPREHESV    07/22/2021  Outside Claim: [place] EYE EXAM, EST PATIENT,COMPREHESV     Only the first 5 history entries have been loaded, but more history exists.                    Upcoming       Diabetes Urine Screening (Yearly) Next due on 8/23/2025 08/23/2024  MICROALB/CREAT RATIO component of Microalbumin/Creatinine Ratio, Urine    08/01/2023  MICROALB/CREAT RATIO component of Microalbumin/Creatinine Ratio, Urine    07/19/2022  MICROALB/CREAT RATIO component of MICROALBUMIN / CREATININE RATIO URINE    11/12/2020  MICROALB/CREAT RATIO component of Microalbumin/creatinine urine ratio    05/14/2018  MICROALB/CREAT RATIO component of Microalbumin, Random Urine (w/Creatinine)      Only the first 5 history entries have been loaded, but more history exists.              Hemoglobin A1c (Every 6 Months) Next due on 12/5/2025 06/05/2025  Hemoglobin A1C External component of Hemoglobin A1C    08/19/2024  Hemoglobin A1C External component of Hemoglobin A1C    02/01/2024  Hemoglobin A1C External component of Hemoglobin A1C    02/01/2024  Hemoglobin A1c    08/01/2023  Hemoglobin A1C External component of Hemoglobin A1C      Only the first 5 history entries have been loaded, but more history exists.              Mammogram (Yearly) Next due on 12/12/2025 12/12/2024  Mammo Digital Screening Bilat w/ Raul    12/11/2023  Mammo Digital Screening Bilat w/ Raul    12/08/2022  Mammo Digital Screening Bilat w/ Raul    08/10/2021  [facility] MAMMOGRAPHY    07/10/2020  Mammo Digital Diagnostic Bilat w/ Raul      Only the first 5 history entries have been loaded, but more history exists.              Lipid Panel (Yearly) Next due on 2/4/2026 02/04/2025  Cholesterol Total component of Lipid Panel    11/21/2024  Cholesterol Total component of Lipid Panel    08/19/2024  Cholesterol Total component of Lipid Panel    08/01/2023  Cholesterol Total  component of Lipid Panel    03/07/2022  Cholesterol Total component of Lipid Panel      Only the first 5 history entries have been loaded, but more history exists.              Foot Exam (Yearly) Next due on 3/11/2026      03/11/2025  Done    09/03/2020  Smart[employer]: WORKFLOW - DIABETES - DIABETIC FOOT EXAM PERFORMED    10/31/2018  Smart[employer]: WORKFLOW - DIABETES - DIABETIC FOOT EXAM PERFORMED              DEXA Scan (Every 2 Years) Next due on 12/12/2026 12/12/2024  DXA Bone Density Axial Skeleton 1 or more sites    08/11/2020  [facility] DEXA SCAN    08/14/2018  DXA Bone Density Spine And Hip    02/19/2016  Outside Procedure: CHG DEXA,BONE DENSITY, 1 + SITE, AXIAL SKELETON              Colorectal Cancer Screening (Colonoscopy - Every 5 Years) Next due on 2/8/2027 02/08/2022   COLONOSCOPY    02/02/2017   COLONOSCOPY                      Completed or No Longer Recommended       Hepatitis C Screening  Completed      07/26/2018  Hepatitis C Ab component of Hepatitis C antibody              Shingles Vaccine  Discontinued      No completion history exists for this topic.              Influenza Vaccine  Discontinued      01/06/2022  [employer]: WORKFLOW - HEALTHY PLANET - EXTERNAL DATA - EXTERNAL PROCEDURE DATE - INFLUENZA    12/02/2014  Imm Admin: Influenza - Trivalent - Afluria, Fluzone MDV    12/02/2014  Imm Admin: Influenza Split    12/02/2014  Imm Admin: Influenza    10/25/2013  Imm Admin: Influenza - Trivalent - Afluria, Fluzone MDV     Only the first 5 history entries have been loaded, but more history exists.            COVID-19 Vaccine  Discontinued      12/17/2021  Imm Admin: COVID-19, MRNA, LN-S, PF (MODERNA FULL 0.5 ML DOSE)    03/19/2021  Imm Admin: COVID-19, MRNA, LN-S, PF (MODERNA FULL 0.5 ML DOSE)    02/18/2021  Imm Admin: COVID-19, MRNA, LN-S, PF (MODERNA FULL 0.5 ML DOSE)              RSV Vaccine (Age 60+ and Pregnant patients)  Discontinued      No completion history exists for this topic.               Pneumococcal Vaccines (Age 50+)  Discontinued     No completion history exists for this topic.                            Prior to Admission medications    Medication Sig Start Date End Date Taking? Authorizing Provider   ALPRAZolam (XANAX) 0.5 MG tablet Take 1 tablet (0.5 mg total) by mouth 2 (two) times daily as needed for Anxiety. 5/12/25  Yes rDe Mcclellan MD   amitriptyline (ELAVIL) 25 MG tablet Take 1 tablet (25 mg total) by mouth nightly as needed for Insomnia. 11/11/24 11/11/25 Yes Geena Barnard MD   apixaban (ELIQUIS) 5 mg Tab Take 1 tablet (5 mg total) by mouth 2 (two) times daily. 1/9/25  Yes Edmond Sifuentes MD PhD   biotin 5 mg Cap Take 5 mg by mouth once daily.   Yes Provider, Historical   blood sugar diagnostic (TRUE METRIX GLUCOSE TEST STRIP) Strp 1 strip by Misc.(Non-Drug; Combo Route) route once daily. 7/17/24  Yes Geena Barnard MD   blood-glucose meter (TRUE METRIX GLUCOSE METER) kit Use as instructed 7/17/24 7/16/25 Yes Geena Barnard MD   butalbital-acetaminophen-caffeine -40 mg (FIORICET, ESGIC) -40 mg per tablet Take 1 tablet by mouth every 4 (four) hours as needed for Headaches. 6/2/25  Yes Dre Mcclellan MD butalbital-acetaminophen-caffeine -40 mg (FIORICET, ESGIC) -40 mg per tablet Take 1 tablet by mouth every 7 days.   Yes Provider, Historical   calcium citrate-vitamin D3 315-200 mg (CITRACAL+D) 315 mg-5 mcg (200 unit) per tablet Take 1 tablet by mouth once daily.   Yes Provider, Historical   dicyclomine (BENTYL) 10 MG capsule Take 1 capsule (10 mg total) by mouth 4 (four) times daily as needed (abdominal discomfort). 4/17/25  Yes Dre Mcclellan MD   esomeprazole (NEXIUM) 40 MG capsule TAKE 1 CAPSULE BY MOUTH TWICE DAILY BEFORE MEALS 4/1/25  Yes Dre Mcclellan MD   folic acid (FOLVITE) 400 MCG tablet Take 400 mcg by mouth once daily.   Yes Provider, Historical   Lactobac no.41/Bifidobact no.7 (PROBIOTIC-10 ORAL) Take by mouth once daily.   Yes  Provider, Historical   lancets (LANCETS,THIN) Misc 1 Lancet by Misc.(Non-Drug; Combo Route) route once daily. 7/17/24  Yes Geena Barnard MD   LINZESS 72 mcg Cap capsule Take 72 mcg by mouth before breakfast. 4/21/25  Yes Provider, Historical   methocarbamoL (ROBAXIN) 500 MG Tab Take 1 tablet (500 mg total) by mouth 3 (three) times daily as needed (pain). 7/8/24  Yes Geena Barnard MD   mupirocin (BACTROBAN) 2 % ointment Apply topically 3 (three) times daily. 7/17/24  Yes Geena Barnard MD   nystatin (MYCOSTATIN) powder Apply topically 4 (four) times daily. 2/28/24  Yes Mana Pulido MD   ondansetron (ZOFRAN-ODT) 4 MG TbDL Take 1 tablet (4 mg total) by mouth every 8 (eight) hours as needed (nausea). 4/25/25  Yes Dre Mcclellan MD   promethazine-dextromethorphan (PROMETHAZINE-DM) 6.25-15 mg/5 mL Syrp Take by mouth every 8 (eight) hours as needed. 2/5/25  Yes Provider, Historical   propranoloL (INDERAL LA) 60 MG 24 hr capsule Take 60 mg by mouth as needed.  Patient taking differently: Take 20 mg by mouth as needed. 1/31/24  Yes Provider, Historical   rosuvastatin (CRESTOR) 40 MG Tab TAKE 1 TABLET BY MOUTH ONCE DAILY 9/22/24  Yes Geena Barnard MD   sertraline (ZOLOFT) 50 MG tablet Take 1 tablet (50 mg total) by mouth once daily. 5/7/25 5/7/26 Yes Dre Mcclellan MD   telmisartan (MICARDIS) 20 MG Tab Take 1 tablet (20 mg total) by mouth once daily. 4/21/25  Yes Dre Mcclellan MD   traMADoL (ULTRAM) 50 mg tablet Take 1 tablet (50 mg total) by mouth every 8 (eight) hours as needed for Pain. 7/8/24  Yes Ehsan Falk MD   meclizine (ANTIVERT) 25 mg tablet Take 1 tablet (25 mg total) by mouth 2 (two) times daily as needed. 12/16/24 6/18/25 Yes Geena Barnard MD   tirzepatide (MOUNJARO) 5 mg/0.5 mL PnIj Inject 5 mg into the skin once a week. 2/21/25 6/18/25 Yes Dwaine Yadav MD   buPROPion (WELLBUTRIN) 75 MG tablet Take 1 tablet (75 mg total) by mouth 2 (two) times daily. 6/18/25  6/18/26  Dre Mcclellan MD   tirzepatide (MOUNJARO) 2.5 mg/0.5 mL PnIj Inject 2.5 mg into the skin every 7 days. 6/18/25   Dre Mcclellan MD   traZODone (DESYREL) 50 MG tablet Take 1 tablet (50 mg total) by mouth every evening. 5/7/24 6/18/25  Geena Barnard MD       OBJECTIVE:     Vitals:    06/18/25 1448   BP: 106/65   BP Location: Right arm   Patient Position: Sitting   Pulse: (!) 59   SpO2: 96%   Weight: 51 kg (112 lb 5.2 oz)   Height: 5' (1.524 m)       Body mass index is 21.94 kg/m².     PHYSICAL EXAM  GEN - A+OX4, NAD   HEENT - PERRL, EOMI, OP clear  Neck - No thyromegaly or cervical LAD.   CV - RRR, no m/r   Chest - CTAB, no wheezing or rhonchi  Abd - S/NT/ND/+BS.   Ext - 2+BDP and radial pulses. No BLE edema.  MSK - normal ROM, no tenderness  Neuro - 5/5 BUE and BLE strength.  LN - No axillary or inguinal LAD appreciated.  Skin - No rash.   Psych - anxiety and low mood    LABS  Lab Results   Component Value Date    WBC 8.57 02/04/2025    HGB 14.4 02/04/2025    HCT 45.3 02/04/2025    MCV 93 02/04/2025     02/04/2025         CMP  Sodium   Date Value Ref Range Status   06/05/2025 141 136 - 145 mmol/L Final   02/04/2025 143 136 - 145 mmol/L Final   07/26/2018 140 135 - 146 Final     Potassium   Date Value Ref Range Status   06/05/2025 4.6 3.5 - 5.1 mmol/L Final   02/04/2025 5.6 (H) 3.5 - 5.1 mmol/L Final     Comment:     *No Visible Hemolysis   07/26/2018 4.2 3.5 - 5.3 Final     Chloride   Date Value Ref Range Status   06/05/2025 107 95 - 110 mmol/L Final   02/04/2025 102 95 - 110 mmol/L Final   07/26/2018 106 98 - 110 Final     CO2   Date Value Ref Range Status   06/05/2025 26 23 - 29 mmol/L Final   02/04/2025 25 23 - 29 mmol/L Final   07/26/2018 27 20 - 31 Final     Glucose   Date Value Ref Range Status   06/05/2025 97 70 - 110 mg/dL Final   02/04/2025 102 70 - 110 mg/dL Final   06/21/2018 114 (A) 65 - 99 Final     BUN   Date Value Ref Range Status   06/05/2025 18 8 - 23 mg/dL Final     Creatinine    Date Value Ref Range Status   06/05/2025 0.8 0.5 - 1.4 mg/dL Final   06/21/2018 0.65 0.50 - 0.99 Final     Calcium   Date Value Ref Range Status   06/05/2025 9.4 8.7 - 10.5 mg/dL Final   02/04/2025 11.0 (H) 8.7 - 10.5 mg/dL Final   07/26/2018 9.1 8.6 - 10.4 mg/dL Final   07/26/2018 9.1 8.6 - 10.4 mg/dL Final     Protein Total   Date Value Ref Range Status   06/05/2025 6.8 6.0 - 8.4 gm/dL Final     Total Protein   Date Value Ref Range Status   02/04/2025 8.1 6.0 - 8.4 g/dL Final     Albumin   Date Value Ref Range Status   06/05/2025 3.9 3.5 - 5.2 g/dL Final   02/04/2025 4.2 3.5 - 5.2 g/dL Final   07/26/2018 4.0 3.6 - 5.1 Final   07/26/2018 53  Final     Total Bilirubin   Date Value Ref Range Status   02/04/2025 0.4 0.1 - 1.0 mg/dL Final     Comment:     For infants and newborns, interpretation of results should be based  on gestational age, weight and in agreement with clinical  observations.    Premature Infant recommended reference ranges:  Up to 24 hours.............<8.0 mg/dL  Up to 48 hours............<12.0 mg/dL  3-5 days..................<15.0 mg/dL  6-29 days.................<15.0 mg/dL       Bilirubin Total   Date Value Ref Range Status   06/05/2025 0.3 0.1 - 1.0 mg/dL Final     Comment:     For infants and newborns, interpretation of results should be based   on gestational age, weight and in agreement with clinical   observations.    Premature Infant recommended reference ranges:   0-24 hours:  <8.0 mg/dL   24-48 hours: <12.0 mg/dL   3-5 days:    <15.0 mg/dL   6-29 days:   <15.0 mg/dL     Alkaline Phosphatase   Date Value Ref Range Status   02/04/2025 96 40 - 150 U/L Final     ALP   Date Value Ref Range Status   06/05/2025 77 40 - 150 unit/L Final     AST   Date Value Ref Range Status   06/05/2025 20 11 - 45 unit/L Final   02/04/2025 20 10 - 40 U/L Final   07/26/2018 16 10 - 35 U/L Final     ALT   Date Value Ref Range Status   06/05/2025 25 10 - 44 unit/L Final   02/04/2025 16 10 - 44 U/L Final    07/26/2018 27 6 - 29 U/L Final     Anion Gap   Date Value Ref Range Status   06/05/2025 8 8 - 16 mmol/L Final   05/14/2018 17 9 - 18 mmol/L Final     eGFR   Date Value Ref Range Status   06/05/2025 >60 >60 mL/min/1.73/m2 Final     Comment:     Estimated GFR calculated using the CKD-EPI creatinine (2021) equation.   02/04/2025 59.9 (A) >60 mL/min/1.73 m^2 Final       ASSESSMENT & PLAN:   Ms. Jayla Loyd is a 74 y.o. female who was seen in clinic today for scheduled follow up. Assessed emotional state and family situation, particularly concerns about grandson's substance abuse. Evaluated current medication regimen, including Prozac and Xanax. Assessed complaint of lightheadedness, noting vitamin levels are WNL. Evaluated use of meclizine for vertigo prevention, determining it may not be necessary, and instructed patient to stop taking it. Considered potential drug interactions, particularly with Eliquis. Will follow up in one month to review management with new medications.        1. Moderate episode of recurrent major depressive disorder  Overview:  - MDD managed with sertraline 50mg qd  - Symptoms not controlled, exacerbated by life events  - No SI's   - Acute anxiety and depression  - PHQ9 and GAD7 scores today of 11 and 10 respectively    Assessment & Plan:  - Continue sertraline  - Discussed risks of starting or increasing SSRI dose, including sersatonin syndrome  - Discontinued therapies pt no longer taking  - Suggested a trial of bupropion 150mg qd along with sertraline 50mg qd  - Reviewed adverse effects including seizure risks  - Started bupropion 150mg qd  - Recommended follow up with in-clinic LCSW for mental health and therapy; referral placed  - Follow up with psych as indicated  - Future PHQ9/GAD7        2. Type 2 DM with CKD stage 3 and hypertension  Overview:  - DM2 managed with mounjaro 5mg INJ weekly and low sugar/starch diet, noted at desired weight and would like to step down to lower  dose  - CKD 3a/A1 managed with telmisartan 20mg qd  - BP controlled with telmisartan 20mg qd (for CKD) and propranolol 60mg qd (for tremor)    Assessment & Plan:  Lab Results   Component Value Date    HGBA1C 5.2 06/05/2025    HGBA1C 5.3 08/19/2024    HGBA1C 7.0 (H) 02/01/2024    HGBA1C 6.7 (H) 08/01/2023    HGBA1C 6.9 (H) 07/19/2022     Lab Results   Component Value Date    EGFRNORACEVR >60 06/05/2025    EGFRNORACEVR 59.9 (A) 02/04/2025    EGFRNORACEVR >60.0 11/21/2024     Lab Results   Component Value Date    MICALBCREAT 34.7 (H) 08/23/2024     BP Readings from Last 5 Encounters:   06/18/25 106/65   05/14/25 105/65   03/11/25 (!) 102/50   02/21/25 110/70   02/04/25 100/60     - BP well controlled  - A1C and GFR stable on current therapies   - Reduced Mounjaro dose to 2.5mg INJ weekly  - Trend GFR UMAC  - Continue current regimen  - Low sugar/carb renal and cardiac friendly diet encouraged  - Aerobic exercise as tolerated, goal 150min/week  - Regular home BP and blood glucose checks ecouraged  - Complete annual eye & foot examinations  - Avoid nephrotoxic agents and renally dose medications      Orders:  -     tirzepatide (MOUNJARO) 2.5 mg/0.5 mL PnIj; Inject 2.5 mg into the skin every 7 days.  Dispense: 4 Pen; Refill: 0    3. Anxiety and depression  -     buPROPion (WELLBUTRIN) 75 MG tablet; Take 1 tablet (75 mg total) by mouth 2 (two) times daily.  Dispense: 60 tablet; Refill: 11  -     Ambulatory referral/consult to [facility]; Future; Expected date: 06/25/2025         Follow up in about 4 weeks (around 7/16/2025).     All questions answered. Pt to call/message the Primary Clinic for any additional concerns    I spent a total of 35 minutes on the day of the visit.      This includes face to face time and non-face to face time preparing to see the patient (eg, review of tests), obtaining and/or reviewing separately obtained history, documenting clinical information in the electronic  or other health record, independently interpreting results and communicating results to the patient/family/caregiver, or care coordinator.       Dre Mcclellan MD  [facility]    This note was generated with the assistance of ambient listening technology. Verbal consent was obtained by the patient and accompanying visitor(s) for the recording of patient appointment to facilitate this note. I attest to having reviewed and edited the generated note for accuracy, though some syntax or spelling errors may persist. Please contact the author of this note for any clarification.

## 2025-06-19 ENCOUNTER — PATIENT MESSAGE (OUTPATIENT)
Dept: PRIMARY CARE CLINIC | Facility: CLINIC | Age: 75
End: 2025-06-19
Payer: MEDICARE

## 2025-06-19 ENCOUNTER — TELEPHONE (OUTPATIENT)
Dept: PRIMARY CARE CLINIC | Facility: CLINIC | Age: 75
End: 2025-06-19
Payer: MEDICARE

## 2025-06-19 PROBLEM — F32.1 MAJOR DEPRESSIVE DISORDER, SINGLE EPISODE, MODERATE: Status: ACTIVE | Noted: 2025-06-19

## 2025-06-19 NOTE — ASSESSMENT & PLAN NOTE
Lab Results   Component Value Date    HGBA1C 5.2 06/05/2025    HGBA1C 5.3 08/19/2024    HGBA1C 7.0 (H) 02/01/2024    HGBA1C 6.7 (H) 08/01/2023    HGBA1C 6.9 (H) 07/19/2022     Lab Results   Component Value Date    EGFRNORACEVR >60 06/05/2025    EGFRNORACEVR 59.9 (A) 02/04/2025    EGFRNORACEVR >60.0 11/21/2024     Lab Results   Component Value Date    MICALBCREAT 34.7 (H) 08/23/2024     BP Readings from Last 5 Encounters:   06/18/25 106/65   05/14/25 105/65   03/11/25 (!) 102/50   02/21/25 110/70   02/04/25 100/60     - BP well controlled  - A1C and GFR stable on current therapies   - Reduced Mounjaro dose to 2.5mg INJ weekly  - Trend GFR UMAC  - Continue current regimen  - Low sugar/carb renal and cardiac friendly diet encouraged  - Aerobic exercise as tolerated, goal 150min/week  - Regular home BP and blood glucose checks ecouraged  - Complete annual eye & foot examinations  - Avoid nephrotoxic agents and renally dose medications

## 2025-06-19 NOTE — ASSESSMENT & PLAN NOTE
- Continue sertraline  - Discussed risks of starting or increasing SSRI dose, including sersatonin syndrome  - Discontinued therapies pt no longer taking  - Suggested a trial of bupropion 150mg qd along with sertraline 50mg qd  - Reviewed adverse effects including seizure risks  - Started bupropion 150mg qd  - Recommended follow up with in-clinic LCSW for mental health and therapy; referral placed  - Follow up with psych as indicated  - Future PHQ9/GAD7

## 2025-06-19 NOTE — TELEPHONE ENCOUNTER
Returned call to patient, pt reports she did  the mounjaro today. Clarified for pt bupropion 150mg qd. Pt verbalized she had 75 mg tabs and will take 2. Thanked RN for calling to help clarify for her.

## 2025-06-23 ENCOUNTER — PATIENT MESSAGE (OUTPATIENT)
Dept: PRIMARY CARE CLINIC | Facility: CLINIC | Age: 75
End: 2025-06-23
Payer: MEDICARE

## 2025-06-23 ENCOUNTER — TELEPHONE (OUTPATIENT)
Dept: PRIMARY CARE CLINIC | Facility: CLINIC | Age: 75
End: 2025-06-23
Payer: MEDICARE

## 2025-06-23 NOTE — TELEPHONE ENCOUNTER
"Received VM from patient on 6/20 stating Dr. Mcclellan referred her and she is interested in getting scheduled. LCSW contacted patient regarding referral from Dr. Mcclellan for behavioral health services.  LCSW explained the role of therapist.  Patient verbalized understanding of service provided and was in agreement to schedule an initial appointment.  Patient is not a "morning person."  She is scheduled for 7/2 at 1pm for intake. Patient was encouraged to contact LCSW with any need to reschedule.    "

## 2025-06-24 DIAGNOSIS — E78.5 HYPERLIPIDEMIA, UNSPECIFIED HYPERLIPIDEMIA TYPE: Primary | ICD-10-CM

## 2025-06-24 RX ORDER — ROSUVASTATIN CALCIUM 40 MG/1
40 TABLET, COATED ORAL
Qty: 90 TABLET | Refills: 2 | Status: SHIPPED | OUTPATIENT
Start: 2025-06-24

## 2025-07-02 ENCOUNTER — TELEPHONE (OUTPATIENT)
Dept: PRIMARY CARE CLINIC | Facility: CLINIC | Age: 75
End: 2025-07-02
Payer: MEDICARE

## 2025-07-02 NOTE — TELEPHONE ENCOUNTER
VM from patient cancelling appt for this afternoon due to being in pain from dental work.  Would like to reschedule intake session.     LCSW spoke with patient and she is rescheduled for 7/14.

## 2025-07-09 ENCOUNTER — PATIENT MESSAGE (OUTPATIENT)
Dept: PRIMARY CARE CLINIC | Facility: CLINIC | Age: 75
End: 2025-07-09
Payer: MEDICARE

## 2025-07-09 DIAGNOSIS — F41.9 ANXIETY: Primary | ICD-10-CM

## 2025-07-09 DIAGNOSIS — F33.1 MODERATE EPISODE OF RECURRENT MAJOR DEPRESSIVE DISORDER: ICD-10-CM

## 2025-07-09 DIAGNOSIS — G44.89 CHRONIC MIXED HEADACHE SYNDROME: ICD-10-CM

## 2025-07-09 DIAGNOSIS — R11.0 NAUSEA: ICD-10-CM

## 2025-07-09 RX ORDER — ALPRAZOLAM 0.25 MG/1
TABLET ORAL
Qty: 90 TABLET | Refills: 0 | Status: SHIPPED | OUTPATIENT
Start: 2025-07-09

## 2025-07-09 RX ORDER — BUTALBITAL, ACETAMINOPHEN AND CAFFEINE 50; 325; 40 MG/1; MG/1; MG/1
1 TABLET ORAL EVERY 4 HOURS PRN
Qty: 30 TABLET | Refills: 0 | Status: SHIPPED | OUTPATIENT
Start: 2025-07-09

## 2025-07-09 RX ORDER — ONDANSETRON 4 MG/1
4 TABLET, ORALLY DISINTEGRATING ORAL EVERY 8 HOURS PRN
Qty: 30 TABLET | Refills: 3 | Status: SHIPPED | OUTPATIENT
Start: 2025-07-09

## 2025-07-10 RX ORDER — DULOXETIN HYDROCHLORIDE 60 MG/1
60 CAPSULE, DELAYED RELEASE ORAL DAILY
Qty: 90 CAPSULE | Refills: 3 | Status: SHIPPED | OUTPATIENT
Start: 2025-07-10 | End: 2026-07-10

## 2025-07-11 DIAGNOSIS — I12.9 TYPE 2 DM WITH CKD STAGE 3 AND HYPERTENSION: ICD-10-CM

## 2025-07-11 DIAGNOSIS — E11.22 TYPE 2 DM WITH CKD STAGE 3 AND HYPERTENSION: ICD-10-CM

## 2025-07-11 DIAGNOSIS — N18.30 TYPE 2 DM WITH CKD STAGE 3 AND HYPERTENSION: ICD-10-CM

## 2025-07-11 RX ORDER — TIRZEPATIDE 2.5 MG/.5ML
2.5 INJECTION, SOLUTION SUBCUTANEOUS
Qty: 4 PEN | Refills: 0 | Status: SHIPPED | OUTPATIENT
Start: 2025-07-11

## 2025-07-14 ENCOUNTER — CLINICAL SUPPORT (OUTPATIENT)
Dept: PRIMARY CARE CLINIC | Facility: CLINIC | Age: 75
End: 2025-07-14
Payer: MEDICARE

## 2025-07-14 DIAGNOSIS — F32.A ANXIETY AND DEPRESSION: ICD-10-CM

## 2025-07-14 DIAGNOSIS — F41.9 ANXIETY AND DEPRESSION: ICD-10-CM

## 2025-07-14 PROCEDURE — 99499 UNLISTED E&M SERVICE: CPT | Mod: HCNC,S$GLB,,

## 2025-07-14 PROCEDURE — 99999 PR PBB SHADOW E&M-EST. PATIENT-LVL I: CPT | Mod: PBBFAC,HCNC,,
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
## 2025-07-22 ENCOUNTER — DOCUMENTATION ONLY (OUTPATIENT)
Dept: PRIMARY CARE CLINIC | Facility: CLINIC | Age: 75
End: 2025-07-22
Payer: MEDICARE

## 2025-07-22 ENCOUNTER — OFFICE VISIT (OUTPATIENT)
Dept: PRIMARY CARE CLINIC | Facility: CLINIC | Age: 75
End: 2025-07-22
Payer: MEDICARE

## 2025-07-22 VITALS
DIASTOLIC BLOOD PRESSURE: 60 MMHG | BODY MASS INDEX: 21.74 KG/M2 | SYSTOLIC BLOOD PRESSURE: 104 MMHG | OXYGEN SATURATION: 96 % | WEIGHT: 111.31 LBS | HEART RATE: 64 BPM

## 2025-07-22 DIAGNOSIS — F41.9 ANXIETY: Primary | ICD-10-CM

## 2025-07-22 DIAGNOSIS — I10 PRIMARY HYPERTENSION: ICD-10-CM

## 2025-07-22 PROCEDURE — 4010F ACE/ARB THERAPY RXD/TAKEN: CPT | Mod: CPTII,HCNC,S$GLB,

## 2025-07-22 PROCEDURE — 1101F PT FALLS ASSESS-DOCD LE1/YR: CPT | Mod: CPTII,HCNC,S$GLB,

## 2025-07-22 PROCEDURE — 3078F DIAST BP <80 MM HG: CPT | Mod: CPTII,HCNC,S$GLB,

## 2025-07-22 PROCEDURE — 99999 PR PBB SHADOW E&M-EST. PATIENT-LVL IV: CPT | Mod: PBBFAC,HCNC,,

## 2025-07-22 PROCEDURE — 3008F BODY MASS INDEX DOCD: CPT | Mod: CPTII,HCNC,S$GLB,

## 2025-07-22 PROCEDURE — 3288F FALL RISK ASSESSMENT DOCD: CPT | Mod: CPTII,HCNC,S$GLB,

## 2025-07-22 PROCEDURE — 1126F AMNT PAIN NOTED NONE PRSNT: CPT | Mod: CPTII,HCNC,S$GLB,

## 2025-07-22 PROCEDURE — 1160F RVW MEDS BY RX/DR IN RCRD: CPT | Mod: CPTII,HCNC,S$GLB,

## 2025-07-22 PROCEDURE — 99214 OFFICE O/P EST MOD 30 MIN: CPT | Mod: HCNC,S$GLB,,

## 2025-07-22 PROCEDURE — 3044F HG A1C LEVEL LT 7.0%: CPT | Mod: CPTII,HCNC,S$GLB,

## 2025-07-22 PROCEDURE — 3074F SYST BP LT 130 MM HG: CPT | Mod: CPTII,HCNC,S$GLB,

## 2025-07-22 PROCEDURE — 1159F MED LIST DOCD IN RCRD: CPT | Mod: CPTII,HCNC,S$GLB,

## 2025-07-22 RX ORDER — ALPRAZOLAM 0.25 MG/1
TABLET ORAL
Start: 2025-07-22

## 2025-07-23 RX ORDER — ACETAMINOPHEN AND CODEINE PHOSPHATE 300; 30 MG/1; MG/1
1 TABLET ORAL EVERY 6 HOURS PRN
COMMUNITY
Start: 2025-06-11 | End: 2025-07-23

## 2025-07-23 NOTE — ASSESSMENT & PLAN NOTE
BP Readings from Last 5 Encounters:   07/22/25 104/60   06/18/25 106/65   05/14/25 105/65   03/11/25 (!) 102/50   02/21/25 110/70   - BP well managed on current antihypertensive medications  - Continue current BP regimen  - Daily home BP checks/logs  - Cardiac diet encouraged  - Aerobic exercise as tolerated, goal 150min/week.

## 2025-07-23 NOTE — PROGRESS NOTES
"  [facility]    Subjective     Patient Name: Jayla Loyd  YOB: 1950  Patient Age: 74 y.o.  Patient Sex: female  Patient Phone: [phone]  PCP: Dre Mcclellan MD  Last PCP Appointment: 7/22/2025    Ms. Jayla Loyd is a 74 y.o. female who was seen in clinic today for scheduled follow up.    History of Present Illness    CHIEF COMPLAINT:  Ms. Loyd presents today for follow up    MEDICATIONS:  She is currently taking Mounjaro 2.5 mg without side effects or significant weight loss, Xanax 2.5 mg at night, Propranolol 20 mg every morning for finger tremor, and Telmisartan half dose at night for proteinuria. She discontinued Wellbutrin and Zoloft due to feeling "weird". Also stopped Meclizine and tapering off Amitriptyline. Cymbalta 60mg continued for anxiety and depression symptoms.    SLEEP:  She reports irregular sleep patterns, going to bed around 12:30 AM, briefly waking at 7 AM when partner leaves, then sleeping for an additional hour. She experiences excessive daytime sleepiness which she attributes to Xanax 2.5 mg. She is hesitant to discontinue the medication but considering adjusting the timing.    NEUROLOGICAL:  She experiences intermittent whole body vibration sensation upon waking. A neurologist has ruled out Parkinson's disease. She reports hand tremor when holding phone for extended periods. She notes a black spot in her peripheral left visual field, which is not a floater. Recent eye exam in February/March revealed no significant findings. No reports of incontinence, LOC, tongue biting, chest pains, or palpitations.    BLOOD PRESSURE:  Home blood pressure measurements consistently range between 104/60 and 110/65, lower than her historical baseline of 118/70. Her average systolic typically ranges 110-115. She denies symptoms of orthostatic hypotension or related complications.    MUSCULOSKELETAL:  She reports ongoing discomfort from bilateral shoulder replacements, " with persistent pain despite protein supplementation and vitamins. Current shoulder function is not meeting expectations.    PSYCH:  Pt followed up with in-clinic LCSW. Symptoms of anxiety and depression controlled with cymbalta; wellbutrin and zoloft discontinued. Overall symptom management reported to be better controlled. Both medication and therapy noted to have contributed modest improvement.    FAMILY HISTORY:  Mother has history of bladder cancer, diagnosed while being a heavy smoker.    Answers submitted by the patient for this visit:  Review of Systems Questionnaire (Submitted on 7/15/2025)  activity change: No  unexpected weight change: No  neck pain: No  hearing loss: No  rhinorrhea: Yes  trouble swallowing: No  eye discharge: Yes  visual disturbance: No  chest tightness: No  wheezing: No  chest pain: No  palpitations: No  blood in stool: No  constipation: No  vomiting: No  diarrhea: No  polydipsia: No  polyuria: No  difficulty urinating: No  hematuria: No  menstrual problem: No  dysuria: No  joint swelling: No  arthralgias: No  headaches: Yes  weakness: No  confusion: No  dysphoric mood: Yes          Health Maintenance Summary            Current Care Gaps       TETANUS VACCINE (Every 10 Years) Overdue since 2/1/2008 02/01/1998  Imm Admin: Td - PF (ADULT)    02/01/1998  Imm Admin: Td (ADULT)              Diabetic Eye Exam (Yearly) Overdue since 2/20/2025 02/20/2024  Outside Claim: LA EYE EXAM, NEW PATIENT,COMPREHESV    01/04/2024  Outside Claim: LA FUNDAL PHOTOGRAPHY    01/04/2024  Outside Claim: LA EYE EXAM, EST PATIENT,COMPREHESV    03/09/2023  Outside Claim: LA EYE EXAM, EST PATIENT,COMPREHESV    07/22/2021  Outside Claim: LA EYE EXAM, EST PATIENT,COMPREHESV     Only the first 5 history entries have been loaded, but more history exists.                    Upcoming       Diabetes Urine Screening (Yearly) Next due on 8/23/2025 08/23/2024  MICROALB/CREAT RATIO component of  Microalbumin/Creatinine Ratio, Urine    08/01/2023  MICROALB/CREAT RATIO component of Microalbumin/Creatinine Ratio, Urine    07/19/2022  MICROALB/CREAT RATIO component of MICROALBUMIN / CREATININE RATIO URINE    11/12/2020  MICROALB/CREAT RATIO component of Microalbumin/creatinine urine ratio    05/14/2018  MICROALB/CREAT RATIO component of Microalbumin, Random Urine (w/Creatinine)      Only the first 5 history entries have been loaded, but more history exists.              Hemoglobin A1c (Every 6 Months) Next due on 12/5/2025 06/05/2025  Hemoglobin A1C External component of Hemoglobin A1C    08/19/2024  Hemoglobin A1C External component of Hemoglobin A1C    02/01/2024  Hemoglobin A1C External component of Hemoglobin A1C    02/01/2024  Hemoglobin A1c    08/01/2023  Hemoglobin A1C External component of Hemoglobin A1C      Only the first 5 history entries have been loaded, but more history exists.              Mammogram (Yearly) Next due on 12/12/2025 12/12/2024  Mammo Digital Screening Bilat w/ Raul    12/11/2023  Mammo Digital Screening Bilat w/ Raul    12/08/2022  Mammo Digital Screening Bilat w/ Raul    08/10/2021  HM MAMMOGRAPHY    07/10/2020  Mammo Digital Diagnostic Bilat w/ Raul      Only the first 5 history entries have been loaded, but more history exists.              Lipid Panel (Yearly) Next due on 2/4/2026 02/04/2025  Cholesterol Total component of Lipid Panel    11/21/2024  Cholesterol Total component of Lipid Panel    08/19/2024  Cholesterol Total component of Lipid Panel    08/01/2023  Cholesterol Total component of Lipid Panel    03/07/2022  Cholesterol Total component of Lipid Panel      Only the first 5 history entries have been loaded, but more history exists.              Foot Exam (Yearly) Next due on 3/11/2026      03/11/2025  Done    09/03/2020  Smart[facility]: WORKFLOW - DIABETES - DIABETIC FOOT EXAM PERFORMED    10/31/2018  SmartData: WORKFLOW - DIABETES - DIABETIC FOOT EXAM  PERFORMED              DEXA Scan (Every 2 Years) Next due on 12/12/2026 12/12/2024  DXA Bone Density Axial Skeleton 1 or more sites    08/11/2020   DEXA SCAN    08/14/2018  DXA Bone Density Spine And Hip    02/19/2016  Outside Procedure: CHG DEXA,BONE DENSITY, 1 + SITE, AXIAL SKELETON              Colorectal Cancer Screening (Colonoscopy - Every 5 Years) Next due on 2/8/2027 02/08/2022   COLONOSCOPY    02/02/2017   COLONOSCOPY                      Completed or No Longer Recommended       Hepatitis C Screening  Completed      07/26/2018  Hepatitis C Ab component of Hepatitis C antibody              Shingles Vaccine  Discontinued      No completion history exists for this topic.              Influenza Vaccine  Discontinued      01/06/2022  Smart[employer]: WORKFLOW - HEALTHY PLANET - EXTERNAL DATA - EXTERNAL PROCEDURE DATE - INFLUENZA    12/02/2014  Imm Admin: Influenza - Trivalent - Afluria, Fluzone MDV    12/02/2014  Imm Admin: Influenza Split    12/02/2014  Imm Admin: Influenza    10/25/2013  Imm Admin: Influenza - Trivalent - Afluria, Fluzone MDV     Only the first 5 history entries have been loaded, but more history exists.            COVID-19 Vaccine  Discontinued      12/17/2021  Imm Admin: COVID-19, MRNA, LN-S, PF (MODERNA FULL 0.5 ML DOSE)    03/19/2021  Imm Admin: COVID-19, MRNA, LN-S, PF (MODERNA FULL 0.5 ML DOSE)    02/18/2021  Imm Admin: COVID-19, MRNA, LN-S, PF (MODERNA FULL 0.5 ML DOSE)              RSV Vaccine (Age 60+ and Pregnant patients)  Discontinued      No completion history exists for this topic.              Pneumococcal Vaccines (Age 50+)  Discontinued     No completion history exists for this topic.                            Prior to Admission medications    Medication Sig Start Date End Date Taking? Authorizing Provider   apixaban (ELIQUIS) 5 mg Tab Take 1 tablet (5 mg total) by mouth 2 (two) times daily. 1/9/25  Yes Edmond Sifuentes MD PhD   biotin 5 mg Cap Take 5 mg by  mouth once daily.   Yes Provider, Historical   blood sugar diagnostic (TRUE METRIX GLUCOSE TEST STRIP) Strp 1 strip by Misc.(Non-Drug; Combo Route) route once daily. 7/17/24  Yes Geena Barnard MD butalbital-acetaminophen-caffeine -40 mg (FIORICET, ESGIC) -40 mg per tablet Take 1 tablet by mouth every 7 days.   Yes Provider, Historical   butalbital-acetaminophen-caffeine -40 mg (FIORICET, ESGIC) -40 mg per tablet Take 1 tablet by mouth every 4 (four) hours as needed for Headaches. 7/9/25  Yes Dre Mcclellan MD   calcium citrate-vitamin D3 315-200 mg (CITRACAL+D) 315 mg-5 mcg (200 unit) per tablet Take 1 tablet by mouth once daily.   Yes Provider, Historical   dicyclomine (BENTYL) 10 MG capsule Take 1 capsule (10 mg total) by mouth 4 (four) times daily as needed (abdominal discomfort). 4/17/25  Yes Dre Mcclellan MD   DULoxetine (CYMBALTA) 60 MG capsule Take 1 capsule (60 mg total) by mouth once daily. 7/10/25 7/10/26 Yes Dre Mcclellan MD   esomeprazole (NEXIUM) 40 MG capsule TAKE 1 CAPSULE BY MOUTH TWICE DAILY BEFORE MEALS 4/1/25  Yes Dre Mcclellan MD   folic acid (FOLVITE) 400 MCG tablet Take 400 mcg by mouth once daily.   Yes Provider, Historical   Lactobac no.41/Bifidobact no.7 (PROBIOTIC-10 ORAL) Take by mouth once daily.   Yes Provider, Historical   lancets (LANCETS,THIN) Misc 1 Lancet by Misc.(Non-Drug; Combo Route) route once daily. 7/17/24  Yes Geena Barnard MD   LINZESS 72 mcg Cap capsule Take 72 mcg by mouth before breakfast. 4/21/25  Yes Provider, Historical   methocarbamoL (ROBAXIN) 500 MG Tab Take 1 tablet (500 mg total) by mouth 3 (three) times daily as needed (pain). 7/8/24  Yes Geena Barnard MD   mupirocin (BACTROBAN) 2 % ointment Apply topically 3 (three) times daily. 7/17/24  Yes Geena Barnard MD   nystatin (MYCOSTATIN) powder Apply topically 4 (four) times daily. 2/28/24  Yes Mana Pulido MD   ondansetron (ZOFRAN-ODT) 4 MG TbDL Take 1 tablet (4 mg  total) by mouth every 8 (eight) hours as needed (nausea). 7/9/25  Yes Dre Mcclellan MD   promethazine-dextromethorphan (PROMETHAZINE-DM) 6.25-15 mg/5 mL Syrp Take by mouth every 8 (eight) hours as needed. 2/5/25  Yes Provider, Historical   propranoloL (INDERAL) 20 MG tablet Take 20 mg by mouth as needed. 1/31/24  Yes Provider, Historical   rosuvastatin (CRESTOR) 40 MG Tab TAKE 1 TABLET BY MOUTH ONCE DAILY 6/24/25  Yes Dre Mcclellan MD   telmisartan (MICARDIS) 20 MG Tab Take 1 tablet (20 mg total) by mouth once daily. 4/21/25  Yes Dre Mcclellan MD   tirzepatide (MOUNJARO) 2.5 mg/0.5 mL PnIj Inject 2.5 mg into the skin every 7 days. 7/11/25  Yes Dre Mcclellan MD   traMADoL (ULTRAM) 50 mg tablet Take 1 tablet (50 mg total) by mouth every 8 (eight) hours as needed for Pain. 7/8/24  Yes Ehsan Falk MD   ALPRAZolam (XANAX) 0.25 MG tablet Take 0.5mg as needed for daytime anxiety and 0.25mg for nighttime anxiety 7/9/25 7/22/25 Yes Dre Mcclellan MD   ALPRAZolam (XANAX) 0.25 MG tablet Take 0.25mg for nighttime anxiety 7/22/25   Dre Mcclellan MD   blood-glucose meter (TRUE METRIX GLUCOSE METER) kit Use as instructed 7/17/24 7/16/25  Geena Barnard MD   amitriptyline (ELAVIL) 25 MG tablet Take 1 tablet (25 mg total) by mouth nightly as needed for Insomnia. 11/11/24 7/22/25  Geena Barnard MD       OBJECTIVE:     Vitals:    07/22/25 1258   BP: 104/60   BP Location: Right arm   Patient Position: Sitting   Pulse: 64   SpO2: 96%   Weight: 50.5 kg (111 lb 5.3 oz)       Body mass index is 21.74 kg/m².     PHYSICAL EXAM  GEN - A+OX4, NAD   HEENT - PERRL, EOMI  Neck - No thyromegaly or cervical LAD.   CV - RRR, no m/r   Chest - CTAB, no wheezing or rhonchi  Abd - S/NT/ND/+BS.   Ext - 2+BDP and radial pulses. No BLE edema.  MSK - normal ROM, no tenderness  Neuro - 5/5 BUE and BLE strength.  LN - No axillary or inguinal LAD appreciated.  Skin - No rash.   Psych - mild anxiety    LABS  Lab Results   Component Value Date     WBC 8.57 02/04/2025    HGB 14.4 02/04/2025    HCT 45.3 02/04/2025    MCV 93 02/04/2025     02/04/2025         CMP  Sodium   Date Value Ref Range Status   06/05/2025 141 136 - 145 mmol/L Final   02/04/2025 143 136 - 145 mmol/L Final     Potassium   Date Value Ref Range Status   06/05/2025 4.6 3.5 - 5.1 mmol/L Final   02/04/2025 5.6 (H) 3.5 - 5.1 mmol/L Final     Comment:     *No Visible Hemolysis     Chloride   Date Value Ref Range Status   06/05/2025 107 95 - 110 mmol/L Final   02/04/2025 102 95 - 110 mmol/L Final     CO2   Date Value Ref Range Status   06/05/2025 26 23 - 29 mmol/L Final   02/04/2025 25 23 - 29 mmol/L Final     Glucose   Date Value Ref Range Status   06/05/2025 97 70 - 110 mg/dL Final   02/04/2025 102 70 - 110 mg/dL Final     BUN   Date Value Ref Range Status   06/05/2025 18 8 - 23 mg/dL Final     Creatinine   Date Value Ref Range Status   06/05/2025 0.8 0.5 - 1.4 mg/dL Final     Calcium   Date Value Ref Range Status   06/05/2025 9.4 8.7 - 10.5 mg/dL Final   02/04/2025 11.0 (H) 8.7 - 10.5 mg/dL Final     Protein Total   Date Value Ref Range Status   06/05/2025 6.8 6.0 - 8.4 gm/dL Final     Total Protein   Date Value Ref Range Status   02/04/2025 8.1 6.0 - 8.4 g/dL Final     Albumin   Date Value Ref Range Status   06/05/2025 3.9 3.5 - 5.2 g/dL Final   02/04/2025 4.2 3.5 - 5.2 g/dL Final     Total Bilirubin   Date Value Ref Range Status   02/04/2025 0.4 0.1 - 1.0 mg/dL Final     Comment:     For infants and newborns, interpretation of results should be based  on gestational age, weight and in agreement with clinical  observations.    Premature Infant recommended reference ranges:  Up to 24 hours.............<8.0 mg/dL  Up to 48 hours............<12.0 mg/dL  3-5 days..................<15.0 mg/dL  6-29 days.................<15.0 mg/dL       Bilirubin Total   Date Value Ref Range Status   06/05/2025 0.3 0.1 - 1.0 mg/dL Final     Comment:     For infants and newborns, interpretation of results  should be based   on gestational age, weight and in agreement with clinical   observations.    Premature Infant recommended reference ranges:   0-24 hours:  <8.0 mg/dL   24-48 hours: <12.0 mg/dL   3-5 days:    <15.0 mg/dL   6-29 days:   <15.0 mg/dL     Alkaline Phosphatase   Date Value Ref Range Status   02/04/2025 96 40 - 150 U/L Final     ALP   Date Value Ref Range Status   06/05/2025 77 40 - 150 unit/L Final     AST   Date Value Ref Range Status   06/05/2025 20 11 - 45 unit/L Final   02/04/2025 20 10 - 40 U/L Final     ALT   Date Value Ref Range Status   06/05/2025 25 10 - 44 unit/L Final   02/04/2025 16 10 - 44 U/L Final     Anion Gap   Date Value Ref Range Status   06/05/2025 8 8 - 16 mmol/L Final     eGFR   Date Value Ref Range Status   06/05/2025 >60 >60 mL/min/1.73/m2 Final     Comment:     Estimated GFR calculated using the CKD-EPI creatinine (2021) equation.   02/04/2025 59.9 (A) >60 mL/min/1.73 m^2 Final       ASSESSMENT & PLAN:   Ms. Jayla Loyd is a 74 y.o. female who was seen in clinic today for scheduled follow up. SBP consistently in 110s to 115s range with periodic readings in the lower 100s. Current readings do not warrant immediate medication changes. Continued telmisartan at current dose (half tablet nightly) for kidney protection and proteinuria. Renal function improved since 2021, but protein still high in last urinalysis. Sleep patterns evaluated with noted decreased use of Xanax. Mounjaro 2.5 mg effective; patient maintaining weight without side effects. Anxiety assessed with noted improvement on Zoloft. Pt following up with in-clinic LCSW. Will schedule clinic follow up in 2 months.         1. Anxiety  Overview:  - Pt reports symptoms of MDD and Anxiety are better controlled on cymbalta 60mg qd  - PRN Xanax 0.25mg taken qhs; previously taken 0.5mg qd + 0.25mg qhs    Assessment & Plan:  - Continue current therapy  - Follow up with clinic LCSW    Orders:  -     ALPRAZolam (XANAX) 0.25  MG tablet; Take 0.25mg for nighttime anxiety    2. Primary hypertension  Overview:  - HTN controlled on propranolol 20mg qd and telmisartan 20mg qd   - Propranolol taken for essential tremor and telmisartan taken for proteinuria.    Assessment & Plan:  BP Readings from Last 5 Encounters:   07/22/25 104/60   06/18/25 106/65   05/14/25 105/65   03/11/25 (!) 102/50   02/21/25 110/70   - BP well managed on current antihypertensive medications  - Continue current BP regimen  - Daily home BP checks/logs  - Cardiac diet encouraged  - Aerobic exercise as tolerated, goal 150min/week.           Follow up in about 2 months (around 9/22/2025).     All questions answered. Pt to call/message the Primary Clinic for any additional concerns    I spent a total of 35 minutes on the day of the visit.      This includes face to face time and non-face to face time preparing to see the patient (eg, review of tests), obtaining and/or reviewing separately obtained history, documenting clinical information in the electronic or other health record, independently interpreting results and communicating results to the patient/family/caregiver, or care coordinator.       Dre Mcclellan MD  [facility] - [facility]    This note was generated with the assistance of ambient listening technology. Verbal consent was obtained by the patient and accompanying visitor(s) for the recording of patient appointment to facilitate this note. I attest to having reviewed and edited the generated note for accuracy, though some syntax or spelling errors may persist. Please contact the author of this note for any clarification.

## 2025-07-24 DIAGNOSIS — G44.89 CHRONIC MIXED HEADACHE SYNDROME: ICD-10-CM

## 2025-07-24 RX ORDER — BUTALBITAL, ACETAMINOPHEN AND CAFFEINE 50; 325; 40 MG/1; MG/1; MG/1
1 TABLET ORAL EVERY 4 HOURS PRN
Qty: 30 TABLET | Refills: 0 | Status: SHIPPED | OUTPATIENT
Start: 2025-07-24

## 2025-08-05 ENCOUNTER — PATIENT MESSAGE (OUTPATIENT)
Dept: PRIMARY CARE CLINIC | Facility: CLINIC | Age: 75
End: 2025-08-05
Payer: MEDICARE

## 2025-08-05 DIAGNOSIS — N18.30 TYPE 2 DM WITH CKD STAGE 3 AND HYPERTENSION: ICD-10-CM

## 2025-08-05 DIAGNOSIS — I12.9 TYPE 2 DM WITH CKD STAGE 3 AND HYPERTENSION: ICD-10-CM

## 2025-08-05 DIAGNOSIS — E11.22 TYPE 2 DM WITH CKD STAGE 3 AND HYPERTENSION: ICD-10-CM

## 2025-08-05 RX ORDER — TIRZEPATIDE 2.5 MG/.5ML
2.5 INJECTION, SOLUTION SUBCUTANEOUS
Qty: 4 PEN | Refills: 0 | Status: SHIPPED | OUTPATIENT
Start: 2025-08-05

## 2025-08-11 DIAGNOSIS — G44.89 CHRONIC MIXED HEADACHE SYNDROME: ICD-10-CM

## 2025-08-11 RX ORDER — BUTALBITAL, ACETAMINOPHEN AND CAFFEINE 50; 325; 40 MG/1; MG/1; MG/1
1 TABLET ORAL EVERY 4 HOURS PRN
Qty: 30 TABLET | Refills: 0 | Status: SHIPPED | OUTPATIENT
Start: 2025-08-11

## 2025-08-12 DIAGNOSIS — R11.0 NAUSEA: ICD-10-CM

## 2025-08-12 RX ORDER — DICYCLOMINE HYDROCHLORIDE 10 MG/1
CAPSULE ORAL
Qty: 60 CAPSULE | Refills: 1 | Status: SHIPPED | OUTPATIENT
Start: 2025-08-12

## 2025-08-26 DIAGNOSIS — G44.89 CHRONIC MIXED HEADACHE SYNDROME: ICD-10-CM

## 2025-08-26 RX ORDER — BUTALBITAL, ACETAMINOPHEN AND CAFFEINE 50; 325; 40 MG/1; MG/1; MG/1
1 TABLET ORAL EVERY 4 HOURS PRN
Qty: 30 TABLET | Refills: 0 | Status: SHIPPED | OUTPATIENT
Start: 2025-08-26

## 2025-08-30 DIAGNOSIS — R11.0 NAUSEA: ICD-10-CM

## 2025-09-02 RX ORDER — ONDANSETRON 4 MG/1
TABLET, ORALLY DISINTEGRATING ORAL
Qty: 30 TABLET | Refills: 3 | Status: SHIPPED | OUTPATIENT
Start: 2025-09-02

## 2025-09-03 ENCOUNTER — TELEPHONE (OUTPATIENT)
Dept: PRIMARY CARE CLINIC | Facility: CLINIC | Age: 75
End: 2025-09-03
Payer: MEDICARE

## 2025-09-03 DIAGNOSIS — M19.212 OTHER SECONDARY OSTEOARTHRITIS OF BOTH SHOULDERS: ICD-10-CM

## 2025-09-03 DIAGNOSIS — M19.211 OTHER SECONDARY OSTEOARTHRITIS OF BOTH SHOULDERS: ICD-10-CM

## 2025-09-03 RX ORDER — TRAMADOL HYDROCHLORIDE 50 MG/1
50 TABLET, FILM COATED ORAL EVERY 6 HOURS PRN
Qty: 21 TABLET | Refills: 0 | Status: SHIPPED | OUTPATIENT
Start: 2025-09-03

## 2025-09-05 DIAGNOSIS — E11.22 TYPE 2 DM WITH CKD STAGE 3 AND HYPERTENSION: ICD-10-CM

## 2025-09-05 DIAGNOSIS — N18.30 TYPE 2 DM WITH CKD STAGE 3 AND HYPERTENSION: ICD-10-CM

## 2025-09-05 DIAGNOSIS — I12.9 TYPE 2 DM WITH CKD STAGE 3 AND HYPERTENSION: ICD-10-CM

## 2025-09-05 RX ORDER — TIRZEPATIDE 2.5 MG/.5ML
2.5 INJECTION, SOLUTION SUBCUTANEOUS
Qty: 4 PEN | Refills: 0 | Status: SHIPPED | OUTPATIENT
Start: 2025-09-05